# Patient Record
Sex: MALE | Race: WHITE | NOT HISPANIC OR LATINO | Employment: OTHER | URBAN - METROPOLITAN AREA
[De-identification: names, ages, dates, MRNs, and addresses within clinical notes are randomized per-mention and may not be internally consistent; named-entity substitution may affect disease eponyms.]

---

## 2018-10-19 ENCOUNTER — HOSPITAL ENCOUNTER (OUTPATIENT)
Facility: MEDICAL CENTER | Age: 73
DRG: 254 | End: 2018-10-19
Payer: MEDICARE

## 2018-10-19 ENCOUNTER — HOSPITAL ENCOUNTER (OUTPATIENT)
Dept: RADIOLOGY | Facility: MEDICAL CENTER | Age: 73
End: 2018-10-19

## 2018-10-19 ENCOUNTER — HOSPITAL ENCOUNTER (INPATIENT)
Facility: MEDICAL CENTER | Age: 73
LOS: 7 days | DRG: 254 | End: 2018-10-26
Admitting: INTERNAL MEDICINE
Payer: MEDICARE

## 2018-10-19 DIAGNOSIS — I99.8 ISCHEMIA OF TOE: ICD-10-CM

## 2018-10-19 PROBLEM — L98.8 MORGELLONS SYNDROME: Status: ACTIVE | Noted: 2018-10-19

## 2018-10-19 PROBLEM — I73.9 PAD (PERIPHERAL ARTERY DISEASE) (HCC): Status: ACTIVE | Noted: 2018-10-19

## 2018-10-19 PROBLEM — E87.6 HYPOKALEMIA: Status: ACTIVE | Noted: 2018-10-19

## 2018-10-19 PROBLEM — L03.116 CELLULITIS OF LEFT LOWER EXTREMITY: Status: ACTIVE | Noted: 2018-10-19

## 2018-10-19 LAB
APTT PPP: 35.9 SEC (ref 24.7–36)
INR PPP: 1.08 (ref 0.87–1.13)
PLATELET # BLD AUTO: 291 K/UL (ref 164–446)
PROTHROMBIN TIME: 14.1 SEC (ref 12–14.6)

## 2018-10-19 PROCEDURE — 700101 HCHG RX REV CODE 250: Performed by: HOSPITALIST

## 2018-10-19 PROCEDURE — 85610 PROTHROMBIN TIME: CPT

## 2018-10-19 PROCEDURE — 36415 COLL VENOUS BLD VENIPUNCTURE: CPT

## 2018-10-19 PROCEDURE — 85730 THROMBOPLASTIN TIME PARTIAL: CPT

## 2018-10-19 PROCEDURE — 700102 HCHG RX REV CODE 250 W/ 637 OVERRIDE(OP): Performed by: HOSPITALIST

## 2018-10-19 PROCEDURE — 700111 HCHG RX REV CODE 636 W/ 250 OVERRIDE (IP): Performed by: HOSPITALIST

## 2018-10-19 PROCEDURE — A9270 NON-COVERED ITEM OR SERVICE: HCPCS | Performed by: HOSPITALIST

## 2018-10-19 PROCEDURE — 99223 1ST HOSP IP/OBS HIGH 75: CPT | Mod: AI | Performed by: HOSPITALIST

## 2018-10-19 PROCEDURE — 85049 AUTOMATED PLATELET COUNT: CPT

## 2018-10-19 PROCEDURE — 770006 HCHG ROOM/CARE - MED/SURG/GYN SEMI*

## 2018-10-19 RX ORDER — BRIMONIDINE TARTRATE 2 MG/ML
1 SOLUTION/ DROPS OPHTHALMIC 2 TIMES DAILY
Status: ON HOLD | COMMUNITY
End: 2019-07-08

## 2018-10-19 RX ORDER — MULTIVIT WITH MINERALS/LUTEIN
1 TABLET ORAL DAILY
COMMUNITY

## 2018-10-19 RX ORDER — ACETAMINOPHEN 325 MG/1
650 TABLET ORAL EVERY 6 HOURS PRN
Status: DISCONTINUED | OUTPATIENT
Start: 2018-10-19 | End: 2018-10-26 | Stop reason: HOSPADM

## 2018-10-19 RX ORDER — ONDANSETRON 2 MG/ML
4 INJECTION INTRAMUSCULAR; INTRAVENOUS EVERY 4 HOURS PRN
Status: DISCONTINUED | OUTPATIENT
Start: 2018-10-19 | End: 2018-10-26 | Stop reason: HOSPADM

## 2018-10-19 RX ORDER — BISACODYL 10 MG
10 SUPPOSITORY, RECTAL RECTAL
Status: DISCONTINUED | OUTPATIENT
Start: 2018-10-19 | End: 2018-10-26 | Stop reason: HOSPADM

## 2018-10-19 RX ORDER — OXYCODONE HYDROCHLORIDE 5 MG/1
5 TABLET ORAL EVERY 4 HOURS PRN
Status: DISCONTINUED | OUTPATIENT
Start: 2018-10-19 | End: 2018-10-20

## 2018-10-19 RX ORDER — BRIMONIDINE TARTRATE 2 MG/ML
1 SOLUTION/ DROPS OPHTHALMIC 2 TIMES DAILY
Status: DISCONTINUED | OUTPATIENT
Start: 2018-10-19 | End: 2018-10-26 | Stop reason: HOSPADM

## 2018-10-19 RX ORDER — HEPARIN SODIUM 1000 [USP'U]/ML
2600 INJECTION, SOLUTION INTRAVENOUS; SUBCUTANEOUS PRN
Status: DISCONTINUED | OUTPATIENT
Start: 2018-10-19 | End: 2018-10-20

## 2018-10-19 RX ORDER — HEPARIN SODIUM 1000 [USP'U]/ML
5000 INJECTION, SOLUTION INTRAVENOUS; SUBCUTANEOUS ONCE
Status: COMPLETED | OUTPATIENT
Start: 2018-10-19 | End: 2018-10-19

## 2018-10-19 RX ORDER — POLYETHYLENE GLYCOL 3350 17 G/17G
1 POWDER, FOR SOLUTION ORAL
Status: DISCONTINUED | OUTPATIENT
Start: 2018-10-19 | End: 2018-10-26 | Stop reason: HOSPADM

## 2018-10-19 RX ORDER — GUAIFENESIN/DEXTROMETHORPHAN 100-10MG/5
10 SYRUP ORAL EVERY 6 HOURS PRN
Status: DISCONTINUED | OUTPATIENT
Start: 2018-10-19 | End: 2018-10-26 | Stop reason: HOSPADM

## 2018-10-19 RX ORDER — ATORVASTATIN CALCIUM 20 MG/1
20 TABLET, FILM COATED ORAL NIGHTLY
Status: ON HOLD | COMMUNITY
End: 2019-07-08

## 2018-10-19 RX ORDER — LATANOPROST 50 UG/ML
1 SOLUTION/ DROPS OPHTHALMIC NIGHTLY
Status: ON HOLD | COMMUNITY
End: 2019-07-08

## 2018-10-19 RX ORDER — M-VIT,TX,IRON,MINS/CALC/FOLIC 27MG-0.4MG
1 TABLET ORAL DAILY
Status: DISCONTINUED | OUTPATIENT
Start: 2018-10-20 | End: 2018-10-26 | Stop reason: HOSPADM

## 2018-10-19 RX ORDER — AMOXICILLIN 250 MG
2 CAPSULE ORAL 2 TIMES DAILY
Status: DISCONTINUED | OUTPATIENT
Start: 2018-10-19 | End: 2018-10-26 | Stop reason: HOSPADM

## 2018-10-19 RX ORDER — ONDANSETRON 4 MG/1
4 TABLET, ORALLY DISINTEGRATING ORAL EVERY 4 HOURS PRN
Status: DISCONTINUED | OUTPATIENT
Start: 2018-10-19 | End: 2018-10-26 | Stop reason: HOSPADM

## 2018-10-19 RX ORDER — TIMOLOL MALEATE 5 MG/ML
1 SOLUTION/ DROPS OPHTHALMIC 2 TIMES DAILY
Status: ON HOLD | COMMUNITY
End: 2019-07-08

## 2018-10-19 RX ORDER — ATORVASTATIN CALCIUM 20 MG/1
20 TABLET, FILM COATED ORAL NIGHTLY
Status: DISCONTINUED | OUTPATIENT
Start: 2018-10-19 | End: 2018-10-26 | Stop reason: HOSPADM

## 2018-10-19 RX ORDER — TIMOLOL MALEATE 5 MG/ML
1 SOLUTION/ DROPS OPHTHALMIC 2 TIMES DAILY
Status: DISCONTINUED | OUTPATIENT
Start: 2018-10-19 | End: 2018-10-26 | Stop reason: HOSPADM

## 2018-10-19 RX ORDER — LATANOPROST 50 UG/ML
1 SOLUTION/ DROPS OPHTHALMIC NIGHTLY
Status: DISCONTINUED | OUTPATIENT
Start: 2018-10-19 | End: 2018-10-26 | Stop reason: HOSPADM

## 2018-10-19 RX ADMIN — HEPARIN SODIUM 1050 UNITS/HR: 5000 INJECTION, SOLUTION INTRAVENOUS at 20:42

## 2018-10-19 RX ADMIN — TIMOLOL MALEATE 1 DROP: 5 SOLUTION/ DROPS OPHTHALMIC at 20:26

## 2018-10-19 RX ADMIN — BRIMONIDINE TARTRATE 1 DROP: 2 SOLUTION OPHTHALMIC at 20:26

## 2018-10-19 RX ADMIN — ACETAMINOPHEN 650 MG: 325 TABLET, FILM COATED ORAL at 23:18

## 2018-10-19 RX ADMIN — LATANOPROST 1 DROP: 50 SOLUTION OPHTHALMIC at 20:41

## 2018-10-19 RX ADMIN — ATORVASTATIN CALCIUM 20 MG: 20 TABLET, FILM COATED ORAL at 20:27

## 2018-10-19 RX ADMIN — OXYCODONE HYDROCHLORIDE 5 MG: 5 TABLET ORAL at 20:27

## 2018-10-19 RX ADMIN — HEPARIN SODIUM 5000 UNITS: 1000 INJECTION, SOLUTION INTRAVENOUS; SUBCUTANEOUS at 20:41

## 2018-10-19 ASSESSMENT — COGNITIVE AND FUNCTIONAL STATUS - GENERAL
SUGGESTED CMS G CODE MODIFIER DAILY ACTIVITY: CH
DAILY ACTIVITIY SCORE: 24
SUGGESTED CMS G CODE MODIFIER MOBILITY: CH
MOBILITY SCORE: 24

## 2018-10-19 ASSESSMENT — PAIN SCALES - GENERAL
PAINLEVEL_OUTOF10: 3
PAINLEVEL_OUTOF10: 6

## 2018-10-19 ASSESSMENT — ENCOUNTER SYMPTOMS
PALPITATIONS: 0
NAUSEA: 0
DIZZINESS: 0
VOMITING: 0
COUGH: 0
CHILLS: 0
ORTHOPNEA: 0
HEMOPTYSIS: 0
SPUTUM PRODUCTION: 0

## 2018-10-19 ASSESSMENT — COPD QUESTIONNAIRES
DURING THE PAST 4 WEEKS HOW MUCH DID YOU FEEL SHORT OF BREATH: NONE/LITTLE OF THE TIME
DO YOU EVER COUGH UP ANY MUCUS OR PHLEGM?: NO/ONLY WITH OCCASIONAL COLDS OR INFECTIONS
IN THE PAST 12 MONTHS DO YOU DO LESS THAN YOU USED TO BECAUSE OF YOUR BREATHING PROBLEMS: DISAGREE/UNSURE
HAVE YOU SMOKED AT LEAST 100 CIGARETTES IN YOUR ENTIRE LIFE: NO/DON'T KNOW
COPD SCREENING SCORE: 2

## 2018-10-19 ASSESSMENT — LIFESTYLE VARIABLES
ALCOHOL_USE: NO
EVER_SMOKED: YES

## 2018-10-19 ASSESSMENT — PATIENT HEALTH QUESTIONNAIRE - PHQ9
2. FEELING DOWN, DEPRESSED, IRRITABLE, OR HOPELESS: NOT AT ALL
SUM OF ALL RESPONSES TO PHQ9 QUESTIONS 1 AND 2: 0
1. LITTLE INTEREST OR PLEASURE IN DOING THINGS: NOT AT ALL

## 2018-10-19 NOTE — PROGRESS NOTES
Direct admit from [facility]. Accepted by Dr. Arteaga for ischemic lt toe.  ADT signed & held, needs to be released upon pt arrival.  No written orders received.  Pt coming by ground.

## 2018-10-20 ENCOUNTER — APPOINTMENT (OUTPATIENT)
Dept: RADIOLOGY | Facility: MEDICAL CENTER | Age: 73
DRG: 254 | End: 2018-10-20
Attending: SURGERY
Payer: MEDICARE

## 2018-10-20 LAB
ANION GAP SERPL CALC-SCNC: 9 MMOL/L (ref 0–11.9)
APTT PPP: 46.4 SEC (ref 24.7–36)
APTT PPP: 81.2 SEC (ref 24.7–36)
BASOPHILS # BLD AUTO: 0.2 % (ref 0–1.8)
BASOPHILS # BLD: 0.02 K/UL (ref 0–0.12)
BUN SERPL-MCNC: 23 MG/DL (ref 8–22)
CALCIUM SERPL-MCNC: 9.2 MG/DL (ref 8.5–10.5)
CHLORIDE SERPL-SCNC: 100 MMOL/L (ref 96–112)
CO2 SERPL-SCNC: 28 MMOL/L (ref 20–33)
CREAT SERPL-MCNC: 1.47 MG/DL (ref 0.5–1.4)
EOSINOPHIL # BLD AUTO: 0.29 K/UL (ref 0–0.51)
EOSINOPHIL NFR BLD: 3.4 % (ref 0–6.9)
ERYTHROCYTE [DISTWIDTH] IN BLOOD BY AUTOMATED COUNT: 49.6 FL (ref 35.9–50)
GLUCOSE SERPL-MCNC: 89 MG/DL (ref 65–99)
HCT VFR BLD AUTO: 34.3 % (ref 42–52)
HGB BLD-MCNC: 10.9 G/DL (ref 14–18)
IMM GRANULOCYTES # BLD AUTO: 0.04 K/UL (ref 0–0.11)
IMM GRANULOCYTES NFR BLD AUTO: 0.5 % (ref 0–0.9)
LYMPHOCYTES # BLD AUTO: 2.19 K/UL (ref 1–4.8)
LYMPHOCYTES NFR BLD: 25.9 % (ref 22–41)
MCH RBC QN AUTO: 30.4 PG (ref 27–33)
MCHC RBC AUTO-ENTMCNC: 31.8 G/DL (ref 33.7–35.3)
MCV RBC AUTO: 95.5 FL (ref 81.4–97.8)
MONOCYTES # BLD AUTO: 0.93 K/UL (ref 0–0.85)
MONOCYTES NFR BLD AUTO: 11 % (ref 0–13.4)
NEUTROPHILS # BLD AUTO: 4.97 K/UL (ref 1.82–7.42)
NEUTROPHILS NFR BLD: 59 % (ref 44–72)
NRBC # BLD AUTO: 0 K/UL
NRBC BLD-RTO: 0 /100 WBC
PLATELET # BLD AUTO: 264 K/UL (ref 164–446)
PMV BLD AUTO: 8.6 FL (ref 9–12.9)
POTASSIUM SERPL-SCNC: 4.5 MMOL/L (ref 3.6–5.5)
RBC # BLD AUTO: 3.59 M/UL (ref 4.7–6.1)
SODIUM SERPL-SCNC: 137 MMOL/L (ref 135–145)
WBC # BLD AUTO: 8.4 K/UL (ref 4.8–10.8)

## 2018-10-20 PROCEDURE — 93880 EXTRACRANIAL BILAT STUDY: CPT | Mod: 26 | Performed by: SURGERY

## 2018-10-20 PROCEDURE — 93880 EXTRACRANIAL BILAT STUDY: CPT

## 2018-10-20 PROCEDURE — 700111 HCHG RX REV CODE 636 W/ 250 OVERRIDE (IP): Performed by: SURGERY

## 2018-10-20 PROCEDURE — A9270 NON-COVERED ITEM OR SERVICE: HCPCS | Performed by: SURGERY

## 2018-10-20 PROCEDURE — 700102 HCHG RX REV CODE 250 W/ 637 OVERRIDE(OP): Performed by: INTERNAL MEDICINE

## 2018-10-20 PROCEDURE — 770006 HCHG ROOM/CARE - MED/SURG/GYN SEMI*

## 2018-10-20 PROCEDURE — A9270 NON-COVERED ITEM OR SERVICE: HCPCS | Performed by: INTERNAL MEDICINE

## 2018-10-20 PROCEDURE — 36415 COLL VENOUS BLD VENIPUNCTURE: CPT

## 2018-10-20 PROCEDURE — 700111 HCHG RX REV CODE 636 W/ 250 OVERRIDE (IP): Performed by: INTERNAL MEDICINE

## 2018-10-20 PROCEDURE — 700102 HCHG RX REV CODE 250 W/ 637 OVERRIDE(OP): Performed by: HOSPITALIST

## 2018-10-20 PROCEDURE — 85025 COMPLETE CBC W/AUTO DIFF WBC: CPT

## 2018-10-20 PROCEDURE — 80048 BASIC METABOLIC PNL TOTAL CA: CPT

## 2018-10-20 PROCEDURE — 85730 THROMBOPLASTIN TIME PARTIAL: CPT

## 2018-10-20 PROCEDURE — 700105 HCHG RX REV CODE 258: Performed by: INTERNAL MEDICINE

## 2018-10-20 PROCEDURE — 700111 HCHG RX REV CODE 636 W/ 250 OVERRIDE (IP): Performed by: HOSPITALIST

## 2018-10-20 PROCEDURE — 99233 SBSQ HOSP IP/OBS HIGH 50: CPT | Performed by: INTERNAL MEDICINE

## 2018-10-20 PROCEDURE — 700102 HCHG RX REV CODE 250 W/ 637 OVERRIDE(OP): Performed by: SURGERY

## 2018-10-20 PROCEDURE — A9270 NON-COVERED ITEM OR SERVICE: HCPCS | Performed by: HOSPITALIST

## 2018-10-20 RX ORDER — ASPIRIN 81 MG/1
81 TABLET, CHEWABLE ORAL DAILY
Status: DISCONTINUED | OUTPATIENT
Start: 2018-10-20 | End: 2018-10-26 | Stop reason: HOSPADM

## 2018-10-20 RX ORDER — OXYCODONE HYDROCHLORIDE 5 MG/1
5-10 TABLET ORAL EVERY 4 HOURS PRN
Status: DISCONTINUED | OUTPATIENT
Start: 2018-10-20 | End: 2018-10-26 | Stop reason: HOSPADM

## 2018-10-20 RX ORDER — HEPARIN SODIUM 5000 [USP'U]/ML
5000 INJECTION, SOLUTION INTRAVENOUS; SUBCUTANEOUS EVERY 8 HOURS
Status: DISCONTINUED | OUTPATIENT
Start: 2018-10-20 | End: 2018-10-26 | Stop reason: HOSPADM

## 2018-10-20 RX ORDER — SODIUM CHLORIDE 9 MG/ML
INJECTION, SOLUTION INTRAVENOUS CONTINUOUS
Status: DISCONTINUED | OUTPATIENT
Start: 2018-10-20 | End: 2018-10-24

## 2018-10-20 RX ORDER — MORPHINE SULFATE 4 MG/ML
2 INJECTION, SOLUTION INTRAMUSCULAR; INTRAVENOUS
Status: DISCONTINUED | OUTPATIENT
Start: 2018-10-20 | End: 2018-10-26 | Stop reason: HOSPADM

## 2018-10-20 RX ORDER — CLOPIDOGREL BISULFATE 75 MG/1
75 TABLET ORAL DAILY
Status: DISCONTINUED | OUTPATIENT
Start: 2018-10-20 | End: 2018-10-26 | Stop reason: HOSPADM

## 2018-10-20 RX ADMIN — BRIMONIDINE TARTRATE 1 DROP: 2 SOLUTION OPHTHALMIC at 17:01

## 2018-10-20 RX ADMIN — LATANOPROST 1 DROP: 50 SOLUTION OPHTHALMIC at 21:20

## 2018-10-20 RX ADMIN — MORPHINE SULFATE 2 MG: 4 INJECTION INTRAVENOUS at 19:48

## 2018-10-20 RX ADMIN — HEPARIN SODIUM 5000 UNITS: 5000 INJECTION, SOLUTION INTRAVENOUS; SUBCUTANEOUS at 14:07

## 2018-10-20 RX ADMIN — BRIMONIDINE TARTRATE 1 DROP: 2 SOLUTION OPHTHALMIC at 06:31

## 2018-10-20 RX ADMIN — ATORVASTATIN CALCIUM 20 MG: 20 TABLET, FILM COATED ORAL at 21:14

## 2018-10-20 RX ADMIN — OXYCODONE HYDROCHLORIDE 10 MG: 5 TABLET ORAL at 16:59

## 2018-10-20 RX ADMIN — HEPARIN SODIUM 5000 UNITS: 5000 INJECTION, SOLUTION INTRAVENOUS; SUBCUTANEOUS at 21:14

## 2018-10-20 RX ADMIN — SODIUM CHLORIDE: 9 INJECTION, SOLUTION INTRAVENOUS at 19:48

## 2018-10-20 RX ADMIN — OXYCODONE HYDROCHLORIDE 5 MG: 5 TABLET ORAL at 00:39

## 2018-10-20 RX ADMIN — SENNOSIDES AND DOCUSATE SODIUM 2 TABLET: 8.6; 5 TABLET ORAL at 21:14

## 2018-10-20 RX ADMIN — TIMOLOL MALEATE 1 DROP: 5 SOLUTION/ DROPS OPHTHALMIC at 17:01

## 2018-10-20 RX ADMIN — MORPHINE SULFATE 2 MG: 4 INJECTION INTRAVENOUS at 09:13

## 2018-10-20 RX ADMIN — TIMOLOL MALEATE 1 DROP: 5 SOLUTION/ DROPS OPHTHALMIC at 06:31

## 2018-10-20 RX ADMIN — Medication 81 MG: at 10:20

## 2018-10-20 RX ADMIN — CLOPIDOGREL 75 MG: 75 TABLET, FILM COATED ORAL at 10:20

## 2018-10-20 RX ADMIN — MORPHINE SULFATE 2 MG: 4 INJECTION INTRAVENOUS at 14:07

## 2018-10-20 RX ADMIN — SODIUM CHLORIDE: 9 INJECTION, SOLUTION INTRAVENOUS at 09:13

## 2018-10-20 RX ADMIN — OXYCODONE HYDROCHLORIDE 10 MG: 5 TABLET ORAL at 21:14

## 2018-10-20 ASSESSMENT — ENCOUNTER SYMPTOMS
SENSORY CHANGE: 0
VOMITING: 0
FEVER: 0
FLANK PAIN: 0
DIAPHORESIS: 0
COUGH: 0
ABDOMINAL PAIN: 0
NAUSEA: 0
MEMORY LOSS: 0
DIZZINESS: 0
SHORTNESS OF BREATH: 0
MYALGIAS: 1
HEADACHES: 0
FOCAL WEAKNESS: 0
NERVOUS/ANXIOUS: 0
WEAKNESS: 1
BACK PAIN: 0
CHILLS: 0
PALPITATIONS: 0
SPEECH CHANGE: 0
DEPRESSION: 0

## 2018-10-20 ASSESSMENT — PAIN SCALES - GENERAL
PAINLEVEL_OUTOF10: 4
PAINLEVEL_OUTOF10: 2
PAINLEVEL_OUTOF10: 7
PAINLEVEL_OUTOF10: 6
PAINLEVEL_OUTOF10: 10
PAINLEVEL_OUTOF10: 10
PAINLEVEL_OUTOF10: 7

## 2018-10-20 NOTE — PROGRESS NOTES
Med rec complete per pt at bedside with RX bottles  Bottles reviewed and returned to pt's belongings bag  Pt does not have latanoprost or centrum multivitamin with his possessions  Allergies reviewed - NKDA  No ABX in last month

## 2018-10-20 NOTE — PROGRESS NOTES
Report received from RN, assumed care at 1705  Pt is A0X4, and responds appropriately   Pt declines any SOB, chest pain, new onset of numbness/ tingiling  Pt rates pain at 3/10, on a scale of 1-10, pt resting at this time  Pt is voiding adequatly and without hesitancy  Pt has + flatus, + bowel sounds,  BM on 10/18/2018 PTA   Pt ambulates with a stand by assist and a steady gait  Pt is currently NPO, pt denies any nausea/vomiting  Pt has left foot digit three gang green toe, and 2+ pitting edema in L foot  Pt has scattered scabs and abrasions on entiertity of skin  Pt has cut on sacrum, picture taken and uploaded to [facility], wound consult placed   Admit done   Skin check complete   Plan of care discussed, all questions answered. Explained importance of calling before getting OOB and pt verbalizes understanding. Explained importance of oral care. Call light is within reach, treaded slipper socks on, bed in lowest/ locked position, hourly rounding in place, all needs met at this time

## 2018-10-20 NOTE — PROGRESS NOTES
[facility] Hospitalist Progress Note    Date of Service: 10/20/2018    Chief Complaint  73 y.o. male admitted 10/19/2018 with history of cellulitis and now with resultant ischemic left toes on anticoagulation.    Interval Problem Update  + left toe pain, ttp  Denies sob/nausea    Consultants/Specialty  surgery    Disposition   tbd        Review of Systems   Constitutional: Negative for chills, diaphoresis, fever and malaise/fatigue.   HENT: Negative for congestion and hearing loss.    Respiratory: Negative for cough and shortness of breath.    Cardiovascular: Negative for chest pain, palpitations and leg swelling.   Gastrointestinal: Negative for abdominal pain, nausea and vomiting.   Genitourinary: Negative for dysuria and flank pain.   Musculoskeletal: Positive for joint pain and myalgias. Negative for back pain.   Neurological: Positive for weakness. Negative for dizziness, sensory change, speech change, focal weakness and headaches.   Psychiatric/Behavioral: Negative for depression and memory loss. The patient is not nervous/anxious.       Physical Exam  Laboratory/Imaging   Hemodynamics  Temp (24hrs), Av.8 °C (98.3 °F), Min:36.4 °C (97.5 °F), Max:37.3 °C (99.2 °F)   Temperature: 37.3 °C (99.2 °F)  Pulse  Av.6  Min: 61  Max: 77    Blood Pressure : 140/84      Respiratory      Respiration: 18, Pulse Oximetry: 93 %        RUL Breath Sounds: Clear, RML Breath Sounds: Diminished, RLL Breath Sounds: Diminished, BRIAN Breath Sounds: Clear, LLL Breath Sounds: Diminished    Fluids    Intake/Output Summary (Last 24 hours) at 10/20/18 1251  Last data filed at 10/20/18 0800   Gross per 24 hour   Intake                0 ml   Output                0 ml   Net                0 ml       Nutrition  Orders Placed This Encounter   Procedures   • Diet Order Regular     Standing Status:   Standing     Number of Occurrences:   1     Order Specific Question:   Diet:     Answer:   Regular [1]   • Diet NPO     Standing Status:    Standing     Number of Occurrences:   8     Order Specific Question:   Restrict to:     Answer:   Sips with Medications [3]     Physical Exam   Constitutional: He is oriented to person, place, and time. He appears well-nourished. No distress.   HENT:   Head: Normocephalic and atraumatic.   Nose: Nose normal.   Eyes: Pupils are equal, round, and reactive to light. EOM are normal. Right eye exhibits no discharge. Left eye exhibits no discharge.   Neck: Neck supple. No thyromegaly present.   Cardiovascular: Normal rate and intact distal pulses.    Pulmonary/Chest: Breath sounds normal. No respiratory distress. He has no wheezes.   Abdominal: Soft. Bowel sounds are normal. He exhibits no distension. There is no tenderness.   Musculoskeletal: He exhibits no edema or tenderness.   Neurological: He is alert and oriented to person, place, and time. No cranial nerve deficit. Coordination normal.   Skin: Skin is warm and dry. No rash noted. He is not diaphoretic. No erythema.   Ischemic toes, ttp    Psychiatric: He has a normal mood and affect. His behavior is normal. Thought content normal.   Nursing note and vitals reviewed.      Recent Labs      10/19/18   2029  10/20/18   0330   WBC   --   8.4   RBC   --   3.59*   HEMOGLOBIN   --   10.9*   HEMATOCRIT   --   34.3*   MCV   --   95.5   MCH   --   30.4   MCHC   --   31.8*   RDW   --   49.6   PLATELETCT  291  264   MPV   --   8.6*     Recent Labs      10/20/18   0330   SODIUM  137   POTASSIUM  4.5   CHLORIDE  100   CO2  28   GLUCOSE  89   BUN  23*   CREATININE  1.47*   CALCIUM  9.2     Recent Labs      10/19/18   2029  10/20/18   0331  10/20/18   0957   APTT  35.9  81.2*  46.4*   INR  1.08   --    --                   Assessment/Plan     Ischemia of toe- (present on admission)   Assessment & Plan    Ischemia of multiple toes on the left foot  Improved with anticoagulation  Heparin drip for now in anticipation of probable invasive procedure  Rate of heparin drip will be  adjusted based on serial pTT measurements to ensure adequate anticoagulation and to avoid potential complications of bleeding  Dr. Grande of vascular surgery , npo for ? 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

## 2018-10-20 NOTE — ASSESSMENT & PLAN NOTE
CTA at outside hospital demonstrates extensive PAD  Continue statin and heparin for now  Vascular surgery consult

## 2018-10-20 NOTE — PROGRESS NOTES
2 RN skin check complete     Pt has left foot digit three gang green toe     Pt has scattered scabs and abrasions on entiertity of skin    Pt has cut on sacrum, picture taken and uploaded to [facility], wound consult placed     Pt has callous on bilateral heels     All bony prominces are intact, no areas of skin breakdown noted

## 2018-10-20 NOTE — PROGRESS NOTES
Bedside report completed.  A&O x4.  In no acute distress.   VSS.  SpO2 100% on RA.  Tolerating regular diet, denies N/V.  Complaints of L foot pain, will medicate per MAR.  Last BM 10/18/18 per pt report    Call light and personal belongings within reach. POC discussed and all questions answered.  Hourly rounding in place.  No additional needs at this time.

## 2018-10-20 NOTE — CARE PLAN
Problem: Safety  Goal: Will remain free from falls  Patient is not deemed a fall risk. Patient educated to call for assistance when necessary. Call light left within reach of patient.     Problem: Infection  Goal: Will remain free from infection    Intervention: Implement standard precautions and perform hand washing before and after patient contact  Hand hygiene performed prior to and after entering pt room. Gloves worn during patient interactions.

## 2018-10-20 NOTE — WOUND TEAM
Came to check wound to coccyx. Patient currently having carotid US being done. Wound team will return in am. Nursing to continue pressure ulcer preventative measures.

## 2018-10-20 NOTE — H&P
Hospital Medicine History & Physical Note    Date of Service  10/19/2018    Primary Care Physician  No primary care provider on file.    Consultants  Dr. Grande, vascular surgery    Code Status  Full Code    Chief Complaint  Discolored toes on left foot    History of Presenting Illness  73 y.o. male who presented 10/19/2018 with discoloration of his left toes.    Mr Lara has a history of tobacco dependence and hyperlipidemia.  He was admitted to [facility] and is transferred to [facility] for vascular surgery coverage.  His medical chart was reviewed and is summarized as follows: He was admitted on 10/12/2018 with acute onset of pain at the left 1st through 4th toes, it was also felt that he had cellulitis in this area. He was treated with antibiotics and anti-coagulated with lovenox.  This resulted in improvement in area of erythema and some regression of the blue area on his toes.  CT angiography demonstrated extensive peripheral arterial disease and plaque.     Review of Systems  Review of Systems   Constitutional: Negative for chills and malaise/fatigue.   Respiratory: Negative for cough, hemoptysis and sputum production.    Cardiovascular: Negative for chest pain, palpitations and orthopnea.   Gastrointestinal: Negative for nausea and vomiting.   Skin: Negative for itching and rash.   Neurological: Negative for dizziness.   All other systems reviewed and are negative.      Past Medical History  Hyperlipidemia  Glaucoma  Macular degeneration    Surgical History   has no past surgical history on file.     Family History  Father with alcoholism, mother with macular degeneration     Social History  Quit smoking 10 years ago  Rare alcohol  Smokes Marijuana    Allergies  No Known Allergies    Medications  Prior to Admission Medications   Prescriptions Last Dose Informant Patient Reported? Taking?   Multiple Vitamins-Minerals (CENTRUM SILVER) Tab 10/18/2018 at am Patient Yes Yes   Sig: Take 1 Tab by mouth  every day.   atorvastatin (LIPITOR) 20 MG Tab 10/18/2018 at pm Rx Bottle (For Med Information) Yes Yes   Sig: Take 20 mg by mouth every evening.   brimonidine (ALPHAGAN) 0.2 % Solution 10/18/2018 at pm Rx Bottle (For Med Information) Yes Yes   Sig: Place 1 Drop in right eye 2 Times a Day.   latanoprost (XALATAN) 0.005 % Solution 10/18/2018 at pm Patient Yes Yes   Sig: Place 1 Drop in right eye every evening.   timolol (TIMOPTIC) 0.5 % Solution 10/18/2018 at pm Rx Bottle (For Med Information) Yes Yes   Sig: Place 1 Drop in right eye 2 times a day.      Facility-Administered Medications: None       Physical Exam  Temp:  [36.9 °C (98.5 °F)] 36.9 °C (98.5 °F)  Pulse:  [71] 71  Resp:  [16] 16  BP: (143)/(81) 143/81    Physical Exam   Constitutional: He is oriented to person, place, and time. He appears well-developed and well-nourished.   HENT:   Head: Normocephalic and atraumatic.   Eyes: Conjunctivae and EOM are normal. Right eye exhibits no discharge. Left eye exhibits no discharge.   Cardiovascular: Normal rate, regular rhythm and intact distal pulses.    No murmur heard.  2+ Radial Pulses  Brisk Capillary Refill   Pulmonary/Chest: Effort normal and breath sounds normal. No respiratory distress. He has no wheezes.   Abdominal: Soft. Bowel sounds are normal. He exhibits no distension. There is no tenderness. There is no rebound.   Musculoskeletal: Normal range of motion. He exhibits no edema.   Left 1st-4th foot is dusky  2nd toe is purple  Minimal surrounding erythema   Neurological: He is alert and oriented to person, place, and time. No cranial nerve deficit.   Skin: Skin is warm and dry. He is not diaphoretic. No erythema.   Skin is warm and well perfused   Psychiatric: He has a normal mood and affect.       Laboratory:  WBC: 9.1, HGB 12.2, Plt: 369  Sodium 139, Potassium 3.3, Bun 24, Creatinine 1.3    Imaging:  EKG, reviewed by myself: sinus bradycardia, no ST segment elevation    Assessment/Plan:  I anticipate  this patient will require at least two midnights for appropriate medical management, necessitating inpatient admission.    Ischemia of toe- (present on admission)   Assessment & Plan    Ischemia of multiple toes on the left foot  Improved with anticoagulation  Heparin drip for now in anticipation of probable invasive procedure  Rate of heparin drip will be adjusted based on serial pTT measurements to ensure adequate anticoagulation and to avoid potential complications of bleeding  Dr. Grande of vascular surgery has been consulted and will see the patient        Cellulitis of left lower extremity- (present on admission)   Assessment & Plan    At this point any cellulitis appears to be resolved, hold off on further antibiotics        PAD (peripheral artery disease) (HCC)- (present on admission)   Assessment & Plan    CTA at outside hospital demonstrates extensive PAD  Continue statin and heparin for now  Vascular surgery consult        Hypokalemia- (present on admission)   Assessment & Plan    Recheck labs now            VTE prophylaxis: heparin

## 2018-10-20 NOTE — PROGRESS NOTES
Report received from RN, assumed care at 0700  Pt is A0X4, and responds appropriately   Pt declines any SOB, chest pain, new onset of numbness/ tingiling  Pt rates pain at 10 /10, on a scale of 1-10, awaiting page back from hospitalist regarding pain medications   Pt is voiding adequatly and without hesitancy  Pt has + flatus, + bowel sounds, BM on 10/18/2018 PTA   Pt ambulates with a stand by assist   Pt is NPO awaiting surgical consult, pt denies any nausea/vomiting  Pt has left foot digit three gang green toe, and 2+ pitting edema in L foot, pulses noted in bilateral feet  Pt has scattered scabs and abrasions on entiertity of skin  Pt has cut on sacrum    Plan of care discussed, all questions answered. Explained importance of calling before getting OOB and pt verbalizes understanding. Explained importance of oral care. Call light is within reach, treaded slipper socks on, bed in lowest/ locked position, hourly rounding in place, all needs met at this time

## 2018-10-20 NOTE — CONSULTS
Seen and examined.  Please see dictated note, #[id number].    Plan:  1) Hydrate patient with NS for his chronic kidney disease in anticipation of angiogram with intervention on Monday.  Will also need amputation of left 3rd toe at some point post endovascular intervention.  2) Stop Heparin gtt.  Start Plavix.    Discussed with patient and RN.    Thank you!

## 2018-10-20 NOTE — PROGRESS NOTES
Admitting hospitalist paged for updates that pt is here, admit is done, height and weight obtained, PIV started, med rec being done by pharmacy.

## 2018-10-20 NOTE — CONSULTS
DATE OF SERVICE:  10/20/2018    HOSPITAL VISIT    REFERRING PHYSICIAN:  Kev Schmidt MD    CHIEF COMPLAINT:  Left toe pain.    HISTORY OF PRESENT ILLNESS:  The patient is a pleasant 73 years old male with   history of tobacco abuse and hyperlipidemia who was admitted to outside   hospital for cellulitis and gangrene of the left toes.  He was treated with IV   antibiotics and heparin anticoagulation.  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  The popliteal and runoff   vessels appeared to be patent, although is diffusively calcified.  Patient was   transferred to [facility].  I was asked to see patient.    PAST MEDICAL HISTORY:  Significant for hyperlipidemia, glaucoma, macular   degeneration.  The patient denied a history of coronary artery disease or   pulmonary disease.    ALLERGIES:  NKDA.    CURRENT MEDICATIONS:  Reviewed.  Patient is on heparin.    SOCIAL HISTORY:  Significant for tobacco use up to about 10 years ago.  He   denies alcohol abuse.  He smokes weed, but deny other illicit drug use.    FAMILY HISTORY:  Significant for alcoholism in his father and macular   degeneration in his mother.    REVIEW OF SYSTEMS:  Significant for left toe pain.  Patient denied chest pain.    He does have some mild dyspnea on exertion.  He denied any neurological   symptom.  The remaining of his review of system is negative as per AMA/CMS   criteria.    PHYSICAL EXAMINATION:  GENERAL:  Patient is a well-developed, well-nourished male in no apparent   distress.  HEENT:  Unremarkable.  LUNGS:  Clear to auscultation bilaterally.  CARDIOVASCULAR:  Showed regular rate and rhythm.  ABDOMEN:  Showed the abdomen to be soft, nondistended, nontender with   normoactive bowel sounds.  EXTREMITIES:    Upper extremities showed the radial pulses to be palpable   bilaterally.  Lower extremity exam showed the femoral and  posterior tibial   pulses to be actually palpable bilaterally although weak on the left side.  There is 1+ edema of the left   foot.  The [id number] left third toe as well as medial aspect of left 2nd toe are gangrenous.  There is mild erythema   of the left second toe.  NEUROLOGIC:  Nonfocal.    I reviewed the CTA images from outside hospital.    LABORATORY DATA:  Also reviewed.    ASSESSMENT:  1.  Peripheral arterial disease with left toe ischemia and gangrene.  2.  Chronic kidney disease.  3.  Hyperlipidemia.  4.  History of tobacco use.  5.  Glaucoma.    PLAN:  I had a long discussion with patient.  I recommended that he undergo   angiogram with possible endovascular intervention to improve his circulation   to help with healing of the second toe and to hopefully ensure healing of the   left toe amputation.  The risks of the procedure were discussed.  Risks   include but not limited to bleeding, infection, atheroembolization, contrast   reaction, contrast-induced kidney failure, and perioperative anesthetic   complication.  The alternative of not having the procedure done was also   discussed.  With this option, there is a risk of progression of gangrene and   infection.  Patient indicated understanding and would like to proceed.  Due to   him having chronic renal insufficiency and received contrast bolus yesterday   for CTA, I would not be able to perform the angiogram today.  I recommend that   the patient be hydrated with normal saline over the next couple of days and   in preparation for angiogram on Monday.  Patient indicated understanding.    There is no indication for continuation of heparin intravenously.  I will   therefore stop the heparin and place patient on Plavix 75 mg once a day.    Since patient has risk factor for carotid disease, I ordered a carotid duplex   as well.    Thank you for the consultation.       ____________________________________     MD ANDREA FRITZ / RICHA    DD:   10/20/2018 09:23:18  DT:  10/20/2018 13:03:21    D#:  [id number]  Job#:  [id number]    cc: MINGO HERNANDEZ MD

## 2018-10-20 NOTE — ASSESSMENT & PLAN NOTE
On Plavix/ASA indefinitely  Dr. Grande, post angiogram with angioplasty followed by amputation x 2 toes  Wound care to continue

## 2018-10-21 LAB
ANION GAP SERPL CALC-SCNC: 7 MMOL/L (ref 0–11.9)
BASOPHILS # BLD AUTO: 0 % (ref 0–1.8)
BASOPHILS # BLD: 0 K/UL (ref 0–0.12)
BUN SERPL-MCNC: 21 MG/DL (ref 8–22)
CALCIUM SERPL-MCNC: 9.3 MG/DL (ref 8.5–10.5)
CHLORIDE SERPL-SCNC: 104 MMOL/L (ref 96–112)
CO2 SERPL-SCNC: 28 MMOL/L (ref 20–33)
CREAT SERPL-MCNC: 1.37 MG/DL (ref 0.5–1.4)
EOSINOPHIL # BLD AUTO: 0.07 K/UL (ref 0–0.51)
EOSINOPHIL NFR BLD: 0.9 % (ref 0–6.9)
ERYTHROCYTE [DISTWIDTH] IN BLOOD BY AUTOMATED COUNT: 51.2 FL (ref 35.9–50)
GLUCOSE SERPL-MCNC: 93 MG/DL (ref 65–99)
HCT VFR BLD AUTO: 34.7 % (ref 42–52)
HGB BLD-MCNC: 11.3 G/DL (ref 14–18)
LYMPHOCYTES # BLD AUTO: 2.56 K/UL (ref 1–4.8)
LYMPHOCYTES NFR BLD: 35.1 % (ref 22–41)
MANUAL DIFF BLD: NORMAL
MCH RBC QN AUTO: 31.4 PG (ref 27–33)
MCHC RBC AUTO-ENTMCNC: 32.6 G/DL (ref 33.7–35.3)
MCV RBC AUTO: 96.4 FL (ref 81.4–97.8)
METAMYELOCYTES NFR BLD MANUAL: 1.7 %
MONOCYTES # BLD AUTO: 0.58 K/UL (ref 0–0.85)
MONOCYTES NFR BLD AUTO: 7.9 % (ref 0–13.4)
MORPHOLOGY BLD-IMP: NORMAL
NEUTROPHILS # BLD AUTO: 3.97 K/UL (ref 1.82–7.42)
NEUTROPHILS NFR BLD: 53.5 % (ref 44–72)
NEUTS BAND NFR BLD MANUAL: 0.9 % (ref 0–10)
NRBC # BLD AUTO: 0 K/UL
NRBC BLD-RTO: 0 /100 WBC
PLATELET # BLD AUTO: 269 K/UL (ref 164–446)
PLATELET BLD QL SMEAR: NORMAL
PMV BLD AUTO: 8.7 FL (ref 9–12.9)
POTASSIUM SERPL-SCNC: 4.1 MMOL/L (ref 3.6–5.5)
RBC # BLD AUTO: 3.6 M/UL (ref 4.7–6.1)
RBC BLD AUTO: NORMAL
SODIUM SERPL-SCNC: 139 MMOL/L (ref 135–145)
WBC # BLD AUTO: 7.3 K/UL (ref 4.8–10.8)

## 2018-10-21 PROCEDURE — 80048 BASIC METABOLIC PNL TOTAL CA: CPT

## 2018-10-21 PROCEDURE — 700102 HCHG RX REV CODE 250 W/ 637 OVERRIDE(OP): Performed by: INTERNAL MEDICINE

## 2018-10-21 PROCEDURE — 85007 BL SMEAR W/DIFF WBC COUNT: CPT

## 2018-10-21 PROCEDURE — 700102 HCHG RX REV CODE 250 W/ 637 OVERRIDE(OP): Performed by: HOSPITALIST

## 2018-10-21 PROCEDURE — 36415 COLL VENOUS BLD VENIPUNCTURE: CPT

## 2018-10-21 PROCEDURE — 770006 HCHG ROOM/CARE - MED/SURG/GYN SEMI*

## 2018-10-21 PROCEDURE — 85027 COMPLETE CBC AUTOMATED: CPT

## 2018-10-21 PROCEDURE — 700111 HCHG RX REV CODE 636 W/ 250 OVERRIDE (IP): Performed by: INTERNAL MEDICINE

## 2018-10-21 PROCEDURE — 700105 HCHG RX REV CODE 258: Performed by: INTERNAL MEDICINE

## 2018-10-21 PROCEDURE — 99233 SBSQ HOSP IP/OBS HIGH 50: CPT | Performed by: INTERNAL MEDICINE

## 2018-10-21 PROCEDURE — 700102 HCHG RX REV CODE 250 W/ 637 OVERRIDE(OP): Performed by: SURGERY

## 2018-10-21 PROCEDURE — A9270 NON-COVERED ITEM OR SERVICE: HCPCS | Performed by: HOSPITALIST

## 2018-10-21 PROCEDURE — A9270 NON-COVERED ITEM OR SERVICE: HCPCS | Performed by: SURGERY

## 2018-10-21 PROCEDURE — 700111 HCHG RX REV CODE 636 W/ 250 OVERRIDE (IP): Performed by: SURGERY

## 2018-10-21 PROCEDURE — A9270 NON-COVERED ITEM OR SERVICE: HCPCS | Performed by: INTERNAL MEDICINE

## 2018-10-21 RX ADMIN — MULTIPLE VITAMINS W/ MINERALS TAB 1 TABLET: TAB at 04:55

## 2018-10-21 RX ADMIN — LATANOPROST 1 DROP: 50 SOLUTION OPHTHALMIC at 21:57

## 2018-10-21 RX ADMIN — BRIMONIDINE TARTRATE 1 DROP: 2 SOLUTION OPHTHALMIC at 04:56

## 2018-10-21 RX ADMIN — OXYCODONE HYDROCHLORIDE 10 MG: 5 TABLET ORAL at 21:57

## 2018-10-21 RX ADMIN — CLOPIDOGREL 75 MG: 75 TABLET, FILM COATED ORAL at 04:56

## 2018-10-21 RX ADMIN — ATORVASTATIN CALCIUM 20 MG: 20 TABLET, FILM COATED ORAL at 21:57

## 2018-10-21 RX ADMIN — OXYCODONE HYDROCHLORIDE 10 MG: 5 TABLET ORAL at 04:55

## 2018-10-21 RX ADMIN — MORPHINE SULFATE 2 MG: 4 INJECTION INTRAVENOUS at 04:17

## 2018-10-21 RX ADMIN — TIMOLOL MALEATE 1 DROP: 5 SOLUTION/ DROPS OPHTHALMIC at 17:19

## 2018-10-21 RX ADMIN — HEPARIN SODIUM 5000 UNITS: 5000 INJECTION, SOLUTION INTRAVENOUS; SUBCUTANEOUS at 13:47

## 2018-10-21 RX ADMIN — SODIUM CHLORIDE: 9 INJECTION, SOLUTION INTRAVENOUS at 05:01

## 2018-10-21 RX ADMIN — OXYCODONE HYDROCHLORIDE 10 MG: 5 TABLET ORAL at 17:17

## 2018-10-21 RX ADMIN — Medication 81 MG: at 04:56

## 2018-10-21 RX ADMIN — SENNOSIDES AND DOCUSATE SODIUM 2 TABLET: 8.6; 5 TABLET ORAL at 04:56

## 2018-10-21 RX ADMIN — OXYCODONE HYDROCHLORIDE 10 MG: 5 TABLET ORAL at 01:00

## 2018-10-21 RX ADMIN — HEPARIN SODIUM 5000 UNITS: 5000 INJECTION, SOLUTION INTRAVENOUS; SUBCUTANEOUS at 04:55

## 2018-10-21 RX ADMIN — HEPARIN SODIUM 5000 UNITS: 5000 INJECTION, SOLUTION INTRAVENOUS; SUBCUTANEOUS at 21:57

## 2018-10-21 RX ADMIN — OXYCODONE HYDROCHLORIDE 10 MG: 5 TABLET ORAL at 11:47

## 2018-10-21 RX ADMIN — SODIUM CHLORIDE: 9 INJECTION, SOLUTION INTRAVENOUS at 14:55

## 2018-10-21 RX ADMIN — TIMOLOL MALEATE 1 DROP: 5 SOLUTION/ DROPS OPHTHALMIC at 05:46

## 2018-10-21 RX ADMIN — BRIMONIDINE TARTRATE 1 DROP: 2 SOLUTION OPHTHALMIC at 17:18

## 2018-10-21 ASSESSMENT — ENCOUNTER SYMPTOMS
CHILLS: 0
MYALGIAS: 1
FOCAL WEAKNESS: 0
ABDOMINAL PAIN: 0
WEAKNESS: 1
NAUSEA: 0
SENSORY CHANGE: 0
SPEECH CHANGE: 0
FEVER: 0
HEADACHES: 0
PALPITATIONS: 0
NERVOUS/ANXIOUS: 0
SHORTNESS OF BREATH: 0

## 2018-10-21 ASSESSMENT — PAIN SCALES - GENERAL
PAINLEVEL_OUTOF10: 4
PAINLEVEL_OUTOF10: 6
PAINLEVEL_OUTOF10: 0
PAINLEVEL_OUTOF10: 5
PAINLEVEL_OUTOF10: ASSUMED PAIN PRESENT
PAINLEVEL_OUTOF10: 5
PAINLEVEL_OUTOF10: 4
PAINLEVEL_OUTOF10: 5
PAINLEVEL_OUTOF10: ASSUMED PAIN PRESENT
PAINLEVEL_OUTOF10: 7
PAINLEVEL_OUTOF10: 3

## 2018-10-21 NOTE — CARE PLAN
Problem: Safety  Goal: Will remain free from injury  Outcome: PROGRESSING AS EXPECTED  Patient ambulates frequently during NOC shift unassisted with steady gait. Patient advised to stop when tires.     Problem: Venous Thromboembolism (VTW)/Deep Vein Thrombosis (DVT) Prevention:  Goal: Patient will participate in Venous Thrombosis (VTE)/Deep Vein Thrombosis (DVT)Prevention Measures  Outcome: PROGRESSING AS EXPECTED  Patient on aspirin, Plavix, and subcutaneous heparin for impending vascular surgery. Patient ambulates frequently during NOC shift.

## 2018-10-21 NOTE — PROGRESS NOTES
"Received report from AM RN; assumed care. VSS. A&O x 4. 93% on RA. Patient initially reporting pain in left foot of 7/10, medicated with IV MS at beginning of shift. PO oxycodone provided once patient finished ambulating x 6 laps around unit unassisted with steady wait. Patient stated it assists with pain. Mild abdominal distention noted, non-tender with palpation. BS  x 4 normoactive. Provided stool softener initially refused since patient stated he's feeling \"a little constipated.\" Fluids encouraged. BM reported 10/19/18 per pt. Abrasions/scattered scabs noted. Small wound noted sacrum, which patient stated was fiberglass trying to work itself out of his body. 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 BLE edema noted. Left 3rd toe black, with 1-2 toes of same foot dusky, red/swollen toes surround 3rd toe. Tender to touch. Patient guards from touching/sock application. + void. + flatus. Bed locked/lowest position. Call light/belongings within reach. All needs met at present.   "

## 2018-10-21 NOTE — WOUND TEAM
Pt seen by wound team for sacral discoloration.  Pt presents with 2 small lesions from scratching that are covered with small amount dry purple drainage.  Yadi skin is intact and color is WNL.  Pt states he is putting lotion on the area and refraining from scratching.  Requesting nursing to continue to watch area.  Wound team signing off for now.

## 2018-10-21 NOTE — PROGRESS NOTES
"Assumed care of patient from night shift RN  73 year old male  Full code  Admitted 10/19 d/t gangrene of the Left foot third toe  Patient A&O x 4  RA  Cardiac WNL  Last BM 10/19  Voiding appropriately  Regular diet  20 g R and L  forearm with NS at 100  Skin: Left foot, third toe gangrenous, sacral wound where patient states \"fiberglass is moving\"   Up self  Low fall risk per [facility]  Plan: surgery tomorrow  All questions answered and all needs met at this time. Bed in low locked position. Call light within reach and patient verbalized understanding of proper use. RN will implement hourly rounding and continue to answer call lights appropriately.      "

## 2018-10-21 NOTE — PROGRESS NOTES
[facility]ist Progress Note    Date of Service: 10/21/2018    Chief Complaint  73 y.o. male admitted 10/19/2018 with history of cellulitis and now with resultant ischemic left toes on anticoagulation.    Interval Problem Update  + left toe pain, controlled  Ambulating without assistance  N.p.o. after midnight for OR    Consultants/Specialty  surgery    Disposition   tbd        Review of Systems   Constitutional: Negative for chills and fever.   HENT: Negative for congestion and hearing loss.    Respiratory: Negative for shortness of breath.    Cardiovascular: Negative for palpitations and leg swelling.   Gastrointestinal: Negative for abdominal pain and nausea.   Genitourinary: Negative for dysuria and urgency.   Musculoskeletal: Positive for joint pain and myalgias.   Neurological: Positive for weakness. Negative for sensory change, speech change, focal weakness and headaches.   Psychiatric/Behavioral: The patient is not nervous/anxious.       Physical Exam  Laboratory/Imaging   Hemodynamics  Temp (24hrs), Av.7 °C (98.1 °F), Min:36.3 °C (97.3 °F), Max:37.4 °C (99.4 °F)   Temperature: 36.4 °C (97.5 °F)  Pulse  Av  Min: 61  Max: 77    Blood Pressure : 132/78      Respiratory      Respiration: 18, Pulse Oximetry: 96 %        RML Breath Sounds: Diminished, RLL Breath Sounds: Diminished, LLL Breath Sounds: Diminished    Fluids    Intake/Output Summary (Last 24 hours) at 10/21/18 1403  Last data filed at 10/21/18 0415   Gross per 24 hour   Intake             1200 ml   Output                0 ml   Net             1200 ml       Nutrition  Orders Placed This Encounter   Procedures   • Diet Order Regular     Standing Status:   Standing     Number of Occurrences:   1     Order Specific Question:   Diet:     Answer:   Regular [1]   • Diet NPO     Standing Status:   Standing     Number of Occurrences:   8     Order Specific Question:   Restrict to:     Answer:   Sips with Medications [3]     Physical Exam    Constitutional: He is oriented to person, place, and time. He appears well-nourished. No distress.   HENT:   Head: Normocephalic and atraumatic.   Eyes: Pupils are equal, round, and reactive to light. EOM are normal.   Neck: Neck supple.   Cardiovascular: Normal rate and intact distal pulses.    Pulmonary/Chest: Effort normal and breath sounds normal. No respiratory distress.   Abdominal: Soft. Bowel sounds are normal. He exhibits no distension. There is no tenderness.   Musculoskeletal: He exhibits no edema or tenderness.   Neurological: He is alert and oriented to person, place, and time. No cranial nerve deficit.   Skin: Skin is warm and dry.   Ischemic toes, ttp    Psychiatric: He has a normal mood and affect. His behavior is normal. Thought content normal.   Nursing note and vitals reviewed.      Recent Labs      10/19/18   2029  10/20/18   0330  10/21/18   0359   WBC   --   8.4  7.3   RBC   --   3.59*  3.60*   HEMOGLOBIN   --   10.9*  11.3*   HEMATOCRIT   --   34.3*  34.7*   MCV   --   95.5  96.4   MCH   --   30.4  31.4   MCHC   --   31.8*  32.6*   RDW   --   49.6  51.2*   PLATELETCT  291  264  269   MPV   --   8.6*  8.7*     Recent Labs      10/20/18   0330  10/21/18   0359   SODIUM  137  139   POTASSIUM  4.5  4.1   CHLORIDE  100  104   CO2  28  28   GLUCOSE  89  93   BUN  23*  21   CREATININE  1.47*  1.37   CALCIUM  9.2  9.3     Recent Labs      10/19/18   2029  10/20/18   0331  10/20/18   0957   APTT  35.9  81.2*  46.4*   INR  1.08   --    --                   Assessment/Plan     Ischemia of toe- (present on admission)   Assessment & Plan    Ischemia of multiple toes on the left foot  Improved with anticoagulation  Heparin drip discontinued per surgery  On Plavix  Rate of heparin drip will be adjusted based on serial pTT measurements to ensure adequate anticoagulation and to avoid potential complications of bleeding  Dr. 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

## 2018-10-22 ENCOUNTER — APPOINTMENT (OUTPATIENT)
Dept: RADIOLOGY | Facility: MEDICAL CENTER | Age: 73
DRG: 254 | End: 2018-10-22
Attending: SURGERY
Payer: MEDICARE

## 2018-10-22 LAB
ALBUMIN SERPL BCP-MCNC: 3.5 G/DL (ref 3.2–4.9)
ALBUMIN/GLOB SERPL: 1.2 G/DL
ALP SERPL-CCNC: 64 U/L (ref 30–99)
ALT SERPL-CCNC: 52 U/L (ref 2–50)
ANION GAP SERPL CALC-SCNC: 6 MMOL/L (ref 0–11.9)
ANISOCYTOSIS BLD QL SMEAR: ABNORMAL
AST SERPL-CCNC: 43 U/L (ref 12–45)
BASOPHILS # BLD AUTO: 0.9 % (ref 0–1.8)
BASOPHILS # BLD: 0.07 K/UL (ref 0–0.12)
BILIRUB SERPL-MCNC: 0.3 MG/DL (ref 0.1–1.5)
BUN SERPL-MCNC: 28 MG/DL (ref 8–22)
CALCIUM SERPL-MCNC: 9.1 MG/DL (ref 8.5–10.5)
CHLORIDE SERPL-SCNC: 103 MMOL/L (ref 96–112)
CO2 SERPL-SCNC: 25 MMOL/L (ref 20–33)
CREAT SERPL-MCNC: 1.35 MG/DL (ref 0.5–1.4)
EOSINOPHIL # BLD AUTO: 0.22 K/UL (ref 0–0.51)
EOSINOPHIL NFR BLD: 2.6 % (ref 0–6.9)
ERYTHROCYTE [DISTWIDTH] IN BLOOD BY AUTOMATED COUNT: 49.7 FL (ref 35.9–50)
GLOBULIN SER CALC-MCNC: 2.9 G/DL (ref 1.9–3.5)
GLUCOSE SERPL-MCNC: 93 MG/DL (ref 65–99)
HCT VFR BLD AUTO: 33.7 % (ref 42–52)
HGB BLD-MCNC: 11 G/DL (ref 14–18)
LYMPHOCYTES # BLD AUTO: 1.89 K/UL (ref 1–4.8)
LYMPHOCYTES NFR BLD: 22.8 % (ref 22–41)
MACROCYTES BLD QL SMEAR: ABNORMAL
MANUAL DIFF BLD: NORMAL
MCH RBC QN AUTO: 31.3 PG (ref 27–33)
MCHC RBC AUTO-ENTMCNC: 32.6 G/DL (ref 33.7–35.3)
MCV RBC AUTO: 95.7 FL (ref 81.4–97.8)
METAMYELOCYTES NFR BLD MANUAL: 0.9 %
MONOCYTES # BLD AUTO: 0.51 K/UL (ref 0–0.85)
MONOCYTES NFR BLD AUTO: 6.2 % (ref 0–13.4)
MORPHOLOGY BLD-IMP: NORMAL
NEUTROPHILS # BLD AUTO: 5.53 K/UL (ref 1.82–7.42)
NEUTROPHILS NFR BLD: 64 % (ref 44–72)
NEUTS BAND NFR BLD MANUAL: 2.6 % (ref 0–10)
NRBC # BLD AUTO: 0 K/UL
NRBC BLD-RTO: 0 /100 WBC
OVALOCYTES BLD QL SMEAR: NORMAL
PLATELET # BLD AUTO: 263 K/UL (ref 164–446)
PLATELET BLD QL SMEAR: NORMAL
PMV BLD AUTO: 9.1 FL (ref 9–12.9)
POIKILOCYTOSIS BLD QL SMEAR: NORMAL
POTASSIUM SERPL-SCNC: 4.2 MMOL/L (ref 3.6–5.5)
PROT SERPL-MCNC: 6.4 G/DL (ref 6–8.2)
RBC # BLD AUTO: 3.52 M/UL (ref 4.7–6.1)
RBC BLD AUTO: PRESENT
SODIUM SERPL-SCNC: 134 MMOL/L (ref 135–145)
VARIANT LYMPHS BLD QL SMEAR: NORMAL
WBC # BLD AUTO: 8.3 K/UL (ref 4.8–10.8)

## 2018-10-22 PROCEDURE — A9270 NON-COVERED ITEM OR SERVICE: HCPCS | Performed by: INTERNAL MEDICINE

## 2018-10-22 PROCEDURE — 047L3Z1 DILATION OF LEFT FEMORAL ARTERY USING DRUG-COATED BALLOON, PERCUTANEOUS APPROACH: ICD-10-PCS | Performed by: SURGERY

## 2018-10-22 PROCEDURE — 700102 HCHG RX REV CODE 250 W/ 637 OVERRIDE(OP): Performed by: HOSPITALIST

## 2018-10-22 PROCEDURE — 700111 HCHG RX REV CODE 636 W/ 250 OVERRIDE (IP)

## 2018-10-22 PROCEDURE — 700102 HCHG RX REV CODE 250 W/ 637 OVERRIDE(OP): Performed by: SURGERY

## 2018-10-22 PROCEDURE — 700117 HCHG RX CONTRAST REV CODE 255: Performed by: SURGERY

## 2018-10-22 PROCEDURE — 80053 COMPREHEN METABOLIC PANEL: CPT

## 2018-10-22 PROCEDURE — 700102 HCHG RX REV CODE 250 W/ 637 OVERRIDE(OP): Performed by: INTERNAL MEDICINE

## 2018-10-22 PROCEDURE — 99153 MOD SED SAME PHYS/QHP EA: CPT

## 2018-10-22 PROCEDURE — A9270 NON-COVERED ITEM OR SERVICE: HCPCS | Performed by: HOSPITALIST

## 2018-10-22 PROCEDURE — 99232 SBSQ HOSP IP/OBS MODERATE 35: CPT | Performed by: INTERNAL MEDICINE

## 2018-10-22 PROCEDURE — 047N3Z1 DILATION OF LEFT POPLITEAL ARTERY USING DRUG-COATED BALLOON, PERCUTANEOUS APPROACH: ICD-10-PCS | Performed by: SURGERY

## 2018-10-22 PROCEDURE — 85027 COMPLETE CBC AUTOMATED: CPT

## 2018-10-22 PROCEDURE — A9270 NON-COVERED ITEM OR SERVICE: HCPCS | Performed by: SURGERY

## 2018-10-22 PROCEDURE — 36415 COLL VENOUS BLD VENIPUNCTURE: CPT

## 2018-10-22 PROCEDURE — 85007 BL SMEAR W/DIFF WBC COUNT: CPT

## 2018-10-22 PROCEDURE — 700105 HCHG RX REV CODE 258: Performed by: INTERNAL MEDICINE

## 2018-10-22 PROCEDURE — 770006 HCHG ROOM/CARE - MED/SURG/GYN SEMI*

## 2018-10-22 PROCEDURE — B41G1ZZ FLUOROSCOPY OF LEFT LOWER EXTREMITY ARTERIES USING LOW OSMOLAR CONTRAST: ICD-10-PCS | Performed by: SURGERY

## 2018-10-22 PROCEDURE — 047J3ZZ DILATION OF LEFT EXTERNAL ILIAC ARTERY, PERCUTANEOUS APPROACH: ICD-10-PCS | Performed by: SURGERY

## 2018-10-22 PROCEDURE — 700111 HCHG RX REV CODE 636 W/ 250 OVERRIDE (IP): Performed by: SURGERY

## 2018-10-22 PROCEDURE — 700111 HCHG RX REV CODE 636 W/ 250 OVERRIDE (IP): Performed by: INTERNAL MEDICINE

## 2018-10-22 RX ORDER — SODIUM CHLORIDE 9 MG/ML
500 INJECTION, SOLUTION INTRAVENOUS
Status: DISCONTINUED | OUTPATIENT
Start: 2018-10-22 | End: 2018-10-22

## 2018-10-22 RX ORDER — ONDANSETRON 2 MG/ML
4 INJECTION INTRAMUSCULAR; INTRAVENOUS PRN
Status: ACTIVE | OUTPATIENT
Start: 2018-10-22 | End: 2018-10-22

## 2018-10-22 RX ORDER — CEFAZOLIN SODIUM 2 G/100ML
2 INJECTION, SOLUTION INTRAVENOUS ONCE
Status: COMPLETED | OUTPATIENT
Start: 2018-10-22 | End: 2018-10-22

## 2018-10-22 RX ORDER — MIDAZOLAM HYDROCHLORIDE 1 MG/ML
.5-2 INJECTION INTRAMUSCULAR; INTRAVENOUS PRN
Status: DISCONTINUED | OUTPATIENT
Start: 2018-10-22 | End: 2018-10-22

## 2018-10-22 RX ORDER — IODIXANOL 270 MG/ML
100 INJECTION, SOLUTION INTRAVASCULAR ONCE
Status: COMPLETED | OUTPATIENT
Start: 2018-10-22 | End: 2018-10-22

## 2018-10-22 RX ORDER — MIDAZOLAM HYDROCHLORIDE 1 MG/ML
INJECTION INTRAMUSCULAR; INTRAVENOUS
Status: COMPLETED
Start: 2018-10-22 | End: 2018-10-22

## 2018-10-22 RX ORDER — HEPARIN SODIUM,PORCINE 1000/ML
VIAL (ML) INJECTION
Status: COMPLETED
Start: 2018-10-22 | End: 2018-10-22

## 2018-10-22 RX ADMIN — OXYCODONE HYDROCHLORIDE 10 MG: 5 TABLET ORAL at 01:56

## 2018-10-22 RX ADMIN — OXYCODONE HYDROCHLORIDE 10 MG: 5 TABLET ORAL at 14:37

## 2018-10-22 RX ADMIN — IODIXANOL 12 ML: 270 INJECTION, SOLUTION INTRAVASCULAR at 16:52

## 2018-10-22 RX ADMIN — ATORVASTATIN CALCIUM 20 MG: 20 TABLET, FILM COATED ORAL at 17:49

## 2018-10-22 RX ADMIN — SODIUM CHLORIDE: 9 INJECTION, SOLUTION INTRAVENOUS at 10:11

## 2018-10-22 RX ADMIN — CEFAZOLIN SODIUM 2 G: 2 INJECTION, SOLUTION INTRAVENOUS at 15:46

## 2018-10-22 RX ADMIN — HEPARIN SODIUM 5000 UNITS: 5000 INJECTION, SOLUTION INTRAVENOUS; SUBCUTANEOUS at 21:15

## 2018-10-22 RX ADMIN — FENTANYL CITRATE 50 MCG: 50 INJECTION, SOLUTION INTRAMUSCULAR; INTRAVENOUS at 16:35

## 2018-10-22 RX ADMIN — MIDAZOLAM HYDROCHLORIDE 1 MG: 1 INJECTION INTRAMUSCULAR; INTRAVENOUS at 16:11

## 2018-10-22 RX ADMIN — FENTANYL CITRATE 50 MCG: 50 INJECTION, SOLUTION INTRAMUSCULAR; INTRAVENOUS at 16:09

## 2018-10-22 RX ADMIN — MULTIPLE VITAMINS W/ MINERALS TAB 1 TABLET: TAB at 05:53

## 2018-10-22 RX ADMIN — SODIUM CHLORIDE: 9 INJECTION, SOLUTION INTRAVENOUS at 02:33

## 2018-10-22 RX ADMIN — BRIMONIDINE TARTRATE 1 DROP: 2 SOLUTION OPHTHALMIC at 17:45

## 2018-10-22 RX ADMIN — CLOPIDOGREL 75 MG: 75 TABLET, FILM COATED ORAL at 05:53

## 2018-10-22 RX ADMIN — BRIMONIDINE TARTRATE 1 DROP: 2 SOLUTION OPHTHALMIC at 05:52

## 2018-10-22 RX ADMIN — OXYCODONE HYDROCHLORIDE 10 MG: 5 TABLET ORAL at 10:09

## 2018-10-22 RX ADMIN — TIMOLOL MALEATE 1 DROP: 5 SOLUTION/ DROPS OPHTHALMIC at 05:54

## 2018-10-22 RX ADMIN — MIDAZOLAM HYDROCHLORIDE 1 MG: 1 INJECTION, SOLUTION INTRAMUSCULAR; INTRAVENOUS at 16:11

## 2018-10-22 RX ADMIN — HEPARIN SODIUM 5000 UNITS: 5000 INJECTION, SOLUTION INTRAVENOUS; SUBCUTANEOUS at 05:54

## 2018-10-22 RX ADMIN — OXYCODONE HYDROCHLORIDE 5 MG: 5 TABLET ORAL at 22:58

## 2018-10-22 RX ADMIN — Medication 81 MG: at 05:53

## 2018-10-22 RX ADMIN — HEPARIN SODIUM 5000 UNITS: 1000 INJECTION, SOLUTION INTRAVENOUS; SUBCUTANEOUS at 16:25

## 2018-10-22 RX ADMIN — OXYCODONE HYDROCHLORIDE 10 MG: 5 TABLET ORAL at 18:47

## 2018-10-22 RX ADMIN — MORPHINE SULFATE 2 MG: 4 INJECTION INTRAVENOUS at 04:27

## 2018-10-22 RX ADMIN — MORPHINE SULFATE 2 MG: 4 INJECTION INTRAVENOUS at 17:49

## 2018-10-22 RX ADMIN — OXYCODONE HYDROCHLORIDE 10 MG: 5 TABLET ORAL at 05:53

## 2018-10-22 RX ADMIN — LATANOPROST 1 DROP: 50 SOLUTION OPHTHALMIC at 17:45

## 2018-10-22 RX ADMIN — SODIUM CHLORIDE: 9 INJECTION, SOLUTION INTRAVENOUS at 22:58

## 2018-10-22 RX ADMIN — TIMOLOL MALEATE 1 DROP: 5 SOLUTION/ DROPS OPHTHALMIC at 17:45

## 2018-10-22 ASSESSMENT — ENCOUNTER SYMPTOMS
FOCAL WEAKNESS: 0
DEPRESSION: 0
DIAPHORESIS: 0
MYALGIAS: 1
NERVOUS/ANXIOUS: 0
SHORTNESS OF BREATH: 0
NAUSEA: 0
CHILLS: 0
FEVER: 0
HEADACHES: 0
ABDOMINAL PAIN: 0
WEAKNESS: 0
HEARTBURN: 0

## 2018-10-22 ASSESSMENT — PAIN SCALES - GENERAL
PAINLEVEL_OUTOF10: 6
PAINLEVEL_OUTOF10: 4
PAINLEVEL_OUTOF10: 4
PAINLEVEL_OUTOF10: 2
PAINLEVEL_OUTOF10: 3
PAINLEVEL_OUTOF10: 6
PAINLEVEL_OUTOF10: 6
PAINLEVEL_OUTOF10: 5
PAINLEVEL_OUTOF10: 6
PAINLEVEL_OUTOF10: 6
PAINLEVEL_OUTOF10: 4
PAINLEVEL_OUTOF10: 6
PAINLEVEL_OUTOF10: 4

## 2018-10-22 NOTE — OR SURGEON
Immediate Post OP Note    PreOp Diagnosis: PAD, left toe gangrene.    PostOp Diagnosis: Same.    Procedure(s):  Left leg angiogram, L SFA and EIA angioplasty.    Surgeon:  Ana Grande M.D.    Type of Anesthesia:  IV sedation for 1 hour and local with 10ml 1% Lidocaine.    IR Staff:    Specimens removed if any:  None.    Estimated Blood Loss: 10ml.    IVF: 12ml Visipaque.    Findings: Severe L proximal EIA and distal SFA stenosis, treated with angioplasty.    Complications: None.    Dictated, #[id number].        10/22/2018 4:52 PM Ana Grande M.D.

## 2018-10-22 NOTE — CARE PLAN
Problem: Pain Management  Goal: Pain level will decrease to patient’s comfort goal  Outcome: PROGRESSING AS EXPECTED  Pt has pain in L 3rd toe, receiving PRN pain meds per MAR, repositioned for comfort. Non-pharmacologic options offered.    Problem: Skin Integrity  Goal: Risk for impaired skin integrity will decrease  Outcome: PROGRESSING AS EXPECTED  L third toe is black, surrounding toes are red and swollen. Numbness and pain. Pt keeping area clean and dry. Dry gauze placed btw toes per order.

## 2018-10-22 NOTE — CARE PLAN
Problem: Knowledge Deficit  Goal: Knowledge of disease process/condition, treatment plan, diagnostic tests, and medications will improve  Outcome: PROGRESSING AS EXPECTED  Patient's questions answered about procedure and times for 10/22/18. NPO status reiterated. Patient verbalized understanding.     Problem: Fluid Volume:  Goal: Will maintain balanced intake and output  Outcome: PROGRESSING AS EXPECTED  Patient tolerating MIVF. Fluids encouraged. I&O's being measured.

## 2018-10-22 NOTE — PROGRESS NOTES
"Received report from AM RN; assumed care. VSS. A&O x 4. 97% SpO2 on RA. Patient stated was a \"good day\".  Medicated for pain; see MAR. BS  x 4 normoactive. Abdomen soft, non-tender  . BLE edema noted. Left 3rd toe black, with surrounding toes red/edematous. Tender to touch. + void. + flatus. Patient showered near beginning of shift; it \"felt great\" expressed upon inquiry.  Patient tolerating MIVF. 2 PIV bilateral FA patent, dressings CDI; RFA saline locked. LFA infusing 100 mL/hour of 0.9% NS. Patient instructed about NPO status at midnight, verbalized understanding. Ingested chocolate pudding. Bed locked/lowest position. Call light/belongings within reach. All needs met at present.   "

## 2018-10-22 NOTE — DOCUMENTATION QUERY
DOCUMENTATION QUERY    PROVIDERS: Please select “Cosign w/ note”to reply to query.    To better represent the severity of illness of your patient, please review the following information and exercise your independent professional judgment in responding to this query.     Chronic Kidney Disease is documented in the Consult Note. Based upon the clinical findings, risk factors, and treatment, can this diagnosis be further specified?    • Chronic Kidney Disease Stage I      • Chronic Kidney Disease Stage II     • Chronic Kidney Disease Stage III    • Chronic Kidney Disease Stage IV    • Chronic Kidney Disease Stage V     • End Stage Renal Disease  • Other explanation of clinical findings  • Unable to determine        The medical record reflects the following:   Clinical Findings · Per Surgery Consult Note: hydrate patient with NS for his chronic kidney disease  · 10/20 Bun 23, Creatinine 1.47, GFR 47  · 10/21 Bun 21, 1.37, GFR 51  · 10/22 Bun 28, Creatinine 1.35, GFR 51    Treatment  IV NS   Risk Factors  age, tobacco use, hyperlipidemia    Location within medical record  History & Physical, Consult Notes, Lab Results  and MAR       Definitions of CKD stages & ESRD are documented in chart below.     CKD Stage    Description    GFR (mL/min/1.73 m2)      1    Kidney damage with normal or elevated GFR    Greater than 90      2    Kidney damage with mild decrease in GFR    60-89      3    Moderate decrease in GFR                       30-59      4    Severe decrease in GFR    15-29      5    Kidney failure  Less than 15  (or dialysis)      ESRD    Renal replacement therapy    Dialysis or transplantation   Source: [employer]    Thank you,   Burak Colvin RN, BSN  Clinical   [phone]

## 2018-10-22 NOTE — PROGRESS NOTES
Left lower extremity arteriogram with left femoral and iliac angioplasty performed by Dr Grande via right femoral access. Procedure BARs explained to patient and consent obtained. Patient was continuously assessed and monitored throughout procedure; baseline ETCO2 38-40 with consistent waveform. Procedure completed without incidence. Right groin CDI without swelling; angioseal closure used and sterile guaze/tegaderm dressing with elastoderm compress applied. Patient tolerated procedure quite well and was returned to his room in good condition, alert and conversant.

## 2018-10-22 NOTE — DISCHARGE PLANNING
Anticipated Discharge Disposition: Home with services to be determined    Action: This RN CM met with pt at bedside briefly, unable to complete assessment as pt was leaving for a procedure.  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  Pt does not have a PCP, and requested assistance to be set up with a PCP in [place].  RN CM will follow up.    Barriers to Discharge: Unknown    Plan: RN CM will follow up and complete assessment.

## 2018-10-22 NOTE — PROGRESS NOTES
[facility]    Date of Service: 10/22/2018    Chief Complaint  73 y.o. male admitted 10/19/2018 with history of cellulitis and now with resultant ischemic left toes on anticoagulation.    Interval Problem Update  No new events  N.p.o. for angiogram today    Consultants/Specialty  surgery    Disposition   tbd        Review of Systems   Constitutional: Negative for chills, diaphoresis, fever and malaise/fatigue.   HENT: Negative for congestion.    Respiratory: Negative for shortness of breath.    Cardiovascular: Negative for chest pain and leg swelling.   Gastrointestinal: Negative for abdominal pain, heartburn and nausea.   Genitourinary: Negative for hematuria and urgency.   Musculoskeletal: Positive for joint pain and myalgias.   Neurological: Negative for focal weakness, weakness and headaches.   Psychiatric/Behavioral: Negative for depression. The patient is not nervous/anxious.       Physical Exam  Laboratory/Imaging   Hemodynamics  Temp (24hrs), Av.8 °C (98.2 °F), Min:36.6 °C (97.8 °F), Max:36.9 °C (98.5 °F)   Temperature: 36.6 °C (97.8 °F)  Pulse  Av.1  Min: 61  Max: 77    Blood Pressure : 113/71      Respiratory      Respiration: 18, Pulse Oximetry: 93 %        RML Breath Sounds: Diminished, RLL Breath Sounds: Diminished, LLL Breath Sounds: Diminished    Fluids    Intake/Output Summary (Last 24 hours) at 10/22/18 1557  Last data filed at 10/22/18 0800   Gross per 24 hour   Intake             1500 ml   Output                0 ml   Net             1500 ml       Nutrition  Orders Placed This Encounter   Procedures   • Diet NPO     Standing Status:   Standing     Number of Occurrences:   8     Order Specific Question:   Restrict to:     Answer:   Sips with Medications [3]     Physical Exam   Constitutional: He is oriented to person, place, and time. No distress.   HENT:   Head: Normocephalic and atraumatic.   Mouth/Throat: No oropharyngeal exudate.   Eyes: Pupils are equal, round,  and reactive to light. EOM are normal. No scleral icterus.   Neck: Neck supple.   Cardiovascular: Normal rate and intact distal pulses.    Pulmonary/Chest: Effort normal and breath sounds normal. No respiratory distress. He has no wheezes.   Abdominal: Soft. Bowel sounds are normal. He exhibits no distension and no mass. There is no tenderness.   Musculoskeletal: He exhibits no edema.   Neurological: He is alert and oriented to person, place, and time. No cranial nerve deficit. Coordination normal.   Skin: Skin is warm and dry. No rash noted. He is not diaphoretic. No erythema. No pallor.   Ischemic toes, ttp    Psychiatric: He has a normal mood and affect. His behavior is normal. Judgment and thought content normal.   Nursing note and vitals reviewed.      Recent Labs      10/20/18   0330  10/21/18   0359  10/22/18   0346   WBC  8.4  7.3  8.3   RBC  3.59*  3.60*  3.52*   HEMOGLOBIN  10.9*  11.3*  11.0*   HEMATOCRIT  34.3*  34.7*  33.7*   MCV  95.5  96.4  95.7   MCH  30.4  31.4  31.3   MCHC  31.8*  32.6*  32.6*   RDW  49.6  51.2*  49.7   PLATELETCT  264  269  263   MPV  8.6*  8.7*  9.1     Recent Labs      10/20/18   0330  10/21/18   0359  10/22/18   0346   SODIUM  137  139  134*   POTASSIUM  4.5  4.1  4.2   CHLORIDE  100  104  103   CO2  28  28  25   GLUCOSE  89  93  93   BUN  23*  21  28*   CREATININE  1.47*  1.37  1.35   CALCIUM  9.2  9.3  9.1     Recent Labs      10/19/18   2029  10/20/18   0331  10/20/18   0957   APTT  35.9  81.2*  46.4*   INR  1.08   --    --                   Assessment/Plan     Ischemia of toe- (present on admission)   Assessment & Plan    Ischemia of multiple toes on the left foot  Improved with anticoagulation  Heparin drip discontinued per surgery  On Plavix  Rate of heparin drip will be adjusted based on serial pTT measurements to ensure adequate anticoagulation and to avoid potential complications of bleeding  Dr. Grande of vascular surgery , plan for angiogram Monday  Continue Ivf,  pain control  Morphine prn  oxy 5-10 q4    10/22 N.p.o. for angiogram today  Follow-up a.m. 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

## 2018-10-23 LAB
ANION GAP SERPL CALC-SCNC: 9 MMOL/L (ref 0–11.9)
BASOPHILS # BLD AUTO: 0.2 % (ref 0–1.8)
BASOPHILS # BLD: 0.02 K/UL (ref 0–0.12)
BUN SERPL-MCNC: 20 MG/DL (ref 8–22)
CALCIUM SERPL-MCNC: 9.3 MG/DL (ref 8.5–10.5)
CHLORIDE SERPL-SCNC: 101 MMOL/L (ref 96–112)
CO2 SERPL-SCNC: 26 MMOL/L (ref 20–33)
CREAT SERPL-MCNC: 1.31 MG/DL (ref 0.5–1.4)
EOSINOPHIL # BLD AUTO: 0.13 K/UL (ref 0–0.51)
EOSINOPHIL NFR BLD: 1.2 % (ref 0–6.9)
ERYTHROCYTE [DISTWIDTH] IN BLOOD BY AUTOMATED COUNT: 49.6 FL (ref 35.9–50)
GLUCOSE SERPL-MCNC: 100 MG/DL (ref 65–99)
HCT VFR BLD AUTO: 33.7 % (ref 42–52)
HGB BLD-MCNC: 11.3 G/DL (ref 14–18)
IMM GRANULOCYTES # BLD AUTO: 0.04 K/UL (ref 0–0.11)
IMM GRANULOCYTES NFR BLD AUTO: 0.4 % (ref 0–0.9)
LYMPHOCYTES # BLD AUTO: 1.76 K/UL (ref 1–4.8)
LYMPHOCYTES NFR BLD: 16.7 % (ref 22–41)
MCH RBC QN AUTO: 31.8 PG (ref 27–33)
MCHC RBC AUTO-ENTMCNC: 33.5 G/DL (ref 33.7–35.3)
MCV RBC AUTO: 94.9 FL (ref 81.4–97.8)
MONOCYTES # BLD AUTO: 0.83 K/UL (ref 0–0.85)
MONOCYTES NFR BLD AUTO: 7.9 % (ref 0–13.4)
NEUTROPHILS # BLD AUTO: 7.73 K/UL (ref 1.82–7.42)
NEUTROPHILS NFR BLD: 73.6 % (ref 44–72)
NRBC # BLD AUTO: 0 K/UL
NRBC BLD-RTO: 0 /100 WBC
PLATELET # BLD AUTO: 264 K/UL (ref 164–446)
PMV BLD AUTO: 9.1 FL (ref 9–12.9)
POTASSIUM SERPL-SCNC: 4.2 MMOL/L (ref 3.6–5.5)
RBC # BLD AUTO: 3.55 M/UL (ref 4.7–6.1)
SODIUM SERPL-SCNC: 136 MMOL/L (ref 135–145)
WBC # BLD AUTO: 10.5 K/UL (ref 4.8–10.8)

## 2018-10-23 PROCEDURE — 700111 HCHG RX REV CODE 636 W/ 250 OVERRIDE (IP)

## 2018-10-23 PROCEDURE — 88305 TISSUE EXAM BY PATHOLOGIST: CPT

## 2018-10-23 PROCEDURE — 700102 HCHG RX REV CODE 250 W/ 637 OVERRIDE(OP): Performed by: INTERNAL MEDICINE

## 2018-10-23 PROCEDURE — 160002 HCHG RECOVERY MINUTES (STAT): Performed by: SURGERY

## 2018-10-23 PROCEDURE — 700101 HCHG RX REV CODE 250

## 2018-10-23 PROCEDURE — 700102 HCHG RX REV CODE 250 W/ 637 OVERRIDE(OP): Performed by: ANESTHESIOLOGY

## 2018-10-23 PROCEDURE — 0Y6S0Z0 DETACHMENT AT LEFT 2ND TOE, COMPLETE, OPEN APPROACH: ICD-10-PCS | Performed by: SURGERY

## 2018-10-23 PROCEDURE — 160009 HCHG ANES TIME/MIN: Performed by: SURGERY

## 2018-10-23 PROCEDURE — 0Y6U0Z0 DETACHMENT AT LEFT 3RD TOE, COMPLETE, OPEN APPROACH: ICD-10-PCS | Performed by: SURGERY

## 2018-10-23 PROCEDURE — 500881 HCHG PACK, EXTREMITY: Performed by: SURGERY

## 2018-10-23 PROCEDURE — 700111 HCHG RX REV CODE 636 W/ 250 OVERRIDE (IP): Performed by: INTERNAL MEDICINE

## 2018-10-23 PROCEDURE — A9270 NON-COVERED ITEM OR SERVICE: HCPCS | Performed by: INTERNAL MEDICINE

## 2018-10-23 PROCEDURE — 160036 HCHG PACU - EA ADDL 30 MINS PHASE I: Performed by: SURGERY

## 2018-10-23 PROCEDURE — 500440 HCHG DRESSING, STERILE ROLL (KERLIX): Performed by: SURGERY

## 2018-10-23 PROCEDURE — 160028 HCHG SURGERY MINUTES - 1ST 30 MINS LEVEL 3: Performed by: SURGERY

## 2018-10-23 PROCEDURE — 501838 HCHG SUTURE GENERAL: Performed by: SURGERY

## 2018-10-23 PROCEDURE — 36415 COLL VENOUS BLD VENIPUNCTURE: CPT

## 2018-10-23 PROCEDURE — 160039 HCHG SURGERY MINUTES - EA ADDL 1 MIN LEVEL 3: Performed by: SURGERY

## 2018-10-23 PROCEDURE — 160035 HCHG PACU - 1ST 60 MINS PHASE I: Performed by: SURGERY

## 2018-10-23 PROCEDURE — 88311 DECALCIFY TISSUE: CPT

## 2018-10-23 PROCEDURE — 700105 HCHG RX REV CODE 258: Performed by: INTERNAL MEDICINE

## 2018-10-23 PROCEDURE — A9270 NON-COVERED ITEM OR SERVICE: HCPCS | Performed by: ANESTHESIOLOGY

## 2018-10-23 PROCEDURE — 160048 HCHG OR STATISTICAL LEVEL 1-5: Performed by: SURGERY

## 2018-10-23 PROCEDURE — A9270 NON-COVERED ITEM OR SERVICE: HCPCS | Performed by: HOSPITALIST

## 2018-10-23 PROCEDURE — 99233 SBSQ HOSP IP/OBS HIGH 50: CPT | Performed by: INTERNAL MEDICINE

## 2018-10-23 PROCEDURE — 700102 HCHG RX REV CODE 250 W/ 637 OVERRIDE(OP): Performed by: HOSPITALIST

## 2018-10-23 PROCEDURE — 80048 BASIC METABOLIC PNL TOTAL CA: CPT

## 2018-10-23 PROCEDURE — 770006 HCHG ROOM/CARE - MED/SURG/GYN SEMI*

## 2018-10-23 PROCEDURE — 700111 HCHG RX REV CODE 636 W/ 250 OVERRIDE (IP): Performed by: SURGERY

## 2018-10-23 PROCEDURE — 85025 COMPLETE CBC W/AUTO DIFF WBC: CPT

## 2018-10-23 RX ORDER — OXYCODONE HCL 5 MG/5 ML
10 SOLUTION, ORAL ORAL
Status: DISCONTINUED | OUTPATIENT
Start: 2018-10-23 | End: 2018-10-23 | Stop reason: HOSPADM

## 2018-10-23 RX ORDER — OXYCODONE HCL 5 MG/5 ML
5 SOLUTION, ORAL ORAL
Status: DISCONTINUED | OUTPATIENT
Start: 2018-10-23 | End: 2018-10-23 | Stop reason: HOSPADM

## 2018-10-23 RX ORDER — HALOPERIDOL 5 MG/ML
1 INJECTION INTRAMUSCULAR
Status: DISCONTINUED | OUTPATIENT
Start: 2018-10-23 | End: 2018-10-23 | Stop reason: HOSPADM

## 2018-10-23 RX ORDER — SODIUM CHLORIDE, SODIUM LACTATE, POTASSIUM CHLORIDE, CALCIUM CHLORIDE 600; 310; 30; 20 MG/100ML; MG/100ML; MG/100ML; MG/100ML
INJECTION, SOLUTION INTRAVENOUS CONTINUOUS
Status: DISCONTINUED | OUTPATIENT
Start: 2018-10-23 | End: 2018-10-23 | Stop reason: HOSPADM

## 2018-10-23 RX ORDER — MEPERIDINE HYDROCHLORIDE 25 MG/ML
6.25 INJECTION INTRAMUSCULAR; INTRAVENOUS; SUBCUTANEOUS
Status: DISCONTINUED | OUTPATIENT
Start: 2018-10-23 | End: 2018-10-23 | Stop reason: HOSPADM

## 2018-10-23 RX ORDER — OXYCODONE HCL 5 MG/5 ML
5 SOLUTION, ORAL ORAL
Status: COMPLETED | OUTPATIENT
Start: 2018-10-23 | End: 2018-10-23

## 2018-10-23 RX ORDER — HYDRALAZINE HYDROCHLORIDE 20 MG/ML
5 INJECTION INTRAMUSCULAR; INTRAVENOUS
Status: DISCONTINUED | OUTPATIENT
Start: 2018-10-23 | End: 2018-10-23 | Stop reason: HOSPADM

## 2018-10-23 RX ORDER — ONDANSETRON 2 MG/ML
4 INJECTION INTRAMUSCULAR; INTRAVENOUS
Status: DISCONTINUED | OUTPATIENT
Start: 2018-10-23 | End: 2018-10-23 | Stop reason: HOSPADM

## 2018-10-23 RX ORDER — OXYCODONE HYDROCHLORIDE 10 MG/1
10 TABLET ORAL
Status: DISCONTINUED | OUTPATIENT
Start: 2018-10-23 | End: 2018-10-23 | Stop reason: HOSPADM

## 2018-10-23 RX ORDER — HYDROMORPHONE HYDROCHLORIDE 2 MG/ML
0.2 INJECTION, SOLUTION INTRAMUSCULAR; INTRAVENOUS; SUBCUTANEOUS
Status: DISCONTINUED | OUTPATIENT
Start: 2018-10-23 | End: 2018-10-23 | Stop reason: HOSPADM

## 2018-10-23 RX ORDER — LABETALOL HYDROCHLORIDE 5 MG/ML
5 INJECTION, SOLUTION INTRAVENOUS
Status: DISCONTINUED | OUTPATIENT
Start: 2018-10-23 | End: 2018-10-23 | Stop reason: HOSPADM

## 2018-10-23 RX ORDER — HYDROMORPHONE HYDROCHLORIDE 2 MG/ML
0.4 INJECTION, SOLUTION INTRAMUSCULAR; INTRAVENOUS; SUBCUTANEOUS
Status: DISCONTINUED | OUTPATIENT
Start: 2018-10-23 | End: 2018-10-23 | Stop reason: HOSPADM

## 2018-10-23 RX ORDER — HYDROMORPHONE HYDROCHLORIDE 2 MG/ML
0.1 INJECTION, SOLUTION INTRAMUSCULAR; INTRAVENOUS; SUBCUTANEOUS
Status: DISCONTINUED | OUTPATIENT
Start: 2018-10-23 | End: 2018-10-23 | Stop reason: HOSPADM

## 2018-10-23 RX ORDER — CEFAZOLIN SODIUM 2 G/100ML
2 INJECTION, SOLUTION INTRAVENOUS EVERY 8 HOURS
Status: COMPLETED | OUTPATIENT
Start: 2018-10-23 | End: 2018-10-24

## 2018-10-23 RX ORDER — OXYCODONE HCL 5 MG/5 ML
10 SOLUTION, ORAL ORAL
Status: COMPLETED | OUTPATIENT
Start: 2018-10-23 | End: 2018-10-23

## 2018-10-23 RX ORDER — OXYCODONE HYDROCHLORIDE 5 MG/1
5 TABLET ORAL
Status: DISCONTINUED | OUTPATIENT
Start: 2018-10-23 | End: 2018-10-23 | Stop reason: HOSPADM

## 2018-10-23 RX ADMIN — HEPARIN SODIUM 5000 UNITS: 5000 INJECTION, SOLUTION INTRAVENOUS; SUBCUTANEOUS at 21:35

## 2018-10-23 RX ADMIN — OXYCODONE HYDROCHLORIDE 10 MG: 5 TABLET ORAL at 11:25

## 2018-10-23 RX ADMIN — LATANOPROST 1 DROP: 50 SOLUTION OPHTHALMIC at 19:29

## 2018-10-23 RX ADMIN — HEPARIN SODIUM 5000 UNITS: 5000 INJECTION, SOLUTION INTRAVENOUS; SUBCUTANEOUS at 06:36

## 2018-10-23 RX ADMIN — MULTIPLE VITAMINS W/ MINERALS TAB 1 TABLET: TAB at 06:36

## 2018-10-23 RX ADMIN — SENNOSIDES AND DOCUSATE SODIUM 2 TABLET: 8.6; 5 TABLET ORAL at 06:36

## 2018-10-23 RX ADMIN — SENNOSIDES AND DOCUSATE SODIUM 2 TABLET: 8.6; 5 TABLET ORAL at 19:24

## 2018-10-23 RX ADMIN — OXYCODONE HYDROCHLORIDE 10 MG: 5 TABLET ORAL at 06:44

## 2018-10-23 RX ADMIN — OXYCODONE HYDROCHLORIDE 10 MG: 5 TABLET ORAL at 21:36

## 2018-10-23 RX ADMIN — ATORVASTATIN CALCIUM 20 MG: 20 TABLET, FILM COATED ORAL at 21:36

## 2018-10-23 RX ADMIN — TIMOLOL MALEATE 1 DROP: 5 SOLUTION/ DROPS OPHTHALMIC at 06:36

## 2018-10-23 RX ADMIN — BRIMONIDINE TARTRATE 1 DROP: 2 SOLUTION OPHTHALMIC at 19:24

## 2018-10-23 RX ADMIN — BRIMONIDINE TARTRATE 1 DROP: 2 SOLUTION OPHTHALMIC at 06:39

## 2018-10-23 RX ADMIN — MORPHINE SULFATE 2 MG: 4 INJECTION INTRAVENOUS at 02:33

## 2018-10-23 RX ADMIN — MORPHINE SULFATE 2 MG: 4 INJECTION INTRAVENOUS at 23:40

## 2018-10-23 RX ADMIN — OXYCODONE HYDROCHLORIDE 10 MG: 5 SOLUTION ORAL at 17:04

## 2018-10-23 RX ADMIN — TIMOLOL MALEATE 1 DROP: 5 SOLUTION/ DROPS OPHTHALMIC at 19:28

## 2018-10-23 RX ADMIN — SODIUM CHLORIDE: 9 INJECTION, SOLUTION INTRAVENOUS at 21:35

## 2018-10-23 RX ADMIN — CEFAZOLIN SODIUM 2 G: 2 INJECTION, SOLUTION INTRAVENOUS at 21:35

## 2018-10-23 RX ADMIN — SODIUM CHLORIDE: 9 INJECTION, SOLUTION INTRAVENOUS at 08:34

## 2018-10-23 ASSESSMENT — PAIN SCALES - GENERAL
PAINLEVEL_OUTOF10: 7
PAINLEVEL_OUTOF10: 2
PAINLEVEL_OUTOF10: 3
PAINLEVEL_OUTOF10: 6
PAINLEVEL_OUTOF10: 5
PAINLEVEL_OUTOF10: 9
PAINLEVEL_OUTOF10: 5
PAINLEVEL_OUTOF10: 0
PAINLEVEL_OUTOF10: 8
PAINLEVEL_OUTOF10: 7

## 2018-10-23 ASSESSMENT — ENCOUNTER SYMPTOMS
DIZZINESS: 0
COUGH: 0
FEVER: 0
NAUSEA: 0
SHORTNESS OF BREATH: 0
HEADACHES: 0
PALPITATIONS: 0
CHILLS: 0
WEAKNESS: 0
NERVOUS/ANXIOUS: 0
MYALGIAS: 1
ABDOMINAL PAIN: 0

## 2018-10-23 NOTE — CARE PLAN
Problem: Venous Thromboembolism (VTW)/Deep Vein Thrombosis (DVT) Prevention:  Goal: Patient will participate in Venous Thrombosis (VTE)/Deep Vein Thrombosis (DVT)Prevention Measures  Outcome: PROGRESSING AS EXPECTED  Pt ambulates very frequently in the hallway, and rarely in bed.  Pt takes plavix and ASA for pharmacologic prophylaxis.    Problem: Pain Management  Goal: Pain level will decrease to patient's comfort goal  Outcome: PROGRESSING AS EXPECTED  Current pain regimen adequately controlling pain.

## 2018-10-23 NOTE — PROGRESS NOTES
Successful angioplasty of left external iliac artery and distal left superficial femoral artery.    Plan for amputation of left 3rd toe, possible 2nd, toe(s) tomorrow at 3PM.    Discussed with patient who agreed to proceed with plan.  All questions were answered.

## 2018-10-23 NOTE — PROGRESS NOTES
Pt is A&O 4  Pain controlled on current regimen  Denies nausea  Tolerating Diet Regular  Surgical incision to R femoral covered with pressure dressing, no drainage noted on or around dressing  + Urine Void   + Flatus  - BM Void  Up independent   SCD's off, pt ambulating  Reviewed plan of care with patient, bed in lowest position and locked, pt resting comfortably now, call light within reach, all needs met at this time

## 2018-10-23 NOTE — OR NURSING
1619: received to PACU with LMA. Respirations spontaneous and unlabored. Dressing to left foot CDI. Brisk capillary refill. Elevated to 2 pillows.  1625: patient awaken. LMA dc'd without problem or difficulty. Denies any pain. Able to move left lower extremity.  1704: c/o pain. Pain scale 6/10. Medicated. See MAR  1733: report called to Deanna ADAMSON  1735: transported via bed by RN to T 431-2 with O2 at 2L / nc. Stable.

## 2018-10-23 NOTE — OR SURGEON
Immediate Post OP Note    PreOp Diagnosis: Gangrenous left 2nd and 3rd toes.    PostOp Diagnosis: Same.    Procedure(s):  TOE AMPUTATION LEFT 3RD  AND 2ND  TOES - Wound Class: Dirty or Infected    Surgeon:  Ana Grande M.D.    Anesthesiologist/Type of Anesthesia:  Anesthesiologist: Erica Ruth M.D./General    Surgical Staff:  Circulator: Pamela Trevino R.N.  Scrub Person: Hilario West    Specimens removed if any:  ID Type Source Tests Collected by Time Destination   A : Left 2nd/3rd toes Tissue Other Tissue PATHOLOGY SPECIMEN Ana Grande M.D. 10/23/2018  4:01 PM        Estimated Blood Loss: 5ml.    IVF:  400ml.    Findings: Tissue at amputation sites appears to be healthy.    Complications: None.    Dictated, #[id number].      10/23/2018 4:20 PM Ana Grande M.D.

## 2018-10-23 NOTE — PROGRESS NOTES
Pt back from IR.   VSS, on room air.  Medicated for pain.  R groin with pressure dressing, CDI.  Pt to lie flat until 1900.  Diet restarted.  NPO tomorrow after 0800.  Early breakfast tray ordered.

## 2018-10-23 NOTE — OP REPORT
DATE OF SERVICE:  10/22/2018    SURGEON:  Ana Grande MD    ANESTHESIA:  Intravenous sedation with fentanyl and Versed for 1 hour and   local anesthesia with 10 mL of 1% lidocaine solution.    PREOPERATIVE DIAGNOSIS:  Peripheral arterial disease with left toe gangrenes.    POSTOPERATIVE DIAGNOSIS:  Peripheral arterial disease with left toe gangrenes.    PROCEDURES:  1.  Percutaneous cannulation of right common femoral artery under ultrasound   guidance.  2.  Selective catheterization of left popliteal artery from contralateral   approach.  3.  Left leg angiogram.  4.  Percutaneous, transluminal angioplasty of severe left distal superficial   femoral artery/proximal popliteal artery stenosis using a 6x40 mm drug-coated   IN.PACT angioplasty balloon.  5.  Percutaneous, transluminal angioplasty of severe proximal left external   iliac artery stenosis using 8x40 mm angioplasty balloon.  6.  Application of StarClose, right common femoral artery.    INDICATION FOR PROCEDURE:  The patient is a pleasant 73 years old male with   multiple medical problems who presented with gangrenous left toes.  CT   angiogram was obtained and showed significant stenosis in the proximal left   external iliac artery as well as distal left superficial femoral artery.    Discussion was made with patient.  He would like to undergo above procedure to   improve circulation prior to amputation of the gangrenous toes, fully   understanding all risks.    DESCRIPTION OF PROCEDURE:  Informed consent was obtained.  Patient was taken   to interventional radiology suite and was placed in the supine position.  He   was given Ancef intravenously.  He has been on normal saline hydration.  A   time-out procedure was done.  Intravenous sedation was performed with fentanyl   and Versed.  The patient was closely monitored.    Next, both of his groins were sterilely prepped and draped in the normal   fashion.  Duplex evaluation of the right common femoral  artery was performed.    It was found to be ectatic and patent.  Under ultrasound guidance, the right   common femoral artery was percutaneously cannulated using an access needle,   after the skin and subcutaneous tissue were anesthetized with 10 mL of 1%   lidocaine solution.  A guidewire was passed through the needle.  The needle   was removed and replaced with a 5-Belgian sheath.  Over the guidewire, an   Omniflush catheter was advanced into the abdominal aorta.  Next, selective   catheterization of the left superficial femoral artery was performed from the   contralateral approach.  The 5-Belgian sheath was removed and replaced with a   6-Belgian Jonas sheath.  Patient was given heparin 5000 units intravenously.    Selective catheterization of the left popliteal artery was performed.  After   the heparin was allowed to circulate systemically for 3 minutes, over the   guidewire, percutaneous, transluminal angioplasty of the severe left distal   superficial femoral artery/proximal popliteal artery stenosis was performed   using 6x40 mm drug-coated IN.PACT angioplasty balloon.  A followup angiogram   was obtained and showed restoration of luminal patency with no significant   recoiledl stenosis seen.    Next, the Jonas sheath retracted into the left common iliac artery.  A left   iliac angiogram was obtained.  Over the guidewire, percutaneous, transluminal   angioplasty of the severe proximal left external iliac artery stenosis was   performed using an 8x40 mm angioplasty balloon.  A followup angiogram was   obtained and showed restoration of luminal patency with no significant recoiled   stenosis and preservation of distal flow.    The Jonas sheath was removed and StarClose was deployed with excellent   hemostasis achieved.  Sterile dressing was applied.    IMPRESSION:  1.  Aneurysmal left common iliac artery.  The left external iliac artery had   severe stenosis proximally.  This was successfully treated with  percutaneous,   transluminal angioplasty using 8x40 mm angioplasty balloon.  The left internal   iliac artery appears to be chronically occluded.  2.  Patent left common femoral and profunda femoral arteries.  The left   superficial femoral artery is patent except for distally where there was   severe stenosis seen.  This was treated with percutaneous, transluminal   angioplasty using 6x40 mm drug-coated IN.PACT angioplasty balloon.  The   below-knee popliteal artery is patent and appears to have 3-vessel runoff   seen.  The distal runoff vessels were not evaluated in order to minimize the   amount of contrast used.    ESTIMATED BLOOD LOSS:  10 mL    CONTRAST USED:  12 mL of Visipaque.    Sponge and instrument count was correct x2.    The patient was then awakened and taken to recovery area in stable condition   with strongly palpable left posterior tibial pulses as well as the left   dorsalis pedis pulses with multiphasic Doppler flow signals.       ____________________________________     MD ANDREA FRITZ / RICHA    DD:  10/22/2018 17:02:49  DT:  10/22/2018 17:29:39    D#:  [id number]  Job#:  [id number]    cc: BROOKE GOOD DO

## 2018-10-23 NOTE — PROGRESS NOTES
Bedside report received.  Assessment complete.  A&O x 4. Patient calls appropriately.  Patient up with no assist.   Patient has 2/10 pain. Declines intervention at this time  Denies N&V. Tolerating regular diet for early breakfast tray- pt is NPO since 0800 for surgery at 1500.  positive void, positive flatus  Patient denies SOB.  Patient resting in bed.  Review plan with of care with patient. Call light and personal belongings with in reach. Hourly rounding in place. All needs met at this time.

## 2018-10-23 NOTE — PROGRESS NOTES
[facility] Hospitalist Progress Note    Date of Service: 10/23/2018    Chief Complaint  73 y.o. male admitted 10/19/2018 with history of cellulitis and now with resultant ischemic left toes on anticoagulation.    Interval Problem Update  Ambulating, denies pain    Consultants/Specialty  surgery    Disposition   tbd        Review of Systems   Constitutional: Negative for chills and fever.   HENT: Negative for congestion.    Respiratory: Negative for cough and shortness of breath.    Cardiovascular: Negative for palpitations and leg swelling.   Gastrointestinal: Negative for abdominal pain and nausea.   Genitourinary: Negative for dysuria and hematuria.   Musculoskeletal: Positive for joint pain and myalgias.   Neurological: Negative for dizziness, weakness and headaches.   Psychiatric/Behavioral: The patient is not nervous/anxious.       Physical Exam  Laboratory/Imaging   Hemodynamics  Temp (24hrs), Av.8 °C (98.3 °F), Min:36.6 °C (97.8 °F), Max:37.4 °C (99.3 °F)   Temperature: 36.6 °C (97.9 °F)  Pulse  Av.8  Min: 55  Max: 85 Heart Rate (Monitored): 80  Blood Pressure : 118/71, NIBP: 124/74      Respiratory      Respiration: 15, Pulse Oximetry: 98 %        RUL Breath Sounds: Clear, RML Breath Sounds: Clear, RLL Breath Sounds: Diminished, BRIAN Breath Sounds: Clear, LLL Breath Sounds: Diminished    Fluids    Intake/Output Summary (Last 24 hours) at 10/23/18 1658  Last data filed at 10/23/18 1610   Gross per 24 hour   Intake             2960 ml   Output              305 ml   Net             2655 ml       Nutrition  Orders Placed This Encounter   Procedures   • Diet NPO     Standing Status:   Standing     Number of Occurrences:   1     Order Specific Question:   Restrict to:     Answer:   Sips with Medications [3]     Physical Exam   Constitutional: He is oriented to person, place, and time. No distress.   HENT:   Head: Normocephalic and atraumatic.   Eyes: Pupils are equal, round, and reactive to light. EOM are  normal.   Neck: Neck supple.   Cardiovascular: Normal rate and intact distal pulses.    Pulmonary/Chest: Effort normal and breath sounds normal. No respiratory distress.   Abdominal: Soft. Bowel sounds are normal. He exhibits no distension. There is no tenderness.   Musculoskeletal: He exhibits no edema.   Neurological: He is alert and oriented to person, place, and time. No cranial nerve deficit.   Skin: Skin is warm and dry. No erythema.   Ischemic toes, ttp     Dressing c/d/i   Psychiatric: He has a normal mood and affect. His behavior is normal. Judgment and thought content normal.   Nursing note and vitals reviewed.      Recent Labs      10/21/18   0359  10/22/18   0346  10/23/18   0412   WBC  7.3  8.3  10.5   RBC  3.60*  3.52*  3.55*   HEMOGLOBIN  11.3*  11.0*  11.3*   HEMATOCRIT  34.7*  33.7*  33.7*   MCV  96.4  95.7  94.9   MCH  31.4  31.3  31.8   MCHC  32.6*  32.6*  33.5*   RDW  51.2*  49.7  49.6   PLATELETCT  269  263  264   MPV  8.7*  9.1  9.1     Recent Labs      10/21/18   0359  10/22/18   0346  10/23/18   0412   SODIUM  139  134*  136   POTASSIUM  4.1  4.2  4.2   CHLORIDE  104  103  101   CO2  28  25  26   GLUCOSE  93  93  100*   BUN  21  28*  20   CREATININE  1.37  1.35  1.31   CALCIUM  9.3  9.1  9.3                      Assessment/Plan     Ischemia of toe- (present on admission)   Assessment & Plan    Ischemia of multiple toes on the left foot  Improved with anticoagulation  Heparin drip discontinued per surgery  On Plavix  Rate of heparin drip will be adjusted based on serial pTT measurements to ensure adequate anticoagulation and to avoid potential complications of bleeding  Dr. Grande of vascular surgery , plan for angiogram Monday  Continue Ivf, pain control  Morphine prn  oxy 5-10 q4    10/23 s/p angioplasty, npo for toe amputation today  Follow-up a.m. 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

## 2018-10-24 LAB
ANION GAP SERPL CALC-SCNC: 9 MMOL/L (ref 0–11.9)
BASOPHILS # BLD AUTO: 0.2 % (ref 0–1.8)
BASOPHILS # BLD: 0.02 K/UL (ref 0–0.12)
BUN SERPL-MCNC: 21 MG/DL (ref 8–22)
CALCIUM SERPL-MCNC: 8.7 MG/DL (ref 8.5–10.5)
CHLORIDE SERPL-SCNC: 103 MMOL/L (ref 96–112)
CO2 SERPL-SCNC: 25 MMOL/L (ref 20–33)
CREAT SERPL-MCNC: 1.37 MG/DL (ref 0.5–1.4)
EOSINOPHIL # BLD AUTO: 0.25 K/UL (ref 0–0.51)
EOSINOPHIL NFR BLD: 2.7 % (ref 0–6.9)
ERYTHROCYTE [DISTWIDTH] IN BLOOD BY AUTOMATED COUNT: 51.4 FL (ref 35.9–50)
GLUCOSE SERPL-MCNC: 96 MG/DL (ref 65–99)
HCT VFR BLD AUTO: 32.1 % (ref 42–52)
HGB BLD-MCNC: 10.3 G/DL (ref 14–18)
IMM GRANULOCYTES # BLD AUTO: 0.03 K/UL (ref 0–0.11)
IMM GRANULOCYTES NFR BLD AUTO: 0.3 % (ref 0–0.9)
LYMPHOCYTES # BLD AUTO: 1.83 K/UL (ref 1–4.8)
LYMPHOCYTES NFR BLD: 19.6 % (ref 22–41)
MCH RBC QN AUTO: 31.2 PG (ref 27–33)
MCHC RBC AUTO-ENTMCNC: 32.1 G/DL (ref 33.7–35.3)
MCV RBC AUTO: 97.3 FL (ref 81.4–97.8)
MONOCYTES # BLD AUTO: 0.94 K/UL (ref 0–0.85)
MONOCYTES NFR BLD AUTO: 10.1 % (ref 0–13.4)
NEUTROPHILS # BLD AUTO: 6.28 K/UL (ref 1.82–7.42)
NEUTROPHILS NFR BLD: 67.1 % (ref 44–72)
NRBC # BLD AUTO: 0 K/UL
NRBC BLD-RTO: 0 /100 WBC
PATHOLOGY CONSULT NOTE: NORMAL
PLATELET # BLD AUTO: 263 K/UL (ref 164–446)
PMV BLD AUTO: 8.9 FL (ref 9–12.9)
POTASSIUM SERPL-SCNC: 4.2 MMOL/L (ref 3.6–5.5)
RBC # BLD AUTO: 3.3 M/UL (ref 4.7–6.1)
SODIUM SERPL-SCNC: 137 MMOL/L (ref 135–145)
WBC # BLD AUTO: 9.4 K/UL (ref 4.8–10.8)

## 2018-10-24 PROCEDURE — 700102 HCHG RX REV CODE 250 W/ 637 OVERRIDE(OP): Performed by: SURGERY

## 2018-10-24 PROCEDURE — 700111 HCHG RX REV CODE 636 W/ 250 OVERRIDE (IP): Performed by: SPECIALIST

## 2018-10-24 PROCEDURE — 700111 HCHG RX REV CODE 636 W/ 250 OVERRIDE (IP): Performed by: SURGERY

## 2018-10-24 PROCEDURE — 80048 BASIC METABOLIC PNL TOTAL CA: CPT

## 2018-10-24 PROCEDURE — 770006 HCHG ROOM/CARE - MED/SURG/GYN SEMI*

## 2018-10-24 PROCEDURE — A9270 NON-COVERED ITEM OR SERVICE: HCPCS | Performed by: INTERNAL MEDICINE

## 2018-10-24 PROCEDURE — 85025 COMPLETE CBC W/AUTO DIFF WBC: CPT

## 2018-10-24 PROCEDURE — 36415 COLL VENOUS BLD VENIPUNCTURE: CPT

## 2018-10-24 PROCEDURE — 700105 HCHG RX REV CODE 258: Performed by: INTERNAL MEDICINE

## 2018-10-24 PROCEDURE — A9270 NON-COVERED ITEM OR SERVICE: HCPCS | Performed by: HOSPITALIST

## 2018-10-24 PROCEDURE — A9270 NON-COVERED ITEM OR SERVICE: HCPCS | Performed by: SURGERY

## 2018-10-24 PROCEDURE — 700102 HCHG RX REV CODE 250 W/ 637 OVERRIDE(OP): Performed by: INTERNAL MEDICINE

## 2018-10-24 PROCEDURE — 700102 HCHG RX REV CODE 250 W/ 637 OVERRIDE(OP): Performed by: HOSPITALIST

## 2018-10-24 PROCEDURE — 99233 SBSQ HOSP IP/OBS HIGH 50: CPT | Performed by: INTERNAL MEDICINE

## 2018-10-24 RX ORDER — KETOROLAC TROMETHAMINE 30 MG/ML
15 INJECTION, SOLUTION INTRAMUSCULAR; INTRAVENOUS ONCE
Status: COMPLETED | OUTPATIENT
Start: 2018-10-24 | End: 2018-10-24

## 2018-10-24 RX ADMIN — SENNOSIDES AND DOCUSATE SODIUM 2 TABLET: 8.6; 5 TABLET ORAL at 05:29

## 2018-10-24 RX ADMIN — OXYCODONE HYDROCHLORIDE 10 MG: 5 TABLET ORAL at 12:25

## 2018-10-24 RX ADMIN — CEFAZOLIN SODIUM 2 G: 2 INJECTION, SOLUTION INTRAVENOUS at 05:30

## 2018-10-24 RX ADMIN — KETOROLAC TROMETHAMINE 15 MG: 30 INJECTION, SOLUTION INTRAMUSCULAR at 01:01

## 2018-10-24 RX ADMIN — OXYCODONE HYDROCHLORIDE 10 MG: 5 TABLET ORAL at 22:50

## 2018-10-24 RX ADMIN — OXYCODONE HYDROCHLORIDE 10 MG: 5 TABLET ORAL at 03:00

## 2018-10-24 RX ADMIN — HEPARIN SODIUM 5000 UNITS: 5000 INJECTION, SOLUTION INTRAVENOUS; SUBCUTANEOUS at 05:30

## 2018-10-24 RX ADMIN — MULTIPLE VITAMINS W/ MINERALS TAB 1 TABLET: TAB at 05:30

## 2018-10-24 RX ADMIN — CEFAZOLIN SODIUM 2 G: 2 INJECTION, SOLUTION INTRAVENOUS at 13:32

## 2018-10-24 RX ADMIN — HEPARIN SODIUM 5000 UNITS: 5000 INJECTION, SOLUTION INTRAVENOUS; SUBCUTANEOUS at 21:15

## 2018-10-24 RX ADMIN — TIMOLOL MALEATE 1 DROP: 5 SOLUTION/ DROPS OPHTHALMIC at 17:58

## 2018-10-24 RX ADMIN — BRIMONIDINE TARTRATE 1 DROP: 2 SOLUTION OPHTHALMIC at 17:58

## 2018-10-24 RX ADMIN — Medication 81 MG: at 05:29

## 2018-10-24 RX ADMIN — LATANOPROST 1 DROP: 50 SOLUTION OPHTHALMIC at 21:16

## 2018-10-24 RX ADMIN — SODIUM CHLORIDE: 9 INJECTION, SOLUTION INTRAVENOUS at 06:42

## 2018-10-24 RX ADMIN — CLOPIDOGREL 75 MG: 75 TABLET, FILM COATED ORAL at 05:30

## 2018-10-24 RX ADMIN — OXYCODONE HYDROCHLORIDE 10 MG: 5 TABLET ORAL at 17:57

## 2018-10-24 RX ADMIN — HEPARIN SODIUM 5000 UNITS: 5000 INJECTION, SOLUTION INTRAVENOUS; SUBCUTANEOUS at 13:32

## 2018-10-24 RX ADMIN — TIMOLOL MALEATE 1 DROP: 5 SOLUTION/ DROPS OPHTHALMIC at 06:41

## 2018-10-24 RX ADMIN — BRIMONIDINE TARTRATE 1 DROP: 2 SOLUTION OPHTHALMIC at 05:34

## 2018-10-24 RX ADMIN — ATORVASTATIN CALCIUM 20 MG: 20 TABLET, FILM COATED ORAL at 21:16

## 2018-10-24 RX ADMIN — ACETAMINOPHEN 650 MG: 325 TABLET, FILM COATED ORAL at 01:01

## 2018-10-24 ASSESSMENT — ENCOUNTER SYMPTOMS
WEAKNESS: 1
BACK PAIN: 0
NAUSEA: 0
SPEECH CHANGE: 0
SENSORY CHANGE: 0
COUGH: 0
HEADACHES: 0
ABDOMINAL PAIN: 0
MYALGIAS: 1
CHILLS: 0
SHORTNESS OF BREATH: 0
NERVOUS/ANXIOUS: 0
DEPRESSION: 0
FEVER: 0
VOMITING: 0

## 2018-10-24 ASSESSMENT — PAIN SCALES - GENERAL
PAINLEVEL_OUTOF10: 6
PAINLEVEL_OUTOF10: 5
PAINLEVEL_OUTOF10: 2
PAINLEVEL_OUTOF10: 6
PAINLEVEL_OUTOF10: 5
PAINLEVEL_OUTOF10: 2

## 2018-10-24 NOTE — PROGRESS NOTES
Two RN skin check complete.     Surgical dressing noted to left leg- second and third toes are gone.     Blanching redness noted to sacrum- noted prior to patient transferring off unit.     Blanching redness noted behind bilateral ears.     Scattered abrasions noted.

## 2018-10-24 NOTE — OP REPORT
DATE OF SERVICE:  10/23/2018    SURGEON:  Ana Grande MD    ANESTHESIOLOGIST:  Erica Ruth MD    TYPE OF ANESTHESIA:  General anesthesia.    PREOPERATIVE DIAGNOSIS:  Gangrenous left second and third toes.    POSTOPERATIVE DIAGNOSIS:  Gangrenous left second and third toes.    PROCEDURE:  Amputation of left second and third toes, MP joint.    INDICATION FOR PROCEDURE:  The patient is a pleasant gentleman with multiple   medical problems who presented with gangrenous left toes.  He underwent   angiogram with angioplasty of the severely stenosed left proximal external   iliac artery and distal superficial femoral artery.  He is now taken to the   operating room for amputation of the gangrenous toes.  He would like to   proceed, fully understanding all risks.    DESCRIPTION OF PROCEDURE:  Informed consent was obtained.  The patient was   taken to the operating room and was placed in the supine position.  He was   given Ancef intravenously.  General anesthesia was induced.  Next, his left   lower extremity was sterilely prepped and draped in the normal fashion.    Inspection of the left second toe was done.  The overlying blister was sharply   excised using Metzenbaum scissor.  The tissue underneath the blister was   unhealthy with early gangrenous changes.  The left third toe was gangrenous.    Decision was made to do amputation of the left second and third toes.    Incision was made around the base of the left second and third toe, extended   to near the MP joints dorsally.  The incision was extended through   subcutaneous tissue and tendon using sharp dissection.  The left second and   third toe was disarticulated at the MP joint and passed off as specimen.    There was good bleeding from the digital vessel.  The digital vessel was   oversewn with 3-0 Vicryl suture.  The wound was copiously irrigated with warm   saline solution and was found to have good hemostasis.  The wound was closed   with 3-0 nylon  vertical mattresses.  The wounds were clean and sterile   dressing was applied.    ESTIMATED BLOOD LOSS:  5 mL    INTRAVENOUS FLUID:  400 mL    Sponge and instrument count was correct x2.    The patient was then awakened, extubated, taken to recovery area in stable   condition.       ____________________________________     MD ANDREA FRITZ / RICHA    DD:  10/23/2018 16:47:46  DT:  10/23/2018 17:43:22    D#:  [id number]  Job#:  [id number]    cc: Erica FRAGA DO, MD

## 2018-10-24 NOTE — DISCHARGE PLANNING
"Anticipated Discharge Disposition:  Home with services to be determined    Action: RN CM attempted to meet with pt to complete assessment, however he declined to speak with writer, requested writer to call nurse, saying \"I want my soup\" and asked to wait until tomorrow to speak with him.  RN CM informed bedside RN of pt request.      RN CM also called [facility] and spoke with Sheila, who reports pt has seen a PCP in [place] several times, Dr Britney Cordova at the [facility], [phone].      Barriers to Discharge: none known    Plan: Continue to attempt to complete assessment    "

## 2018-10-24 NOTE — PROGRESS NOTES
Pt is A&O 4  Pain tolerable on current regimen  Denies nausea  Tolerating Diet Regular  Surgical incision to R foot for 2-3rd toe amputation, covered with original surgical dressing, no drainage noted  + Urine Void   + Flatus  - BM Void  Non weightbearing on the R forefoot, ok for weight bearing on the R heel.  Pt aware and states understanding  Reviewed plan of care with patient, bed in lowest position and locked, pt resting comfortably now, call light within reach, all needs met at this time

## 2018-10-24 NOTE — PROGRESS NOTES
[facility]    Date of Service: 10/24/2018    Chief Complaint  73 y.o. male admitted 10/19/2018 with history of cellulitis and now with resultant ischemic left toes on anticoagulation.    Interval Problem Update  Status post toe amputation  Resting, easily arousable  Pain controlled    Consultants/Specialty  surgery    Disposition  PT/OT evaluation        Review of Systems   Constitutional: Negative for chills, fever and malaise/fatigue.   HENT: Negative for congestion.    Respiratory: Negative for cough and shortness of breath.    Cardiovascular: Negative for chest pain and leg swelling.   Gastrointestinal: Negative for abdominal pain, nausea and vomiting.   Genitourinary: Negative for dysuria and hematuria.   Musculoskeletal: Positive for joint pain and myalgias. Negative for back pain.   Neurological: Positive for weakness. Negative for sensory change, speech change and headaches.   Psychiatric/Behavioral: Negative for depression. The patient is not nervous/anxious.       Physical Exam  Laboratory/Imaging   Hemodynamics  Temp (24hrs), Av.7 °C (98 °F), Min:36.2 °C (97.2 °F), Max:36.9 °C (98.4 °F)   Temperature: 36.5 °C (97.7 °F)  Pulse  Av.2  Min: 55  Max: 85 Heart Rate (Monitored): 65  Blood Pressure : 122/69, NIBP: 127/78      Respiratory      Respiration: 16, Pulse Oximetry: 98 %        RUL Breath Sounds: Clear, RML Breath Sounds: Clear, RLL Breath Sounds: Diminished, BRIAN Breath Sounds: Clear, LLL Breath Sounds: Diminished    Fluids    Intake/Output Summary (Last 24 hours) at 10/24/18 1246  Last data filed at 10/24/18 0900   Gross per 24 hour   Intake             2440 ml   Output             1105 ml   Net             1335 ml       Nutrition  Orders Placed This Encounter   Procedures   • Diet Order Regular     Standing Status:   Standing     Number of Occurrences:   1     Order Specific Question:   Diet:     Answer:   Regular [1]     Physical Exam   Constitutional: He is oriented to  person, place, and time. No distress.   HENT:   Head: Normocephalic and atraumatic.   Mouth/Throat: No oropharyngeal exudate.   Eyes: Pupils are equal, round, and reactive to light. EOM are normal.   Neck: Normal range of motion. Neck supple.   Cardiovascular: Normal rate and intact distal pulses.    Pulmonary/Chest: Effort normal and breath sounds normal. No respiratory distress. He has no wheezes.   Abdominal: Soft. Bowel sounds are normal. He exhibits no distension. There is no tenderness.   Musculoskeletal: He exhibits no edema or tenderness.   Neurological: He is alert and oriented to person, place, and time. No cranial nerve deficit. Coordination normal.   Skin: Skin is warm and dry. He is not diaphoretic.   Ischemic toes, ttp     Dressing c/d/i   Psychiatric: He has a normal mood and affect. His behavior is normal. Judgment and thought content normal.   Nursing note and vitals reviewed.      Recent Labs      10/22/18   0346  10/23/18   0412  10/24/18   0439   WBC  8.3  10.5  9.4   RBC  3.52*  3.55*  3.30*   HEMOGLOBIN  11.0*  11.3*  10.3*   HEMATOCRIT  33.7*  33.7*  32.1*   MCV  95.7  94.9  97.3   MCH  31.3  31.8  31.2   MCHC  32.6*  33.5*  32.1*   RDW  49.7  49.6  51.4*   PLATELETCT  263  264  263   MPV  9.1  9.1  8.9*     Recent Labs      10/22/18   0346  10/23/18   0412  10/24/18   0438   SODIUM  134*  136  137   POTASSIUM  4.2  4.2  4.2   CHLORIDE  103  101  103   CO2  25  26  25   GLUCOSE  93  100*  96   BUN  28*  20  21   CREATININE  1.35  1.31  1.37   CALCIUM  9.1  9.3  8.7                      Assessment/Plan     Ischemia of toe- (present on admission)   Assessment & Plan    Ischemia of multiple toes on the left foot  Improved with anticoagulation  Heparin drip discontinued per surgery  On Plavix  Rate of heparin drip will be adjusted based on serial pTT measurements to ensure adequate anticoagulation and to avoid potential complications of bleeding  Dr. 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

## 2018-10-24 NOTE — PROGRESS NOTES
Awake, no complaints.  AF, VSS    L LE - Dressings c/d/i.  Toes are well perfused.    Assessment:  1) S/P angiogram and angioplasty of left lower extremity.  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    Plan:  Stable.  Continue Aspirin and Plavix.  Will take dressing down tomorrow to check wound.    Discussed with patient.

## 2018-10-24 NOTE — CARE PLAN
Problem: Safety  Goal: Will remain free from falls  Outcome: PROGRESSING AS EXPECTED  Pt educated not to get OOB without staff present.  Non weightbearing on the R forefoot.  Pt states understanding, call light within reach.    Problem: Pain Management  Goal: Pain level will decrease to patient's comfort goal  Outcome: PROGRESSING AS EXPECTED  Pain tolerable on current regimen

## 2018-10-25 LAB
ANION GAP SERPL CALC-SCNC: 5 MMOL/L (ref 0–11.9)
BASOPHILS # BLD AUTO: 0.2 % (ref 0–1.8)
BASOPHILS # BLD: 0.02 K/UL (ref 0–0.12)
BUN SERPL-MCNC: 21 MG/DL (ref 8–22)
CALCIUM SERPL-MCNC: 9.2 MG/DL (ref 8.5–10.5)
CHLORIDE SERPL-SCNC: 101 MMOL/L (ref 96–112)
CO2 SERPL-SCNC: 30 MMOL/L (ref 20–33)
CREAT SERPL-MCNC: 1.43 MG/DL (ref 0.5–1.4)
EOSINOPHIL # BLD AUTO: 0.29 K/UL (ref 0–0.51)
EOSINOPHIL NFR BLD: 3.1 % (ref 0–6.9)
ERYTHROCYTE [DISTWIDTH] IN BLOOD BY AUTOMATED COUNT: 50.2 FL (ref 35.9–50)
GLUCOSE SERPL-MCNC: 91 MG/DL (ref 65–99)
HCT VFR BLD AUTO: 32 % (ref 42–52)
HGB BLD-MCNC: 10.7 G/DL (ref 14–18)
IMM GRANULOCYTES # BLD AUTO: 0.06 K/UL (ref 0–0.11)
IMM GRANULOCYTES NFR BLD AUTO: 0.6 % (ref 0–0.9)
LYMPHOCYTES # BLD AUTO: 1.93 K/UL (ref 1–4.8)
LYMPHOCYTES NFR BLD: 20.8 % (ref 22–41)
MCH RBC QN AUTO: 32 PG (ref 27–33)
MCHC RBC AUTO-ENTMCNC: 33.4 G/DL (ref 33.7–35.3)
MCV RBC AUTO: 95.8 FL (ref 81.4–97.8)
MONOCYTES # BLD AUTO: 1.09 K/UL (ref 0–0.85)
MONOCYTES NFR BLD AUTO: 11.8 % (ref 0–13.4)
NEUTROPHILS # BLD AUTO: 5.88 K/UL (ref 1.82–7.42)
NEUTROPHILS NFR BLD: 63.5 % (ref 44–72)
NRBC # BLD AUTO: 0 K/UL
NRBC BLD-RTO: 0 /100 WBC
PLATELET # BLD AUTO: 254 K/UL (ref 164–446)
PMV BLD AUTO: 9.2 FL (ref 9–12.9)
POTASSIUM SERPL-SCNC: 4.5 MMOL/L (ref 3.6–5.5)
RBC # BLD AUTO: 3.34 M/UL (ref 4.7–6.1)
SODIUM SERPL-SCNC: 136 MMOL/L (ref 135–145)
WBC # BLD AUTO: 9.3 K/UL (ref 4.8–10.8)

## 2018-10-25 PROCEDURE — 85025 COMPLETE CBC W/AUTO DIFF WBC: CPT

## 2018-10-25 PROCEDURE — 97161 PT EVAL LOW COMPLEX 20 MIN: CPT

## 2018-10-25 PROCEDURE — A9270 NON-COVERED ITEM OR SERVICE: HCPCS | Performed by: INTERNAL MEDICINE

## 2018-10-25 PROCEDURE — 700102 HCHG RX REV CODE 250 W/ 637 OVERRIDE(OP): Performed by: SURGERY

## 2018-10-25 PROCEDURE — G8980 MOBILITY D/C STATUS: HCPCS | Mod: CI

## 2018-10-25 PROCEDURE — 700102 HCHG RX REV CODE 250 W/ 637 OVERRIDE(OP): Performed by: INTERNAL MEDICINE

## 2018-10-25 PROCEDURE — G8978 MOBILITY CURRENT STATUS: HCPCS | Mod: CI

## 2018-10-25 PROCEDURE — G8979 MOBILITY GOAL STATUS: HCPCS | Mod: CI

## 2018-10-25 PROCEDURE — A9270 NON-COVERED ITEM OR SERVICE: HCPCS | Performed by: HOSPITALIST

## 2018-10-25 PROCEDURE — 700102 HCHG RX REV CODE 250 W/ 637 OVERRIDE(OP): Performed by: HOSPITALIST

## 2018-10-25 PROCEDURE — 770006 HCHG ROOM/CARE - MED/SURG/GYN SEMI*

## 2018-10-25 PROCEDURE — 99233 SBSQ HOSP IP/OBS HIGH 50: CPT | Performed by: INTERNAL MEDICINE

## 2018-10-25 PROCEDURE — 700111 HCHG RX REV CODE 636 W/ 250 OVERRIDE (IP): Performed by: SURGERY

## 2018-10-25 PROCEDURE — A9270 NON-COVERED ITEM OR SERVICE: HCPCS | Performed by: SURGERY

## 2018-10-25 PROCEDURE — 80048 BASIC METABOLIC PNL TOTAL CA: CPT

## 2018-10-25 PROCEDURE — 36415 COLL VENOUS BLD VENIPUNCTURE: CPT

## 2018-10-25 RX ADMIN — ATORVASTATIN CALCIUM 20 MG: 20 TABLET, FILM COATED ORAL at 21:44

## 2018-10-25 RX ADMIN — LATANOPROST 1 DROP: 50 SOLUTION OPHTHALMIC at 21:45

## 2018-10-25 RX ADMIN — BRIMONIDINE TARTRATE 1 DROP: 2 SOLUTION OPHTHALMIC at 17:24

## 2018-10-25 RX ADMIN — MULTIPLE VITAMINS W/ MINERALS TAB 1 TABLET: TAB at 06:40

## 2018-10-25 RX ADMIN — TIMOLOL MALEATE 1 DROP: 5 SOLUTION/ DROPS OPHTHALMIC at 06:41

## 2018-10-25 RX ADMIN — OXYCODONE HYDROCHLORIDE 10 MG: 5 TABLET ORAL at 17:23

## 2018-10-25 RX ADMIN — OXYCODONE HYDROCHLORIDE 5 MG: 5 TABLET ORAL at 08:52

## 2018-10-25 RX ADMIN — BRIMONIDINE TARTRATE 1 DROP: 2 SOLUTION OPHTHALMIC at 06:40

## 2018-10-25 RX ADMIN — Medication 81 MG: at 06:40

## 2018-10-25 RX ADMIN — HEPARIN SODIUM 5000 UNITS: 5000 INJECTION, SOLUTION INTRAVENOUS; SUBCUTANEOUS at 13:15

## 2018-10-25 RX ADMIN — HEPARIN SODIUM 5000 UNITS: 5000 INJECTION, SOLUTION INTRAVENOUS; SUBCUTANEOUS at 21:43

## 2018-10-25 RX ADMIN — SENNOSIDES AND DOCUSATE SODIUM 2 TABLET: 8.6; 5 TABLET ORAL at 17:22

## 2018-10-25 RX ADMIN — TIMOLOL MALEATE 1 DROP: 5 SOLUTION/ DROPS OPHTHALMIC at 17:23

## 2018-10-25 RX ADMIN — HEPARIN SODIUM 5000 UNITS: 5000 INJECTION, SOLUTION INTRAVENOUS; SUBCUTANEOUS at 06:40

## 2018-10-25 RX ADMIN — OXYCODONE HYDROCHLORIDE 10 MG: 5 TABLET ORAL at 03:00

## 2018-10-25 RX ADMIN — OXYCODONE HYDROCHLORIDE 10 MG: 5 TABLET ORAL at 13:15

## 2018-10-25 RX ADMIN — OXYCODONE HYDROCHLORIDE 10 MG: 5 TABLET ORAL at 21:43

## 2018-10-25 RX ADMIN — SENNOSIDES AND DOCUSATE SODIUM 2 TABLET: 8.6; 5 TABLET ORAL at 06:47

## 2018-10-25 RX ADMIN — CLOPIDOGREL 75 MG: 75 TABLET, FILM COATED ORAL at 06:40

## 2018-10-25 ASSESSMENT — ENCOUNTER SYMPTOMS
SORE THROAT: 0
SPEECH CHANGE: 0
FEVER: 0
INSOMNIA: 0
HEADACHES: 0
HEARTBURN: 0
CLAUDICATION: 0
NERVOUS/ANXIOUS: 0
PHOTOPHOBIA: 0
ABDOMINAL PAIN: 0
DEPRESSION: 0
CONSTIPATION: 0
DIARRHEA: 0
BLURRED VISION: 0
SENSORY CHANGE: 0
DIZZINESS: 0
SHORTNESS OF BREATH: 0
WEAKNESS: 0
VOMITING: 0
NAUSEA: 0
COUGH: 0
CHILLS: 0
MYALGIAS: 0

## 2018-10-25 ASSESSMENT — GAIT ASSESSMENTS
ASSISTIVE DEVICE: FRONT WHEEL WALKER
DISTANCE (FEET): 200
GAIT LEVEL OF ASSIST: SUPERVISED
DEVIATION: STEP TO

## 2018-10-25 ASSESSMENT — PAIN SCALES - GENERAL
PAINLEVEL_OUTOF10: 4
PAINLEVEL_OUTOF10: 6
PAINLEVEL_OUTOF10: 3
PAINLEVEL_OUTOF10: 2
PAINLEVEL_OUTOF10: 3
PAINLEVEL_OUTOF10: 5
PAINLEVEL_OUTOF10: 5

## 2018-10-25 ASSESSMENT — COGNITIVE AND FUNCTIONAL STATUS - GENERAL
SUGGESTED CMS G CODE MODIFIER MOBILITY: CH
MOBILITY SCORE: 24

## 2018-10-25 NOTE — CARE PLAN
Problem: Discharge Barriers/Planning  Goal: Patient's continuum of care needs will be met  Outcome: PROGRESSING AS EXPECTED  Pt able to ambulate with offloading boot, states tolerable pain with ambulation.    Problem: Pain Management  Goal: Pain level will decrease to patient's comfort goal  Outcome: PROGRESSING AS EXPECTED  Pt's pain adequately managed at this time on current regimen

## 2018-10-25 NOTE — THERAPY
"Physical Therapy Evaluation completed.   Bed Mobility:  Supine to Sit: Modified Independent  Transfers: Sit to Stand: Supervised  Gait: Level Of Assist: Supervised with Front-Wheel Walker       Plan of Care: Patient with no further skilled PT needs in the acute care setting at this time  Discharge Recommendations: Equipment: No Equipment Needed. Post-acute therapy Currently anticipate no further skilled therapy needs once patient is discharged from the inpatient setting.    See \"Rehab Therapy-Acute\" Patient Summary Report for complete documentation.     "

## 2018-10-25 NOTE — PROGRESS NOTES
Pt is A&O 4  Pain controlled at this time on current regimen  Denies nausea  Tolerating Diet Regular  Original surgical dressing to R foot in place, no drainage noted  + Urine Void   + Flatus  + BM Void  Up independent with FWW and loading boot, gait steady, while slightly uneven d/t boot  Reviewed plan of care with patient, bed in lowest position and locked, pt resting comfortably now, call light within reach, all needs met at this time

## 2018-10-25 NOTE — PROGRESS NOTES
Assumed care of patient from night shift RN   73 year old female   Full code   Admitted 10/19 d/t gangrene of left foot 2 and 3 toe   Pain4/10 will medicate per MAR   A&O x 4   RA   Cardiac: WNL  Bowel: last BM 10/24/2018  Bladder: voiding appropriately in the urinal  20 g L forearm, 20 g R forearm, SL  Left foot dressing, CDI, 2 scabs with redness on sacrum  standby assist with walker, pt calls appropriatly  Low fall risk per [facility], appropriate interventions in place   All questions answered and all needs met at this time. Bed in low locked position. Call light within reach and patient verbalized understanding of proper use. RN will implement hourly rounding and answer call lights appropriatly

## 2018-10-26 ENCOUNTER — PATIENT OUTREACH (OUTPATIENT)
Dept: HEALTH INFORMATION MANAGEMENT | Facility: OTHER | Age: 73
End: 2018-10-26

## 2018-10-26 VITALS
SYSTOLIC BLOOD PRESSURE: 129 MMHG | RESPIRATION RATE: 17 BRPM | TEMPERATURE: 98.4 F | BODY MASS INDEX: 25.53 KG/M2 | DIASTOLIC BLOOD PRESSURE: 108 MMHG | WEIGHT: 153.22 LBS | OXYGEN SATURATION: 94 % | HEIGHT: 65 IN | HEART RATE: 57 BPM

## 2018-10-26 PROCEDURE — A9270 NON-COVERED ITEM OR SERVICE: HCPCS | Performed by: HOSPITALIST

## 2018-10-26 PROCEDURE — A9270 NON-COVERED ITEM OR SERVICE: HCPCS | Performed by: SURGERY

## 2018-10-26 PROCEDURE — 700111 HCHG RX REV CODE 636 W/ 250 OVERRIDE (IP): Performed by: SURGERY

## 2018-10-26 PROCEDURE — 99239 HOSP IP/OBS DSCHRG MGMT >30: CPT | Performed by: INTERNAL MEDICINE

## 2018-10-26 PROCEDURE — 700102 HCHG RX REV CODE 250 W/ 637 OVERRIDE(OP): Performed by: HOSPITALIST

## 2018-10-26 PROCEDURE — 700102 HCHG RX REV CODE 250 W/ 637 OVERRIDE(OP): Performed by: SURGERY

## 2018-10-26 RX ORDER — CLOPIDOGREL BISULFATE 75 MG/1
75 TABLET ORAL DAILY
Qty: 30 TAB | Refills: 3 | Status: ON HOLD | OUTPATIENT
Start: 2018-10-27 | End: 2019-07-08

## 2018-10-26 RX ORDER — ASPIRIN 81 MG/1
81 TABLET, CHEWABLE ORAL DAILY
Qty: 100 TAB | Status: ON HOLD | COMMUNITY
Start: 2018-10-27 | End: 2019-07-08

## 2018-10-26 RX ADMIN — SENNOSIDES AND DOCUSATE SODIUM 2 TABLET: 8.6; 5 TABLET ORAL at 05:09

## 2018-10-26 RX ADMIN — Medication 81 MG: at 05:09

## 2018-10-26 RX ADMIN — MULTIPLE VITAMINS W/ MINERALS TAB 1 TABLET: TAB at 05:10

## 2018-10-26 RX ADMIN — TIMOLOL MALEATE 1 DROP: 5 SOLUTION/ DROPS OPHTHALMIC at 08:06

## 2018-10-26 RX ADMIN — CLOPIDOGREL 75 MG: 75 TABLET, FILM COATED ORAL at 05:10

## 2018-10-26 RX ADMIN — HEPARIN SODIUM 5000 UNITS: 5000 INJECTION, SOLUTION INTRAVENOUS; SUBCUTANEOUS at 05:11

## 2018-10-26 RX ADMIN — BRIMONIDINE TARTRATE 1 DROP: 2 SOLUTION OPHTHALMIC at 05:21

## 2018-10-26 ASSESSMENT — PAIN SCALES - GENERAL: PAINLEVEL_OUTOF10: 0

## 2018-10-26 NOTE — PROGRESS NOTES
Pt is A&O 4  Pain controlled on current regimen  Denies nausea  Tolerating Diet Regular  Surgical incision covered with gauze, tape per MD  + Urine Void   + Flatus  - BM Void  Up independent, pt very frequently walks in hallway  Family updated on POC  Reviewed plan of care with patient, bed in lowest position and locked, pt resting comfortably now, call light within reach, all needs met at this time

## 2018-10-26 NOTE — DISCHARGE SUMMARY
Discharge Summary    CHIEF COMPLAINT ON ADMISSION  Discolored toes left foot.    Reason for Admission  lt toe ischemia     Admission Date  10/19/2018    CODE STATUS  Full Code    HPI & HOSPITAL COURSE  This is a 73 y.o. male here with cold and discolored left toes. He was transferred from [facility] to [facility] for vascular surgery consultation, he had been there since 10/12/18 and treated for cellulitis of the left toes.  As there was worsening discoloration he was sent here.  Patient admitted and vascular surgery consulted, Dr Grande, patient had angiogram and angioplasty followed by 2 toe amputation.  He tolerated this well and will follow up with Dr Grande in 3 weeks with the follow recommendations from him:      Continue Aspirin and Plavix indefinitely.  OK to shower.  No bath for 2 weeks.  Dry dressing change to amputation site once a day.  Keep left leg elevated when not on feet.  Again, STAY ON PLAVIX AND ASPIRIN INDEFINITELY.  Please have patient follow up with me 3 weeks after discharge for suture removal.    Therefore, he is discharged in good and stable condition to home with organized home healthcare and close outpatient follow-up.    The patient met 2-midnight criteria for an inpatient stay at the time of discharge.    Discharge Date  10/26/18    FOLLOW UP ITEMS POST DISCHARGE  PCP today  Dr Grande in 3 weeks    DISCHARGE DIAGNOSES  Active Problems:    Ischemia of toe POA: Yes    PAD (peripheral artery disease) (HCC) POA: Yes    Cellulitis of left lower extremity POA: Yes    Hypokalemia POA: Yes    Morgellons syndrome POA: Yes  Resolved Problems:    * No resolved hospital problems. *      FOLLOW UP  No future appointments.  Ana Grande M.D.  [address]  [address]  [facility] [address]  [phone]    In 3 weeks  For suture removal    PCP    Schedule an appointment as soon as possible for a visit        MEDICATIONS ON DISCHARGE     Medication List      START taking these medications       Instructions   aspirin 81 MG Chew chewable tablet  Commonly known as:  ASA   Take 1 Tab by mouth every day.  Dose:  81 mg     clopidogrel 75 MG Tabs  Commonly known as:  PLAVIX   Take 1 Tab by mouth every day.  Dose:  75 mg        CONTINUE taking these medications      Instructions   atorvastatin 20 MG Tabs  Commonly known as:  LIPITOR   Take 20 mg by mouth every evening.  Dose:  20 mg     brimonidine 0.2 % Soln  Commonly known as:  ALPHAGAN   Place 1 Drop in right eye 2 Times a Day.  Dose:  1 Drop     CENTRUM SILVER Tabs   Take 1 Tab by mouth every day.  Dose:  1 Tab     latanoprost 0.005 % Soln  Commonly known as:  XALATAN   Place 1 Drop in right eye every evening.  Dose:  1 Drop     timolol 0.5 % Soln  Commonly known as:  TIMOPTIC   Place 1 Drop in right eye 2 times a day.  Dose:  1 Drop            Allergies  No Known Allergies    DIET  Orders Placed This Encounter   Procedures   • Diet Order Regular     Standing Status:   Standing     Number of Occurrences:   1     Order Specific Question:   Diet:     Answer:   Regular [1]       ACTIVITY  As tolerated.  Weight bearing as tolerated    CONSULTATIONS  Dr Grande - vascular surgery    PROCEDURES  Dr Grande, 10/22/18:  Left leg angiogram, L SFA and EIA angioplasty  Dr Grande, 10/23/18:  TOE AMPUTATION LEFT 3RD  AND 2ND  TOES    LABORATORY  Lab Results   Component Value Date    SODIUM 136 10/25/2018    POTASSIUM 4.5 10/25/2018    CHLORIDE 101 10/25/2018    CO2 30 10/25/2018    GLUCOSE 91 10/25/2018    BUN 21 10/25/2018    CREATININE 1.43 (H) 10/25/2018        Lab Results   Component Value Date    WBC 9.3 10/25/2018    HEMOGLOBIN 10.7 (L) 10/25/2018    HEMATOCRIT 32.0 (L) 10/25/2018    PLATELETCT 254 10/25/2018        Total time of the discharge process exceeds 35 minutes.

## 2018-10-26 NOTE — CARE PLAN
Problem: Safety  Goal: Will remain free from injury  Outcome: PROGRESSING AS EXPECTED  Bed low and locked, call light and belongings in reach, hourly rounding in place, pt calls appropriately for assistance    Problem: Discharge Barriers/Planning  Goal: Patient's continuum of care needs will be met  Outcome: PROGRESSING AS EXPECTED  PCP appointment for today  HH set up  Likely D/C today, cleared by surgery

## 2018-10-26 NOTE — DISCHARGE PLANNING
Per [facility], [facility] has accepted pt but will not be able to see him until Wednesday 10/31.  Informed bedside RN, she will check with hospitalist to determine if this is acceptable.

## 2018-10-26 NOTE — DISCHARGE PLANNING
Received Choice form at 0840  Agency/Facility Name: [facility]  Referral sent per Choice form @ 0843     Agency/Facility Name: [facility]  Spoke To: Manohar  Outcome: Informed manohar of referral that was sent and its urgent nature, she will call back in 30 minutes.    Agency/Facility Name: [facility]  Spoke To: Manohar  Outcome: Patient accepted as long as he gets his PCP from the appointment, and will not be able to get out to him until Wednesday.    RN LORI James informed.

## 2018-10-26 NOTE — DISCHARGE PLANNING
Anticipated Discharge Disposition: Home with Home Health    Action: This RN CM provided choice form to pt for home health follow up, he chose [facility].  RN CM faxed completed choice form to [facility] with confirmation received.  Pt's friend is here ready to transport pt if he is discharged today.  RN CM also calling clinic in [place] for PCP appointment; the only appointment available is today 10/26 at 1430, otherwise there are no appointments until Tuesday 10/30.      Pt is agreeable, would need to leave the premises by noon to make it to his appointment at the [facility], [phone], fax [phone].      Informed bedside RN that DC order and summary will need to be completed in time for this plan.    Barriers to Discharge: Home Health acceptance and PCP appointment.    Plan: Await Home Health acceptance and discharge orders

## 2018-10-26 NOTE — PROGRESS NOTES
[facility] Medicine Daily Progress Note    Date of Service  10/25/2018    Chief Complaint  73 y.o. male admitted 10/19/2018 with cold toes LLE, severe vascular insufficiency and gangrene.    Hospital Course    Patient admitted and vascular surgery consulted, Dr Grande, patient had angiogram and angioplasty followed by 2 toe amputation.  He tolerated this well and awaiting after surgery evaluation by Dr Grande for next step of recommendations.      Interval Problem Update  Patient feeling well and hoping to dc home soon.  No acute complaints.    Consultants/Specialty  Christiane - surgery    Code Status  full    Disposition  Home when surgically cleared.    Review of Systems  Review of Systems   Constitutional: Negative for chills and fever.   HENT: Negative for congestion and sore throat.    Eyes: Negative for blurred vision and photophobia.   Respiratory: Negative for cough and shortness of breath.    Cardiovascular: Negative for chest pain, claudication and leg swelling.   Gastrointestinal: Negative for abdominal pain, constipation, diarrhea, heartburn, nausea and vomiting.   Genitourinary: Negative for dysuria and hematuria.   Musculoskeletal: Negative for joint pain and myalgias.   Skin: Negative for itching and rash.   Neurological: Negative for dizziness, sensory change, speech change, weakness and headaches.   Psychiatric/Behavioral: Negative for depression. The patient is not nervous/anxious and does not have insomnia.         Physical Exam  Temp:  [36.7 °C (98.1 °F)-37.4 °C (99.4 °F)] 36.7 °C (98.1 °F)  Pulse:  [60-63] 62  Resp:  [16-18] 17  BP: (120-141)/(70-95) 129/95    Physical Exam   Constitutional: He is oriented to person, place, and time. He appears well-developed and well-nourished. No distress.   HENT:   Head: Normocephalic and atraumatic.   Eyes: Conjunctivae are normal. No scleral icterus.   Neck: Neck supple. No JVD present.   Cardiovascular: Normal rate and regular rhythm.  Exam reveals no gallop  and no friction rub.    No murmur heard.  Pulmonary/Chest: Effort normal and breath sounds normal. No respiratory distress. He has no wheezes. He exhibits no tenderness.   Abdominal: Soft. Bowel sounds are normal. He exhibits no distension and no mass. There is no tenderness.   Musculoskeletal: He exhibits no edema or tenderness.   LLE wrapped in surgical bandages     Lymphadenopathy:     He has no cervical adenopathy.   Neurological: He is alert and oriented to person, place, and time. No cranial nerve deficit.   Skin: Skin is warm and dry. He is not diaphoretic. No erythema. No pallor.   Psychiatric: He has a normal mood and affect. His behavior is normal.   Nursing note and vitals reviewed.      Fluids    Intake/Output Summary (Last 24 hours) at 10/25/18 1749  Last data filed at 10/25/18 1310   Gross per 24 hour   Intake              960 ml   Output             2250 ml   Net            -1290 ml       Laboratory  Recent Labs      10/23/18   0412  10/24/18   0439  10/25/18   0403   WBC  10.5  9.4  9.3   RBC  3.55*  3.30*  3.34*   HEMOGLOBIN  11.3*  10.3*  10.7*   HEMATOCRIT  33.7*  32.1*  32.0*   MCV  94.9  97.3  95.8   MCH  31.8  31.2  32.0   MCHC  33.5*  32.1*  33.4*   RDW  49.6  51.4*  50.2*   PLATELETCT  264  263  254   MPV  9.1  8.9*  9.2     Recent Labs      10/23/18   0412  10/24/18   0438  10/25/18   0403   SODIUM  136  137  136   POTASSIUM  4.2  4.2  4.5   CHLORIDE  101  103  101   CO2  26  25  30   GLUCOSE  100*  96  91   BUN  20  21  21   CREATININE  1.31  1.37  1.43*   CALCIUM  9.3  8.7  9.2                   Imaging  US-CAROTID DOPPLER BILAT   Final Result      CT-FOREIGN FILM CAT SCAN   Final Result      OUTSIDE IMAGES-DX CHEST   Final Result      OUTSIDE IMAGES-DX LOWER EXTREMITY, LEFT   Final Result      OUTSIDE IMAGES-US ABDOMEN   Final Result      US-FOREIGN FILM ULTRASOUND   Final Result      IR-EXTREMITY ANGIOGRAM-UNILATERAL LEFT    (Results Pending)        Assessment/Plan  Ischemia of toe-  (present on admission)   Assessment & Plan    On Plavix/ASA indefinitely  Dr. 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

## 2018-10-26 NOTE — PROGRESS NOTES
Awake, no complaints.  AF, VSS     L LE - Amputation site healthy, c/d/i.  Toes are well perfused.     Assessment:  1) S/P angiogram and angioplasty of left lower extremity.  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     Plan:  Doing well.  Continue Aspirin and Plavix indefinitely.  OK to shower.  No bath for 2 weeks.  Dry dressing change to amputation site once a day.  Keep left leg elevated when not on feet.  OK to be discharged home from my standpoint.  Again, STAY ON PLAVIX AND ASPIRIN INDEFINITELY.  Please have patient follow up with me 3 weeks after discharge for suture removal.     Discussed with patient.

## 2018-10-26 NOTE — PROGRESS NOTES
Per YURI pt needs to see PCP prior to HH set up. Pt has appointment with PCP scheduled for TODAY 10/26 at 1430 in [place]. Pt will need to D/C by noon to make it to appointment. Soonest available if unable to make it today is Tuesday. Will discuss with MD

## 2018-10-26 NOTE — CARE PLAN
Problem: Communication  Goal: The ability to communicate needs accurately and effectively will improve  Outcome: PROGRESSING AS EXPECTED  Discussed POC with pt and pt family.  All questions asked and answered at this time.    Problem: Psychosocial Needs:  Goal: Level of anxiety will decrease  Outcome: PROGRESSING AS EXPECTED  Allowing pt to roam hallways as pt becomes anxious when staying in room for long periods of time.

## 2018-10-26 NOTE — FACE TO FACE
Face to Face Supporting Documentation - Home Health    The encounter with this patient was in whole or in part the primary reason for home health admission.    Date of encounter:   Patient:                    MRN:                       YOB: 2020  Dc aPtel  [id number]  1945     Home health to see patient for:  Skilled Nursing care for assessment, interventions & education and Wound Care    Skilled need for:  Surgical Aftercare amputation toes left foot    Skilled nursing interventions to include:  Wound Care    Homebound status evidenced by:  Needs the assistance of another person in order to leave the home. Leaving home requires a considerable and taxing effort. There is a normal inability to leave the home.    Community Physician to provide follow up care: No primary care provider on file.     Optional Interventions? No      I certify the face to face encounter for this home health care referral meets the CMS requirements and the encounter/clinical assessment with the patient was, in whole, or in part, for the medical condition(s) listed above, which is the primary reason for home health care. Based on my clinical findings: the service(s) are medically necessary, support the need for home health care, and the homebound criteria are met.  I certify that this patient has had a face to face encounter by myself.  Maureen Odell D.O. - NPI: [id number]

## 2018-10-26 NOTE — DISCHARGE INSTRUCTIONS
Discharge Instructions    Discharged to home by car with relative. Discharged via wheelchair, hospital escort: Yes.  Special equipment needed: Not Applicable    Be sure to schedule a follow-up appointment with your primary care doctor or any specialists as instructed.     Discharge Plan:   Diet Plan: Discussed  Activity Level: Discussed  Confirmed Follow up Appointment: Patient to Call and Schedule Appointment  Confirmed Symptoms Management: Discussed  Medication Reconciliation Updated: Yes  Influenza Vaccine Indication: Not indicated: Previously immunized this influenza season and > 8 years of age    I understand that a diet low in cholesterol, fat, and sodium is recommended for good health. Unless I have been given specific instructions below for another diet, I accept this instruction as my diet prescription.   Other diet: Regular    Special Instructions:   REMAIN ON PLAVIX AND ASPIRIN INDEFINITELY  OK to shower.  No bath for 2 weeks.  Dry dressing change to amputation site once a day.  Keep left leg elevated when not on feet.  OK to be discharged home from my standpoint.  Again, STAY ON PLAVIX AND ASPIRIN INDEFINITELY.  Please have patient follow up with me 3 weeks after discharge for suture removal.       · Is patient discharged on Warfarin / Coumadin?   No     Toe Amputation    Toe amputation is a surgical procedure to remove all or part of a toe. You may have this procedure if:  · Tissue in your toe is dying because of poor blood supply.  · You have a severe infection in your toe.  Removing your toe keeps nearby tissue healthy. If the toe is infected, removing it helps to keep the infection from spreading.    Tell a health care provider about:  · Any allergies you have.  · All medicines you are taking, including vitamins, herbs, eye drops, creams, and over-the-counter medicines.  · Any problems you or family members have had with anesthetic medicines.  · Any blood disorders you have.  · Any surgeries you have  had.  · Any medical conditions you have.  · Whether you are pregnant or may be pregnant.  ·   What are the risks?  Generally, this is a safe procedure. However, problems may occur, including:  · Bleeding.  · Buildup of blood and fluid (hematoma).  · Infection.  · Allergic reactions to medicines.  · Tissue death in the flap of skin (flap necrosis).  · Trouble with healing.  · Minor changes in the way that you walk (your gait).  · Feeling pain in the area that was removed (phantom pain). This is rare.  ·   What happens before the procedure?  · Follow instructions from your health care provider about eating or drinking restrictions.  · Ask your health care provider about:  ¨ Changing or stopping your regular medicines. This is especially important if you are taking diabetes medicines or blood thinners.  ¨ Taking medicines such as aspirin and ibuprofen. These medicines can thin your blood. Do not take these medicines before your procedure if your health care provider instructs you not to.  · You may be given antibiotic medicine to help prevent infection.  · Plan to have someone take you home after the procedure.  · If you will be going home right after the procedure, plan to have someone with you for 24 hours.  ·   What happens during the procedure?  · You will be given one or more of the following:  ¨ A medicine to help you relax (sedative).  ¨ A medicine to numb the area (local anesthetic).  ¨ A medicine to make you fall asleep (general anesthetic).  ¨ A medicine that is injected into your spine to numb the area below and slightly above the injection site (spinal anesthetic).  ¨ A medicine that is injected into an area of your body to numb everything below the injection site (regional anesthetic).  · To reduce your risk of infection:  ¨ Your health care team will wash or sanitize their hands.  ¨ Your skin will be washed with soap.  · Your surgeon will helena the area of your toe for removal.  · Your surgeon will make a  surgical cut (incision) in your toe.  · The dead tissue and bone will be removed.  · Nerves and vessels will be tied or heated with a special tool to stop bleeding.  · The area will be drained and cleaned.  · If only part of a toe is removed, the remaining part will be covered with a flap of skin.  · The incision will be treated in one of these ways:  ¨ It will be closed with stitches (sutures).  ¨ It will be left open to heal if there is an infection.  · The incision area may be packed with gauze and covered with bandages (dressings).  · Tissue samples may be sent to a lab to be examined under a microscope.  The procedure may vary among health care providers and hospitals.    What happens after the procedure?  · Your blood pressure, heart rate, breathing rate, and blood oxygen level will be monitored often until the medicines you were given have worn off.  · Your foot will be raised up high (elevated) to relieve swelling.  · You will be monitored for pain.  · You will be given pain medicines and antibiotics.  · Your health care provider or physical therapist will help you to move around as soon as possible.  · Do not drive for 24 hours if you received a sedative.  ·   This information is not intended to replace advice given to you by your health care provider. Make sure you discuss any questions you have with your health care provider.  Document Released: 11/28/2016 Document Revised: 08/21/2017 Document Reviewed: 09/10/2016  [employer] Patient Education © 2017 [employer]      Depression / Suicide Risk    As you are discharged from this [facility] facility, it is important to learn how to keep safe from harming yourself.    Recognize the warning signs:  · Abrupt changes in personality, positive or negative- including increase in energy   · Giving away possessions  · Change in eating patterns- significant weight changes-  positive or negative  · Change in sleeping patterns- unable to sleep or sleeping  all the time   · Unwillingness or inability to communicate  · Depression  · Unusual sadness, discouragement and loneliness  · Talk of wanting to die  · Neglect of personal appearance   · Rebelliousness- reckless behavior  · Withdrawal from people/activities they love  · Confusion- inability to concentrate     If you or a loved one observes any of these behaviors or has concerns about self-harm, here's what you can do:  · Talk about it- your feelings and reasons for harming yourself  · Remove any means that you might use to hurt yourself (examples: pills, rope, extension cords, firearm)  · Get professional help from the community (Mental Health, Substance Abuse, psychological counseling)  · Do not be alone:Call your Safe Contact- someone whom you trust who will be there for you.  · Call your local CRISIS HOTLINE 783-0537 or 443-338-2130  · Call your local Children's Mobile Crisis Response Team [place] (743) 470-8596 or www.Banjo  · Call the toll free National Suicide Prevention Hotlines   · National Suicide Prevention Lifeline 065-554-SMXB (8307)  · National Hope Line Network 800-SUICIDE (692-2647)

## 2018-10-26 NOTE — PROGRESS NOTES
Discharge teaching done.  Rx x1 sent to pharmacy  PIV x2 removed    Discharging Patient home per physician order.      Discharged with significant other.      Demonstrated understanding of discharge instructions, follow up appointments, home medications, prescriptions, home care for surgical wound, and nursing care instructions dressing changes.      Ambulating without assistance, voiding without difficulty, pain well controlled, tolerating oral medications, oxygen saturation greater than 90% , tolerating diet.      Educational handouts given and discussed.      Verbalized understanding of discharge instructions and educational handouts.     All questions answered.      Belongings with patient at time of discharge.

## 2019-01-01 ENCOUNTER — TELEPHONE (OUTPATIENT)
Dept: INFECTIOUS DISEASES | Facility: MEDICAL CENTER | Age: 74
End: 2019-01-01

## 2019-01-07 ENCOUNTER — HOSPITAL ENCOUNTER (INPATIENT)
Dept: HOSPITAL 8 - ORIP | Age: 74
LOS: 4 days | Discharge: HOME | DRG: 268 | End: 2019-01-11
Attending: SURGERY | Admitting: SURGERY
Payer: MEDICARE

## 2019-01-07 VITALS — WEIGHT: 159.39 LBS | HEIGHT: 67.5 IN | BODY MASS INDEX: 24.73 KG/M2

## 2019-01-07 DIAGNOSIS — I12.9: ICD-10-CM

## 2019-01-07 DIAGNOSIS — J96.00: ICD-10-CM

## 2019-01-07 DIAGNOSIS — I73.9: ICD-10-CM

## 2019-01-07 DIAGNOSIS — I72.4: ICD-10-CM

## 2019-01-07 DIAGNOSIS — H40.9: ICD-10-CM

## 2019-01-07 DIAGNOSIS — I74.3: ICD-10-CM

## 2019-01-07 DIAGNOSIS — N18.9: ICD-10-CM

## 2019-01-07 DIAGNOSIS — I74.5: ICD-10-CM

## 2019-01-07 DIAGNOSIS — H35.30: ICD-10-CM

## 2019-01-07 DIAGNOSIS — I72.3: Primary | ICD-10-CM

## 2019-01-07 DIAGNOSIS — E78.5: ICD-10-CM

## 2019-01-07 DIAGNOSIS — Z87.891: ICD-10-CM

## 2019-01-07 PROCEDURE — 34705 EVAC RPR A-BIILIAC NDGFT: CPT

## 2019-01-07 PROCEDURE — C1751 CATH, INF, PER/CENT/MIDLINE: HCPCS

## 2019-01-07 PROCEDURE — C1769 GUIDE WIRE: HCPCS

## 2019-01-07 PROCEDURE — 85025 COMPLETE CBC W/AUTO DIFF WBC: CPT

## 2019-01-07 PROCEDURE — 93005 ELECTROCARDIOGRAM TRACING: CPT

## 2019-01-07 PROCEDURE — C1894 INTRO/SHEATH, NON-LASER: HCPCS

## 2019-01-07 PROCEDURE — 80048 BASIC METABOLIC PNL TOTAL CA: CPT

## 2019-01-07 PROCEDURE — C2628 CATHETER, OCCLUSION: HCPCS

## 2019-01-07 PROCEDURE — C1725 CATH, TRANSLUMIN NON-LASER: HCPCS

## 2019-01-07 PROCEDURE — 36415 COLL VENOUS BLD VENIPUNCTURE: CPT

## 2019-01-07 PROCEDURE — 74021 RADEX ABDOMEN 3+ VIEWS: CPT

## 2019-01-07 PROCEDURE — C1874 STENT, COATED/COV W/DEL SYS: HCPCS

## 2019-01-07 PROCEDURE — C1768 GRAFT, VASCULAR: HCPCS

## 2019-01-09 VITALS — DIASTOLIC BLOOD PRESSURE: 77 MMHG | SYSTOLIC BLOOD PRESSURE: 126 MMHG

## 2019-01-09 LAB
ANION GAP SERPL CALC-SCNC: 7 MMOL/L (ref 5–15)
BASOPHILS # BLD AUTO: 0.04 X10^3/UL (ref 0–0.1)
BASOPHILS NFR BLD AUTO: 0 % (ref 0–1)
CALCIUM SERPL-MCNC: 8.8 MG/DL (ref 8.5–10.1)
CHLORIDE SERPL-SCNC: 106 MMOL/L (ref 98–107)
CREAT SERPL-MCNC: 1.37 MG/DL (ref 0.7–1.3)
EOSINOPHIL # BLD AUTO: 0.33 X10^3/UL (ref 0–0.4)
EOSINOPHIL NFR BLD AUTO: 3 % (ref 1–7)
ERYTHROCYTE [DISTWIDTH] IN BLOOD BY AUTOMATED COUNT: 15.5 % (ref 9.4–14.8)
LYMPHOCYTES # BLD AUTO: 1.75 X10^3/UL (ref 1–3.4)
LYMPHOCYTES NFR BLD AUTO: 17 % (ref 22–44)
MCH RBC QN AUTO: 30.7 PG (ref 27.5–34.5)
MCHC RBC AUTO-ENTMCNC: 32.9 G/DL (ref 33.2–36.2)
MCV RBC AUTO: 93.4 FL (ref 81–97)
MD: NO
MONOCYTES # BLD AUTO: 1 X10^3/UL (ref 0.2–0.8)
MONOCYTES NFR BLD AUTO: 9 % (ref 2–9)
NEUTROPHILS # BLD AUTO: 7.45 X10^3/UL (ref 1.8–6.8)
NEUTROPHILS NFR BLD AUTO: 71 % (ref 42–75)
PLATELET # BLD AUTO: 353 X10^3/UL (ref 130–400)
PMV BLD AUTO: 7.3 FL (ref 7.4–10.4)
RBC # BLD AUTO: 4.8 X10^6/UL (ref 4.38–5.82)

## 2019-01-09 PROCEDURE — B41G1ZZ FLUOROSCOPY OF LEFT LOWER EXTREMITY ARTERIES USING LOW OSMOLAR CONTRAST: ICD-10-PCS | Performed by: SURGERY

## 2019-01-09 PROCEDURE — 04VD3DZ RESTRICTION OF LEFT COMMON ILIAC ARTERY WITH INTRALUMINAL DEVICE, PERCUTANEOUS APPROACH: ICD-10-PCS | Performed by: SURGERY

## 2019-01-09 PROCEDURE — 04V03EZ RESTRICTION OF ABDOMINAL AORTA WITH BRANCHED OR FENESTRATED INTRALUMINAL DEVICE, ONE OR TWO ARTERIES, PERCUTANEOUS APPROACH: ICD-10-PCS | Performed by: SURGERY

## 2019-01-09 PROCEDURE — 047L34Z DILATION OF LEFT FEMORAL ARTERY WITH DRUG-ELUTING INTRALUMINAL DEVICE, PERCUTANEOUS APPROACH: ICD-10-PCS | Performed by: SURGERY

## 2019-01-09 PROCEDURE — B4101ZZ FLUOROSCOPY OF ABDOMINAL AORTA USING LOW OSMOLAR CONTRAST: ICD-10-PCS | Performed by: SURGERY

## 2019-01-09 PROCEDURE — 047J34Z DILATION OF LEFT EXTERNAL ILIAC ARTERY WITH DRUG-ELUTING INTRALUMINAL DEVICE, PERCUTANEOUS APPROACH: ICD-10-PCS | Performed by: SURGERY

## 2019-01-09 PROCEDURE — 04VC3DZ RESTRICTION OF RIGHT COMMON ILIAC ARTERY WITH INTRALUMINAL DEVICE, PERCUTANEOUS APPROACH: ICD-10-PCS | Performed by: SURGERY

## 2019-01-09 RX ADMIN — INSULIN LISPRO SCH NOTE: 100 INJECTION, SOLUTION INTRAVENOUS; SUBCUTANEOUS at 20:50

## 2019-01-09 RX ADMIN — MORPHINE SULFATE PRN MG: 4 INJECTION INTRAVENOUS at 21:01

## 2019-01-09 RX ADMIN — SODIUM CHLORIDE SCH MLS/HR: 0.9 INJECTION, SOLUTION INTRAVENOUS at 20:32

## 2019-01-09 RX ADMIN — ONDANSETRON PRN MG: 2 INJECTION, SOLUTION INTRAMUSCULAR; INTRAVENOUS at 20:32

## 2019-01-09 RX ADMIN — CEFAZOLIN SODIUM SCH MLS/HR: 2 SOLUTION INTRAVENOUS at 23:17

## 2019-01-09 RX ADMIN — ATORVASTATIN CALCIUM SCH MG: 20 TABLET, FILM COATED ORAL at 23:17

## 2019-01-09 RX ADMIN — HYDROCODONE BITARTRATE AND ACETAMINOPHEN PRN TAB: 5; 325 TABLET ORAL at 22:30

## 2019-01-09 RX ADMIN — MORPHINE SULFATE PRN MG: 4 INJECTION INTRAVENOUS at 20:32

## 2019-01-10 VITALS — SYSTOLIC BLOOD PRESSURE: 120 MMHG | DIASTOLIC BLOOD PRESSURE: 70 MMHG

## 2019-01-10 VITALS — DIASTOLIC BLOOD PRESSURE: 78 MMHG | SYSTOLIC BLOOD PRESSURE: 120 MMHG

## 2019-01-10 VITALS — DIASTOLIC BLOOD PRESSURE: 77 MMHG | SYSTOLIC BLOOD PRESSURE: 123 MMHG

## 2019-01-10 VITALS — SYSTOLIC BLOOD PRESSURE: 135 MMHG | DIASTOLIC BLOOD PRESSURE: 76 MMHG

## 2019-01-10 VITALS — SYSTOLIC BLOOD PRESSURE: 120 MMHG | DIASTOLIC BLOOD PRESSURE: 67 MMHG

## 2019-01-10 VITALS — SYSTOLIC BLOOD PRESSURE: 113 MMHG | DIASTOLIC BLOOD PRESSURE: 70 MMHG

## 2019-01-10 LAB
ANION GAP SERPL CALC-SCNC: 9 MMOL/L (ref 5–15)
BASOPHILS # BLD AUTO: 0.03 X10^3/UL (ref 0–0.1)
BASOPHILS NFR BLD AUTO: 0 % (ref 0–1)
CALCIUM SERPL-MCNC: 7.7 MG/DL (ref 8.5–10.1)
CHLORIDE SERPL-SCNC: 106 MMOL/L (ref 98–107)
CREAT SERPL-MCNC: 1.32 MG/DL (ref 0.7–1.3)
EOSINOPHIL # BLD AUTO: 0 X10^3/UL (ref 0–0.4)
EOSINOPHIL NFR BLD AUTO: 0 % (ref 1–7)
ERYTHROCYTE [DISTWIDTH] IN BLOOD BY AUTOMATED COUNT: 15 % (ref 9.4–14.8)
LYMPHOCYTES # BLD AUTO: 1.15 X10^3/UL (ref 1–3.4)
LYMPHOCYTES NFR BLD AUTO: 8 % (ref 22–44)
MCH RBC QN AUTO: 31.6 PG (ref 27.5–34.5)
MCHC RBC AUTO-ENTMCNC: 33.5 G/DL (ref 33.2–36.2)
MCV RBC AUTO: 94.3 FL (ref 81–97)
MD: NO
MONOCYTES # BLD AUTO: 1.09 X10^3/UL (ref 0.2–0.8)
MONOCYTES NFR BLD AUTO: 8 % (ref 2–9)
NEUTROPHILS # BLD AUTO: 11.59 X10^3/UL (ref 1.8–6.8)
NEUTROPHILS NFR BLD AUTO: 84 % (ref 42–75)
PLATELET # BLD AUTO: 278 X10^3/UL (ref 130–400)
PMV BLD AUTO: 7.4 FL (ref 7.4–10.4)
RBC # BLD AUTO: 3.73 X10^6/UL (ref 4.38–5.82)

## 2019-01-10 RX ADMIN — ENOXAPARIN SODIUM SCH MG: 40 INJECTION SUBCUTANEOUS at 08:14

## 2019-01-10 RX ADMIN — ASPIRIN SCH MG: 81 TABLET, COATED ORAL at 06:34

## 2019-01-10 RX ADMIN — HYDROCODONE BITARTRATE AND ACETAMINOPHEN PRN TAB: 5; 325 TABLET ORAL at 06:35

## 2019-01-10 RX ADMIN — HYDROCODONE BITARTRATE AND ACETAMINOPHEN PRN TAB: 5; 325 TABLET ORAL at 20:44

## 2019-01-10 RX ADMIN — CEFAZOLIN SODIUM SCH MLS/HR: 2 SOLUTION INTRAVENOUS at 08:13

## 2019-01-10 RX ADMIN — ATORVASTATIN CALCIUM SCH MG: 20 TABLET, FILM COATED ORAL at 20:43

## 2019-01-10 RX ADMIN — HYDROCODONE BITARTRATE AND ACETAMINOPHEN PRN TAB: 5; 325 TABLET ORAL at 16:21

## 2019-01-10 RX ADMIN — INSULIN LISPRO SCH NOTE: 100 INJECTION, SOLUTION INTRAVENOUS; SUBCUTANEOUS at 04:30

## 2019-01-10 RX ADMIN — CLOPIDOGREL SCH MG: 75 TABLET, FILM COATED ORAL at 08:13

## 2019-01-10 RX ADMIN — SODIUM CHLORIDE SCH MLS/HR: 0.9 INJECTION, SOLUTION INTRAVENOUS at 16:21

## 2019-01-10 RX ADMIN — CEFAZOLIN SODIUM SCH MLS/HR: 2 SOLUTION INTRAVENOUS at 16:21

## 2019-01-10 RX ADMIN — SODIUM CHLORIDE SCH MLS/HR: 0.9 INJECTION, SOLUTION INTRAVENOUS at 06:35

## 2019-01-10 RX ADMIN — HYDROCODONE BITARTRATE AND ACETAMINOPHEN PRN TAB: 5; 325 TABLET ORAL at 02:31

## 2019-01-11 VITALS — DIASTOLIC BLOOD PRESSURE: 66 MMHG | SYSTOLIC BLOOD PRESSURE: 118 MMHG

## 2019-01-11 VITALS — SYSTOLIC BLOOD PRESSURE: 139 MMHG | DIASTOLIC BLOOD PRESSURE: 7 MMHG

## 2019-01-11 RX ADMIN — SODIUM CHLORIDE SCH MLS/HR: 0.9 INJECTION, SOLUTION INTRAVENOUS at 02:00

## 2019-01-11 RX ADMIN — CLOPIDOGREL SCH MG: 75 TABLET, FILM COATED ORAL at 08:15

## 2019-01-11 RX ADMIN — ONDANSETRON PRN MG: 2 INJECTION, SOLUTION INTRAMUSCULAR; INTRAVENOUS at 02:25

## 2019-01-11 RX ADMIN — ENOXAPARIN SODIUM SCH MG: 40 INJECTION SUBCUTANEOUS at 08:16

## 2019-01-11 RX ADMIN — HYDROCODONE BITARTRATE AND ACETAMINOPHEN PRN TAB: 5; 325 TABLET ORAL at 06:22

## 2019-01-11 RX ADMIN — ASPIRIN SCH MG: 81 TABLET, COATED ORAL at 06:16

## 2019-01-11 RX ADMIN — HYDROCODONE BITARTRATE AND ACETAMINOPHEN PRN TAB: 5; 325 TABLET ORAL at 02:25

## 2019-07-03 ENCOUNTER — HOSPITAL ENCOUNTER (INPATIENT)
Facility: MEDICAL CENTER | Age: 74
LOS: 4 days | DRG: 254 | End: 2019-07-08
Attending: EMERGENCY MEDICINE | Admitting: INTERNAL MEDICINE
Payer: MEDICARE

## 2019-07-03 ENCOUNTER — HOSPITAL ENCOUNTER (OUTPATIENT)
Dept: RADIOLOGY | Facility: MEDICAL CENTER | Age: 74
End: 2019-07-03

## 2019-07-03 ENCOUNTER — ANESTHESIA EVENT (OUTPATIENT)
Dept: SURGERY | Facility: MEDICAL CENTER | Age: 74
DRG: 254 | End: 2019-07-03
Payer: MEDICARE

## 2019-07-03 ENCOUNTER — ANESTHESIA (OUTPATIENT)
Dept: SURGERY | Facility: MEDICAL CENTER | Age: 74
DRG: 254 | End: 2019-07-03
Payer: MEDICARE

## 2019-07-03 ENCOUNTER — APPOINTMENT (OUTPATIENT)
Dept: RADIOLOGY | Facility: MEDICAL CENTER | Age: 74
DRG: 254 | End: 2019-07-03
Attending: SURGERY
Payer: MEDICARE

## 2019-07-03 DIAGNOSIS — I99.8 ISCHEMIA OF TOE: ICD-10-CM

## 2019-07-03 DIAGNOSIS — I77.1 ARTERIAL INSUFFICIENCY (HCC): ICD-10-CM

## 2019-07-03 DIAGNOSIS — I73.9 CLAUDICATION (HCC): ICD-10-CM

## 2019-07-03 PROBLEM — N18.30 CKD (CHRONIC KIDNEY DISEASE) STAGE 3, GFR 30-59 ML/MIN: Status: ACTIVE | Noted: 2019-07-03

## 2019-07-03 PROBLEM — R73.9 HYPERGLYCEMIA: Status: ACTIVE | Noted: 2019-07-03

## 2019-07-03 PROBLEM — I70.202 FEMORAL ARTERY OCCLUSION, LEFT (HCC): Status: ACTIVE | Noted: 2019-07-03

## 2019-07-03 LAB
ABO + RH BLD: NORMAL
ABO GROUP BLD: NORMAL
ALBUMIN SERPL BCP-MCNC: 4.1 G/DL (ref 3.2–4.9)
ALBUMIN/GLOB SERPL: 1.4 G/DL
ALP SERPL-CCNC: 82 U/L (ref 30–99)
ALT SERPL-CCNC: 14 U/L (ref 2–50)
ANION GAP SERPL CALC-SCNC: 10 MMOL/L (ref 0–11.9)
APTT PPP: 29.7 SEC (ref 24.7–36)
AST SERPL-CCNC: 16 U/L (ref 12–45)
BASOPHILS # BLD AUTO: 0.2 % (ref 0–1.8)
BASOPHILS # BLD: 0.02 K/UL (ref 0–0.12)
BILIRUB SERPL-MCNC: 0.3 MG/DL (ref 0.1–1.5)
BLD GP AB SCN SERPL QL: NORMAL
BUN SERPL-MCNC: 34 MG/DL (ref 8–22)
CALCIUM SERPL-MCNC: 8.9 MG/DL (ref 8.5–10.5)
CHLORIDE SERPL-SCNC: 106 MMOL/L (ref 96–112)
CO2 SERPL-SCNC: 25 MMOL/L (ref 20–33)
CREAT SERPL-MCNC: 1.39 MG/DL (ref 0.5–1.4)
EOSINOPHIL # BLD AUTO: 0.29 K/UL (ref 0–0.51)
EOSINOPHIL NFR BLD: 2.9 % (ref 0–6.9)
ERYTHROCYTE [DISTWIDTH] IN BLOOD BY AUTOMATED COUNT: 51.9 FL (ref 35.9–50)
GLOBULIN SER CALC-MCNC: 3 G/DL (ref 1.9–3.5)
GLUCOSE SERPL-MCNC: 148 MG/DL (ref 65–99)
HCT VFR BLD AUTO: 41.8 % (ref 42–52)
HGB BLD-MCNC: 13.1 G/DL (ref 14–18)
IMM GRANULOCYTES # BLD AUTO: 0.04 K/UL (ref 0–0.11)
IMM GRANULOCYTES NFR BLD AUTO: 0.4 % (ref 0–0.9)
INR PPP: 1 (ref 0.87–1.13)
LYMPHOCYTES # BLD AUTO: 2.08 K/UL (ref 1–4.8)
LYMPHOCYTES NFR BLD: 20.5 % (ref 22–41)
MCH RBC QN AUTO: 29.8 PG (ref 27–33)
MCHC RBC AUTO-ENTMCNC: 31.3 G/DL (ref 33.7–35.3)
MCV RBC AUTO: 95 FL (ref 81.4–97.8)
MONOCYTES # BLD AUTO: 0.91 K/UL (ref 0–0.85)
MONOCYTES NFR BLD AUTO: 9 % (ref 0–13.4)
NEUTROPHILS # BLD AUTO: 6.79 K/UL (ref 1.82–7.42)
NEUTROPHILS NFR BLD: 67 % (ref 44–72)
NRBC # BLD AUTO: 0 K/UL
NRBC BLD-RTO: 0 /100 WBC
PLATELET # BLD AUTO: 335 K/UL (ref 164–446)
PMV BLD AUTO: 9.1 FL (ref 9–12.9)
POTASSIUM SERPL-SCNC: 3.7 MMOL/L (ref 3.6–5.5)
PROT SERPL-MCNC: 7.1 G/DL (ref 6–8.2)
PROTHROMBIN TIME: 13.4 SEC (ref 12–14.6)
RBC # BLD AUTO: 4.4 M/UL (ref 4.7–6.1)
RH BLD: NORMAL
SODIUM SERPL-SCNC: 141 MMOL/L (ref 135–145)
WBC # BLD AUTO: 10.1 K/UL (ref 4.8–10.8)

## 2019-07-03 PROCEDURE — A6402 STERILE GAUZE <= 16 SQ IN: HCPCS | Performed by: SURGERY

## 2019-07-03 PROCEDURE — 500002 HCHG ADHESIVE, DERMABOND: Performed by: SURGERY

## 2019-07-03 PROCEDURE — 85025 COMPLETE CBC W/AUTO DIFF WBC: CPT

## 2019-07-03 PROCEDURE — 99291 CRITICAL CARE FIRST HOUR: CPT

## 2019-07-03 PROCEDURE — 041K0JJ BYPASS RIGHT FEMORAL ARTERY TO LEFT FEMORAL ARTERY WITH SYNTHETIC SUBSTITUTE, OPEN APPROACH: ICD-10-PCS | Performed by: SURGERY

## 2019-07-03 PROCEDURE — 700101 HCHG RX REV CODE 250: Performed by: SURGERY

## 2019-07-03 PROCEDURE — 502594 HCHG SCISSOR HANDLE, HARMONIC ACE: Performed by: SURGERY

## 2019-07-03 PROCEDURE — 501837 HCHG SUTURE CV: Performed by: SURGERY

## 2019-07-03 PROCEDURE — 041L0JL BYPASS LEFT FEMORAL ARTERY TO POPLITEAL ARTERY WITH SYNTHETIC SUBSTITUTE, OPEN APPROACH: ICD-10-PCS | Performed by: SURGERY

## 2019-07-03 PROCEDURE — 502240 HCHG MISC OR SUPPLY RC 0272: Performed by: SURGERY

## 2019-07-03 PROCEDURE — C1757 CATH, THROMBECTOMY/EMBOLECT: HCPCS | Performed by: SURGERY

## 2019-07-03 PROCEDURE — 700111 HCHG RX REV CODE 636 W/ 250 OVERRIDE (IP)

## 2019-07-03 PROCEDURE — 501838 HCHG SUTURE GENERAL: Performed by: SURGERY

## 2019-07-03 PROCEDURE — 700105 HCHG RX REV CODE 258: Performed by: ANESTHESIOLOGY

## 2019-07-03 PROCEDURE — 85730 THROMBOPLASTIN TIME PARTIAL: CPT

## 2019-07-03 PROCEDURE — 160041 HCHG SURGERY MINUTES - EA ADDL 1 MIN LEVEL 4: Performed by: SURGERY

## 2019-07-03 PROCEDURE — 160048 HCHG OR STATISTICAL LEVEL 1-5: Performed by: SURGERY

## 2019-07-03 PROCEDURE — 160036 HCHG PACU - EA ADDL 30 MINS PHASE I: Performed by: SURGERY

## 2019-07-03 PROCEDURE — 86900 BLOOD TYPING SEROLOGIC ABO: CPT

## 2019-07-03 PROCEDURE — 700117 HCHG RX CONTRAST REV CODE 255: Performed by: SURGERY

## 2019-07-03 PROCEDURE — 700102 HCHG RX REV CODE 250 W/ 637 OVERRIDE(OP): Performed by: HOSPITALIST

## 2019-07-03 PROCEDURE — 99219 PR INITIAL OBSERVATION CARE,LEVL II: CPT | Performed by: HOSPITALIST

## 2019-07-03 PROCEDURE — 96375 TX/PRO/DX INJ NEW DRUG ADDON: CPT

## 2019-07-03 PROCEDURE — 85610 PROTHROMBIN TIME: CPT

## 2019-07-03 PROCEDURE — 700111 HCHG RX REV CODE 636 W/ 250 OVERRIDE (IP): Performed by: SURGERY

## 2019-07-03 PROCEDURE — 86901 BLOOD TYPING SEROLOGIC RH(D): CPT

## 2019-07-03 PROCEDURE — 80053 COMPREHEN METABOLIC PANEL: CPT

## 2019-07-03 PROCEDURE — 700111 HCHG RX REV CODE 636 W/ 250 OVERRIDE (IP): Performed by: ANESTHESIOLOGY

## 2019-07-03 PROCEDURE — 700101 HCHG RX REV CODE 250: Performed by: ANESTHESIOLOGY

## 2019-07-03 PROCEDURE — 501445 HCHG STAPLER, SKIN DISP: Performed by: SURGERY

## 2019-07-03 PROCEDURE — C1725 CATH, TRANSLUMIN NON-LASER: HCPCS | Performed by: SURGERY

## 2019-07-03 PROCEDURE — B41G1ZZ FLUOROSCOPY OF LEFT LOWER EXTREMITY ARTERIES USING LOW OSMOLAR CONTRAST: ICD-10-PCS | Performed by: SURGERY

## 2019-07-03 PROCEDURE — A4314 CATH W/DRAINAGE 2-WAY LATEX: HCPCS | Performed by: SURGERY

## 2019-07-03 PROCEDURE — 160002 HCHG RECOVERY MINUTES (STAT): Performed by: SURGERY

## 2019-07-03 PROCEDURE — 04CL0ZZ EXTIRPATION OF MATTER FROM LEFT FEMORAL ARTERY, OPEN APPROACH: ICD-10-PCS | Performed by: SURGERY

## 2019-07-03 PROCEDURE — G0378 HOSPITAL OBSERVATION PER HR: HCPCS

## 2019-07-03 PROCEDURE — 700105 HCHG RX REV CODE 258: Performed by: SURGERY

## 2019-07-03 PROCEDURE — 160029 HCHG SURGERY MINUTES - 1ST 30 MINS LEVEL 4: Performed by: SURGERY

## 2019-07-03 PROCEDURE — 160009 HCHG ANES TIME/MIN: Performed by: SURGERY

## 2019-07-03 PROCEDURE — 160035 HCHG PACU - 1ST 60 MINS PHASE I: Performed by: SURGERY

## 2019-07-03 PROCEDURE — 86850 RBC ANTIBODY SCREEN: CPT

## 2019-07-03 PROCEDURE — A9270 NON-COVERED ITEM OR SERVICE: HCPCS | Performed by: HOSPITALIST

## 2019-07-03 PROCEDURE — C1768 GRAFT, VASCULAR: HCPCS | Performed by: SURGERY

## 2019-07-03 DEVICE — GRAFT GORE PROPATEN 8MMX80CM: Type: IMPLANTABLE DEVICE | Status: FUNCTIONAL

## 2019-07-03 RX ORDER — OXYCODONE HCL 5 MG/5 ML
10 SOLUTION, ORAL ORAL
Status: DISCONTINUED | OUTPATIENT
Start: 2019-07-03 | End: 2019-07-03 | Stop reason: HOSPADM

## 2019-07-03 RX ORDER — SODIUM CHLORIDE, SODIUM LACTATE, POTASSIUM CHLORIDE, CALCIUM CHLORIDE 600; 310; 30; 20 MG/100ML; MG/100ML; MG/100ML; MG/100ML
INJECTION, SOLUTION INTRAVENOUS
Status: DISCONTINUED | OUTPATIENT
Start: 2019-07-03 | End: 2019-07-03 | Stop reason: SURG

## 2019-07-03 RX ORDER — CLOPIDOGREL BISULFATE 75 MG/1
75 TABLET ORAL DAILY
Status: DISCONTINUED | OUTPATIENT
Start: 2019-07-03 | End: 2019-07-08 | Stop reason: HOSPADM

## 2019-07-03 RX ORDER — BISACODYL 10 MG
10 SUPPOSITORY, RECTAL RECTAL
Status: DISCONTINUED | OUTPATIENT
Start: 2019-07-03 | End: 2019-07-08 | Stop reason: HOSPADM

## 2019-07-03 RX ORDER — MORPHINE SULFATE 10 MG/ML
2 INJECTION, SOLUTION INTRAMUSCULAR; INTRAVENOUS
Status: DISCONTINUED | OUTPATIENT
Start: 2019-07-03 | End: 2019-07-08 | Stop reason: HOSPADM

## 2019-07-03 RX ORDER — DEXAMETHASONE SODIUM PHOSPHATE 4 MG/ML
INJECTION, SOLUTION INTRA-ARTICULAR; INTRALESIONAL; INTRAMUSCULAR; INTRAVENOUS; SOFT TISSUE PRN
Status: DISCONTINUED | OUTPATIENT
Start: 2019-07-03 | End: 2019-07-03 | Stop reason: SURG

## 2019-07-03 RX ORDER — HYDROMORPHONE HYDROCHLORIDE 1 MG/ML
0.4 INJECTION, SOLUTION INTRAMUSCULAR; INTRAVENOUS; SUBCUTANEOUS
Status: DISCONTINUED | OUTPATIENT
Start: 2019-07-03 | End: 2019-07-03 | Stop reason: HOSPADM

## 2019-07-03 RX ORDER — ATORVASTATIN CALCIUM 20 MG/1
20 TABLET, FILM COATED ORAL NIGHTLY
Status: DISCONTINUED | OUTPATIENT
Start: 2019-07-03 | End: 2019-07-08 | Stop reason: HOSPADM

## 2019-07-03 RX ORDER — AMOXICILLIN 250 MG
2 CAPSULE ORAL 2 TIMES DAILY
Status: DISCONTINUED | OUTPATIENT
Start: 2019-07-04 | End: 2019-07-08 | Stop reason: HOSPADM

## 2019-07-03 RX ORDER — TIMOLOL MALEATE 5 MG/ML
1 SOLUTION/ DROPS OPHTHALMIC 2 TIMES DAILY
Status: DISCONTINUED | OUTPATIENT
Start: 2019-07-04 | End: 2019-07-08 | Stop reason: HOSPADM

## 2019-07-03 RX ORDER — MEPERIDINE HYDROCHLORIDE 25 MG/ML
12.5 INJECTION INTRAMUSCULAR; INTRAVENOUS; SUBCUTANEOUS
Status: DISCONTINUED | OUTPATIENT
Start: 2019-07-03 | End: 2019-07-03 | Stop reason: HOSPADM

## 2019-07-03 RX ORDER — PROTAMINE SULFATE 10 MG/ML
INJECTION, SOLUTION INTRAVENOUS PRN
Status: DISCONTINUED | OUTPATIENT
Start: 2019-07-03 | End: 2019-07-03 | Stop reason: SURG

## 2019-07-03 RX ORDER — SODIUM CHLORIDE, SODIUM LACTATE, POTASSIUM CHLORIDE, CALCIUM CHLORIDE 600; 310; 30; 20 MG/100ML; MG/100ML; MG/100ML; MG/100ML
INJECTION, SOLUTION INTRAVENOUS CONTINUOUS
Status: DISCONTINUED | OUTPATIENT
Start: 2019-07-03 | End: 2019-07-03 | Stop reason: HOSPADM

## 2019-07-03 RX ORDER — OXYCODONE HYDROCHLORIDE 5 MG/1
2.5 TABLET ORAL
Status: DISCONTINUED | OUTPATIENT
Start: 2019-07-03 | End: 2019-07-08 | Stop reason: HOSPADM

## 2019-07-03 RX ORDER — HEPARIN SODIUM,PORCINE 1000/ML
VIAL (ML) INJECTION
Status: DISCONTINUED | OUTPATIENT
Start: 2019-07-03 | End: 2019-07-03

## 2019-07-03 RX ORDER — ACETAMINOPHEN 325 MG/1
650 TABLET ORAL EVERY 6 HOURS PRN
Status: DISCONTINUED | OUTPATIENT
Start: 2019-07-03 | End: 2019-07-08 | Stop reason: HOSPADM

## 2019-07-03 RX ORDER — ONDANSETRON 4 MG/1
4 TABLET, ORALLY DISINTEGRATING ORAL EVERY 4 HOURS PRN
Status: DISCONTINUED | OUTPATIENT
Start: 2019-07-03 | End: 2019-07-08 | Stop reason: HOSPADM

## 2019-07-03 RX ORDER — OXYCODONE HCL 5 MG/5 ML
5 SOLUTION, ORAL ORAL
Status: DISCONTINUED | OUTPATIENT
Start: 2019-07-03 | End: 2019-07-03 | Stop reason: HOSPADM

## 2019-07-03 RX ORDER — IODIXANOL 270 MG/ML
INJECTION, SOLUTION INTRAVASCULAR
Status: DISCONTINUED | OUTPATIENT
Start: 2019-07-03 | End: 2019-07-03 | Stop reason: HOSPADM

## 2019-07-03 RX ORDER — ONDANSETRON 2 MG/ML
4 INJECTION INTRAMUSCULAR; INTRAVENOUS
Status: DISCONTINUED | OUTPATIENT
Start: 2019-07-03 | End: 2019-07-03 | Stop reason: HOSPADM

## 2019-07-03 RX ORDER — ONDANSETRON 2 MG/ML
INJECTION INTRAMUSCULAR; INTRAVENOUS PRN
Status: DISCONTINUED | OUTPATIENT
Start: 2019-07-03 | End: 2019-07-03 | Stop reason: SURG

## 2019-07-03 RX ORDER — CEFAZOLIN SODIUM 2 G/100ML
2 INJECTION, SOLUTION INTRAVENOUS EVERY 8 HOURS
Status: COMPLETED | OUTPATIENT
Start: 2019-07-04 | End: 2019-07-04

## 2019-07-03 RX ORDER — POLYETHYLENE GLYCOL 3350 17 G/17G
1 POWDER, FOR SOLUTION ORAL
Status: DISCONTINUED | OUTPATIENT
Start: 2019-07-03 | End: 2019-07-08 | Stop reason: HOSPADM

## 2019-07-03 RX ORDER — HYDRALAZINE HYDROCHLORIDE 20 MG/ML
10 INJECTION INTRAMUSCULAR; INTRAVENOUS EVERY 4 HOURS PRN
Status: DISCONTINUED | OUTPATIENT
Start: 2019-07-03 | End: 2019-07-08 | Stop reason: HOSPADM

## 2019-07-03 RX ORDER — CEFAZOLIN SODIUM 1 G/3ML
INJECTION, POWDER, FOR SOLUTION INTRAMUSCULAR; INTRAVENOUS PRN
Status: DISCONTINUED | OUTPATIENT
Start: 2019-07-03 | End: 2019-07-03 | Stop reason: SURG

## 2019-07-03 RX ORDER — BRIMONIDINE TARTRATE 2 MG/ML
1 SOLUTION/ DROPS OPHTHALMIC 2 TIMES DAILY
Status: DISCONTINUED | OUTPATIENT
Start: 2019-07-03 | End: 2019-07-08 | Stop reason: HOSPADM

## 2019-07-03 RX ORDER — HYDROMORPHONE HYDROCHLORIDE 1 MG/ML
0.2 INJECTION, SOLUTION INTRAMUSCULAR; INTRAVENOUS; SUBCUTANEOUS
Status: DISCONTINUED | OUTPATIENT
Start: 2019-07-03 | End: 2019-07-03 | Stop reason: HOSPADM

## 2019-07-03 RX ORDER — HALOPERIDOL 5 MG/ML
1 INJECTION INTRAMUSCULAR
Status: DISCONTINUED | OUTPATIENT
Start: 2019-07-03 | End: 2019-07-03 | Stop reason: HOSPADM

## 2019-07-03 RX ORDER — LATANOPROST 50 UG/ML
1 SOLUTION/ DROPS OPHTHALMIC NIGHTLY
Status: DISCONTINUED | OUTPATIENT
Start: 2019-07-03 | End: 2019-07-08 | Stop reason: HOSPADM

## 2019-07-03 RX ORDER — SODIUM CHLORIDE 9 MG/ML
INJECTION, SOLUTION INTRAVENOUS CONTINUOUS
Status: DISCONTINUED | OUTPATIENT
Start: 2019-07-03 | End: 2019-07-07

## 2019-07-03 RX ORDER — HYDROMORPHONE HYDROCHLORIDE 2 MG/ML
INJECTION, SOLUTION INTRAMUSCULAR; INTRAVENOUS; SUBCUTANEOUS PRN
Status: DISCONTINUED | OUTPATIENT
Start: 2019-07-03 | End: 2019-07-03 | Stop reason: SURG

## 2019-07-03 RX ORDER — HYDROMORPHONE HYDROCHLORIDE 1 MG/ML
0.1 INJECTION, SOLUTION INTRAMUSCULAR; INTRAVENOUS; SUBCUTANEOUS
Status: DISCONTINUED | OUTPATIENT
Start: 2019-07-03 | End: 2019-07-03 | Stop reason: HOSPADM

## 2019-07-03 RX ORDER — IPRATROPIUM BROMIDE AND ALBUTEROL SULFATE 2.5; .5 MG/3ML; MG/3ML
3 SOLUTION RESPIRATORY (INHALATION)
Status: DISCONTINUED | OUTPATIENT
Start: 2019-07-03 | End: 2019-07-03 | Stop reason: HOSPADM

## 2019-07-03 RX ORDER — BACITRACIN 50000 [IU]/1
INJECTION, POWDER, FOR SOLUTION INTRAMUSCULAR
Status: DISCONTINUED | OUTPATIENT
Start: 2019-07-03 | End: 2019-07-03

## 2019-07-03 RX ORDER — MIDAZOLAM HYDROCHLORIDE 1 MG/ML
2 INJECTION INTRAMUSCULAR; INTRAVENOUS ONCE
Status: COMPLETED | OUTPATIENT
Start: 2019-07-03 | End: 2019-07-03

## 2019-07-03 RX ORDER — MIDAZOLAM HYDROCHLORIDE 1 MG/ML
INJECTION INTRAMUSCULAR; INTRAVENOUS
Status: COMPLETED
Start: 2019-07-03 | End: 2019-07-03

## 2019-07-03 RX ORDER — ONDANSETRON 2 MG/ML
4 INJECTION INTRAMUSCULAR; INTRAVENOUS EVERY 4 HOURS PRN
Status: DISCONTINUED | OUTPATIENT
Start: 2019-07-03 | End: 2019-07-08 | Stop reason: HOSPADM

## 2019-07-03 RX ORDER — ASPIRIN 81 MG/1
81 TABLET, CHEWABLE ORAL DAILY
Status: DISCONTINUED | OUTPATIENT
Start: 2019-07-03 | End: 2019-07-08 | Stop reason: HOSPADM

## 2019-07-03 RX ORDER — OXYCODONE HYDROCHLORIDE 5 MG/1
5 TABLET ORAL
Status: DISCONTINUED | OUTPATIENT
Start: 2019-07-03 | End: 2019-07-08 | Stop reason: HOSPADM

## 2019-07-03 RX ORDER — HEPARIN SODIUM,PORCINE 1000/ML
VIAL (ML) INJECTION PRN
Status: DISCONTINUED | OUTPATIENT
Start: 2019-07-03 | End: 2019-07-03 | Stop reason: SURG

## 2019-07-03 RX ORDER — DIPHENHYDRAMINE HYDROCHLORIDE 50 MG/ML
12.5 INJECTION INTRAMUSCULAR; INTRAVENOUS
Status: DISCONTINUED | OUTPATIENT
Start: 2019-07-03 | End: 2019-07-03 | Stop reason: HOSPADM

## 2019-07-03 RX ADMIN — HYDROMORPHONE HYDROCHLORIDE 2 MG: 2 INJECTION, SOLUTION INTRAMUSCULAR; INTRAVENOUS; SUBCUTANEOUS at 17:23

## 2019-07-03 RX ADMIN — LIDOCAINE HYDROCHLORIDE 100 MG: 20 INJECTION, SOLUTION INTRAVENOUS at 17:23

## 2019-07-03 RX ADMIN — SODIUM CHLORIDE, POTASSIUM CHLORIDE, SODIUM LACTATE AND CALCIUM CHLORIDE: 600; 310; 30; 20 INJECTION, SOLUTION INTRAVENOUS at 17:01

## 2019-07-03 RX ADMIN — CEFAZOLIN 2 G: 330 INJECTION, POWDER, FOR SOLUTION INTRAMUSCULAR; INTRAVENOUS at 17:13

## 2019-07-03 RX ADMIN — FENTANYL CITRATE 50 MCG: 50 INJECTION, SOLUTION INTRAMUSCULAR; INTRAVENOUS at 19:15

## 2019-07-03 RX ADMIN — MIDAZOLAM 2 MG: 1 INJECTION INTRAMUSCULAR; INTRAVENOUS at 21:15

## 2019-07-03 RX ADMIN — ONDANSETRON 4 MG: 2 INJECTION INTRAMUSCULAR; INTRAVENOUS at 17:13

## 2019-07-03 RX ADMIN — DEXAMETHASONE SODIUM PHOSPHATE 4 MG: 4 INJECTION, SOLUTION INTRA-ARTICULAR; INTRALESIONAL; INTRAMUSCULAR; INTRAVENOUS; SOFT TISSUE at 17:13

## 2019-07-03 RX ADMIN — MIDAZOLAM 2 MG: 1 INJECTION INTRAMUSCULAR; INTRAVENOUS at 21:00

## 2019-07-03 RX ADMIN — ATORVASTATIN CALCIUM 20 MG: 20 TABLET, FILM COATED ORAL at 23:28

## 2019-07-03 RX ADMIN — PROPOFOL 120 MG: 10 INJECTION, EMULSION INTRAVENOUS at 17:23

## 2019-07-03 RX ADMIN — HEPARIN SODIUM 5000 UNITS: 1000 INJECTION, SOLUTION INTRAVENOUS; SUBCUTANEOUS at 18:29

## 2019-07-03 RX ADMIN — OXYCODONE HYDROCHLORIDE 5 MG: 5 TABLET ORAL at 23:28

## 2019-07-03 RX ADMIN — SODIUM CHLORIDE: 9 INJECTION, SOLUTION INTRAVENOUS at 23:28

## 2019-07-03 RX ADMIN — SODIUM CHLORIDE, POTASSIUM CHLORIDE, SODIUM LACTATE AND CALCIUM CHLORIDE: 600; 310; 30; 20 INJECTION, SOLUTION INTRAVENOUS at 20:15

## 2019-07-03 RX ADMIN — FENTANYL CITRATE 50 MCG: 50 INJECTION, SOLUTION INTRAMUSCULAR; INTRAVENOUS at 18:05

## 2019-07-03 RX ADMIN — PROTAMINE SULFATE 30 MG: 10 INJECTION, SOLUTION INTRAVENOUS at 20:05

## 2019-07-03 ASSESSMENT — ENCOUNTER SYMPTOMS
VOMITING: 0
LOSS OF CONSCIOUSNESS: 0
FALLS: 0
TINGLING: 1
COUGH: 0
HEADACHES: 0
FEVER: 0
DIZZINESS: 0
BLOOD IN STOOL: 0
PSYCHIATRIC NEGATIVE: 1
PALPITATIONS: 0
SHORTNESS OF BREATH: 0
ABDOMINAL PAIN: 0
MYALGIAS: 1
NAUSEA: 0
CHILLS: 0

## 2019-07-03 ASSESSMENT — COGNITIVE AND FUNCTIONAL STATUS - GENERAL
MOBILITY SCORE: 24
SUGGESTED CMS G CODE MODIFIER DAILY ACTIVITY: CH
DAILY ACTIVITIY SCORE: 24
SUGGESTED CMS G CODE MODIFIER MOBILITY: CH

## 2019-07-03 ASSESSMENT — LIFESTYLE VARIABLES
TOTAL SCORE: 0
HAVE YOU EVER FELT YOU SHOULD CUT DOWN ON YOUR DRINKING: NO
ON A TYPICAL DAY WHEN YOU DRINK ALCOHOL HOW MANY DRINKS DO YOU HAVE: 1
EVER FELT BAD OR GUILTY ABOUT YOUR DRINKING: NO
EVER_SMOKED: YES
TOTAL SCORE: 0
HOW MANY TIMES IN THE PAST YEAR HAVE YOU HAD 5 OR MORE DRINKS IN A DAY: 0
HAVE PEOPLE ANNOYED YOU BY CRITICIZING YOUR DRINKING: NO
TOTAL SCORE: 0
CONSUMPTION TOTAL: NEGATIVE
EVER HAD A DRINK FIRST THING IN THE MORNING TO STEADY YOUR NERVES TO GET RID OF A HANGOVER: NO
AVERAGE NUMBER OF DAYS PER WEEK YOU HAVE A DRINK CONTAINING ALCOHOL: 1
ALCOHOL_USE: YES

## 2019-07-03 ASSESSMENT — PATIENT HEALTH QUESTIONNAIRE - PHQ9
SUM OF ALL RESPONSES TO PHQ9 QUESTIONS 1 AND 2: 0
2. FEELING DOWN, DEPRESSED, IRRITABLE, OR HOPELESS: NOT AT ALL
1. LITTLE INTEREST OR PLEASURE IN DOING THINGS: NOT AT ALL

## 2019-07-03 ASSESSMENT — PAIN SCALES - GENERAL: PAIN_LEVEL: 2

## 2019-07-03 ASSESSMENT — COPD QUESTIONNAIRES
DO YOU EVER COUGH UP ANY MUCUS OR PHLEGM?: NO/ONLY WITH OCCASIONAL COLDS OR INFECTIONS
COPD SCREENING SCORE: 4
IN THE PAST 12 MONTHS DO YOU DO LESS THAN YOU USED TO BECAUSE OF YOUR BREATHING PROBLEMS: DISAGREE/UNSURE
DURING THE PAST 4 WEEKS HOW MUCH DID YOU FEEL SHORT OF BREATH: NONE/LITTLE OF THE TIME
HAVE YOU SMOKED AT LEAST 100 CIGARETTES IN YOUR ENTIRE LIFE: YES

## 2019-07-03 NOTE — ED PROVIDER NOTES
ED Provider Note    Scribed for Leah Wynn M.D. by Wilfrido Guo. 7/3/2019, 3:59 PM.    Primary care provider: None  Means of arrival: Ambulance  History obtained from: Patient  History limited by: None    CHIEF COMPLAINT  Chief Complaint   Patient presents with   • Other       HPI  Dc Patel is a 74 y.o. male who presents to the Emergency Department as a transfer for evaluation of leg pain onset last week. The patient's leg pain onset suddenly while he was doing no unusual activity. The pain radiated down his entire leg. The patient had an associated symptom of left lower leg decreased sensation. The patient's pain is constant but exacerbated by ambulation. The patient's pain is rated as a 5/10 when he is at rest. His pain is not alleviated by taking acetaminophen. The patient also affirms mild shortness of breath with ambulation. The patient denies any shortness of breath at rest. The patient had an arterial ultrasound scheduled this morning was referred to present to the ED. The patient has no DVT's and his arterial scan showed an arterial clot. He has no history of similar symptoms. The patient had a bipass surgery in January 2019. The patient had two toes amputated from his left foot in October 2018. The patient denies any abdominal pain, chest pain, leg swelling, or right leg pain.     REVIEW OF SYSTEMS  Pertinent positives include left leg and and decreased sensation. Pertinent negatives include no abdominal pain, chest pain, leg swelling, or right leg pain.  All other systems reviewed and negative.    PAST MEDICAL HISTORY   Cardiac stent.    SURGICAL HISTORY   has a past surgical history that includes toe amputation (Left, 10/23/2018).    SOCIAL HISTORY  Social History   Substance Use Topics   • Smoking status: Former Smoker   • Smokeless tobacco: Never Used   • Alcohol use Not on file      History   Drug use: Unknown       FAMILY HISTORY  History reviewed. No pertinent family history.    CURRENT  "MEDICATIONS  Home Medications     Reviewed by Obey Lambert M.D. (Physician) on 07/03/19 at 1659  Med List Status: <None>   Medication Last Dose Status   aspirin (ASA) 81 MG Chew Tab chewable tablet  Active   atorvastatin (LIPITOR) 20 MG Tab  Active   brimonidine (ALPHAGAN) 0.2 % Solution  Active   clopidogrel (PLAVIX) 75 MG Tab  Active   latanoprost (XALATAN) 0.005 % Solution  Active   Multiple Vitamins-Minerals (CENTRUM SILVER) Tab  Active   timolol (TIMOPTIC) 0.5 % Solution  Active                ALLERGIES  No Known Allergies    PHYSICAL EXAM  VITAL SIGNS: /83   Pulse 76   Temp 36.8 °C (98.2 °F) (Oral)   Resp 18   Ht 1.702 m (5' 7\")   Wt 72.6 kg (160 lb)   SpO2 94%   BMI 25.06 kg/m²   Constitutional: Alert in no apparent distress.  HENT: No signs of trauma, Bilateral external ears normal, Nose normal.   Eyes: Pupils are equal and reactive, Conjunctiva normal, Non-icteric.   Neck: Normal range of motion, No tenderness, Supple, No stridor.   Cardiovascular: Regular rate and rhythm, no murmurs.   Thorax & Lungs: Normal breath sounds, No respiratory distress, No wheezing, No chest tenderness.   Abdomen: Bowel sounds normal, Soft, No tenderness, No masses, No peritoneal signs.  Skin: Warm, Dry, No erythema, No rash.   Musculoskeletal:  Unable to palpate left foot pulse. Slightly delayed (3 second) capillary refill on the left toes. Left 2nd and 3rd toe amputation.  No major deformities noted.  Neurologic: Alert, moving all extremities without difficulty, no focal deficits.    LABS  Labs Reviewed   CBC WITH DIFFERENTIAL - Abnormal; Notable for the following:        Result Value    RBC 4.40 (*)     Hemoglobin 13.1 (*)     Hematocrit 41.8 (*)     MCHC 31.3 (*)     RDW 51.9 (*)     Lymphocytes 20.50 (*)     Monos (Absolute) 0.91 (*)     All other components within normal limits    Narrative:     Indicate which anticoagulants the patient is on:->UNKNOWN   COMP METABOLIC PANEL - Abnormal; Notable for the " following:     Glucose 148 (*)     Bun 34 (*)     All other components within normal limits    Narrative:     Indicate which anticoagulants the patient is on:->UNKNOWN   ESTIMATED GFR - Abnormal; Notable for the following:     GFR If Non  50 (*)     All other components within normal limits    Narrative:     Indicate which anticoagulants the patient is on:->UNKNOWN   PROTHROMBIN TIME    Narrative:     Indicate which anticoagulants the patient is on:->UNKNOWN   APTT    Narrative:     Indicate which anticoagulants the patient is on:->UNKNOWN   COD (ADULT)   ABO RH CONFIRM   URINE DRUG SCREEN     All labs reviewed by me.    RADIOLOGY  US-FOREIGN FILM ULTRASOUND   Final Result      US-FOREIGN FILM ULTRASOUND   Final Result      DX-PORTABLE FLUORO > 1 HOUR    (Results Pending)     The radiologist's interpretation of all radiological studies have been reviewed by me.    COURSE & MEDICAL DECISION MAKING  Pertinent Labs & Imaging studies reviewed. (See chart for details)    On History Review, patient's ultrasound showed likely occlusion fem fem bipass graft. Occlusion of the left distal SFA through the proximal popliteal artery.     3:59 PM - Patient seen and examined at bedside. I informed the patient of my plan to evaluate their symptoms including blood work and consulting vascular. Patient verbalizes understanding and support with my plan of care. Ordered CBC, CMP, Prothombin Time, APTT, COD to evaluate his symptoms.     4:10 PM - Paged Vascular.    4:24 PM - I spoke to Dr Grande (vascular) who agrees to consult the patient and take him to the OR.    4:46 PM - Paged hospitalist    4:37 PM - I spoke to Dr Montano (Hospitalist) who agrees to admit the patient. Patient care is transferred at this time.      Decision Making:  This is a 74 y.o. year old male who presents with evidence of an arterial clot in his recent femorofemoral bypass.  Patient has a history of claudication he has slightly delayed  capillary refill on that foot and I am unable to palpate pulses.  Dr. Grande, vascular surgery new of the patient's arrival is planning on taking him to the operating room.  He will be admitted by the hospitalist service afterwards.    DISPOSITION:  Patient will be admitted to Dr Montano in guarded condition.    FINAL IMPRESSION  1. Claudication (HCC)    2. Arterial insufficiency (HCC)               This dictation has been created using voice recognition software and/or scribes. The accuracy of the dictation is limited by the abilities of the software and the expertise of the scribes. I expect there may be some errors of grammar and possibly content. I made every attempt to manually correct the errors within my dictation. However, errors related to voice recognition software and/or scribes may still exist and should be interpreted within the appropriate context.     IWilfrido (Scribe), am scribing for, and in the presence of, Leah Wynn M.D..    Electronically signed by: Wilfrido Guo (Scribe), 7/3/2019    Leah REGAN M.D. personally performed the services described in this documentation, as scribed by Wilfrido Guo in my presence, and it is both accurate and complete.    The note accurately reflects work and decisions made by me.  Leah Wynn  7/3/2019  7:10 PM

## 2019-07-03 NOTE — ANESTHESIA PREPROCEDURE EVALUATION
"Severe vascular disease. Left leg with poor blood flow. History of toe amputations. Denies CAD though \"might have heart attack once\".     No active angina or dypnea.     HTN.     Relevant Problems   No relevant active problems       Physical Exam    Airway   Mallampati: II  TM distance: >3 FB  Neck ROM: full       Cardiovascular - normal exam  Rhythm: regular  Rate: normal  (-) murmur     Dental - normal exam  (+) lower dentures, upper dentures         Pulmonary - normal exam  Breath sounds clear to auscultation     Abdominal    Neurological - normal exam                 Anesthesia Plan    ASA 3 - emergent   ASA physical status 3 criteria: CAD/stents (> 3 months), other (comment) and hypertension - poorly controlledASA physical status emergent criteria: compromised vital organ, limb or tissue    Plan - general       Airway plan will be LMA        Induction: intravenous    Postoperative Plan: Postoperative administration of opioids is intended.    Pertinent diagnostic labs and testing reviewed    Informed Consent:    Anesthetic plan and risks discussed with patient.    Use of blood products discussed with: patient whom consented to blood products.         "

## 2019-07-03 NOTE — ED TRIAGE NOTES
Transfer from other hospital: arterial occlusion left lower leg. Patient c/o calf pain for the past 10 days which suddenly got worse. Currently on blood thinners.

## 2019-07-04 LAB
ANION GAP SERPL CALC-SCNC: 8 MMOL/L (ref 0–11.9)
BUN SERPL-MCNC: 34 MG/DL (ref 8–22)
CALCIUM SERPL-MCNC: 8.3 MG/DL (ref 8.5–10.5)
CHLORIDE SERPL-SCNC: 104 MMOL/L (ref 96–112)
CO2 SERPL-SCNC: 28 MMOL/L (ref 20–33)
CREAT SERPL-MCNC: 1.56 MG/DL (ref 0.5–1.4)
ERYTHROCYTE [DISTWIDTH] IN BLOOD BY AUTOMATED COUNT: 51.8 FL (ref 35.9–50)
EST. AVERAGE GLUCOSE BLD GHB EST-MCNC: 146 MG/DL
GLUCOSE BLD-MCNC: 132 MG/DL (ref 65–99)
GLUCOSE BLD-MCNC: 140 MG/DL (ref 65–99)
GLUCOSE SERPL-MCNC: 184 MG/DL (ref 65–99)
HBA1C MFR BLD: 6.7 % (ref 0–5.6)
HCT VFR BLD AUTO: 35.2 % (ref 42–52)
HGB BLD-MCNC: 11.1 G/DL (ref 14–18)
MCH RBC QN AUTO: 30.3 PG (ref 27–33)
MCHC RBC AUTO-ENTMCNC: 31.5 G/DL (ref 33.7–35.3)
MCV RBC AUTO: 96.2 FL (ref 81.4–97.8)
PLATELET # BLD AUTO: 311 K/UL (ref 164–446)
PMV BLD AUTO: 9.1 FL (ref 9–12.9)
POTASSIUM SERPL-SCNC: 4.8 MMOL/L (ref 3.6–5.5)
RBC # BLD AUTO: 3.66 M/UL (ref 4.7–6.1)
SODIUM SERPL-SCNC: 140 MMOL/L (ref 135–145)
WBC # BLD AUTO: 15.1 K/UL (ref 4.8–10.8)

## 2019-07-04 PROCEDURE — 770006 HCHG ROOM/CARE - MED/SURG/GYN SEMI*

## 2019-07-04 PROCEDURE — 82962 GLUCOSE BLOOD TEST: CPT | Mod: 91

## 2019-07-04 PROCEDURE — 85027 COMPLETE CBC AUTOMATED: CPT

## 2019-07-04 PROCEDURE — 3E0234Z INTRODUCTION OF SERUM, TOXOID AND VACCINE INTO MUSCLE, PERCUTANEOUS APPROACH: ICD-10-PCS | Performed by: SURGERY

## 2019-07-04 PROCEDURE — 83036 HEMOGLOBIN GLYCOSYLATED A1C: CPT

## 2019-07-04 PROCEDURE — 90670 PCV13 VACCINE IM: CPT | Performed by: SURGERY

## 2019-07-04 PROCEDURE — 700111 HCHG RX REV CODE 636 W/ 250 OVERRIDE (IP): Performed by: HOSPITALIST

## 2019-07-04 PROCEDURE — 700105 HCHG RX REV CODE 258: Performed by: SURGERY

## 2019-07-04 PROCEDURE — A9270 NON-COVERED ITEM OR SERVICE: HCPCS | Performed by: HOSPITALIST

## 2019-07-04 PROCEDURE — 700102 HCHG RX REV CODE 250 W/ 637 OVERRIDE(OP): Performed by: INTERNAL MEDICINE

## 2019-07-04 PROCEDURE — 700102 HCHG RX REV CODE 250 W/ 637 OVERRIDE(OP): Performed by: HOSPITALIST

## 2019-07-04 PROCEDURE — 96375 TX/PRO/DX INJ NEW DRUG ADDON: CPT

## 2019-07-04 PROCEDURE — 97165 OT EVAL LOW COMPLEX 30 MIN: CPT

## 2019-07-04 PROCEDURE — 700101 HCHG RX REV CODE 250: Performed by: HOSPITALIST

## 2019-07-04 PROCEDURE — 96365 THER/PROPH/DIAG IV INF INIT: CPT

## 2019-07-04 PROCEDURE — 700102 HCHG RX REV CODE 250 W/ 637 OVERRIDE(OP): Performed by: SURGERY

## 2019-07-04 PROCEDURE — 36415 COLL VENOUS BLD VENIPUNCTURE: CPT

## 2019-07-04 PROCEDURE — 90471 IMMUNIZATION ADMIN: CPT

## 2019-07-04 PROCEDURE — 97162 PT EVAL MOD COMPLEX 30 MIN: CPT

## 2019-07-04 PROCEDURE — 99232 SBSQ HOSP IP/OBS MODERATE 35: CPT | Performed by: INTERNAL MEDICINE

## 2019-07-04 PROCEDURE — 700111 HCHG RX REV CODE 636 W/ 250 OVERRIDE (IP): Performed by: SURGERY

## 2019-07-04 PROCEDURE — 96372 THER/PROPH/DIAG INJ SC/IM: CPT

## 2019-07-04 PROCEDURE — 80048 BASIC METABOLIC PNL TOTAL CA: CPT

## 2019-07-04 PROCEDURE — A9270 NON-COVERED ITEM OR SERVICE: HCPCS | Performed by: SURGERY

## 2019-07-04 RX ORDER — HEPARIN SODIUM 5000 [USP'U]/ML
5000 INJECTION, SOLUTION INTRAVENOUS; SUBCUTANEOUS EVERY 12 HOURS
Status: DISCONTINUED | OUTPATIENT
Start: 2019-07-04 | End: 2019-07-08 | Stop reason: HOSPADM

## 2019-07-04 RX ORDER — GABAPENTIN 300 MG/1
300 CAPSULE ORAL EVERY EVENING
Status: DISCONTINUED | OUTPATIENT
Start: 2019-07-04 | End: 2019-07-08 | Stop reason: HOSPADM

## 2019-07-04 RX ADMIN — HEPARIN SODIUM 5000 UNITS: 5000 INJECTION, SOLUTION INTRAVENOUS; SUBCUTANEOUS at 17:58

## 2019-07-04 RX ADMIN — OXYCODONE HYDROCHLORIDE 5 MG: 5 TABLET ORAL at 22:29

## 2019-07-04 RX ADMIN — ASPIRIN 81 MG 81 MG: 81 TABLET ORAL at 04:55

## 2019-07-04 RX ADMIN — CEFAZOLIN SODIUM 2 G: 2 INJECTION, SOLUTION INTRAVENOUS at 18:06

## 2019-07-04 RX ADMIN — CEFAZOLIN SODIUM 2 G: 2 INJECTION, SOLUTION INTRAVENOUS at 00:50

## 2019-07-04 RX ADMIN — ATORVASTATIN CALCIUM 20 MG: 20 TABLET, FILM COATED ORAL at 20:54

## 2019-07-04 RX ADMIN — BRIMONIDINE TARTRATE 1 DROP: 2 SOLUTION/ DROPS OPHTHALMIC at 04:56

## 2019-07-04 RX ADMIN — OXYCODONE HYDROCHLORIDE 5 MG: 5 TABLET ORAL at 18:15

## 2019-07-04 RX ADMIN — SENNOSIDES, DOCUSATE SODIUM 2 TABLET: 50; 8.6 TABLET, FILM COATED ORAL at 04:55

## 2019-07-04 RX ADMIN — CLOPIDOGREL BISULFATE 75 MG: 75 TABLET ORAL at 04:55

## 2019-07-04 RX ADMIN — BRIMONIDINE TARTRATE 1 DROP: 2 SOLUTION/ DROPS OPHTHALMIC at 18:00

## 2019-07-04 RX ADMIN — SENNOSIDES, DOCUSATE SODIUM 2 TABLET: 50; 8.6 TABLET, FILM COATED ORAL at 17:58

## 2019-07-04 RX ADMIN — GABAPENTIN 300 MG: 300 CAPSULE ORAL at 17:58

## 2019-07-04 RX ADMIN — OXYCODONE HYDROCHLORIDE 5 MG: 5 TABLET ORAL at 02:31

## 2019-07-04 RX ADMIN — TIMOLOL MALEATE 1 DROP: 5 SOLUTION OPHTHALMIC at 18:08

## 2019-07-04 RX ADMIN — ONDANSETRON 4 MG: 2 INJECTION INTRAMUSCULAR; INTRAVENOUS at 12:10

## 2019-07-04 RX ADMIN — CEFAZOLIN SODIUM 2 G: 2 INJECTION, SOLUTION INTRAVENOUS at 11:26

## 2019-07-04 RX ADMIN — PNEUMOCOCCAL 13-VALENT CONJUGATE VACCINE 0.5 ML: 2.2; 2.2; 2.2; 2.2; 2.2; 4.4; 2.2; 2.2; 2.2; 2.2; 2.2; 2.2; 2.2 INJECTION, SUSPENSION INTRAMUSCULAR at 04:55

## 2019-07-04 RX ADMIN — SODIUM CHLORIDE: 9 INJECTION, SOLUTION INTRAVENOUS at 11:27

## 2019-07-04 RX ADMIN — TIMOLOL MALEATE 1 DROP: 5 SOLUTION OPHTHALMIC at 04:56

## 2019-07-04 RX ADMIN — OXYCODONE HYDROCHLORIDE 5 MG: 5 TABLET ORAL at 07:40

## 2019-07-04 RX ADMIN — OXYCODONE HYDROCHLORIDE 5 MG: 5 TABLET ORAL at 12:13

## 2019-07-04 ASSESSMENT — COGNITIVE AND FUNCTIONAL STATUS - GENERAL
TOILETING: A LOT
MOVING TO AND FROM BED TO CHAIR: UNABLE
MOBILITY SCORE: 12
MOVING FROM LYING ON BACK TO SITTING ON SIDE OF FLAT BED: UNABLE
DRESSING REGULAR LOWER BODY CLOTHING: A LOT
TURNING FROM BACK TO SIDE WHILE IN FLAT BAD: A LOT
SUGGESTED CMS G CODE MODIFIER DAILY ACTIVITY: CK
HELP NEEDED FOR BATHING: A LOT
PERSONAL GROOMING: A LITTLE
CLIMB 3 TO 5 STEPS WITH RAILING: A LOT
SUGGESTED CMS G CODE MODIFIER MOBILITY: CL
DAILY ACTIVITIY SCORE: 16
WALKING IN HOSPITAL ROOM: A LITTLE
STANDING UP FROM CHAIR USING ARMS: A LITTLE
DRESSING REGULAR UPPER BODY CLOTHING: A LITTLE

## 2019-07-04 ASSESSMENT — ENCOUNTER SYMPTOMS
NAUSEA: 0
WEAKNESS: 1
FEVER: 0
MYALGIAS: 1
ABDOMINAL PAIN: 1
CHILLS: 0
VOMITING: 0
SENSORY CHANGE: 1

## 2019-07-04 ASSESSMENT — GAIT ASSESSMENTS: GAIT LEVEL OF ASSIST: UNABLE TO PARTICIPATE

## 2019-07-04 ASSESSMENT — ACTIVITIES OF DAILY LIVING (ADL): TOILETING: INDEPENDENT

## 2019-07-04 NOTE — H&P
Hospital Medicine History & Physical Note    Date of Service  7/3/2019    Primary Care Physician  No primary care provider on file.    Consultants  Vascular Surgery- [facility]    Code Status  Full    Chief Complaint  Left leg rest pain    History of Presenting Illness  74 y.o. male who presented 7/3/2019 with progressive worsening of left leg pain.  He was previously hospitalized in October 2018 where he had left angiogram, angioplasty and two toe amputation with Dr. Grande.  He had been doing well since that time but started to develop worsening rest pain in the left leg.  US done at an outside facility showed occlusion of the superficial femoral artery. Given his symptoms, it was recommended that he come in for vascular intervention.  I saw and examined the patient in pre-op, discussed the case with Dr. Grande.    Review of Systems  Review of Systems   Constitutional: Negative for chills, fever and malaise/fatigue.   Respiratory: Negative for cough and shortness of breath.    Cardiovascular: Negative for chest pain, palpitations and leg swelling.   Gastrointestinal: Negative for abdominal pain, blood in stool, nausea and vomiting.   Genitourinary: Negative for dysuria and hematuria.   Musculoskeletal: Positive for myalgias (left leg). Negative for falls.   Neurological: Positive for tingling (neuropathy). Negative for dizziness, loss of consciousness and headaches.   Psychiatric/Behavioral: Negative.    All other systems reviewed and are negative.      Past Medical History   has no past medical history on file.    Surgical History   has a past surgical history that includes toe amputation (Left, 10/23/2018).     Family History  family history is not on file.   Brother with heart failure, father with PE    Social History   reports that he has quit smoking. He has never used smokeless tobacco.  Smokes marijuana.  Denies alcohol, tobacco or illicit drug use.    Allergies  No Known Allergies    Medications  Prior to  Admission Medications   Prescriptions Last Dose Informant Patient Reported? Taking?   Multiple Vitamins-Minerals (CENTRUM SILVER) Tab  Patient Yes No   Sig: Take 1 Tab by mouth every day.   aspirin (ASA) 81 MG Chew Tab chewable tablet   Yes No   Sig: Take 1 Tab by mouth every day.   atorvastatin (LIPITOR) 20 MG Tab  Rx Bottle (For Med Information) Yes No   Sig: Take 20 mg by mouth every evening.   brimonidine (ALPHAGAN) 0.2 % Solution  Rx Bottle (For Med Information) Yes No   Sig: Place 1 Drop in right eye 2 Times a Day.   clopidogrel (PLAVIX) 75 MG Tab   No No   Sig: Take 1 Tab by mouth every day.   latanoprost (XALATAN) 0.005 % Solution  Patient Yes No   Sig: Place 1 Drop in right eye every evening.   timolol (TIMOPTIC) 0.5 % Solution  Rx Bottle (For Med Information) Yes No   Sig: Place 1 Drop in right eye 2 times a day.      Facility-Administered Medications: None       Physical Exam  Temp:  [36.8 °C (98.2 °F)] 36.8 °C (98.2 °F)  Pulse:  [76-80] 80  Resp:  [17-18] 17  BP: (133-140)/(83-92) 140/92  SpO2:  [94 %-97 %] 97 %    Physical Exam   Constitutional: He is oriented to person, place, and time. He appears well-developed. No distress.   HENT:   Head: Normocephalic.   Mouth/Throat: Oropharynx is clear and moist.   Eyes: EOM are normal. No scleral icterus.   Neck: Normal range of motion. Neck supple. No tracheal deviation present.   Cardiovascular: Normal rate.    Pulmonary/Chest: Effort normal and breath sounds normal. No respiratory distress. He has no rales.   Abdominal: Soft. Bowel sounds are normal. He exhibits no distension. There is no tenderness.   Bilateral groin incisions, well healed   Musculoskeletal: He exhibits tenderness (LE bilaterally).   Left second and third toe amputated, well-healed incision   Neurological: He is alert and oriented to person, place, and time.   Skin: Skin is dry. He is not diaphoretic.   LE cool to the touch, L > R   Psychiatric: His speech is normal and behavior is normal.    Odd affect   Vitals reviewed.      Laboratory:  Recent Labs      07/03/19   1613   WBC  10.1   RBC  4.40*   HEMOGLOBIN  13.1*   HEMATOCRIT  41.8*   MCV  95.0   MCH  29.8   MCHC  31.3*   RDW  51.9*   PLATELETCT  335   MPV  9.1     Recent Labs      07/03/19   1613   SODIUM  141   POTASSIUM  3.7   CHLORIDE  106   CO2  25   GLUCOSE  148*   BUN  34*   CREATININE  1.39   CALCIUM  8.9     Recent Labs      07/03/19   1613   ALTSGPT  14   ASTSGOT  16   ALKPHOSPHAT  82   TBILIRUBIN  0.3   GLUCOSE  148*     Recent Labs      07/03/19   1613   APTT  29.7   INR  1.00             No results for input(s): TROPONINI in the last 72 hours.    Urinalysis:    No results found     Imaging:  US-FOREIGN FILM ULTRASOUND   Final Result      US-FOREIGN FILM ULTRASOUND   Final Result      DX-PORTABLE FLUORO > 1 HOUR    (Results Pending)         Assessment/Plan:  I anticipate this patient is appropriate for observation status at this time.    * Femoral artery occlusion, left (HCC)- (present on admission)   Assessment & Plan    Presented with left lower critical limb ischemia. Planned for OR this evening, had a fem-fem bypass, left SFA and popliteal thrombectomy with vascular surgery.  - vascular following, appreciate recs  - pain control  - continue asa, plavix and statin    Procedures:  1) Right common femoral to left profunda femoral bypass using 8mm Propatent graft.  2) Left SFA and popliteal thrombectomy.  3) Left leg angiogram.  4) Left femoral to below-knee popliteal bypass using 8mm Propatent graft.     PAD (peripheral artery disease) ([facility])- (present on admission)   Assessment & Plan    Continue with risk modification and education  - continue with ASA, plavix, and statin  - encouraged competed cessation from any smoking     CKD (chronic kidney disease) stage 3, GFR 30-59 ml/min ([facility])- (present on admission)   Assessment & Plan    At baseline.   - avoid nephrotoxic agents, renally dose medications     Hyperglycemia- (present on  admission)   Assessment & Plan    Noted to by hyperglycemic, not on any DM medications.   - A1C pending         VTE prophylaxis: Hold for surgery

## 2019-07-04 NOTE — CARE PLAN
Problem: Communication  Goal: The ability to communicate needs accurately and effectively will improve  Outcome: PROGRESSING AS EXPECTED    Intervention: [place] patient and significant other/support system to call light to alert staff of needs   07/04/19 0039   OTHER   Oriented to: All of the Following : Location of Bathroom, Visiting Policy, Unit Routine, Call Light and Bedside Controls, Bedside Rail Policy, Smoking Policy, Rights and Responsibilities, Bedside Report, and Patient Education Notebook         Problem: Knowledge Deficit  Goal: Knowledge of disease process/condition, treatment plan, diagnostic tests, and medications will improve  Outcome: PROGRESSING AS EXPECTED  Patient educated about disease process and plan of care for the shift. Patient expressed understanding. All questions answered at this time.

## 2019-07-04 NOTE — WOUND TEAM
Wound consult received for unknown reasons. Spoke w/ Mark bedside RN and per Mark, patient is on bedrest, has two prevena wound vacs to bilateral groin area that was placed over incisions. Pt has no other wounds at this time. Please re consult wound care if needed.

## 2019-07-04 NOTE — PROGRESS NOTES
Fem-fem bypass, left SFA/popliteal thrombectomy, left leg angiogram, and left fem-pop bypass were done with good blood flow restored post-op.

## 2019-07-04 NOTE — ANESTHESIA TIME REPORT
Anesthesia Start and Stop Event Times     Date Time Event    7/3/2019 1713 Anesthesia Start     2055 Anesthesia Stop        Responsible Staff  07/03/19    Name Role Begin End    Obey Lambert M.D. Anesth 1713 1820    Sourav Hook D.O. Anesth 1820 2055        Preop Diagnosis (Free Text):  Pre-op Diagnosis             Preop Diagnosis (Codes):  Diagnosis Information     Diagnosis Code(s):         Post op Diagnosis  Critical ischemia of lower extremity  vascular occlusion    Premium Reason  A. 3PM - 7AM    Comments:

## 2019-07-04 NOTE — CARE PLAN
Problem: Pain Management  Goal: Pain level will decrease to patient's comfort goal  Outcome: PROGRESSING AS EXPECTED  Pain medication given to control and maintain an acceptable comfort level for the patient    Problem: Safety  Goal: Will remain free from injury  Outcome: PROGRESSING AS EXPECTED  Pt educated on the importance of using call light before getting out of bed. Pt call appropriately and waits for hospital staff to assist with ambulation. Call light with in reach, non-skid socks on patient, room clear of clutter.

## 2019-07-04 NOTE — CONSULTS
Seen and examined.  Please see dictated note, #[id number].    To OR for fem-fem bypass, angiogram, and possible left leg bypass.  All questions were answered.

## 2019-07-04 NOTE — ANESTHESIA QCDR
2019 [facility] Clinical Data Registry (for Quality Improvement)     Postoperative nausea/vomiting risk protocol (Adult = 18 yrs and Pediatric 3-17 yrs)- (430 and 463)  General inhalation anesthetic (NOT TIVA) with PONV risk factors: Yes  Provision of anti-emetic therapy with at least 2 different classes of agents: Yes   Patient DID NOT receive anti-emetic therapy and reason is documented in Medical Record:  N/A    Multimodal Pain Management- (AQI59)  Patient undergoing Elective Surgery (i.e. Outpatient, or ASC, or Prescheduled Surgery prior to Hospital Admission): No  Use of Multimodal Pain Management, two or more drugs and/or interventions, NOT including systemic opioids: N/A  Exception: Documented allergy to multiple classes of analgesics: N/A    PACU assessment of acute postoperative pain prior to Anesthesia Care End- Applies to Patients Age = 18- (ABG7)  Initial PACU pain score is which of the following: < 7/10  Patient unable to report pain score: N/A    Post-anesthetic transfer of care checklist/protocol to PACU/ICU- (426 and 427)  Upon conclusion of case, patient transferred to which of the following locations: PACU/Non-ICU  Use of transfer checklist/protocol: Yes  Exclusion: Service Performed in Patient Hospital Room (and thus did not require transfer): N/A    PACU Reintubation- (AQI31)  General anesthesia requiring endotracheal intubation (ETT) along with subsequent extubation in OR or PACU: No  Required reintubation in the PACU: N/A  Extubation was a planned trial documented in the medical record prior to removal of the original airway device: N/A    Unplanned admission to ICU related to anesthesia service up through end of PACU care- (MD51)  Unplanned admission to ICU (not initially anticipated at anesthesia start time): No

## 2019-07-04 NOTE — ASSESSMENT & PLAN NOTE
Monitor renal functions and give fluid resuscitation.  Patient at this point does not need IV fluids anymore as he is drinking and eating orally adequately.

## 2019-07-04 NOTE — OR SURGEON
Immediate Post OP Note    PreOp Diagnosis: Critical left leg ischemia.    PostOp Diagnosis: Same.    Procedure(s):  1) Right common femoral to left profunda femoral bypass using 8mm Propatent graft.  2) Left SFA and popliteal thrombectomy.  3) Left leg angiogram.  4) Left femoral to below-knee popliteal bypass using 8mm Propatent graft.    Surgeon:  Ana Grande M.D.    Anesthesiologist/Type of Anesthesia:  Anesthesiologist: Obey Lambert M.D.; Sourav Hook D.O./* No anesthesia type entered *    Surgical Staff:  Circulator: Gabriel Medina R.N.  Scrub Person: Noreen Marcial; Ruben Cheek  Count Malcolm: Jonatan Sheikh R.N.  Radiology Technologist: Gianni Hairston    Specimens removed if any:  None.    Estimated Blood Loss: 400ml.    IVF: 1.25l.    Contrast: 20ml Visipaque.    Findings: Good flow post-op.    Complications: None.    Dictated, #[id number].        7/3/2019 9:00 PM Ana Grande M.D.

## 2019-07-04 NOTE — OR NURSING
Assumed care of PT,received report from Latha ADAMSON/Andrei ADAMSON.  PT A/O x 4, roommate updated on status.  Belongings at the bedside,  PT denies pain, tolerating water and apple juice.  ALLI Chavez d/c by Andrei ADAMSON, pressure dressing applied. Awaiting bed placement.

## 2019-07-04 NOTE — PROGRESS NOTES
Awake, complains of sensitivity in left foot.  AF, VSS    Gen - Pleasant male in NAD.  Lungs - CTA, bilaterally.  CV - RRR.  Abd - +BS, soft, ND, NT.  LE - Palpable R PT pulses.  Multiphasic Doppler flow signals over bilateral pedal arteries.  1+ edema left lower leg / foot.  Feet are well perfused, warm.  Reperfusion hyperemic left foot.  Neurol - Non focal.    Labs: Reviewed.    Assessment: S/P fem-fem bypass, left leg thrombectomy, angiogram, and left fem-pop bypass.    Plan:  Doing well.  Reassured patient that hypersensitivity and swelling of left foot is normal following reperfusion.  Start Neurontin.  Keep left leg elevated when not on feet.  OK to be out of bed.  Avoid sitting upright to prevent kinking of graft.  Ambulate with assistance.  Start Heparin SQ for DVT prophylaxis.  AVOID injecting below umbilicus where graft is.  Advance diet as tolerated.    Discussed with patient and RN.

## 2019-07-04 NOTE — ASSESSMENT & PLAN NOTE
-accus with sliding scale coverage  -diabetic diet refused by patient  -diabetic education provided  -follow glycohemoglobin levels long term, most recent hemoglobin A1c 6.7  -monitor for hypoglycemic episodes and adjust control if he should get low

## 2019-07-04 NOTE — ANESTHESIA PROCEDURE NOTES
Airway  Date/Time: 7/3/2019 5:25 PM  Performed by: IRWIN CHRISTIAN  Authorized by: IRWIN CHRISTIAN     Location:  OR  Urgency:  Elective  Indications for Airway Management:  Anesthesia  Spontaneous Ventilation: absent    Sedation Level:  Deep  Preoxygenated: Yes    Mask Difficulty Assessment:  0 - not attempted  Final Airway Type:  Supraglottic airway  Final Supraglottic Airway:  Standard LMA  SGA Size:  4  Number of Attempts at Approach:  1

## 2019-07-04 NOTE — ANESTHESIA PROCEDURE NOTES
Arterial Line  Performed by: IRWIN CHRISTIAN  Authorized by: IRWIN CHRISTIAN     Start Time:  7/3/2019 5:33 PM  End Time:  7/3/2019 5:35 PM  Localization: ultrasound guidance  Image captured, interpreted and electronically stored.    Patient Location:  OR  Indication: continuous blood pressure monitoring    Catheter Size:  20 G  Seldinger Technique?: Yes    Laterality:  Left  Site:  Radial artery  Line Secured:  Antimicrobial disc, tape and transparent dressing  Events: patient tolerated procedure well with no complications

## 2019-07-04 NOTE — ASSESSMENT & PLAN NOTE
Postop day #4 after arterial bypass surgery.  Patient's pain at this point is improving the right lower extremity considerably.  Initially patient had critical ischemia in that leg.  Continue at this point with anticoagulation and the physical therapy.  Continue with pain management

## 2019-07-04 NOTE — PROGRESS NOTES
2 RN skin check completed with SNOW Zimmer.   Devices in place oxymask, and prevena wound vacs.  Skin assessed under devices yes.  Confirmed pressure ulcers found on N/A.  New potential pressure ulcers noted on N/A. Wound consult placed N/A.  The following interventions in place N/A.    Bilateral groin sites noted with prevena wound vacs in place.   Dressing noted to left lower calf.   Skin otherwise intact.

## 2019-07-04 NOTE — ANESTHESIA POSTPROCEDURE EVALUATION
Patient: Dc Patel    Procedure Summary     Date:  07/03/19 Room / Location:  [place] [address] / SURGERY [facility]    Anesthesia Start:  1713 Anesthesia Stop:  2055    Procedures:       CREATION, BYPASS, ARTERIAL, FEMORAL TO FEMORAL, USING GRAFT      CREATION, BYPASS, ARTERIAL, FEMORAL TO POPLITEAL, USING GRAFT (Left )      ANGIOGRAM Diagnosis:      Surgeon:  Ana Grande M.D. Responsible Provider:  Sourav Hook D.O.    Anesthesia Type:  general ASA Status:  3 - Emergent          Final Anesthesia Type: general  Last vitals  BP   Blood Pressure : 140/92    Temp   36.8 °C (98.2 °F)    Pulse   Pulse: 80   Resp   17    SpO2   97 %      Anesthesia Post Evaluation    Patient location during evaluation: bedside  Patient participation: complete - patient participated  Level of consciousness: awake and alert and combative (agitated from mckeon)  Pain score: 2    Airway patency: patent  Anesthetic complications: no  Cardiovascular status: adequate  Respiratory status: acceptable  Hydration status: acceptable    PONV: none           Nurse Pain Score: 1 (NPRS)

## 2019-07-04 NOTE — THERAPY
"Occupational Therapy Evaluation completed.   Functional Status:  Pt presents to skilled OT services following Lt femoral-popliteal bypass, pt cleared for mobility this afternoon. Pt performed bed mobility max a, tolerated ~5mins of sitting eob, STS eob with mod a for partial clearance, declined oob/eob ADLs today, BUE strength and ROM grossly intact, c/o some numbness of L foot and pain with mobility. Pt demonstrates decreased balance, pain, generalized weakness and endurance deficits limits pt's participation with ADLs at this time. Pt will benefit from acute skilled OT services while in house and post acute recommended prior to d/c home   Plan of Care: Will benefit from Occupational Therapy 3 times per week  Discharge Recommendations:  Equipment: Will Continue to Assess for Equipment Needs. Post-acute therapy Recommend post-acute placement for additional occupational therapy services prior to discharge home. Patient can tolerate post-acute therapies at a 5x/week frequency.      See \"Rehab Therapy-Acute\" Patient Summary Report for complete documentation.    "

## 2019-07-04 NOTE — CONSULTS
DATE OF SERVICE:  07/03/2019    REFERRING PHYSICIAN:  ER physician at [facility]    CHIEF COMPLAINT:  Left leg pain.    HISTORY OF PRESENT ILLNESS:  The patient is a 74-year-old male who back in   October of last year underwent a left iliac angioplasty and left superficial   femoral artery angioplasty followed by amputation of the gangrenous left   second and third toe.  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 Per patient, he was doing well until about a week   ago when he started to have claudication in the left leg.  The    claudication has progressed to the point that now he has some rest pain.  An   ultrasound was done at [place] and showed occlusion of the left iliac system.    There was also occlusion of the superficial femoral artery with reconstitution   of flow seen in the popliteal artery.  I was contacted and advised to have   the patient taken to a local emergency room where he was evaluated and   transferred to [facility].    PAST MEDICAL HISTORY:  As above.  Also, history of dyslipidemia and chronic kidney disease.    ALLERGIES:  NKDA.    HOME MEDICATIONS:  Aspirin, Plavix, and atorvastatin.    SOCIAL HISTORY:  Significant for past history of tobacco use.  Patient denies   alcohol abuse.  He smokes weed.    FAMILY HISTORY:  Significant for hypertension.    REVIEW OF SYSTEMS:  Significant for left leg pain.  The patient denies chest   pain or shortness of breath.  The remaining of his review of systems is   negative as per AMA/CMS criteria.    PHYSICAL EXAMINATION:  GENERAL:  Patient is a pleasant male.  HEENT:  Unremarkable.  LUNGS:  Clear to auscultation bilaterally.  CARDIOVASCULAR:  Heart sounds are regular rate and rhythm.  ABDOMEN:  Showed the abdomen to be soft, nondistended, nontender with   normoactive bowel sounds.  EXTREMITIES:  Lower extremity exam shows well-healed scars in both groins.     The right common femoral and posterior tibial pulses are strongly palpable with   multiphasic Doppler flow signals.  The left femoral pulses and pedal pulses   are not palpable.  There is a slight discoloration and mottling of the left leg.  The left   leg is cool to touch compared to the right.  NEUROLOGIC:  Grossly nonfocal.    LABORATORY DATA:  Reviewed.    ASSESSMENT:  1.  Critical left leg ischemia.  2.  Chronic renal insufficiency.  3.  Dyslipidemia.    PLAN:  I had a long discussion with the patient.  I recommended the patient   undergo right-to-left femorofemoral bypass, angiogram, and possible left leg   bypass for limb salvage.  Risks of the procedure were discussed, including,   but not limited to bleeding, infection, graft thrombosis, atheroembolization,   contrast reaction, contrast-induced kidney failure, and perioperative   anesthetic complications.  The alternative of not having the procedure was   also discussed.  With this option, there is a risk of limb loss.  Patient   indicated understanding and would like to proceed.  All questions were   answered.    Thank you for the consultation.       ____________________________________     MD ANDREA FRITZ / RICHA    DD:  07/03/2019 17:07:42  DT:  07/03/2019 19:20:34    D#:  [id number]  Job#:  [id number]

## 2019-07-04 NOTE — PROGRESS NOTES
Report received. Patient up to floor with belongings. Monitor room called, patient in sinus rhythm at a rate of 84. Patient encouraged to call for assistance, call light within reach. Bed locked and in lowest position, bed alarm on.

## 2019-07-04 NOTE — ASSESSMENT & PLAN NOTE
Postoperative day #4 after reperfusion.  Continue with pain management continue with Plavix aspirin and statin.

## 2019-07-04 NOTE — PROGRESS NOTES
[facility] Medicine Daily Progress Note    Date of Service  7/4/2019    Chief Complaint  74 y.o. male admitted 7/3/2019 with LLE critical limb ischemia s/p L femoral-popliteal bypass    Hospital Course    see below      Interval Problem Update  PVD-POD#1, doing somewhat well, increased swelling of the LLE but warm. Numbness is still there but less intense today.     Consultants/Specialty  Vascular surgery    Code Status  FCFC    Disposition  TELE, transfer to surgical    Review of Systems  Review of Systems   Constitutional: Negative for chills and fever.   Cardiovascular: Positive for leg swelling.   Gastrointestinal: Positive for abdominal pain (B/L groins). Negative for nausea and vomiting.   Musculoskeletal: Positive for joint pain and myalgias.   Neurological: Positive for sensory change (improving) and weakness.   All other systems reviewed and are negative.       Physical Exam  Temp:  [36.2 °C (97.1 °F)-37.1 °C (98.7 °F)] 36.9 °C (98.5 °F)  Pulse:  [] 77  Resp:  [11-20] 19  BP: (103-149)/(69-92) 110/77  SpO2:  [92 %-100 %] 98 %    Physical Exam   Constitutional: He is oriented to person, place, and time. He appears well-developed and well-nourished. No distress.   HENT:   Head: Normocephalic and atraumatic.   Mouth/Throat: No oropharyngeal exudate.   Eyes: Pupils are equal, round, and reactive to light. No scleral icterus.   Neck: Normal range of motion. Neck supple. No thyromegaly present.   Cardiovascular: Normal rate, regular rhythm, normal heart sounds and intact distal pulses.    No murmur heard.  Pulmonary/Chest: Effort normal and breath sounds normal. No respiratory distress. He has no wheezes.   Abdominal: Soft. Bowel sounds are normal. He exhibits no distension. There is no tenderness.   Musculoskeletal: Normal range of motion. He exhibits edema (2+ pitting edema of the LLE from the knee distally, warm on that leg). He exhibits no tenderness.   B/L inguinal regions with Reyna dressings in  place  -No e/o vac leak    Cold to the touch of the R foot from the ankle distal, dopplerable pulses, no duskiness noted   Neurological: He is alert and oriented to person, place, and time. No cranial nerve deficit.   Skin: Skin is warm and dry. No rash noted.   Psychiatric: He has a normal mood and affect.   Nursing note and vitals reviewed.      Fluids    Intake/Output Summary (Last 24 hours) at 07/04/19 1419  Last data filed at 07/04/19 0500   Gross per 24 hour   Intake             1662 ml   Output             1050 ml   Net              612 ml       Laboratory  Recent Labs      07/03/19   1613  07/04/19   0227   WBC  10.1  15.1*   RBC  4.40*  3.66*   HEMOGLOBIN  13.1*  11.1*   HEMATOCRIT  41.8*  35.2*   MCV  95.0  96.2   MCH  29.8  30.3   MCHC  31.3*  31.5*   RDW  51.9*  51.8*   PLATELETCT  335  311   MPV  9.1  9.1     Recent Labs      07/03/19   1613  07/04/19 0227   SODIUM  141  140   POTASSIUM  3.7  4.8   CHLORIDE  106  104   CO2  25  28   GLUCOSE  148*  184*   BUN  34*  34*   CREATININE  1.39  1.56*   CALCIUM  8.9  8.3*     Recent Labs      07/03/19   1613   APTT  29.7   INR  1.00               Imaging  US-FOREIGN FILM ULTRASOUND   Final Result      US-FOREIGN FILM ULTRASOUND   Final Result      DX-PORTABLE FLUORO > 1 HOUR    (Results Pending)        Assessment/Plan  * Femoral artery occlusion, 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  2) Left SFA and popliteal thrombectomy.  3) Left leg angiogram.  4) Left femoral to below-knee popliteal bypass using 8mm Propatent graft.     PAD  (peripheral artery disease) (HCC)- (present on admission)   Assessment & Plan    Continue with risk modification and education  - continue with ASA, plavix, and statin  - encouraged competed cessation from any smoking     CKD (chronic kidney disease) stage 3, GFR 30-59 ml/min (HCC)- (present on admission)   Assessment & Plan    -a bit above baseline, start NS at 50 ml/hour, low rate.   - avoid nephrotoxic agents, renally dose medications     Hyperglycemia- (present on admission)   Assessment & Plan    Noted to by hyperglycemic (persistent today), not on any DM medications.   - A1C is 6.7  -start ISS, adjust PRN to keep BS less than 150          VTE prophylaxis: heparin

## 2019-07-05 LAB
ANION GAP SERPL CALC-SCNC: 7 MMOL/L (ref 0–11.9)
BASOPHILS # BLD AUTO: 0.3 % (ref 0–1.8)
BASOPHILS # BLD: 0.04 K/UL (ref 0–0.12)
BUN SERPL-MCNC: 23 MG/DL (ref 8–22)
CALCIUM SERPL-MCNC: 8.1 MG/DL (ref 8.5–10.5)
CHLORIDE SERPL-SCNC: 104 MMOL/L (ref 96–112)
CHOLEST SERPL-MCNC: 99 MG/DL (ref 100–199)
CO2 SERPL-SCNC: 25 MMOL/L (ref 20–33)
CREAT SERPL-MCNC: 1.22 MG/DL (ref 0.5–1.4)
EOSINOPHIL # BLD AUTO: 0.05 K/UL (ref 0–0.51)
EOSINOPHIL NFR BLD: 0.4 % (ref 0–6.9)
ERYTHROCYTE [DISTWIDTH] IN BLOOD BY AUTOMATED COUNT: 50.6 FL (ref 35.9–50)
GLUCOSE BLD-MCNC: 105 MG/DL (ref 65–99)
GLUCOSE BLD-MCNC: 113 MG/DL (ref 65–99)
GLUCOSE BLD-MCNC: 136 MG/DL (ref 65–99)
GLUCOSE BLD-MCNC: 194 MG/DL (ref 65–99)
GLUCOSE SERPL-MCNC: 133 MG/DL (ref 65–99)
HCT VFR BLD AUTO: 30.7 % (ref 42–52)
HDLC SERPL-MCNC: 26 MG/DL
HGB BLD-MCNC: 9.6 G/DL (ref 14–18)
IMM GRANULOCYTES # BLD AUTO: 0.05 K/UL (ref 0–0.11)
IMM GRANULOCYTES NFR BLD AUTO: 0.4 % (ref 0–0.9)
LDLC SERPL CALC-MCNC: 42 MG/DL
LYMPHOCYTES # BLD AUTO: 1.76 K/UL (ref 1–4.8)
LYMPHOCYTES NFR BLD: 14.8 % (ref 22–41)
MCH RBC QN AUTO: 29.7 PG (ref 27–33)
MCHC RBC AUTO-ENTMCNC: 31.3 G/DL (ref 33.7–35.3)
MCV RBC AUTO: 95 FL (ref 81.4–97.8)
MONOCYTES # BLD AUTO: 1.2 K/UL (ref 0–0.85)
MONOCYTES NFR BLD AUTO: 10.1 % (ref 0–13.4)
NEUTROPHILS # BLD AUTO: 8.77 K/UL (ref 1.82–7.42)
NEUTROPHILS NFR BLD: 74 % (ref 44–72)
NRBC # BLD AUTO: 0 K/UL
NRBC BLD-RTO: 0 /100 WBC
PLATELET # BLD AUTO: 293 K/UL (ref 164–446)
PMV BLD AUTO: 9.5 FL (ref 9–12.9)
POTASSIUM SERPL-SCNC: 3.8 MMOL/L (ref 3.6–5.5)
RBC # BLD AUTO: 3.23 M/UL (ref 4.7–6.1)
SODIUM SERPL-SCNC: 136 MMOL/L (ref 135–145)
TRIGL SERPL-MCNC: 153 MG/DL (ref 0–149)
WBC # BLD AUTO: 11.9 K/UL (ref 4.8–10.8)

## 2019-07-05 PROCEDURE — 97530 THERAPEUTIC ACTIVITIES: CPT

## 2019-07-05 PROCEDURE — A9270 NON-COVERED ITEM OR SERVICE: HCPCS | Performed by: HOSPITALIST

## 2019-07-05 PROCEDURE — 85025 COMPLETE CBC W/AUTO DIFF WBC: CPT

## 2019-07-05 PROCEDURE — 80061 LIPID PANEL: CPT

## 2019-07-05 PROCEDURE — 700111 HCHG RX REV CODE 636 W/ 250 OVERRIDE (IP): Performed by: SURGERY

## 2019-07-05 PROCEDURE — 99232 SBSQ HOSP IP/OBS MODERATE 35: CPT | Performed by: HOSPITALIST

## 2019-07-05 PROCEDURE — A9270 NON-COVERED ITEM OR SERVICE: HCPCS | Performed by: SURGERY

## 2019-07-05 PROCEDURE — 80048 BASIC METABOLIC PNL TOTAL CA: CPT

## 2019-07-05 PROCEDURE — 36415 COLL VENOUS BLD VENIPUNCTURE: CPT

## 2019-07-05 PROCEDURE — 700102 HCHG RX REV CODE 250 W/ 637 OVERRIDE(OP): Performed by: SURGERY

## 2019-07-05 PROCEDURE — 700102 HCHG RX REV CODE 250 W/ 637 OVERRIDE(OP): Performed by: HOSPITALIST

## 2019-07-05 PROCEDURE — 700105 HCHG RX REV CODE 258: Performed by: INTERNAL MEDICINE

## 2019-07-05 PROCEDURE — 96372 THER/PROPH/DIAG INJ SC/IM: CPT

## 2019-07-05 PROCEDURE — 82962 GLUCOSE BLOOD TEST: CPT

## 2019-07-05 PROCEDURE — 700102 HCHG RX REV CODE 250 W/ 637 OVERRIDE(OP): Performed by: INTERNAL MEDICINE

## 2019-07-05 PROCEDURE — 770006 HCHG ROOM/CARE - MED/SURG/GYN SEMI*

## 2019-07-05 RX ADMIN — OXYCODONE HYDROCHLORIDE 5 MG: 5 TABLET ORAL at 22:04

## 2019-07-05 RX ADMIN — INSULIN HUMAN 1 UNITS: 100 INJECTION, SOLUTION PARENTERAL at 06:52

## 2019-07-05 RX ADMIN — TIMOLOL MALEATE 1 DROP: 5 SOLUTION OPHTHALMIC at 16:58

## 2019-07-05 RX ADMIN — SENNOSIDES, DOCUSATE SODIUM 2 TABLET: 50; 8.6 TABLET, FILM COATED ORAL at 05:07

## 2019-07-05 RX ADMIN — BRIMONIDINE TARTRATE 1 DROP: 2 SOLUTION/ DROPS OPHTHALMIC at 16:57

## 2019-07-05 RX ADMIN — OXYCODONE HYDROCHLORIDE 5 MG: 5 TABLET ORAL at 11:30

## 2019-07-05 RX ADMIN — ATORVASTATIN CALCIUM 20 MG: 20 TABLET, FILM COATED ORAL at 22:03

## 2019-07-05 RX ADMIN — HEPARIN SODIUM 5000 UNITS: 5000 INJECTION, SOLUTION INTRAVENOUS; SUBCUTANEOUS at 16:57

## 2019-07-05 RX ADMIN — GABAPENTIN 300 MG: 300 CAPSULE ORAL at 16:57

## 2019-07-05 RX ADMIN — SENNOSIDES, DOCUSATE SODIUM 2 TABLET: 50; 8.6 TABLET, FILM COATED ORAL at 16:57

## 2019-07-05 RX ADMIN — ASPIRIN 81 MG 81 MG: 81 TABLET ORAL at 05:07

## 2019-07-05 RX ADMIN — OXYCODONE HYDROCHLORIDE 5 MG: 5 TABLET ORAL at 05:07

## 2019-07-05 RX ADMIN — CLOPIDOGREL BISULFATE 75 MG: 75 TABLET ORAL at 05:07

## 2019-07-05 RX ADMIN — OXYCODONE HYDROCHLORIDE 5 MG: 5 TABLET ORAL at 16:57

## 2019-07-05 RX ADMIN — BRIMONIDINE TARTRATE 1 DROP: 2 SOLUTION/ DROPS OPHTHALMIC at 05:09

## 2019-07-05 RX ADMIN — TIMOLOL MALEATE 1 DROP: 5 SOLUTION OPHTHALMIC at 05:08

## 2019-07-05 RX ADMIN — HEPARIN SODIUM 5000 UNITS: 5000 INJECTION, SOLUTION INTRAVENOUS; SUBCUTANEOUS at 05:08

## 2019-07-05 RX ADMIN — SODIUM CHLORIDE: 9 INJECTION, SOLUTION INTRAVENOUS at 05:00

## 2019-07-05 ASSESSMENT — ENCOUNTER SYMPTOMS
FEVER: 0
CONSTIPATION: 0
DIAPHORESIS: 0
HEARTBURN: 0
CARDIOVASCULAR NEGATIVE: 1
DIZZINESS: 0
FOCAL WEAKNESS: 0
PSYCHIATRIC NEGATIVE: 1
LOSS OF CONSCIOUSNESS: 0
PALPITATIONS: 0
ABDOMINAL PAIN: 0
CHILLS: 0
NEUROLOGICAL NEGATIVE: 1
HEMOPTYSIS: 0
COUGH: 0
CONSTITUTIONAL NEGATIVE: 1
EYES NEGATIVE: 1
DIARRHEA: 0
BRUISES/BLEEDS EASILY: 0
MYALGIAS: 1
HEADACHES: 0
SEIZURES: 0
RESPIRATORY NEGATIVE: 1
GASTROINTESTINAL NEGATIVE: 1
BLOOD IN STOOL: 0
WHEEZING: 0
NAUSEA: 0
NERVOUS/ANXIOUS: 0
DOUBLE VISION: 0
VOMITING: 0

## 2019-07-05 ASSESSMENT — COGNITIVE AND FUNCTIONAL STATUS - GENERAL
MOBILITY SCORE: 17
WALKING IN HOSPITAL ROOM: TOTAL
SUGGESTED CMS G CODE MODIFIER MOBILITY: CK
STANDING UP FROM CHAIR USING ARMS: A LITTLE
CLIMB 3 TO 5 STEPS WITH RAILING: TOTAL

## 2019-07-05 NOTE — PROGRESS NOTES
Patient arrived on unit via bed, on 2L O2 via nasal cannula, is A&Ox4, has no complaints of pain at this moment. Will continue to monitor.

## 2019-07-05 NOTE — PROGRESS NOTES
[facility] Medicine Daily Progress Note    Date of Service  7/5/2019    Chief Complaint  74 y.o. male admitted 7/3/2019 with left lower extremity pain    Hospital Course    Patient was admitted due to left lower extremity pain which was found to be secondary to arterial occlusion.  The patient underwent femoropopliteal bypass is now postoperative day #2.  Continue at this point with Plavix aspirin and statin.      Interval Problem Update  Continue with antiplatelet management as well as statin.  Monitor for any pain and give pain management.  Follow surgical recommendations for mobilizing.  Patient most likely will need skilled placement    Consultants/Specialty  Vascular surgery    Code Status  Full code    Disposition  Determined    Review of Systems  Review of Systems   Constitutional: Negative.  Negative for chills, diaphoresis and fever.   HENT: Negative.    Eyes: Negative.  Negative for double vision.   Respiratory: Negative.  Negative for cough, hemoptysis and wheezing.    Cardiovascular: Negative.  Negative for chest pain, palpitations and leg swelling.   Gastrointestinal: Negative.  Negative for abdominal pain, blood in stool, constipation, diarrhea, heartburn, nausea and vomiting.   Genitourinary: Negative.  Negative for frequency, hematuria and urgency.   Musculoskeletal: Positive for myalgias. Negative for joint pain.   Skin: Negative.  Negative for itching and rash.   Neurological: Negative.  Negative for dizziness, focal weakness, seizures, loss of consciousness and headaches.   Endo/Heme/Allergies: Negative.  Does not bruise/bleed easily.   Psychiatric/Behavioral: Negative.  Negative for suicidal ideas. The patient is not nervous/anxious.    All other systems reviewed and are negative.       Physical Exam  Temp:  [36.7 °C (98 °F)-37.7 °C (99.8 °F)] 37 °C (98.6 °F)  Pulse:  [78-89] 78  Resp:  [16-19] 17  BP: (120-134)/(71-87) 134/75  SpO2:  [94 %-97 %] 94 %    Physical Exam   Constitutional: He is  oriented to person, place, and time. He appears well-developed and well-nourished.   HENT:   Head: Normocephalic and atraumatic.   Right Ear: External ear normal.   Left Ear: External ear normal.   Nose: Nose normal.   Mouth/Throat: Oropharynx is clear and moist.   Eyes: Pupils are equal, round, and reactive to light. Conjunctivae and EOM are normal.   Neck: Normal range of motion. Neck supple. No JVD present. No thyromegaly present.   Cardiovascular: Normal rate and regular rhythm.    No murmur heard.  Pulmonary/Chest: He has no wheezes. He has no rales. He exhibits no tenderness.   Abdominal: He exhibits no distension and no mass. There is no tenderness. There is no rebound and no guarding.   Musculoskeletal: Normal range of motion. He exhibits no edema or tenderness.   Lymphadenopathy:     He has no cervical adenopathy.   Neurological: He is alert and oriented to person, place, and time. He has normal reflexes. No cranial nerve deficit.   Skin: Skin is warm and dry. No rash noted. No erythema.   Psychiatric: He has a normal mood and affect.   Nursing note and vitals reviewed.      Fluids    Intake/Output Summary (Last 24 hours) at 07/05/19 1527  Last data filed at 07/05/19 1300   Gross per 24 hour   Intake              480 ml   Output              680 ml   Net             -200 ml       Laboratory  Recent Labs      07/03/19   1613  07/04/19 0227 07/05/19 0412   WBC  10.1  15.1*  11.9*   RBC  4.40*  3.66*  3.23*   HEMOGLOBIN  13.1*  11.1*  9.6*   HEMATOCRIT  41.8*  35.2*  30.7*   MCV  95.0  96.2  95.0   MCH  29.8  30.3  29.7   MCHC  31.3*  31.5*  31.3*   RDW  51.9*  51.8*  50.6*   PLATELETCT  335  311  293   MPV  9.1  9.1  9.5     Recent Labs      07/03/19   1613  07/04/19 0227 07/05/19 0412   SODIUM  141  140  136   POTASSIUM  3.7  4.8  3.8   CHLORIDE  106  104  104   CO2  25  28  25   GLUCOSE  148*  184*  133*   BUN  34*  34*  23*   CREATININE  1.39  1.56*  1.22   CALCIUM  8.9  8.3*  8.1*     Recent  Labs      07/03/19   1613   APTT  29.7   INR  1.00         Recent Labs      07/05/19   0412   TRIGLYCERIDE  153*   HDL  26*   LDL  42       Imaging  DX-PORTABLE FLUORO > 1 HOUR   Preliminary Result         Portable fluoroscopy utilized for 1 minute 24 seconds.      US-FOREIGN FILM ULTRASOUND   Final Result      US-FOREIGN FILM ULTRASOUND   Final Result           Assessment/Plan  * Femoral artery occlusion, left (HCC)- (present on admission)   Assessment & Plan    Postoperative day #2 after reperfusion and vascular surgical bypass.  Continue to monitor for pain at this point with aspirin Plavix and statin.         PAD (peripheral artery disease) ([facility])- (present on admission)   Assessment & Plan    Patient is status post bypass surgery in both lower extremities.  At this point patient will need to continue with aspirin Plavix statin.  And patient at this point needs aggressive smoking cessation.       CKD (chronic kidney disease) stage 3, GFR 30-59 ml/min ([facility])- (present on admission)   Assessment & Plan    Monitor renal functions and avoid nephrotoxic medications     Hyperglycemia- (present on admission)   Assessment & Plan    -accus with sliding scale coverage  -diabetic diet  -diabetic education  -follow glycohemoglobin levels long term, most recent hemoglobin A1c 6.7  -continue with oral antihyperglycemics  -monitor for hypoglycemic episodes and adjust control if he should get low          VTE prophylaxis: Heparin

## 2019-07-05 NOTE — PROGRESS NOTES
Report received by day shift RN. Pt awake alert and oriented x 4 with no c/o pain. Pt updated to POC and verbalized understanding. Bed locked in low position with fall precautions in place and call light in reach.

## 2019-07-05 NOTE — CARE PLAN
Problem: Communication  Goal: The ability to communicate needs accurately and effectively will improve  Outcome: PROGRESSING AS EXPECTED  Patient transferred to our unit, provided education about policies and plan of care, verbalizes understanding and does not have any questions.     Problem: Safety  Goal: Will remain free from falls  Outcome: PROGRESSING AS EXPECTED  Provided patient education about fall risk and importance of calling for assistance , patient aware of bed rest status, Patient verbalizes understanding. Fall precautions in place: bed locked and in lowest position, patient wearing threaded footwear, call light within reach.

## 2019-07-05 NOTE — THERAPY
"Physical Therapy Treatment completed.   Bed Mobility:  Supine to Sit: Modified Independent  Transfers: Sit to Stand: Supervised  Gait: Level Of Assist:  (pt declined gait attempt 2/2 difficulty moving L LE) with No Equipment Needed       Plan of Care: Will benefit from Physical Therapy 3 times per week  Discharge Recommendations: Equipment: Will Continue to Assess for Equipment Needs.     Pt has progressed his independence but appears to be most limited by discomfort to L LE. He did not require assist to exit bed or initiate sit to stand primarily on his R LE. Pt declined gait as he reported having too much pain to L LE. Pt also reported having gotten to standing several times today and appears quite motivated to progress. While here, PT will follow to facilitate progression. May require placement at current level, but pt may also progress such that he can return home. Will continue to assess.      See \"Rehab Therapy-Acute\" Patient Summary Report for complete documentation.       " [facility] ED  Emergency Department Encounter  Emergency Medicine Resident     Patient Name: Devang Torres  MRN: [id number]  [place]: 1997  Date of evaluation: 4/27/19  PCP:  Charles House MD    [address] [place] [address]       Chief Complaint   Patient presents with    Emesis     Pt to ED with c/o vomiting and diarrhea since this morning, pt states was drinking ETOH last night, has been attempting fluids today but cannot keep them down    Diarrhea       HISTORY OF PRESENT ILLNESS  (Location/Symptom, Timing/Onset, Context/Setting, Quality, Duration, Modifying Factors, Severity.)      Tracy Quintanilla is a 19 y.o. male who presents with a chief complaint of nausea and vomiting. Patient reports that he was drinking a lot last night, and when he woke up this morning, he has been throwing up since that time. Patient also reports occasional episodes of liquid stool. Patient has not had any blood in his emesis or stool. Patient denies abdominal pain, fevers, sore throat, shortness of breath, chest pain. PAST MEDICAL / SURGICAL / SOCIAL / FAMILY HISTORY      has a past medical history of Headache(784.0). has no past surgical history on file. Social History     Socioeconomic History    Marital status: Single     Spouse name: Not on file    Number of children: Not on file    Years of education: Not on file    Highest education level: Not on file   Occupational History    Not on file   Social Needs    Financial resource strain: Not on file    Food insecurity:     Worry: Not on file     Inability: Not on file    Transportation needs:     Medical: Not on file     Non-medical: Not on file   Tobacco Use    Smoking status: Never Smoker   Substance and Sexual Activity    Alcohol use:  Yes     Alcohol/week: 0.6 oz     Types: 1 Cans of beer per week    Drug use: Yes     Frequency: 10.0 times per week     Types: Marijuana    Sexual activity: Yes     Partners: Female   Lifestyle    Physical activity:     Days per week: Not on file     Minutes per session: Not on file    Stress: Not on file   Relationships    Social connections:     Talks on phone: Not on file     Gets together: Not on file     Attends [employer] service: Not on file     Active member of club or organization: Not on file     Attends meetings of clubs or organizations: Not on file     Relationship status: Not on file    Intimate partner violence:     Fear of current or ex partner: Not on file     Emotionally abused: Not on file     Physically abused: Not on file     Forced sexual activity: Not on file   Other Topics Concern    Not on file   Social History Narrative    Not on file       History reviewed. No pertinent family history. Allergies:  Patient has no known allergies. Home Medications:  Prior to Admission medications    Medication Sig Start Date End Date Taking? Authorizing Provider   ondansetron (ZOFRAN ODT) 4 MG disintegrating tablet Take 1 tablet by mouth every 8 hours as needed for Nausea 4/27/19  Yes Liana Merino, DO       REVIEW OF SYSTEMS    (2-9 systems for level 4, 10 or more for level 5)      Review of Systems   Constitutional: Negative for fever. HENT: Negative for sore throat. Respiratory: Negative for shortness of breath. Cardiovascular: Negative for chest pain. Gastrointestinal: Positive for diarrhea, nausea and vomiting. Negative for abdominal pain and blood in stool. PHYSICAL EXAM   (up to 7 for level 4, 8 or more for level 5)      INITIAL VITALS:   /65   Pulse 59   Temp 97.7 °F (36.5 °C) (Oral)   Resp 16   SpO2 99%     Physical Exam   Constitutional: He is oriented to person, place, and time. He appears well-developed and well-nourished. No distress. HENT:   Head: Normocephalic and atraumatic. Eyes: Right eye exhibits no discharge. Left eye exhibits no discharge. Neck: No tracheal deviation present. Cardiovascular: Normal rate, regular rhythm and normal heart sounds. Pulmonary/Chest: Effort normal and breath sounds normal. No stridor. No respiratory distress. He has no wheezes. He has no rales. Abdominal: Soft. Bowel sounds are normal. He exhibits no distension and no mass. There is no tenderness. There is no guarding. Musculoskeletal: Normal range of motion. He exhibits no deformity. Neurological: He is alert and oriented to person, place, and time. Skin: Skin is warm and dry. No rash noted. He is not diaphoretic. Psychiatric: He has a normal mood and affect. His speech is normal and behavior is normal.       WORK-UP     PLAN (LABS / IMAGING /EKG):  Orders Placed This Encounter   Procedures    Insert peripheral IV       MEDICATIONS ORDERED:  Orders Placed This Encounter   Medications    ondansetron (ZOFRAN) tablet 4 mg    0.9 % sodium chloride bolus    DISCONTD: promethazine (PHENERGAN) 12.5 mg in sodium chloride 0.9 % 100 mL IVPB    promethazine (PHENERGAN) injection 12.5 mg    ondansetron (ZOFRAN) injection 4 mg    ondansetron (ZOFRAN ODT) 4 MG disintegrating tablet     Sig: Take 1 tablet by mouth every 8 hours as needed for Nausea     Dispense:  5 tablet     Refill:  0       DIAGNOSTIC RESULTS / EMERGENCY DEPARTMENT COURSE /MDM / DIFFERENTIAL DIAGNOSIS     LABS:  No results found for this visit on 04/27/19. RADIOLOGY:  None    EKG  None    All EKG's are interpreted by the Emergency Department Physician who either signs or Co-signs this chart in the absence of a cardiologist.    DIFFERENTIAL DIAGNOSIS:  Dehydration, gastroenteritis, appendicitis, cholecystitis    EMERGENCY DEPARTMENT COURSE & MDM:  19 y.o. male presents with a chief complaint of vomiting and diarrhea. Vitals are stable. Patient is well-appearing and in no acute distress. Patient's abdomen is soft and nontender, therefore doubt appendicitis or cholecystitis. Patient was not appears very dehydrated as his mucous membranes are still moist, and he is not tachycardic.   We'll give patient Zofran for his nausea, and then trial by mouth challenge. Plan is for by mouth rehydration. ED Course as of Apr 28 1003   Sat Apr 27, 2019   1806 Patient still nauseated after zofran. Will start IV, give 1L ns, and give phenergan    [BJ]   1909 On reassessment, patient has had 1L of fluids. Mild nausea remains. Will attempt po challenge and reassess. [BJ]   1927 Patient reports he swallowed the ODT zofran. Will order 4mg of IV zofran at this time. Patient's pulse is now 59 - much improved after fluids. [BJ]   1952 Patient vomited shortly before getting zofran. Will wait a little while longer and then attempt po challenge again. Patient will be signed out to Dr. Kevin Taveras. Patient informed and has no questions about this transition of care at this time. [BJ]      ED Course User Index  [BJ] Leticia Meadows DO       PROCEDURES:  None    CONSULTS:  None    CRITICAL CARE:  Please see attending physician note. FINAL IMPRESSION      1. Non-intractable vomiting with nausea, unspecified vomiting type          DISPOSITION / PLAN     DISPOSITION Decision To Discharge 04/27/2019 08:46:44 PM    PATIENT REFERRED TO:  No follow-up provider specified.     DISCHARGE MEDICATIONS:  Discharge Medication List as of 4/27/2019  8:47 PM          Leticia Meadows DO  Emergency Medicine Resident    (Please note that portions ofthis note were completed with a voice recognition program.  Efforts were made to edit the dictations but occasionally words are mis-transcribed.)        Leticia Meadows DO  Resident  04/28/19 1009

## 2019-07-05 NOTE — CARE PLAN
Problem: Pain Management  Goal: Pain level will decrease to patient's comfort goal  Outcome: PROGRESSING AS EXPECTED   Follow pain managment plan developed in collaboration with patient and Interdisciplinary Team  Pain well managed with current regimen per MAR    Problem: Safety  Goal: Will remain free from injury  Outcome: PROGRESSING AS EXPECTED  Safety precautions in place. Call light within reach. Bed is low and in locked position. Hourly rounding in place.

## 2019-07-05 NOTE — PROGRESS NOTES
"Awake, no complaints.  Left foot feels \"better\".  AF, VSS     Gen - Pleasant male in NAD.  Lungs - CTA, bilaterally.  CV - RRR.  Abd - +BS, soft, ND, NT.  LE - Palpable R PT pulses.  Multiphasic Doppler flow signals over bilateral pedal arteries.  1+ edema left lower leg / foot.  Feet are well perfused, warm.  Reperfusion hyperemic left foot.  Compartments are soft.  Prevena's in place with no surrounding erythema, drainage, or fluctuation.  Neurol - Non focal.     Labs: Reviewed.    Assessment: S/P fem-fem bypass, left leg thrombectomy, angiogram, and left fem-pop bypass.     Plan:  Doing well.  Agree with current care.  Home once able to ambulate independently and cleared by hospitalist service.  If patient is discharged over the weekend, please have patient see me in my office next Tuesday for Prevena dressing removal.    Discussed with patient and RN.    Will follow peripherally.  Please call for questions / concerns.  "

## 2019-07-05 NOTE — PROGRESS NOTES
2 RN skin check completed with SNOW Graham  Devices in place silicone oxygen tubing and bilateral prevena wound vacs to bilateral groin areas.  Skin assessed under devices yes, skin intact with no evidence of skin breakdown noted.  Confirmed pressure ulcers found on none.  The following interventions in place patient education about skin break down provided, pillows in use for support and positioning, patient able to turn self in bed.

## 2019-07-05 NOTE — THERAPY
"Physical Therapy Evaluation completed.   Bed Mobility:  Supine to Sit: Moderate Assist  Transfers: Sit to Stand: Minimal Assist  Gait: Level Of Assist: Unable to Participate with Front-Wheel Walker       Plan of Care: Will benefit from Physical Therapy 3 times per week  Discharge Recommendations: Equipment: Will Continue to Assess for Equipment Needs. Post-acute therapy Recommend post-acute placement for continued physical therapy services prior to discharge home. Patient can tolerate post-acute therapies at a 5x/week frequency.         See \"Rehab Therapy-Acute\" Patient Summary Report for complete documentation.     "

## 2019-07-06 LAB
GLUCOSE BLD-MCNC: 110 MG/DL (ref 65–99)
GLUCOSE BLD-MCNC: 112 MG/DL (ref 65–99)
GLUCOSE BLD-MCNC: 119 MG/DL (ref 65–99)
GLUCOSE BLD-MCNC: 153 MG/DL (ref 65–99)

## 2019-07-06 PROCEDURE — 82962 GLUCOSE BLOOD TEST: CPT | Mod: 91

## 2019-07-06 PROCEDURE — 700105 HCHG RX REV CODE 258: Performed by: INTERNAL MEDICINE

## 2019-07-06 PROCEDURE — 99232 SBSQ HOSP IP/OBS MODERATE 35: CPT | Performed by: HOSPITALIST

## 2019-07-06 PROCEDURE — 96372 THER/PROPH/DIAG INJ SC/IM: CPT

## 2019-07-06 PROCEDURE — 700102 HCHG RX REV CODE 250 W/ 637 OVERRIDE(OP): Performed by: HOSPITALIST

## 2019-07-06 PROCEDURE — 770006 HCHG ROOM/CARE - MED/SURG/GYN SEMI*

## 2019-07-06 PROCEDURE — A9270 NON-COVERED ITEM OR SERVICE: HCPCS | Performed by: SURGERY

## 2019-07-06 PROCEDURE — 700111 HCHG RX REV CODE 636 W/ 250 OVERRIDE (IP): Performed by: SURGERY

## 2019-07-06 PROCEDURE — 700102 HCHG RX REV CODE 250 W/ 637 OVERRIDE(OP): Performed by: SURGERY

## 2019-07-06 PROCEDURE — A9270 NON-COVERED ITEM OR SERVICE: HCPCS | Performed by: HOSPITALIST

## 2019-07-06 RX ADMIN — HEPARIN SODIUM 5000 UNITS: 5000 INJECTION, SOLUTION INTRAVENOUS; SUBCUTANEOUS at 06:14

## 2019-07-06 RX ADMIN — TIMOLOL MALEATE 1 DROP: 5 SOLUTION OPHTHALMIC at 18:41

## 2019-07-06 RX ADMIN — OXYCODONE HYDROCHLORIDE 5 MG: 5 TABLET ORAL at 03:10

## 2019-07-06 RX ADMIN — INSULIN HUMAN 1 UNITS: 100 INJECTION, SOLUTION PARENTERAL at 21:17

## 2019-07-06 RX ADMIN — OXYCODONE HYDROCHLORIDE 5 MG: 5 TABLET ORAL at 09:37

## 2019-07-06 RX ADMIN — ASPIRIN 81 MG 81 MG: 81 TABLET ORAL at 06:13

## 2019-07-06 RX ADMIN — BRIMONIDINE TARTRATE 1 DROP: 2 SOLUTION/ DROPS OPHTHALMIC at 06:17

## 2019-07-06 RX ADMIN — SENNOSIDES, DOCUSATE SODIUM 2 TABLET: 50; 8.6 TABLET, FILM COATED ORAL at 06:13

## 2019-07-06 RX ADMIN — OXYCODONE HYDROCHLORIDE 5 MG: 5 TABLET ORAL at 15:27

## 2019-07-06 RX ADMIN — HEPARIN SODIUM 5000 UNITS: 5000 INJECTION, SOLUTION INTRAVENOUS; SUBCUTANEOUS at 18:37

## 2019-07-06 RX ADMIN — SENNOSIDES, DOCUSATE SODIUM 2 TABLET: 50; 8.6 TABLET, FILM COATED ORAL at 18:38

## 2019-07-06 RX ADMIN — ATORVASTATIN CALCIUM 20 MG: 20 TABLET, FILM COATED ORAL at 21:05

## 2019-07-06 RX ADMIN — TIMOLOL MALEATE 1 DROP: 5 SOLUTION OPHTHALMIC at 06:12

## 2019-07-06 RX ADMIN — OXYCODONE HYDROCHLORIDE 5 MG: 5 TABLET ORAL at 18:38

## 2019-07-06 RX ADMIN — GABAPENTIN 300 MG: 300 CAPSULE ORAL at 18:00

## 2019-07-06 RX ADMIN — OXYCODONE HYDROCHLORIDE 5 MG: 5 TABLET ORAL at 21:24

## 2019-07-06 RX ADMIN — OXYCODONE HYDROCHLORIDE 5 MG: 5 TABLET ORAL at 06:13

## 2019-07-06 RX ADMIN — BRIMONIDINE TARTRATE 1 DROP: 2 SOLUTION/ DROPS OPHTHALMIC at 18:37

## 2019-07-06 RX ADMIN — SODIUM CHLORIDE: 9 INJECTION, SOLUTION INTRAVENOUS at 15:26

## 2019-07-06 RX ADMIN — CLOPIDOGREL BISULFATE 75 MG: 75 TABLET ORAL at 06:13

## 2019-07-06 ASSESSMENT — ENCOUNTER SYMPTOMS
EYES NEGATIVE: 1
CARDIOVASCULAR NEGATIVE: 1
EYE PAIN: 0
VOMITING: 0
POLYDIPSIA: 0
ABDOMINAL PAIN: 0
NEUROLOGICAL NEGATIVE: 1
TREMORS: 0
BLURRED VISION: 0
ORTHOPNEA: 0
SPUTUM PRODUCTION: 0
SENSORY CHANGE: 0
WEIGHT LOSS: 0
FALLS: 0
TINGLING: 0
SHORTNESS OF BREATH: 0
PHOTOPHOBIA: 0
MYALGIAS: 1
BACK PAIN: 0
RESPIRATORY NEGATIVE: 1
BRUISES/BLEEDS EASILY: 0
DEPRESSION: 0
INSOMNIA: 1
CONSTITUTIONAL NEGATIVE: 1
FLANK PAIN: 0
CLAUDICATION: 0
HEADACHES: 0
HEARTBURN: 0
CONSTIPATION: 1
NAUSEA: 1

## 2019-07-06 ASSESSMENT — LIFESTYLE VARIABLES: SUBSTANCE_ABUSE: 0

## 2019-07-06 NOTE — CARE PLAN
Problem: Pain Management  Goal: Pain level will decrease to patient's comfort goal  Outcome: PROGRESSING AS EXPECTED  Pain controlled with oxy 5    Problem: Bowel/Gastric:  Goal: Normal bowel function is maintained or improved  Outcome: PROGRESSING SLOWER THAN EXPECTED

## 2019-07-06 NOTE — PROGRESS NOTES
Hospital Medicine Daily Progress Note    Date of Service  7/6/2019    Chief Complaint  74 y.o. male admitted 7/3/2019 with left lower extremity pain    Hospital Course    7/5/2019-patient was admitted due to left lower extremity pain which was found to be secondary to arterial occlusion.  The patient underwent femoropopliteal bypass is now postoperative day #2.  Continue at this point with Plavix aspirin and statin  7/6/2019- patient today is feeling less pain when he is still.  When he moves the leg he still feeling quite a bit of pain at this point we are optimizing pain management.  Continue at this point with antiplatelet management using Plavix aspirin and statin.  PT OT evaluation pending patient will benefit from going to skilled facility      Interval Problem Update  Continue with diabetic management currently blood sugars are stable continue with blood pressure management optimization.  Continue with antiplatelet management.  PT OT evaluation pending will need placement to skilled facility    Consultants/Specialty  Vascular surgery    Code Status  Full code    Disposition  Patient will need placement to skilled facility once vascular surgery has cleared him and PT OT evaluations are done.    Review of Systems  Review of Systems   Constitutional: Negative.  Negative for malaise/fatigue and weight loss.   HENT: Negative.  Negative for ear discharge and nosebleeds.    Eyes: Negative.  Negative for blurred vision, photophobia and pain.   Respiratory: Negative.  Negative for sputum production and shortness of breath.    Cardiovascular: Negative.  Negative for orthopnea and claudication.   Gastrointestinal: Positive for constipation and nausea. Negative for abdominal pain, heartburn, melena and vomiting.   Genitourinary: Negative.  Negative for dysuria and flank pain.   Musculoskeletal: Positive for myalgias (left calf and thigh and behind the knee). Negative for back pain and falls.   Skin: Negative.     Neurological: Negative.  Negative for tingling, tremors, sensory change and headaches.   Endo/Heme/Allergies: Negative.  Negative for polydipsia. Does not bruise/bleed easily.   Psychiatric/Behavioral: Negative for depression, substance abuse and suicidal ideas. The patient has insomnia.    All other systems reviewed and are negative.       Physical Exam  Temp:  [36.2 °C (97.1 °F)-37.3 °C (99.1 °F)] 36.3 °C (97.3 °F)  Pulse:  [74-92] 79  Resp:  [16-18] 17  BP: (104-121)/(67-78) 104/67  SpO2:  [95 %-97 %] 97 %    Physical Exam   Constitutional: He is oriented to person, place, and time. He appears well-developed and well-nourished. No distress.   HENT:   Head: Normocephalic and atraumatic.   Right Ear: External ear normal.   Left Ear: External ear normal.   Eyes: Pupils are equal, round, and reactive to light. Conjunctivae and EOM are normal. Right eye exhibits no discharge. Left eye exhibits no discharge.   Neck: Normal range of motion. Neck supple. No tracheal deviation present.   Cardiovascular: Normal rate and regular rhythm.    No murmur heard.  Pulmonary/Chest: Effort normal and breath sounds normal. No stridor. He has no wheezes. He has no rales. He exhibits no tenderness.   Abdominal: Soft. Bowel sounds are normal. He exhibits no distension and no mass. There is no tenderness. There is no rebound and no guarding.   Genitourinary: No penile tenderness.   Musculoskeletal: He exhibits edema and tenderness. He exhibits no deformity.        Left hip: He exhibits decreased range of motion, decreased strength and swelling.        Left knee: He exhibits decreased range of motion and swelling.   Neurological: He is alert and oriented to person, place, and time. He has normal reflexes. No cranial nerve deficit. Coordination normal.   Skin: Skin is warm and dry. No rash noted. He is not diaphoretic. No erythema. No pallor.   Psychiatric: He has a normal mood and affect.   Nursing note and vitals  reviewed.      Fluids    Intake/Output Summary (Last 24 hours) at 07/06/19 1404  Last data filed at 07/06/19 0600   Gross per 24 hour   Intake              120 ml   Output             1100 ml   Net             -980 ml       Laboratory  Recent Labs      07/03/19 1613 07/04/19 0227 07/05/19   0412   WBC  10.1  15.1*  11.9*   RBC  4.40*  3.66*  3.23*   HEMOGLOBIN  13.1*  11.1*  9.6*   HEMATOCRIT  41.8*  35.2*  30.7*   MCV  95.0  96.2  95.0   MCH  29.8  30.3  29.7   MCHC  31.3*  31.5*  31.3*   RDW  51.9*  51.8*  50.6*   PLATELETCT  335  311  293   MPV  9.1  9.1  9.5     Recent Labs      07/03/19 1613 07/04/19 0227 07/05/19 0412   SODIUM  141  140  136   POTASSIUM  3.7  4.8  3.8   CHLORIDE  106  104  104   CO2  25  28  25   GLUCOSE  148*  184*  133*   BUN  34*  34*  23*   CREATININE  1.39  1.56*  1.22   CALCIUM  8.9  8.3*  8.1*     Recent Labs      07/03/19 1613   APTT  29.7   INR  1.00         Recent Labs      07/05/19 0412   TRIGLYCERIDE  153*   HDL  26*   LDL  42       Imaging  DX-PORTABLE FLUORO > 1 HOUR   Preliminary Result         Portable fluoroscopy utilized for 1 minute 24 seconds.      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  Patient continues to have some pain continue with pain management.  Continue with aspirin Plavix and statin.         PAD (peripheral artery disease) ([facility])- (present on admission)   Assessment & Plan    Status post arterial bypass surgery.  Continue at this point with antiplatelet management.  Continue with pain management.  Mobilization orders per vascular surgery  Continue with statin       CKD (chronic kidney disease) stage 3, GFR 30-59 ml/min ([facility])- (present on admission)   Assessment & Plan    Renal functions at this point remain optimized.  His most recent creatinine  is 1.22.  BUN has come down.  Discontinue fluids and allow patient to drink and eat orally.  Monitor renal functions avoid nephrotoxic medications     Hyperglycemia- (present on admission)   Assessment & Plan    -accus with sliding scale coverage, blood sugars ranged from 105-136  -diabetic diet provided  -diabetic education  -follow glycohemoglobin levels long term, most recent hemoglobin A1c 6.7  -monitor for hypoglycemic episodes and adjust control if he should get low          VTE prophylaxis: Heparin

## 2019-07-07 LAB
GLUCOSE BLD-MCNC: 131 MG/DL (ref 65–99)
GLUCOSE BLD-MCNC: 94 MG/DL (ref 65–99)
GLUCOSE BLD-MCNC: 94 MG/DL (ref 65–99)
GLUCOSE BLD-MCNC: 98 MG/DL (ref 65–99)

## 2019-07-07 PROCEDURE — A9270 NON-COVERED ITEM OR SERVICE: HCPCS | Performed by: SURGERY

## 2019-07-07 PROCEDURE — 770006 HCHG ROOM/CARE - MED/SURG/GYN SEMI*

## 2019-07-07 PROCEDURE — 700102 HCHG RX REV CODE 250 W/ 637 OVERRIDE(OP): Performed by: HOSPITALIST

## 2019-07-07 PROCEDURE — 82962 GLUCOSE BLOOD TEST: CPT

## 2019-07-07 PROCEDURE — 700111 HCHG RX REV CODE 636 W/ 250 OVERRIDE (IP): Performed by: SURGERY

## 2019-07-07 PROCEDURE — 99232 SBSQ HOSP IP/OBS MODERATE 35: CPT | Performed by: HOSPITALIST

## 2019-07-07 PROCEDURE — 700102 HCHG RX REV CODE 250 W/ 637 OVERRIDE(OP): Performed by: SURGERY

## 2019-07-07 PROCEDURE — A9270 NON-COVERED ITEM OR SERVICE: HCPCS | Performed by: HOSPITALIST

## 2019-07-07 RX ADMIN — POLYETHYLENE GLYCOL 3350 1 PACKET: 17 POWDER, FOR SOLUTION ORAL at 09:50

## 2019-07-07 RX ADMIN — ATORVASTATIN CALCIUM 20 MG: 20 TABLET, FILM COATED ORAL at 20:16

## 2019-07-07 RX ADMIN — SENNOSIDES, DOCUSATE SODIUM 2 TABLET: 50; 8.6 TABLET, FILM COATED ORAL at 05:48

## 2019-07-07 RX ADMIN — BRIMONIDINE TARTRATE 1 DROP: 2 SOLUTION/ DROPS OPHTHALMIC at 17:14

## 2019-07-07 RX ADMIN — OXYCODONE HYDROCHLORIDE 5 MG: 5 TABLET ORAL at 11:30

## 2019-07-07 RX ADMIN — TIMOLOL MALEATE 1 DROP: 5 SOLUTION OPHTHALMIC at 17:15

## 2019-07-07 RX ADMIN — BRIMONIDINE TARTRATE 1 DROP: 2 SOLUTION/ DROPS OPHTHALMIC at 05:50

## 2019-07-07 RX ADMIN — SENNOSIDES, DOCUSATE SODIUM 2 TABLET: 50; 8.6 TABLET, FILM COATED ORAL at 17:14

## 2019-07-07 RX ADMIN — ASPIRIN 81 MG 81 MG: 81 TABLET ORAL at 05:48

## 2019-07-07 RX ADMIN — ACETAMINOPHEN 650 MG: 325 TABLET, FILM COATED ORAL at 09:50

## 2019-07-07 RX ADMIN — GABAPENTIN 300 MG: 300 CAPSULE ORAL at 17:14

## 2019-07-07 RX ADMIN — CLOPIDOGREL BISULFATE 75 MG: 75 TABLET ORAL at 05:48

## 2019-07-07 RX ADMIN — HEPARIN SODIUM 5000 UNITS: 5000 INJECTION, SOLUTION INTRAVENOUS; SUBCUTANEOUS at 05:49

## 2019-07-07 RX ADMIN — OXYCODONE HYDROCHLORIDE 5 MG: 5 TABLET ORAL at 03:03

## 2019-07-07 RX ADMIN — OXYCODONE HYDROCHLORIDE 5 MG: 5 TABLET ORAL at 20:16

## 2019-07-07 RX ADMIN — HEPARIN SODIUM 5000 UNITS: 5000 INJECTION, SOLUTION INTRAVENOUS; SUBCUTANEOUS at 17:14

## 2019-07-07 RX ADMIN — TIMOLOL MALEATE 1 DROP: 5 SOLUTION OPHTHALMIC at 05:47

## 2019-07-07 ASSESSMENT — ENCOUNTER SYMPTOMS
NERVOUS/ANXIOUS: 0
MYALGIAS: 1
CLAUDICATION: 0
NEUROLOGICAL NEGATIVE: 1
SPUTUM PRODUCTION: 0
DEPRESSION: 0
COUGH: 0
DIAPHORESIS: 0
CARDIOVASCULAR NEGATIVE: 1
DIZZINESS: 0
PND: 0
CONSTITUTIONAL NEGATIVE: 1
EYE REDNESS: 0
EYES NEGATIVE: 1
ABDOMINAL PAIN: 0
CONSTIPATION: 1
INSOMNIA: 1
CHILLS: 0
SEIZURES: 0
ORTHOPNEA: 0
FOCAL WEAKNESS: 0
EYE DISCHARGE: 0
NAUSEA: 0
SORE THROAT: 0
RESPIRATORY NEGATIVE: 1
HEARTBURN: 0
BLURRED VISION: 0
WEAKNESS: 0
SHORTNESS OF BREATH: 0

## 2019-07-07 ASSESSMENT — LIFESTYLE VARIABLES: SUBSTANCE_ABUSE: 0

## 2019-07-07 NOTE — CARE PLAN
Problem: Infection  Goal: Will remain free from infection  Outcome: PROGRESSING AS EXPECTED  Standard precautions in place, labs and VS monitored as available, promote nutrition. No s/sx infection at this time. Original surgical dressings CDI.    Problem: Venous Thromboembolism (VTW)/Deep Vein Thrombosis (DVT) Prevention:  Goal: Patient will participate in Venous Thrombosis (VTE)/Deep Vein Thrombosis (DVT)Prevention Measures  Outcome: PROGRESSING AS EXPECTED  SCDs not indicated per MD Mccain. Heparin, ASA, plavix. Encourage ambulation and ROM.    Problem: Bowel/Gastric:  Goal: Normal bowel function is maintained or improved  Outcome: PROGRESSING SLOWER THAN EXPECTED  No BM 4 days. Miralax given this am.Stool softeners as ordered. Promote mobility, PO intake, hydration.

## 2019-07-07 NOTE — CARE PLAN
Problem: Pain Management  Goal: Pain level will decrease to patient's comfort goal  Outcome: PROGRESSING AS EXPECTED  Pain controlled with oxy 5

## 2019-07-07 NOTE — PROGRESS NOTES
Hospital Medicine Daily Progress Note    Date of Service  7/7/2019    Chief Complaint  74 y.o. male admitted 7/3/2019 with left lower extremity pain    Hospital Course    7/5/2019-patient was admitted due to left lower extremity pain which was found to be secondary to arterial occlusion.  The patient underwent femoropopliteal bypass is now postoperative day #2.  Continue at this point with Plavix aspirin and statin  7/6/2019- patient today is feeling less pain when he is still.  When he moves the leg he still feeling quite a bit of pain at this point we are optimizing pain management.  Continue at this point with antiplatelet management using Plavix aspirin and statin.  PT OT evaluation pending patient will benefit from going to skilled facility  7/7/2019- he says he is feeling a lot better his pain is less in the right leg.  Today he is refusing a diabetic diet and he was just a regular diet which he needs at home.  We will adjust insulin to this.  Continue with pain management and therapies      Interval Problem Update  Continue at this point with antiplatelet management Plavix aspirin and statin.  Continue with pain management  Continue with physical therapy and Occupational Therapy.  Patient refuses diabetic diet, monitor diabetic situation including blood sugars and for hypoglycemia.    Consultants/Specialty  Vascular surgery    Code Status  Full code    Disposition  Patient will need placement to skilled facility once vascular surgery has cleared him and PT OT evaluations are done.    Review of Systems  Review of Systems   Constitutional: Negative.  Negative for chills and diaphoresis.   HENT: Negative.  Negative for ear discharge, hearing loss and sore throat.    Eyes: Negative.  Negative for blurred vision, discharge and redness.   Respiratory: Negative.  Negative for cough, sputum production and shortness of breath.    Cardiovascular: Negative.  Negative for chest pain, orthopnea, claudication and PND.    Gastrointestinal: Positive for constipation. Negative for abdominal pain, heartburn and nausea.   Genitourinary: Negative.  Negative for dysuria, frequency, hematuria and urgency.   Musculoskeletal: Positive for myalgias (left calf and thigh and behind the knee).   Skin: Negative.  Negative for itching and rash.   Neurological: Negative.  Negative for dizziness, focal weakness, seizures and weakness.   Endo/Heme/Allergies: Negative.  Negative for environmental allergies.   Psychiatric/Behavioral: Negative for depression, substance abuse and suicidal ideas. The patient has insomnia. The patient is not nervous/anxious.    All other systems reviewed and are negative.       Physical Exam  Temp:  [36.5 °C (97.7 °F)-37.4 °C (99.4 °F)] 36.5 °C (97.7 °F)  Pulse:  [68-89] 89  Resp:  [17-18] 18  BP: (104-121)/(58-72) 121/72  SpO2:  [93 %-99 %] 94 %    Physical Exam   Constitutional: He is oriented to person, place, and time. He appears well-developed and well-nourished. He is cooperative. No distress. Nasal cannula in place.   HENT:   Head: Normocephalic and atraumatic.   Right Ear: External ear normal.   Left Ear: External ear normal.   Nose: Nose normal.   Mouth/Throat: Oropharynx is clear and moist.   Eyes: Pupils are equal, round, and reactive to light. Conjunctivae and EOM are normal.   Neck: Normal range of motion. Neck supple. No JVD present. No thyromegaly present.   Cardiovascular: Normal rate and regular rhythm.  Exam reveals no gallop and no friction rub.    Pulmonary/Chest: Effort normal and breath sounds normal. No stridor. No respiratory distress. He has no wheezes.   Abdominal: Soft. Bowel sounds are normal. He exhibits no distension. There is no tenderness.   Genitourinary: No penile tenderness.   Musculoskeletal: He exhibits edema and tenderness. He exhibits no deformity.        Left hip: He exhibits decreased range of motion, decreased strength and swelling.        Left knee: He exhibits decreased range of  motion and swelling.   Lymphadenopathy:     He has no cervical adenopathy.   Neurological: He is alert and oriented to person, place, and time. He has normal reflexes. No cranial nerve deficit. Coordination normal.   Skin: Skin is warm and dry. No erythema.   Psychiatric: He has a normal mood and affect. His behavior is normal. Judgment and thought content normal.   Nursing note and vitals reviewed.      Fluids    Intake/Output Summary (Last 24 hours) at 07/07/19 1419  Last data filed at 07/07/19 0939   Gross per 24 hour   Intake                0 ml   Output             1430 ml   Net            -1430 ml       Laboratory  Recent Labs      07/05/19 0412   WBC  11.9*   RBC  3.23*   HEMOGLOBIN  9.6*   HEMATOCRIT  30.7*   MCV  95.0   MCH  29.7   MCHC  31.3*   RDW  50.6*   PLATELETCT  293   MPV  9.5     Recent Labs      07/05/19 0412   SODIUM  136   POTASSIUM  3.8   CHLORIDE  104   CO2  25   GLUCOSE  133*   BUN  23*   CREATININE  1.22   CALCIUM  8.1*             Recent Labs      07/05/19 0412   TRIGLYCERIDE  153*   HDL  26*   LDL  42       Imaging  DX-PORTABLE FLUORO > 1 HOUR   Preliminary Result         Portable fluoroscopy utilized for 1 minute 24 seconds.      US-FOREIGN FILM ULTRASOUND   Final Result      US-FOREIGN FILM ULTRASOUND   Final Result           Assessment/Plan  * Femoral artery occlusion, left ([facility])- (present on admission)   Assessment & Plan    Postoperative day #4 after reperfusion.  Continue with pain management continue with Plavix aspirin and statin.       PAD (peripheral artery disease) ([facility])- (present on admission)   Assessment & Plan    Postop day #4 after arterial bypass surgery.  Patient's pain at this point is improving the right lower extremity considerably.  Initially patient had critical ischemia in that leg.  Continue at this point with anticoagulation and the physical therapy.  Continue with pain management         CKD (chronic kidney disease) stage 3, GFR 30-59 ml/min ([facility])- (present  on admission)   Assessment & Plan    Monitor renal functions and give fluid resuscitation.  Patient at this point does not need IV fluids anymore as he is drinking and eating orally adequately.     Hyperglycemia- (present on admission)   Assessment & Plan    -accus with sliding scale coverage  -diabetic diet refused by patient  -diabetic education provided  -follow glycohemoglobin levels long term, most recent hemoglobin A1c 6.7  -monitor for hypoglycemic episodes and adjust control if he should get low          VTE prophylaxis: Heparin

## 2019-07-07 NOTE — PROGRESS NOTES
Clarified with MD Mccain that pt does not need a cardiac diet.  Per MD, SCDs not ordered because they are not indicated for pt.

## 2019-07-07 NOTE — PROGRESS NOTES
"Pt upset about diabetic diet. Pt states he is not diabetic, but that because he has PAD \"they filled in the gaps.\" (Pt A1C 6.7). Per MD Mccain, OK for pt to have regular diet.  "

## 2019-07-07 NOTE — PROGRESS NOTES
MD Mccain at bedside, discussed POC.     Notified of no BM for 4 days. Bowel protocol in place, pt to have miralax today.    Clarified need for continuous IVF. Per MD, no longer needed, order received to discontinue.    Notified MD that pt still requiring 1.5-2 L oxygen. Attempting to wean, IS in use.

## 2019-07-08 VITALS
HEART RATE: 94 BPM | DIASTOLIC BLOOD PRESSURE: 72 MMHG | SYSTOLIC BLOOD PRESSURE: 132 MMHG | WEIGHT: 164.46 LBS | HEIGHT: 67 IN | OXYGEN SATURATION: 91 % | TEMPERATURE: 97.8 F | RESPIRATION RATE: 18 BRPM | BODY MASS INDEX: 25.81 KG/M2

## 2019-07-08 LAB
GLUCOSE BLD-MCNC: 105 MG/DL (ref 65–99)
GLUCOSE BLD-MCNC: 91 MG/DL (ref 65–99)

## 2019-07-08 PROCEDURE — 700102 HCHG RX REV CODE 250 W/ 637 OVERRIDE(OP): Performed by: HOSPITALIST

## 2019-07-08 PROCEDURE — 82962 GLUCOSE BLOOD TEST: CPT | Mod: 91

## 2019-07-08 PROCEDURE — 99239 HOSP IP/OBS DSCHRG MGMT >30: CPT | Performed by: HOSPITALIST

## 2019-07-08 PROCEDURE — 700111 HCHG RX REV CODE 636 W/ 250 OVERRIDE (IP): Performed by: SURGERY

## 2019-07-08 PROCEDURE — A9270 NON-COVERED ITEM OR SERVICE: HCPCS | Performed by: HOSPITALIST

## 2019-07-08 RX ORDER — TIMOLOL MALEATE 5 MG/ML
1 SOLUTION/ DROPS OPHTHALMIC 2 TIMES DAILY
Qty: 1 BOTTLE | Refills: 3 | Status: SHIPPED | OUTPATIENT
Start: 2019-07-08

## 2019-07-08 RX ORDER — HEPARIN SODIUM 5000 [USP'U]/ML
5000 INJECTION, SOLUTION INTRAVENOUS; SUBCUTANEOUS EVERY 12 HOURS
Refills: 0
Start: 2019-07-08 | End: 2019-08-18

## 2019-07-08 RX ORDER — GABAPENTIN 300 MG/1
300 CAPSULE ORAL EVERY EVENING
Qty: 90 CAP
Start: 2019-07-08 | End: 2019-08-18

## 2019-07-08 RX ORDER — BISACODYL 10 MG
10 SUPPOSITORY, RECTAL RECTAL
Refills: 0
Start: 2019-07-08 | End: 2019-08-18

## 2019-07-08 RX ORDER — POLYETHYLENE GLYCOL 3350 17 G/17G
17 POWDER, FOR SOLUTION ORAL
Refills: 3
Start: 2019-07-08 | End: 2019-08-18

## 2019-07-08 RX ORDER — ATORVASTATIN CALCIUM 20 MG/1
20 TABLET, FILM COATED ORAL DAILY
Qty: 30 TAB | Status: ON HOLD
Start: 2019-07-08 | End: 2019-08-26

## 2019-07-08 RX ORDER — BRIMONIDINE TARTRATE 2 MG/ML
1 SOLUTION/ DROPS OPHTHALMIC 2 TIMES DAILY
Qty: 1 BOTTLE
Start: 2019-07-08

## 2019-07-08 RX ORDER — OXYCODONE HYDROCHLORIDE 5 MG/1
5 TABLET ORAL
Qty: 24 TAB | Refills: 0 | Status: SHIPPED | OUTPATIENT
Start: 2019-07-08 | End: 2019-07-11

## 2019-07-08 RX ORDER — CLOPIDOGREL BISULFATE 75 MG/1
75 TABLET ORAL DAILY
Qty: 30 TAB
Start: 2019-07-09 | End: 2019-08-18

## 2019-07-08 RX ORDER — LATANOPROST 50 UG/ML
1 SOLUTION/ DROPS OPHTHALMIC DAILY
Qty: 1 BOTTLE
Start: 2019-07-08 | End: 2019-08-18

## 2019-07-08 RX ORDER — ACETAMINOPHEN 325 MG/1
650 TABLET ORAL EVERY 6 HOURS PRN
Qty: 30 TAB | Refills: 0 | Status: SHIPPED | OUTPATIENT
Start: 2019-07-08

## 2019-07-08 RX ORDER — ASPIRIN 81 MG/1
81 TABLET, CHEWABLE ORAL DAILY
Qty: 100 TAB
Start: 2019-07-09

## 2019-07-08 RX ORDER — ONDANSETRON 4 MG/1
4 TABLET, ORALLY DISINTEGRATING ORAL EVERY 4 HOURS PRN
Qty: 10 TAB | Refills: 0 | Status: SHIPPED | OUTPATIENT
Start: 2019-07-08 | End: 2019-08-18

## 2019-07-08 RX ORDER — AMOXICILLIN 250 MG
2 CAPSULE ORAL 2 TIMES DAILY
Qty: 30 TAB | Refills: 0 | Status: SHIPPED | OUTPATIENT
Start: 2019-07-08 | End: 2019-08-18

## 2019-07-08 RX ADMIN — TIMOLOL MALEATE 1 DROP: 5 SOLUTION OPHTHALMIC at 05:49

## 2019-07-08 RX ADMIN — HEPARIN SODIUM 5000 UNITS: 5000 INJECTION, SOLUTION INTRAVENOUS; SUBCUTANEOUS at 05:49

## 2019-07-08 RX ADMIN — OXYCODONE HYDROCHLORIDE 5 MG: 5 TABLET ORAL at 05:56

## 2019-07-08 RX ADMIN — ASPIRIN 81 MG 81 MG: 81 TABLET ORAL at 05:49

## 2019-07-08 RX ADMIN — BRIMONIDINE TARTRATE 1 DROP: 2 SOLUTION/ DROPS OPHTHALMIC at 05:49

## 2019-07-08 RX ADMIN — CLOPIDOGREL BISULFATE 75 MG: 75 TABLET ORAL at 05:49

## 2019-07-08 NOTE — DISCHARGE PLANNING
Received Choice form at 0959  Agency/Facility Name: [facility] referral  Referral sent per Choice form @ 0916.  REFERRAL sent to call local SNF.

## 2019-07-08 NOTE — DISCHARGE SUMMARY
Discharge Summary    CHIEF COMPLAINT ON ADMISSION  Chief Complaint   Patient presents with   • Other       Reason for Admission  ems     CODE STATUS  Full Code    HPI & HOSPITAL COURSE  Mr. Patel was admitted due to left lower extremity pain which was found to be secondary to arterial occlusion.  The patient underwent femoropopliteal bypass and postoperatively treated with Plavix aspirin and statin.  The management at this point has improved.  Patient today had the drains removed.  He was eval by physical therapy and Occupational Therapy who found that the patient will benefit from extended care facility therapies.  This was documented in the physical therapy notes where the patient seems to be requiring quite a bit of assistance because of decline gait with the left lower extremity pain.  The patient at this point will need continued wound management where the surgical drains were removed with Betadine and dry dressing change to daily.  The patient will need to have continued monitoring of his renal functions long-term due to chronic renal insufficiency stage III.  Patient does fit diabetes mellitus type 2 with a hemoglobin A1c of 6.7.  He has refused a diabetic diet and has been on sliding scale with Accu-Cheks with sliding scale coverage.  At this point time patient will be transferred to an extended care facility for ongoing physical therapy occupational therapy and medical management optimization    Therefore, he is discharged in good and stable condition to skilled nursing facility.    The patient met 2-midnight criteria for an inpatient stay at the time of discharge.      FOLLOW UP ITEMS POST DISCHARGE  Follow-up with the primary care physician in 7 to 10 days after discharge from the extended care facility    DISCHARGE DIAGNOSES  Principal Problem:    Femoral artery occlusion, left (HCC) POA: Yes  Active Problems:    PAD (peripheral artery disease) (HCC) POA: Yes    CKD (chronic kidney disease) stage 3,  GFR 30-59 ml/min ([facility]) POA: Yes    Hyperglycemia POA: Yes  Resolved Problems:    * No resolved hospital problems. *      FOLLOW UP  No future appointments.  [facility]  [address]  [place] [address]  [phone]        Ana Grande M.D.  [address]  [place] [id number] [phone]    In 2 weeks  For wound re-check      MEDICATIONS ON DISCHARGE     Medication List      START taking these medications      Instructions   acetaminophen 325 MG Tabs  Commonly known as:  TYLENOL   Take 2 Tabs by mouth every 6 hours as needed (Mild Pain; (Pain scale 1-3); Temp greater than 100.5 F).  Dose:  650 mg     bisacodyl 10 MG Supp  Commonly known as:  DULCOLAX   Insert 1 Suppository in rectum 1 time daily as needed (if magnesium hydroxide ineffective after 24 hours).  Dose:  10 mg     gabapentin 300 MG Caps  Commonly known as:  NEURONTIN   Take 1 Cap by mouth every evening.  Dose:  300 mg     heparin 5000 UNIT/ML Soln   Inject 1 mL as instructed every 12 hours.  Dose:  5000 Units     insulin regular 100 Unit/mL Soln  Commonly known as:  HUMULIN R   Inject 1-6 Units as instructed 4 Times a Day,Before Meals and at Bedtime.  Dose:  1-6 Units     magnesium hydroxide 400 MG/5ML Susp  Commonly known as:  MILK OF MAGNESIA   Take 30 mL by mouth 1 time daily as needed (if polyethylene glycol ineffective after 24 hours).  Dose:  30 mL     ondansetron 4 MG Tbdp  Commonly known as:  ZOFRAN ODT   Take 1 Tab by mouth every four hours as needed for Nausea (give PO if IV route is unavailable.).  Dose:  4 mg     oxyCODONE immediate-release 5 MG Tabs  Commonly known as:  ROXICODONE   Take 1 Tab by mouth every 3 hours as needed for up to 3 days.  Dose:  5 mg     polyethylene glycol/lytes Pack  Commonly known as:  MIRALAX   Take 1 Packet by mouth 1 time daily as needed (if sennosides and docusate ineffective after 24 hours).  Dose:  17 g     senna-docusate 8.6-50 MG Tabs  Commonly known as:  PERICOLACE or  SENOKOT S   Take 2 Tabs by mouth 2 Times a Day.  Dose:  2 Tab        CHANGE how you take these medications      Instructions   atorvastatin 20 MG Tabs  What changed:  when to take this  Commonly known as:  LIPITOR   Take 1 Tab by mouth every day.  Dose:  20 mg     latanoprost 0.005 % Soln  What changed:  when to take this  Commonly known as:  XALATAN   Place 1 Drop in right eye every day.  Dose:  1 Drop     timolol 0.5 % Soln  What changed:  when to take this  Commonly known as:  TIMOPTIC   Place 1 Drop in right eye 2 Times a Day.  Dose:  1 Drop        CONTINUE taking these medications      Instructions   aspirin 81 MG Chew chewable tablet  Start taking on:  7/9/2019  Commonly known as:  ASA   Take 1 Tab by mouth every day.  Dose:  81 mg     brimonidine 0.2 % Soln  Commonly known as:  ALPHAGAN   Place 1 Drop in right eye 2 Times a Day.  Dose:  1 Drop     CENTRUM SILVER Tabs   Take 1 Tab by mouth every day.  Dose:  1 Tab     clopidogrel 75 MG Tabs  Start taking on:  7/9/2019  Commonly known as:  PLAVIX   Take 1 Tab by mouth every day.  Dose:  75 mg            Allergies  No Known Allergies    DIET  Orders Placed This Encounter   Procedures   • Diet Order Regular     Standing Status:   Standing     Number of Occurrences:   1     Order Specific Question:   Diet:     Answer:   Regular [1]       ACTIVITY  As tolerated.  Weight bearing as tolerated    LINES, DRAINS, AND WOUNDS  This is an automated list. Peripheral IVs will be removed prior to discharge.  Peripheral IV 07/06/19 20 G Left Forearm (Active)   Site Assessment Clean;Dry;Intact 7/8/2019  9:17 AM   Dressing Type Transparent 7/8/2019  9:17 AM   Line Status Scrubbed the hub prior to access;Flushed;Saline locked 7/8/2019  9:17 AM   Dressing Status Intact;Dry 7/8/2019  9:17 AM   Dressing Intervention N/A 7/8/2019  9:17 AM   Infiltration Grading ([facility] 0 7/8/2019  9:17 AM   Phlebitis Scale ([facility] Only) 0 7/8/2019  9:17 AM          Peripheral IV 07/06/19 20 G  Left Forearm (Active)   Site Assessment Clean;Dry;Intact 7/8/2019  9:17 AM   Dressing Type Transparent 7/8/2019  9:17 AM   Line Status Scrubbed the hub prior to access;Flushed;Saline locked 7/8/2019  9:17 AM   Dressing Status Intact;Dry 7/8/2019  9:17 AM   Dressing Intervention N/A 7/8/2019  9:17 AM   Infiltration Grading ([facility], [facility]) 0 7/8/2019  9:17 AM   Phlebitis Scale ([facility] Only) 0 7/8/2019  9:17 AM               MENTAL STATUS ON TRANSFER  Level of Consciousness: Alert  Orientation : Oriented x 4  Speech: Speech Clear    CONSULTATIONS  Vascular surgery    PROCEDURES  Femoropopliteal bypass following thrombectomy on the left side with angiogram.  Done on 7/3/2019    LABORATORY  Lab Results   Component Value Date    SODIUM 136 07/05/2019    POTASSIUM 3.8 07/05/2019    CHLORIDE 104 07/05/2019    CO2 25 07/05/2019    GLUCOSE 133 (H) 07/05/2019    BUN 23 (H) 07/05/2019    CREATININE 1.22 07/05/2019        Lab Results   Component Value Date    WBC 11.9 (H) 07/05/2019    HEMOGLOBIN 9.6 (L) 07/05/2019    HEMATOCRIT 30.7 (L) 07/05/2019    PLATELETCT 293 07/05/2019        Total time of the discharge process exceeds 38 minutes.

## 2019-07-08 NOTE — CARE PLAN
Problem: Discharge Barriers/Planning  Goal: Patient's continuum of care needs will be met  Outcome: PROGRESSING AS EXPECTED  Hospitalist and vascular on board. CM involved for SNF placement, accepted. Pt to discharge to SNF as soon as transport arranged.    Problem: Respiratory:  Goal: Respiratory status will improve  Outcome: PROGRESSING AS EXPECTED  Maintaining sats in 90s on RA. Promote IS use, mobility. Able to pull 3000 IS and is observed to use independently.    Problem: Mobility  Goal: Risk for activity intolerance will decrease  Outcome: PROGRESSING AS EXPECTED  Pt able to walk half lap around unit with standby assist. Alter rest and mobility throughout the day, pain control.

## 2019-07-08 NOTE — DISCHARGE INSTRUCTIONS
Follow up with Dr. Grande in 2 weeks.  Stay on Aspirin, Plavix, and Statin.  Paint incisions with Betadine and cover with dry gauzes once a day.    Discharge Instructions    Discharged to other by medical transportation with escort. Discharged via wheelchair, hospital escort: Yes.  Special equipment needed: Not Applicable    Be sure to schedule a follow-up appointment with your primary care doctor or any specialists as instructed.     Discharge Plan:   Diet Plan: Discussed  Activity Level: Discussed  Confirmed Follow up Appointment: Patient to Call and Schedule Appointment  Confirmed Symptoms Management: Discussed  Medication Reconciliation Updated: Yes  Pneumococcal Vaccine Administered/Refused: Given (See MAR)  Influenza Vaccine Indication: Indicated: Not available from distributor/    I understand that a diet low in cholesterol, fat, and sodium is recommended for good health. Unless I have been given specific instructions below for another diet, I accept this instruction as my diet prescription.   Other diet: regular    Special Instructions:   Deep Vein Thrombosis Discharge Instructions    A deep vein thrombosis (DVT) is a blood clot (thrombus) that develops in a deep vein. A DVT is a clot in the deep, larger veins of the leg, arm, or pelvis. These are more dangerous than clots that might form in veins on the surface of the body. Deep vein thrombosis can lead to complications if the clot breaks off and travels in the bloodstream to the lungs.     CAUSES  Blood clots form in a vein for different reasons. Usually several things cause blood clots. They include:   · The flow of blood slows down.   · The inside of the vein is damaged in some way.   · The person has a condition that makes blood clot more easily. These conditions may include:  · Older age (especially over 75 years old).  · Having a history of blood clots.  · Having major or lengthy surgery. Hip surgery is particularly high-risk.   · Breaking  a hip or leg.  · Sitting or lying still for a long time.  · Cancer or cancer treatment.  · Having a long, thin tube (catheter) placed inside a vein during a medical procedure.   · Being overweight (obese).  · Pregnancy and childbirth.  · Medicines with estrogen.  · Smoking.  · Other circulation or heart problems.     SYMPTOMS  When a clot forms, it can either partially or totally block the blood flow in that vein. Symptoms of a DVT can include:  · Swelling of the leg or arm, especially if one side is much worse.  · Warmth and redness of the leg or arm, especially if one side is much worse.   · Pain in an arm or leg. If the clot is in the leg, symptoms may be more noticeable or worse when standing or walking.  If the blood clot travels to the lung, it may cause:  · Shortness of breath.  · Chest pain. The pain may be worsened by deep breaths.   · Coughing up thick mucus (phlegm), possibly flecked with blood.   Anyone with these symptoms should get emergency medical treatment right away. Call your local emergency  Services (911 in [place] if you have these symptoms.     DIAGNOSIS  If a DVT is suspected, your caregiver will take a full medical history. He or she will also perform a physical exam. Tests that also may be required include:   · Studies of the clotting properties of the blood.   · An ultrasound scan.   · X-rays to show the flow of blood when special dye is injected into the veins (venography).   · Studies of your lungs if you have any chest symptoms.     PREVENTION  · Exercise the legs regularly. Take a brisk 30 minute walk every day.   · Maintain a weight that is appropriate for your height.  · Avoid sitting or lying in bed for long periods of time without moving your legs.   · Women, particularly those over the age of 35, should consider the risks and benefits of taking estrogen medicine, including birth control pills.   · Do not smoke, especially if you take estrogen medicines.   · Long-distance travel can  increase your risk. You should exercise your legs by walking or pumping the muscles every hour.   · In hospital prevention: Prevention may include medical and non medical measures.     TREATMENT  · The most common treatment for DVT is blood thinning (anticoagulant) medicine, which reduces the blood's tendency to clot. Anticoagulants can stop new blood clots from forming and old ones from growing. They cannot dissolve existing clots. Your body does this by itself over time. Anticoagulants can be given by mouth, by intravenous (IV) access, or by injection. Your caregiver will determine the best program for you.   · Less commonly, clot-dissolving drugs (thrombolytics) are used to dissolve a DVT. They carry a high risk of bleeding, so they are used mainly in severe cases.   · Very rarely, a blood clot in the leg needs to be removed surgically.   · If you are unable to take anticoagulants, your caregiver may arrange for you to have a filter placed in a main vein in your belly (abdomen). This filter prevents clots from traveling to your lungs.     HOME CARE INSTRUCTIONS  Take all medicines prescribed by your caregiver. Follow the directions carefully.   · You will most likely continue taking anticoagulants after you leave the hospital. Your caregiver will advise you on the length of treatment (usually 3 to 5 months, sometimes for life).   · Taking too much or too little of an anticoagulant is dangerous. While taking this type of medicine, you will need to have regular blood tests to be sure the dose is correct. The dose can change for many reasons. It is critically important that you take this medicine exactly as prescribed, and that you have blood tests exactly as directed.   · Many foods can interfere with anticoagulants. These include foods high in vitamin K, such as spinach, kale, broccoli, cabbage, hue and turnip greens, [place] sprouts, peas, cauliflower, seaweed, parsley, beef and pork liver, green tea, and  soybean oil. Your caregiver should discuss limits on these foods with you or you should arrange a visit with a dietician to answer your questions.   · Many medicines can interfere with anticoagulants. You must tell your caregiver about any and all medicines you take. This includes all vitamins and supplements. Be especially cautious with aspirin and anti-inflammatory medicines. Ask your caregiver before taking these.   · Anticoagulants can have side effects, mostly excessive bruising or bleeding. You will need to hold pressure over cuts for longer than usual. Avoid alcoholic drinks or consume only very small amounts while taking this medicine.    If you are taking an anticoagulant:  · Wear a medical alert bracelet.  · Notify your dentist or other caregivers before procedures.  · Avoid contact sports.    · Ask your caregiver how soon you can go back to normal activities. Not being active can lead to new clots. Ask for a list of what you should and should not do.   · Exercise your lower leg muscles. This is important while traveling.   · You may need to wear compression stockings. These are tight elastic stockings that apply pressure to the lower legs. This can help keep the blood in the legs from clotting.   · If you are a smoker, you should quit.   · Learn as much as you can about DVT.     SEEK MEDICAL CARE IF:  · You have unusual bruising or any bleeding problems.  · The swelling or pain in your affected arm or leg is not gradually improving.   · You anticipate surgery or long-distance travel. You should get specific advice on DVT prevention.   · You discover other family members with blood clots. This may require further testing for inherited diseases or conditions.     SEEK IMMEDIATE MEDICAL CARE IF:  · You develop chest pain.  · You develop severe shortness of breath.  · You begin to cough up bloody mucus or phlegm (sputum).  · You feel dizzy or faint.   · You develop swelling or pain in the leg.  · You have  breathing problems after traveling.    MAKE SURE YOU:  · Understand these instructions.  · Will watch your condition.  · Will get help right away if you are not doing well or get worse.       · Is patient discharged on Warfarin / Coumadin?   No       Femoral Popliteal Bypass, Care After  Refer to this sheet in the next few weeks. These instructions provide you with information on caring for yourself after your procedure. Your health care provider may also give you more specific instructions. Your treatment has been planned according to current medical practices, but problems sometimes occur. Call your health care provider if you have any problems or questions after your procedure.  HOME CARE INSTRUCTIONS   · Take medicines as told by your health care provider.  · Clean your incision site as told by your health care provider.  · Keep the area around your incision dry unless told by your health care provider. Ask your health care provider when it is okay to shower. Do not take a bath, swim, or soak in a hot tub until your incision has completely healed.  · Keep the area around the incision clean. This will help decrease your risk of infection. Check your incision site every day. Let your health care provider know if you see any swelling, redness, or anything leaking from the incision.  · Exercise as told by your health care provider.  · Avoid strenuous physical activity for the first few weeks. This is anything that requires great effort or energy.  · Raise (elevate) your legs when sitting.  · Ask your health care provider when you can return to work. The type of work you do will make a difference.  · Do not drive while taking pain medicines. Ask your health care provider when it is safe to start driving.  · Take all medicines as directed by your health care provider.  · Keep all your follow-up appointments with your health care provider.  · For long-term success:  ¨ Control your blood pressure.  ¨ Take prescription  medicines as told by your health care provider.  ¨ Manage diabetes, if you have it.  ¨ Do not smoke.  ¨ Eat healthy foods. Ask your health care provider for suggestions, or talk with a dietitian.  ¨ Get regular exercise. Talk with your health care provider before starting any new physical activity.  SEEK MEDICAL CARE IF:   · You have redness and swelling around your incision site.  · You continue to have pain in your leg, even after taking pain medicine.  · You have any questions about your medicines.  · You have nausea or vomiting.  · You have abnormal bowel habits such as having a difficult time having a bowel movement (constipation) or having loose stools (diarrhea).  · You feel weak and tired.  · You are too tired to walk every day.  SEEK IMMEDIATE MEDICAL CARE IF:   · Your leg becomes pale, cold, blue, tingly, or has no feeling.  · You have yellow or tan fluid coming from your incision site.  · You have bleeding from the incision site.  · You have severe pain in your leg that does not go away, even after you have taken pain medicine.  · You have trouble breathing.  · You have chest pain.  · You have a fever.  MAKE SURE YOU:  · Understand these instructions.  · Will watch your condition.  · Will get help right away if you are not doing well or get worse.     This information is not intended to replace advice given to you by your health care provider. Make sure you discuss any questions you have with your health care provider.     Document Released: 10/15/2010 Document Revised: 12/23/2014 Document Reviewed: 10/15/2010  [employer] Interactive Patient Education ©2016 [employer]        Incision Care, Adult  An incision is a surgical cut that is made through your skin. Most incisions are closed after surgery. Your incision may be closed with stitches (sutures), staples, skin glue, or adhesive strips. You may need to return to your health care provider to have sutures or staples removed. This may occur several days  to several weeks after your surgery. The incision needs to be cared for properly to prevent infection.  How to care for your incision  Incision care   · Follow instructions from your health care provider about how to take care of your incision. Make sure you:  ¨ Wash your hands with soap and water before you change the bandage (dressing). If soap and water are not available, use hand .  ¨ Change your dressing as told by your health care provider.  ¨ Leave sutures, skin glue, or adhesive strips in place. These skin closures may need to stay in place for 2 weeks or longer. If adhesive strip edges start to loosen and curl up, you may trim the loose edges. Do not remove adhesive strips completely unless your health care provider tells you to do that.  · Check your incision area every day for signs of infection. Check for:  ¨ More redness, swelling, or pain.  ¨ More fluid or blood.  ¨ Warmth.  ¨ Pus or a bad smell.  · Ask your health care provider how to clean the incision. This may include:  ¨ Using mild soap and water.  ¨ Using a clean towel to pat the incision dry after cleaning it.  ¨ Applying a cream or ointment. Do this only as told by your health care provider.  ¨ Covering the incision with a clean dressing.  · Ask your health care provider when you can leave the incision uncovered.  · Do not take baths, swim, or use a hot tub until your health care provider approves. Ask your health care provider if you can take showers. You may only be allowed to take sponge baths for bathing.  Medicines  · If you were prescribed an antibiotic medicine, cream, or ointment, take or apply the antibiotic as told by your health care provider. Do not stop taking or applying the antibiotic even if your condition improves.  · Take over-the-counter and prescription medicines only as told by your health care provider.  General instructions  · Limit movement around your incision to improve healing.  ¨ Avoid straining, lifting, or  exercise for the first month, or for as long as told by your health care provider.  ¨ Follow instructions from your health care provider about returning to your normal activities.  ¨ Ask your health care provider what activities are safe.  · Protect your incision from the sun when you are outside for the first 6 months, or for as long as told by your health care provider. Apply sunscreen around the scar or cover it up.  · Keep all follow-up visits as told by your health care provider. This is important.  Contact a health care provider if:  · Your have more redness, swelling, or pain around the incision.  · You have more fluid or blood coming from the incision.  · Your incision feels warm to the touch.  · You have pus or a bad smell coming from the incision.  · You have a fever or shaking chills.  · You are nauseous or you vomit.  · You are dizzy.  · Your sutures or staples come undone.  Get help right away if:  · You have a red streak coming from your incision.  · Your incision bleeds through the dressing and the bleeding does not stop with gentle pressure.  · The edges of your incision open up and separate.  · You have severe pain.  · You have a rash.  · You are confused.  · You faint.  · You have trouble breathing and a fast heartbeat.  This information is not intended to replace advice given to you by your health care provider. Make sure you discuss any questions you have with your health care provider.  Document Released: 07/07/2006 Document Revised: 08/25/2017 Document Reviewed: 07/05/2017  [employer] Interactive Patient Education © 2017 [employer]      Depression / Suicide Risk    As you are discharged from this [facility] facility, it is important to learn how to keep safe from harming yourself.    Recognize the warning signs:  · Abrupt changes in personality, positive or negative- including increase in energy   · Giving away possessions  · Change in eating patterns- significant weight changes-  positive  or negative  · Change in sleeping patterns- unable to sleep or sleeping all the time   · Unwillingness or inability to communicate  · Depression  · Unusual sadness, discouragement and loneliness  · Talk of wanting to die  · Neglect of personal appearance   · Rebelliousness- reckless behavior  · Withdrawal from people/activities they love  · Confusion- inability to concentrate     If you or a loved one observes any of these behaviors or has concerns about self-harm, here's what you can do:  · Talk about it- your feelings and reasons for harming yourself  · Remove any means that you might use to hurt yourself (examples: pills, rope, extension cords, firearm)  · Get professional help from the community (Mental Health, Substance Abuse, psychological counseling)  · Do not be alone:Call your Safe Contact- someone whom you trust who will be there for you.  · Call your local CRISIS HOTLINE 289-7747 or 938-738-0352  · Call your local Children's Mobile Crisis Response Team [place] (113) 373-5269 or www.enEvolv  · Call the toll free National Suicide Prevention Hotlines   · National Suicide Prevention Lifeline 136-453-ZZKW (0767)  · National Hope Line Network 800-SUICIDE (239-3970)

## 2019-07-08 NOTE — DISCHARGE PLANNING
Anticipated Discharge Disposition: SNF    Action: LSW met with the Pt and received blanket SNF choice.     Barriers to Discharge: SNF acceptance    Plan: follow up on SNF referrals

## 2019-07-08 NOTE — DISCHARGE PLANNING
LSW informed by [facility] liason that the Pt was accepted. LSW informed [facility] that the Pt would like to go to [facility].

## 2019-07-08 NOTE — PROGRESS NOTES
Awake, no complaints.  Has been ambulatory.  AF, VSS     Gen - Pleasant male in NAD.  Lungs - CTA, bilaterally.  CV - RRR.  Abd - +BS, soft, ND, NT.  LE - Palpable R PT pulses.  Multiphasic Doppler flow signals over bilateral pedal arteries.  Trace to 1+ edema left lower leg / foot.  Feet are well perfused, warm.  Incisions are healing well without surrounding erythema, drainage, or fluctuation.  Neurol - Non focal.     Labs: Reviewed.    Assessment: S/P fem-fem bypass, left leg thrombectomy, angiogram, and left fem-pop bypass.     Plan:  Doing well.  I removed Prevena dressings.  OK to shower.  Paint incisions with Betadine and cover with dry gauzes once a day.    Transfer plan noted.    Please have patient follow up with me in 2 weeks.  Stay on Aspirin, Plavix, and Statin.  Paint incisions with Betadine and cover with dry gauzes once a day while at  [facility].    Discussed with patient and RN.

## 2019-07-08 NOTE — DISCHARGE PLANNING
Anticipated Discharge Disposition: [facility]    Action: LSW informed Pt of the transport time to [facility] and Pt signed the COBRA.     Barriers to Discharge: none    Plan: DC to [facility] at 1500

## 2019-07-08 NOTE — DISCHARGE PLANNING
Care Transition Team Assessment    Information Source  Orientation : Oriented x 4  Information Given By: Patient  Informant's Name: Gene  Who is responsible for making decisions for patient? : Patient    Readmission Evaluation  Is this a readmission?: No    Elopement Risk  Legal Hold: No  Ambulatory or Self Mobile in Wheelchair: Yes  Disoriented: No  Psychiatric Symptoms: None  History of Wandering: No  Elopement this Admit: No  Vocalizing Wanting to Leave: No  Displays Behaviors, Body Language Wanting to Leave: No-Not at Risk for Elopement  Elopement Risk: Not at Risk for Elopement    Interdisciplinary Discharge Planning  Does Admitting Nurse Feel This Could be a Complex Discharge?: No  Primary Care Physician: Dr. Cordova  Lives with - Patient's Self Care Capacity: Significant Other  Patient or legal guardian wants to designate a caregiver (see row info): No  Support Systems: Family Member(s), Friends / Neighbors  Housing / Facility: Mobile Home  Do You Take your Prescribed Medications Regularly: Yes  Mobility Issues: Yes (Patient is bedbound after procedure )  Prior Services: None  Patient Expects to be Discharged to:: Home  Assistance Needed: No  Durable Medical Equipment: Not Applicable    Discharge Preparedness  What is your plan after discharge?: Home with help, Skilled nursing facility  What are your discharge supports?: Partner  Prior Functional Level: Ambulatory, Independent with Activities of Daily Living, Independent with Medication Management  Difficulity with ADLs: None  Difficulity with IADLs: Other    Functional Assesment  Prior Functional Level: Ambulatory, Independent with Activities of Daily Living, Independent with Medication Management    Finances  Prescription Coverage: Yes    Vision / Hearing Impairment  Vision Impairment : Yes  Right Eye Vision: Impaired, Wears Glasses  Left Eye Vision: Impaired, Wears Glasses  Hearing Impairment : Yes  Hearing Impairment: Both Ears, Hearing Device Not  Available  Does Pt Need Special Equipment for the Hearing Impaired?: No              Domestic Abuse  Have you ever been the victim of abuse or violence?: No  Physical Abuse or Sexual Abuse: No  Verbal Abuse or Emotional Abuse: No  Possible Abuse Reported to:: Not Applicable    Psychological Assessment  History of Substance Abuse: None  History of Psychiatric Problems: No  Non-compliant with Treatment: No  Newly Diagnosed Illness: No         Anticipated Discharge Information  Anticipated discharge disposition: Home, SNF

## 2019-07-08 NOTE — PROGRESS NOTES
Discharge prescriptions and instructions reviewed with patient.   Pt verbalized understanding of the orders. Will f/u with PCP and Dr. Grande. IV removed, tip intact. Bilateral groin incisions and LLE incision dressings CDI.     [facility] to [facility], paper prescription for oxy sent with completed COBRA packet.    Pt discharged escorted via wheelchair with all his personal belongings after all questions were answered.

## 2019-07-08 NOTE — DISCHARGE PLANNING
Agency/Facility Name: [facility]  Spoke To: Rosalina  Outcome: They have accepted patient pending bed availability and wound vac availability.  Per  Analia, they currently have a number of patient with wound vacs.    1048: received call from [facility], they have accepted patient on service.

## 2019-07-08 NOTE — DISCHARGE PLANNING
Spoke with Brandon at [facility] they have accepted  Patient. Transport has been arranged for 1500 via the [facility] van. JULIO Mendiola has been advised of transport time. Discharge Summary has been requested.

## 2019-07-16 NOTE — DOCUMENTATION QUERY
"                                                                         [facility]                                                                       Query Response Note      PATIENT:               KATHRYN DIAS  ACCT #:                  [id number]  MRN:                     [id number]  :                      1945  ADMIT DATE:       7/3/2019 3:40 PM  DISCH DATE:        2019 5:50 PM  RESPONDING  PROVIDER #:        [id number]           QUERY TEXT:    Please clarify in documentation the relationship, if any, between   Left femoral artery occlusion and thrombosis of stent graft and left superficial femoral stent.    The patient's Clinical Indicators include:  Left Femoral artery occlusion is Diagnosed by Attending, Dr. Mccain.    OP Report authenticated by Ana Grande M.D. at 2019   in Findings \"The left limb of the stent graft and the left superficial   femoral stent were thrombosed.\"  Options provided:   -- Left femoral artery occlusion is due to or associated with thrombosis of stent graft and left superficial femoral stent   -- Unrelated to each other   -- Unable to determine      Query created by: Henrietta Bowman on 2019 3:29 PM    RESPONSE TEXT:    Left femoral artery occlusion is due to or associated with thrombosis of stent graft and left superficial femoral stent       QUERY TEXT:    Pt has documented Diabetes Mellitus Type 2 noted as ?rule out? or similar terminology such as suspected, probable, etc.     Please clarify status of this condition:    NOTE:  If an appropriate response is not listed below, please respond with a new note.       The patient's Clinical Indicators include:  Progress Notes by Sylwia Mccain M.D. at 2019    Hyperglycemia- (present on admission)  Assessment & Plan    -accus with sliding scale coverage  -diabetic diet refused by patient  -diabetic education provided  -follow glycohemoglobin levels long term, most recent hemoglobin A1c 6.7  -monitor for " hypoglycemic episodes and adjust control if he should get low    Discharge Summaries by Jd Taylor M.D. at 7/8/2019  ?Patient does fit diabetes mellitus type 2 with a hemoglobin A1c of 6.7.  He has refused a diabetic diet and has been on sliding scale with Accu-Cheks with sliding scale coverage.?     Active Problems:   Hyperglycemia POA: Yes  Options provided:   -- Diabetes Mellitus Type 2 is ruled in   -- Diabetes Mellitus Type 2 is ruled out   -- Unable to determine      Query created by: Henrietta Bowman on 7/16/2019 3:29 PM    RESPONSE TEXT:    Diabetes Mellitus Type 2 is ruled in          Electronically signed by:  JD TAYLOR 7/16/2019 4:11 PM

## 2019-08-05 NOTE — DOCUMENTATION QUERY
"                                                                         [facility]                                                                       Query Response Note      PATIENT:               KATHRYN DIAS  ACCT #:                  [id number]  MRN:                     [id number]  :                      1945  ADMIT DATE:       7/3/2019 3:40 PM  DISCH DATE:        2019 5:50 PM  RESPONDING  PROVIDER #:        [id number]           QUERY TEXT:    There is conflicting documentation in the medical record.      Popliteal artery thrombectomy is listed in \"Procedure List', however, there is no description of that being done in Procedure Notes.     Based on treatment, clinical findings and risk factors, can this documentation be further clarified?    The patient's Clinical Indicators include:  OP Report authenticated by Ana Grande M.D. at 2019    PROCEDURES  2.  Left superficial femoral and popliteal artery thrombectomy using #3   López thromboembolectomy catheter      Next, a transverse arteriotomy was made on the superficial femoral artery.    Thrombectomy of the superficial femoral and profunda femoral artery was   performed using a #3 López thromboembolectomy catheter.  There was small   thrombus evacuated.  The arteriotomy was repaired with interrupted 5-0 Prolene   suture.  Options provided:   -- Popliteal thrombectomy was done - PLEASE ADD ADDENDUM TO REFLECT THIS   -- Popliteal thrombectomy was NOT done      Query created by: Henrietta Bowman on 2019 3:29 PM    RESPONSE TEXT:    I am very confused about your question! My Op note clearly said  \"Next, a transverse arteriotomy was made on the superficial femoral artery. Thrombectomy of the superficial femoral and profunda femoral artery was performed using a #3 López thromboembolectomy catheter. There was small thrombus evacuated. The arteriotomy was repaired with interrupted 5-0 Prolene suture.\"          Electronically signed " by:  MARIANN GOOD 8/5/2019 11:32 AM

## 2019-08-18 ENCOUNTER — HOSPITAL ENCOUNTER (INPATIENT)
Facility: MEDICAL CENTER | Age: 74
LOS: 8 days | DRG: 252 | End: 2019-08-26
Attending: EMERGENCY MEDICINE | Admitting: HOSPITALIST
Payer: MEDICARE

## 2019-08-18 ENCOUNTER — APPOINTMENT (OUTPATIENT)
Dept: RADIOLOGY | Facility: MEDICAL CENTER | Age: 74
DRG: 252 | End: 2019-08-18
Attending: EMERGENCY MEDICINE
Payer: MEDICARE

## 2019-08-18 DIAGNOSIS — T82.7XXA INFECTION OF PROSTHETIC VASCULAR GRAFT, INITIAL ENCOUNTER (HCC): ICD-10-CM

## 2019-08-18 DIAGNOSIS — A41.01 SEPSIS DUE TO METHICILLIN SUSCEPTIBLE STAPHYLOCOCCUS AUREUS (HCC): ICD-10-CM

## 2019-08-18 LAB
ALBUMIN SERPL BCP-MCNC: 3.2 G/DL (ref 3.2–4.9)
ALBUMIN/GLOB SERPL: 0.9 G/DL
ALP SERPL-CCNC: 110 U/L (ref 30–99)
ALT SERPL-CCNC: 24 U/L (ref 2–50)
ANION GAP SERPL CALC-SCNC: 7 MMOL/L (ref 0–11.9)
ANISOCYTOSIS BLD QL SMEAR: ABNORMAL
AST SERPL-CCNC: 24 U/L (ref 12–45)
BASOPHILS # BLD AUTO: 0 % (ref 0–1.8)
BASOPHILS # BLD: 0 K/UL (ref 0–0.12)
BILIRUB SERPL-MCNC: 0.3 MG/DL (ref 0.1–1.5)
BUN SERPL-MCNC: 27 MG/DL (ref 8–22)
CALCIUM SERPL-MCNC: 8.8 MG/DL (ref 8.5–10.5)
CHLORIDE SERPL-SCNC: 96 MMOL/L (ref 96–112)
CO2 SERPL-SCNC: 28 MMOL/L (ref 20–33)
CREAT SERPL-MCNC: 1.17 MG/DL (ref 0.5–1.4)
EOSINOPHIL # BLD AUTO: 0 K/UL (ref 0–0.51)
EOSINOPHIL NFR BLD: 0 % (ref 0–6.9)
ERYTHROCYTE [DISTWIDTH] IN BLOOD BY AUTOMATED COUNT: 47.6 FL (ref 35.9–50)
GLOBULIN SER CALC-MCNC: 3.5 G/DL (ref 1.9–3.5)
GLUCOSE SERPL-MCNC: 155 MG/DL (ref 65–99)
HCT VFR BLD AUTO: 28.7 % (ref 42–52)
HGB BLD-MCNC: 9.4 G/DL (ref 14–18)
HYPOCHROMIA BLD QL SMEAR: ABNORMAL
INR PPP: 3.5 (ref 0.87–1.13)
LACTATE BLD-SCNC: 1.1 MMOL/L (ref 0.5–2)
LYMPHOCYTES # BLD AUTO: 0.85 K/UL (ref 1–4.8)
LYMPHOCYTES NFR BLD: 4.4 % (ref 22–41)
MACROCYTES BLD QL SMEAR: ABNORMAL
MANUAL DIFF BLD: NORMAL
MCH RBC QN AUTO: 28.8 PG (ref 27–33)
MCHC RBC AUTO-ENTMCNC: 32.8 G/DL (ref 33.7–35.3)
MCV RBC AUTO: 88 FL (ref 81.4–97.8)
MONOCYTES # BLD AUTO: 1.18 K/UL (ref 0–0.85)
MONOCYTES NFR BLD AUTO: 6.1 % (ref 0–13.4)
MORPHOLOGY BLD-IMP: NORMAL
NEUTROPHILS # BLD AUTO: 17.36 K/UL (ref 1.82–7.42)
NEUTROPHILS NFR BLD: 85.1 % (ref 44–72)
NEUTS BAND NFR BLD MANUAL: 4.4 % (ref 0–10)
NRBC # BLD AUTO: 0 K/UL
NRBC BLD-RTO: 0 /100 WBC
PLATELET # BLD AUTO: 326 K/UL (ref 164–446)
PLATELET BLD QL SMEAR: NORMAL
PMV BLD AUTO: 9.4 FL (ref 9–12.9)
POTASSIUM SERPL-SCNC: 4.1 MMOL/L (ref 3.6–5.5)
PROT SERPL-MCNC: 6.7 G/DL (ref 6–8.2)
PROTHROMBIN TIME: 36.5 SEC (ref 12–14.6)
RBC # BLD AUTO: 3.26 M/UL (ref 4.7–6.1)
RBC BLD AUTO: PRESENT
SODIUM SERPL-SCNC: 131 MMOL/L (ref 135–145)
WBC # BLD AUTO: 19.4 K/UL (ref 4.8–10.8)

## 2019-08-18 PROCEDURE — 700105 HCHG RX REV CODE 258: Performed by: HOSPITALIST

## 2019-08-18 PROCEDURE — 96368 THER/DIAG CONCURRENT INF: CPT

## 2019-08-18 PROCEDURE — 96375 TX/PRO/DX INJ NEW DRUG ADDON: CPT

## 2019-08-18 PROCEDURE — 700102 HCHG RX REV CODE 250 W/ 637 OVERRIDE(OP): Performed by: HOSPITALIST

## 2019-08-18 PROCEDURE — 87040 BLOOD CULTURE FOR BACTERIA: CPT | Mod: 91

## 2019-08-18 PROCEDURE — A9270 NON-COVERED ITEM OR SERVICE: HCPCS | Performed by: HOSPITALIST

## 2019-08-18 PROCEDURE — 85027 COMPLETE CBC AUTOMATED: CPT | Mod: 91

## 2019-08-18 PROCEDURE — 80053 COMPREHEN METABOLIC PANEL: CPT

## 2019-08-18 PROCEDURE — 304561 HCHG STAT O2

## 2019-08-18 PROCEDURE — 85610 PROTHROMBIN TIME: CPT

## 2019-08-18 PROCEDURE — 96376 TX/PRO/DX INJ SAME DRUG ADON: CPT

## 2019-08-18 PROCEDURE — 700117 HCHG RX CONTRAST REV CODE 255: Performed by: EMERGENCY MEDICINE

## 2019-08-18 PROCEDURE — 99223 1ST HOSP IP/OBS HIGH 75: CPT | Mod: AI | Performed by: HOSPITALIST

## 2019-08-18 PROCEDURE — 87186 SC STD MICRODIL/AGAR DIL: CPT

## 2019-08-18 PROCEDURE — 93971 EXTREMITY STUDY: CPT | Mod: LT

## 2019-08-18 PROCEDURE — 36415 COLL VENOUS BLD VENIPUNCTURE: CPT

## 2019-08-18 PROCEDURE — 80048 BASIC METABOLIC PNL TOTAL CA: CPT

## 2019-08-18 PROCEDURE — 700105 HCHG RX REV CODE 258: Performed by: EMERGENCY MEDICINE

## 2019-08-18 PROCEDURE — 87150 DNA/RNA AMPLIFIED PROBE: CPT

## 2019-08-18 PROCEDURE — 87077 CULTURE AEROBIC IDENTIFY: CPT | Mod: 91

## 2019-08-18 PROCEDURE — 96365 THER/PROPH/DIAG IV INF INIT: CPT

## 2019-08-18 PROCEDURE — 99285 EMERGENCY DEPT VISIT HI MDM: CPT

## 2019-08-18 PROCEDURE — 93926 LOWER EXTREMITY STUDY: CPT | Mod: LT

## 2019-08-18 PROCEDURE — 770006 HCHG ROOM/CARE - MED/SURG/GYN SEMI*

## 2019-08-18 PROCEDURE — 700105 HCHG RX REV CODE 258

## 2019-08-18 PROCEDURE — 83605 ASSAY OF LACTIC ACID: CPT

## 2019-08-18 PROCEDURE — 73706 CT ANGIO LWR EXTR W/O&W/DYE: CPT | Mod: LT

## 2019-08-18 PROCEDURE — 96366 THER/PROPH/DIAG IV INF ADDON: CPT

## 2019-08-18 PROCEDURE — 700111 HCHG RX REV CODE 636 W/ 250 OVERRIDE (IP): Performed by: HOSPITALIST

## 2019-08-18 PROCEDURE — 700111 HCHG RX REV CODE 636 W/ 250 OVERRIDE (IP): Performed by: EMERGENCY MEDICINE

## 2019-08-18 PROCEDURE — 85007 BL SMEAR W/DIFF WBC COUNT: CPT | Mod: 91

## 2019-08-18 RX ORDER — METHOCARBAMOL 750 MG/1
750 TABLET, FILM COATED ORAL 2 TIMES DAILY PRN
Refills: 0 | COMMUNITY
Start: 2019-08-16 | End: 2019-08-18

## 2019-08-18 RX ORDER — SODIUM CHLORIDE 9 MG/ML
INJECTION, SOLUTION INTRAVENOUS
Status: ACTIVE
Start: 2019-08-18 | End: 2019-08-19

## 2019-08-18 RX ORDER — AMOXICILLIN 250 MG
2 CAPSULE ORAL 2 TIMES DAILY
Status: DISCONTINUED | OUTPATIENT
Start: 2019-08-18 | End: 2019-08-20

## 2019-08-18 RX ORDER — HYDROMORPHONE HYDROCHLORIDE 1 MG/ML
0.5 INJECTION, SOLUTION INTRAMUSCULAR; INTRAVENOUS; SUBCUTANEOUS EVERY 4 HOURS PRN
Status: DISCONTINUED | OUTPATIENT
Start: 2019-08-18 | End: 2019-08-21

## 2019-08-18 RX ORDER — TRAMADOL HYDROCHLORIDE 50 MG/1
50 TABLET ORAL EVERY 6 HOURS PRN
COMMUNITY
End: 2019-08-18

## 2019-08-18 RX ORDER — BISACODYL 10 MG
10 SUPPOSITORY, RECTAL RECTAL
Status: DISCONTINUED | OUTPATIENT
Start: 2019-08-18 | End: 2019-08-20

## 2019-08-18 RX ORDER — ONDANSETRON 4 MG/1
4 TABLET, ORALLY DISINTEGRATING ORAL EVERY 4 HOURS PRN
Status: DISCONTINUED | OUTPATIENT
Start: 2019-08-18 | End: 2019-08-26 | Stop reason: HOSPADM

## 2019-08-18 RX ORDER — SODIUM CHLORIDE 9 MG/ML
INJECTION, SOLUTION INTRAVENOUS CONTINUOUS
Status: DISCONTINUED | OUTPATIENT
Start: 2019-08-18 | End: 2019-08-19

## 2019-08-18 RX ORDER — ONDANSETRON 2 MG/ML
4 INJECTION INTRAMUSCULAR; INTRAVENOUS EVERY 4 HOURS PRN
Status: DISCONTINUED | OUTPATIENT
Start: 2019-08-18 | End: 2019-08-20

## 2019-08-18 RX ORDER — ACETAMINOPHEN 325 MG/1
650 TABLET ORAL EVERY 6 HOURS PRN
Status: DISCONTINUED | OUTPATIENT
Start: 2019-08-18 | End: 2019-08-20

## 2019-08-18 RX ORDER — WARFARIN SODIUM 5 MG/1
5 TABLET ORAL DAILY
Refills: 4 | Status: ON HOLD | COMMUNITY
Start: 2019-08-16 | End: 2019-08-26

## 2019-08-18 RX ORDER — HYDROMORPHONE HYDROCHLORIDE 1 MG/ML
0.5 INJECTION, SOLUTION INTRAMUSCULAR; INTRAVENOUS; SUBCUTANEOUS
Status: DISCONTINUED | OUTPATIENT
Start: 2019-08-18 | End: 2019-08-18

## 2019-08-18 RX ORDER — SODIUM CHLORIDE 9 MG/ML
INJECTION, SOLUTION INTRAVENOUS
Status: COMPLETED
Start: 2019-08-18 | End: 2019-08-18

## 2019-08-18 RX ORDER — POLYETHYLENE GLYCOL 3350 17 G/17G
1 POWDER, FOR SOLUTION ORAL
Status: DISCONTINUED | OUTPATIENT
Start: 2019-08-18 | End: 2019-08-20

## 2019-08-18 RX ORDER — OXYCODONE HYDROCHLORIDE 5 MG/1
5-10 TABLET ORAL EVERY 4 HOURS PRN
Status: DISCONTINUED | OUTPATIENT
Start: 2019-08-18 | End: 2019-08-21

## 2019-08-18 RX ADMIN — VANCOMYCIN HYDROCHLORIDE 1900 MG: 500 INJECTION, POWDER, LYOPHILIZED, FOR SOLUTION INTRAVENOUS at 17:02

## 2019-08-18 RX ADMIN — HYDROMORPHONE HYDROCHLORIDE 0.5 MG: 1 INJECTION, SOLUTION INTRAMUSCULAR; INTRAVENOUS; SUBCUTANEOUS at 12:43

## 2019-08-18 RX ADMIN — OXYCODONE HYDROCHLORIDE 10 MG: 5 TABLET ORAL at 23:31

## 2019-08-18 RX ADMIN — SODIUM CHLORIDE: 900 INJECTION INTRAVENOUS at 21:07

## 2019-08-18 RX ADMIN — ACETAMINOPHEN 650 MG: 325 TABLET ORAL at 23:31

## 2019-08-18 RX ADMIN — IOHEXOL 100 ML: 350 INJECTION, SOLUTION INTRAVENOUS at 15:40

## 2019-08-18 RX ADMIN — PIPERACILLIN AND TAZOBACTAM 3.38 G: 3; .375 INJECTION, POWDER, LYOPHILIZED, FOR SOLUTION INTRAVENOUS; PARENTERAL at 21:07

## 2019-08-18 RX ADMIN — PIPERACILLIN AND TAZOBACTAM 4.5 G: 4; .5 INJECTION, POWDER, LYOPHILIZED, FOR SOLUTION INTRAVENOUS; PARENTERAL at 16:26

## 2019-08-18 RX ADMIN — HYDROMORPHONE HYDROCHLORIDE 0.5 MG: 1 INJECTION, SOLUTION INTRAMUSCULAR; INTRAVENOUS; SUBCUTANEOUS at 16:17

## 2019-08-18 RX ADMIN — SENNOSIDES, DOCUSATE SODIUM 2 TABLET: 50; 8.6 TABLET, FILM COATED ORAL at 21:08

## 2019-08-18 RX ADMIN — SODIUM CHLORIDE 500 ML: 9 INJECTION, SOLUTION INTRAVENOUS at 21:08

## 2019-08-18 ASSESSMENT — LIFESTYLE VARIABLES
DOES PATIENT WANT TO STOP DRINKING: NO
EVER HAD A DRINK FIRST THING IN THE MORNING TO STEADY YOUR NERVES TO GET RID OF A HANGOVER: NO
TOTAL SCORE: 0
EVER_SMOKED: YES
TOTAL SCORE: 0
ON A TYPICAL DAY WHEN YOU DRINK ALCOHOL HOW MANY DRINKS DO YOU HAVE: 1
HOW MANY TIMES IN THE PAST YEAR HAVE YOU HAD 5 OR MORE DRINKS IN A DAY: 0
CONSUMPTION TOTAL: NEGATIVE
HAVE YOU EVER FELT YOU SHOULD CUT DOWN ON YOUR DRINKING: NO
EVER_SMOKED: YES
CONSUMPTION TOTAL: INCOMPLETE
HAVE PEOPLE ANNOYED YOU BY CRITICIZING YOUR DRINKING: NO
EVER FELT BAD OR GUILTY ABOUT YOUR DRINKING: NO
DO YOU DRINK ALCOHOL: NO
AVERAGE NUMBER OF DAYS PER WEEK YOU HAVE A DRINK CONTAINING ALCOHOL: 1
TOTAL SCORE: 0
EVER FELT BAD OR GUILTY ABOUT YOUR DRINKING: NO
TOTAL SCORE: 0
TOTAL SCORE: 0
EVER HAD A DRINK FIRST THING IN THE MORNING TO STEADY YOUR NERVES TO GET RID OF A HANGOVER: NO
HAVE PEOPLE ANNOYED YOU BY CRITICIZING YOUR DRINKING: NO
ALCOHOL_USE: YES
HAVE YOU EVER FELT YOU SHOULD CUT DOWN ON YOUR DRINKING: NO
DOES PATIENT WANT TO STOP DRINKING: NO
TOTAL SCORE: 0

## 2019-08-18 ASSESSMENT — PATIENT HEALTH QUESTIONNAIRE - PHQ9
5. POOR APPETITE OR OVEREATING: NOT AT ALL
4. FEELING TIRED OR HAVING LITTLE ENERGY: SEVERAL DAYS
6. FEELING BAD ABOUT YOURSELF - OR THAT YOU ARE A FAILURE OR HAVE LET YOURSELF OR YOUR FAMILY DOWN: SEVERAL DAYS
8. MOVING OR SPEAKING SO SLOWLY THAT OTHER PEOPLE COULD HAVE NOTICED. OR THE OPPOSITE, BEING SO FIGETY OR RESTLESS THAT YOU HAVE BEEN MOVING AROUND A LOT MORE THAN USUAL: NOT AT ALL
7. TROUBLE CONCENTRATING ON THINGS, SUCH AS READING THE NEWSPAPER OR WATCHING TELEVISION: NOT AT ALL
1. LITTLE INTEREST OR PLEASURE IN DOING THINGS: MORE THAN HALF THE DAYS
SUM OF ALL RESPONSES TO PHQ9 QUESTIONS 1 AND 2: 0
2. FEELING DOWN, DEPRESSED, IRRITABLE, OR HOPELESS: NOT AT ALL
2. FEELING DOWN, DEPRESSED, IRRITABLE, OR HOPELESS: MORE THAN HALF THE DAYS
1. LITTLE INTEREST OR PLEASURE IN DOING THINGS: NOT AT ALL
3. TROUBLE FALLING OR STAYING ASLEEP OR SLEEPING TOO MUCH: NOT AT ALL
SUM OF ALL RESPONSES TO PHQ QUESTIONS 1-9: 6
SUM OF ALL RESPONSES TO PHQ9 QUESTIONS 1 AND 2: 4
9. THOUGHTS THAT YOU WOULD BE BETTER OFF DEAD, OR OF HURTING YOURSELF: NOT AT ALL

## 2019-08-18 ASSESSMENT — COGNITIVE AND FUNCTIONAL STATUS - GENERAL
TOILETING: A LITTLE
MOVING TO AND FROM BED TO CHAIR: A LITTLE
MOBILITY SCORE: 14
TURNING FROM BACK TO SIDE WHILE IN FLAT BAD: A LITTLE
MOVING FROM LYING ON BACK TO SITTING ON SIDE OF FLAT BED: A LOT
DRESSING REGULAR UPPER BODY CLOTHING: A LITTLE
WALKING IN HOSPITAL ROOM: A LOT
HELP NEEDED FOR BATHING: A LITTLE
CLIMB 3 TO 5 STEPS WITH RAILING: TOTAL
SUGGESTED CMS G CODE MODIFIER DAILY ACTIVITY: CJ
DAILY ACTIVITIY SCORE: 20
PERSONAL GROOMING: A LITTLE
STANDING UP FROM CHAIR USING ARMS: A LITTLE
SUGGESTED CMS G CODE MODIFIER MOBILITY: CL

## 2019-08-18 ASSESSMENT — COPD QUESTIONNAIRES
DO YOU EVER COUGH UP ANY MUCUS OR PHLEGM?: NO/ONLY WITH OCCASIONAL COLDS OR INFECTIONS
DURING THE PAST 4 WEEKS HOW MUCH DID YOU FEEL SHORT OF BREATH: SOME OF THE TIME
COPD SCREENING SCORE: 6
HAVE YOU SMOKED AT LEAST 100 CIGARETTES IN YOUR ENTIRE LIFE: YES

## 2019-08-18 NOTE — PROGRESS NOTES
"Pharmacy Kinetics 74 y.o. male on vancomycin day # 1 2019    Currently on Vancomycin 1900 mg iv once  Provider specified end date: n/a    Indication for Treatment: Intra-Abdominal Infection    Pertinent history per medical record: Admitted on 2019 for possible infected prosthetic vascular graft. Patient had a right to left femoral to femoral bypass graft and left femoral to below-knee popliteal bypass graft both of which were performed on July 3, 2019. Patient reported pain in the suprapubic region overlying the course of his femoral bypass graft on admit. Vascular has been consulted and is recommending broad spectrum abxs for possible infection.    Other antibiotics: Zosyn 3.375 g IV q8h    Allergies: Patient has no known allergies.     List concerns for renal function: age, BUN:SCr > 20, recent contrast, concomitant use with Zosyn    Pertinent cultures to date:   Cultures have been collected    MRSA nares swab if pneumonia is a concern (ordered/positive/negative/n-a): n/a    Recent Labs     19  1340   WBC 19.4*   NEUTSPOLYS 85.10*   BANDSSTABS 4.40     Recent Labs     19  1340   BUN 27*   CREATININE 1.17   ALBUMIN 3.2     No results for input(s): VANCOTROUGH, VANCOPEAK, VANCORANDOM in the last 72 hours.No intake or output data in the 24 hours ending 19 1641   /70   Pulse (!) 47   Temp 37.8 °C (100 °F) (Oral)   Resp 18   Ht 1.702 m (5' 7\")   Wt 76 kg (167 lb 8.8 oz)   SpO2 98%  Temp (24hrs), Av.8 °C (100 °F), Min:37.8 °C (100 °F), Max:37.8 °C (100 °F)      A/P   1. Vancomycin dose change: initiate 1500 mg IV q24h starting tomorrow at 1700  2. Next vancomycin level: two days or sooner if clinically indicated (not ordered)  3. Goal trough: 12-16 mcg/mL  4. Comments: No history of vancomycin administration. Will initiate vancomycin 1500 mg IV q24h starting tomorrow at 1700 (~20 mg/kg) and recommend obtaining a trough prior to third total dose as patient does have risk " factors of accumulation. Patient's renal function stable from prior encounters. Pharmacy will continue to monitor and de-escalate if appropriate.    Lorna Reid, UbaldoD

## 2019-08-18 NOTE — CONSULTS
Vascular Surgery Consult Note  -------------------------------------------------------------------------------------------------  Date: 8/18/2019    Consulting Physician: Soham Ramirez M.D. [facility]  -------------------------------------------------------------------------------------------------    Reason for consultation:  Thrombosed bypass    HPI:  This is a 74 y.o. male who has a fairly complex history of lower extremity revascularization procedures.  He has at least one iliac stent on the left side which apparently was for an aneurysm, but I do not know when that was performed.  He also has a history of a right to left femoral to femoral bypass graft and left femoral to below-knee popliteal bypass graft both of which were performed on July 3, 2019 by Dr. Grande.  Patient also had amputation of the left second and third toes at some point.    Patient presented to the emergency room today with a chief complaint of pain in the suprapubic region overlying the course of his femoral to femoral bypass graft.  He was also found to have a fever and a white blood cell count of 19.4.  Blood cultures have been sent and antibiotics have been ordered.    Patient also has a complaint of severe left knee pain which she says is been going on for several months and getting worse.  The knee is particularly painful with palpation or manipulation and appears to be consistent with musculoskeletal pain.    Patient also reports left foot numbness in the foot appears to have a mild bluish discoloration on exam however the patient can move the foot normally and he reports that the symptoms of numbness have been going on for several months and are not new.  He says those symptoms have been going on even before the bypass operation was performed on [place] 3, 2019.      Past Medical History:   Diagnosis Date   • PVD (peripheral vascular disease) (HCC)        Past Surgical History:   Procedure Laterality Date   • FEMORAL  FEMORAL BYPASS Bilateral 7/3/2019    Procedure: CREATION, BYPASS, ARTERIAL, FEMORAL TO FEMORAL, USING GRAFT;  Surgeon: Ana Grande M.D.;  Location: SURGERY [facility];  Service: General   • FEMORAL POPLITEAL BYPASS Left 7/3/2019    Procedure: CREATION, BYPASS, ARTERIAL, FEMORAL TO POPLITEAL, USING GRAFT;  Surgeon: Ana Grande M.D.;  Location: SURGERY [facility];  Service: General   • ANGIOGRAM Left 7/3/2019    Procedure: ANGIOGRAM;  Surgeon: Ana Grande M.D.;  Location: SURGERY [facility];  Service: General   • TOE AMPUTATION Left 10/23/2018    Procedure: TOE AMPUTATION 3RD  POSS 2ND  ;  Surgeon: Ana Grande M.D.;  Location: SURGERY [facility];  Service: General       Current Facility-Administered Medications   Medication Dose Route Frequency Provider Last Rate Last Dose   • HYDROmorphone pf (DILAUDID) injection 0.5 mg  0.5 mg Intravenous Q HOUR PRN Raymond Ambrosio M.D.   0.5 mg at 08/18/19 1243   • piperacillin-tazobactam (ZOSYN) 4.5 g in  mL IVPB  4.5 g Intravenous Once Raymond Ambrosio M.D.       • vancomycin (VANCOCIN) 1,900 mg in  mL IVPB  25 mg/kg Intravenous Once Raymond Ambrosio M.D.         Current Outpatient Medications   Medication Sig Dispense Refill   • warfarin (COUMADIN) 5 MG Tab Take 5 mg by mouth every day.  4   • brimonidine (ALPHAGAN) 0.2 % Solution Place 1 Drop in right eye 2 Times a Day. 1 Bottle    • timolol (TIMOPTIC) 0.5 % Solution Place 1 Drop in right eye 2 Times a Day. 1 Bottle 3   • atorvastatin (LIPITOR) 20 MG Tab Take 1 Tab by mouth every day. 30 Tab    • aspirin (ASA) 81 MG Chew Tab chewable tablet Take 1 Tab by mouth every day. 100 Tab    • acetaminophen (TYLENOL) 325 MG Tab Take 2 Tabs by mouth every 6 hours as needed (Mild Pain; (Pain scale 1-3); Temp greater than 100.5 F). 30 Tab 0   • Multiple Vitamins-Minerals (CENTRUM SILVER) Tab Take 1 Tab by mouth every day.         Social History     Socioeconomic History   • Marital status:  "     Spouse name: Not on file   • Number of children: Not on file   • Years of education: Not on file   • Highest education level: Not on file   Occupational History   • Not on file   Social Needs   • Financial resource strain: Not on file   • Food insecurity:     Worry: Not on file     Inability: Not on file   • Transportation needs:     Medical: Not on file     Non-medical: Not on file   Tobacco Use   • Smoking status: Former Smoker   • Smokeless tobacco: Never Used   Substance and Sexual Activity   • Alcohol use: Not Currently   • Drug use: Not Currently     Types: Inhaled     Comment: THC   • Sexual activity: Not on file   Lifestyle   • Physical activity:     Days per week: Not on file     Minutes per session: Not on file   • Stress: Not on file   Relationships   • Social connections:     Talks on phone: Not on file     Gets together: Not on file     Attends [employer]oration service: Not on file     Active member of club or organization: Not on file     Attends meetings of clubs or organizations: Not on file     Relationship status: Not on file   • Intimate partner violence:     Fear of current or ex partner: Not on file     Emotionally abused: Not on file     Physically abused: Not on file     Forced sexual activity: Not on file   Other Topics Concern   • Not on file   Social History Narrative   • Not on file       History reviewed. No pertinent family history.    Allergies:  Patient has no known allergies.    Review of Systems:  Noncontributory except as per HPI    Physical Exam:  /70   Pulse 94   Temp 37.8 °C (100 °F) (Oral)   Resp 20   Ht 1.702 m (5' 7\")   Wt 76 kg (167 lb 8.8 oz)   SpO2 92%     Constitutional: Alert, oriented, no acute distress  HEENT:  Normocephalic and atraumatic, EOMI  Neck:   Supple, no JVD,   Cardiovascular: Regular rate and rhythm,   Pulmonary:  Good air entry bilaterally,    Abdominal:  Soft, non-tender, non-distended     Aortic impulse not widened  Musculoskeletal: No " edema, no tenderness except for the left knee which has tenderness and pain with passive or active movement.  Extremities:  There are bilateral longitudinal femoral incisions which are fully healed.  There is a palpable femoral to femoral bypass conduit and there is erythema and mild tenderness overlying the course of the conduit.  There is a healed left below-knee popliteal incision.  There is no erythema or tenderness along the medial thigh or at the site of the popliteal incision.  The skin is warm all the way down to the ankle on both sides.  The left foot has a mild blue or purplish discoloration.  There are paresthesias in the left foot but sensation appears to be intact and motor function is intact.  Neurological:  CN II-XII grossly intact, no focal deficits  Skin:   Skin is warm and dry. No rash noted.      Labs:  Recent Labs     08/18/19  1340   WBC 19.4*   RBC 3.26*   HEMOGLOBIN 9.4*   HEMATOCRIT 28.7*   MCV 88.0   MCH 28.8   MCHC 32.8*   RDW 47.6   PLATELETCT 326   MPV 9.4     Recent Labs     08/18/19  1340   SODIUM 131*   POTASSIUM 4.1   CHLORIDE 96   CO2 28   GLUCOSE 155*   BUN 27*   CREATININE 1.17   CALCIUM 8.8     Recent Labs     08/18/19  1340   INR 3.50*     Recent Labs     08/18/19  1340   ASTSGOT 24   ALTSGPT 24   TBILIRUBIN 0.3   ALKPHOSPHAT 110*   GLOBULIN 3.5   INR 3.50*       Radiology:  CT angiogram of the left lower extremity was performed.  This shows patency of the left side of the femoral to femoral bypass coming into the left common femoral artery and patency of the left profunda femoris artery and its branches.  The native SFA is patent down to the mid section and then there is a stent which is thrombosed and this thrombosis carries all the way through to the below-knee popliteal artery.  There is also a left femoral to below-knee popliteal artery bypass conduit which is thrombosed all the way down past the distal anastomosis with reconstitution of the individual 3 tibial arteries near  their origins.  There is scattered tibial disease.  There is fluid around the femoral to femoral bypass graft and also the femoral to popliteal bypass graft and there are air bubbles in the perigraft fluid and also air bubbles within the thrombosed femoral to popliteal bypass conduit.    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 male who currently has a right to left femoral to femoral bypass and a left femoral to popliteal bypass and evidence of graft infection and also ischemia of the left foot.  The current acute symptoms appear to be more related to the possibility of graft infection in the ischemia of the foot appears to be more subacute or even chronic and were not the chief symptoms that brought him to the emergency room today.    At this point the patient needs to be evaluated and treated for a possible bypass graft infection.    - Blood cultures have been sent  - Broad-spectrum empiric antibiotics have been ordered    - Patient can be on diet as tolerated at this point pending further results.  The patient will not get any emergent surgery but depending on blood culture results he may require extraction of the femorofemoral and femoral-popliteal bypass grafts most likely with simultaneous reconstruction using biologic graft.  I suspect the soonest will be able to perform this operation is Tuesday 8/20.     - Patient's INR was supratherapeutic, we will let that drift down naturally and then start a heparin drip once the INR is below 2.    Appreciate hospitalist services support managing Mr. Patel.    Soham Ramirez MD  [facility] (General and Vascular Surgery)  Cell: [phone] (text or call is fine, if you don't reach me please try my office)  Office: [phone]    8/18/2019    3:54  PM  ___________________________________________________________________  Patient:Gene Devisscher   MRN:[id number]   CSN:[id number]

## 2019-08-18 NOTE — ED TRIAGE NOTES
Pt to ED as transfer from outside hospital accepted by Dr. Langston for worsening pain and decreased pulse to left leg possible DVT vs arterial clot. He had a fem pop bypass by dr. Grande in last 6 weeks. Now has +leg swelling, redness to lower abdomen, pain in back of knee.

## 2019-08-18 NOTE — ED NOTES
2nd iv established, flushed well with blood return initially, but now not able to collect labs. Call to phleb to draw pt .

## 2019-08-18 NOTE — ED NOTES
Med rec updated and complete. Allergies reviewed.  Pt denies antibiotic use in last 14 days.  Pt takes coumadin dose in the AM.  Home pharmacy [facility].

## 2019-08-18 NOTE — ED PROVIDER NOTES
ED Provider Note    CHIEF COMPLAINT  Chief Complaint   Patient presents with   • Leg Pain   • Deep Vein Thrombosis       HPI  Dc Patel is a 74 y.o. male who presents for evaluation of left leg pain and aching.  The patient is a complex history including severe peripheral vascular disease.  He is around 6 weeks status post bilateral femoropopliteal grafting and bypass with Dr. Grande.  He is on Coumadin.  He presented to a [facility] hospital with left leg pain and claudication.  They do not have ultrasound capability but his INR level was apparently elevated at around 4 and he was transferred here for higher level of care.  He reports compliance with his Coumadin regimen.  He denies fevers or chills.  He reports dull aching but no numbness weakness or tingling.  He is relieved by letting his leg hanging off the end of the gurney.  No other symptoms reported    REVIEW OF SYSTEMS  See HPI for further details. All other systems are negative.     PAST MEDICAL HISTORY  Past Medical History:   Diagnosis Date   • PVD (peripheral vascular disease) (HCC)        FAMILY HISTORY  Noncontributory    SOCIAL HISTORY  Social History     Socioeconomic History   • Marital status:      Spouse name: Not on file   • Number of children: Not on file   • Years of education: Not on file   • Highest education level: Not on file   Occupational History   • Not on file   Social Needs   • Financial resource strain: Not on file   • Food insecurity:     Worry: Not on file     Inability: Not on file   • Transportation needs:     Medical: Not on file     Non-medical: Not on file   Tobacco Use   • Smoking status: Former Smoker   • Smokeless tobacco: Never Used   Substance and Sexual Activity   • Alcohol use: Not Currently   • Drug use: Not Currently     Types: Inhaled     Comment: THC   • Sexual activity: Not on file   Lifestyle   • Physical activity:     Days per week: Not on file     Minutes per session: Not on file   • Stress:  Not on file   Relationships   • Social connections:     Talks on phone: Not on file     Gets together: Not on file     Attends Taoism service: Not on file     Active member of club or organization: Not on file     Attends meetings of clubs or organizations: Not on file     Relationship status: Not on file   • Intimate partner violence:     Fear of current or ex partner: Not on file     Emotionally abused: Not on file     Physically abused: Not on file     Forced sexual activity: Not on file   Other Topics Concern   • Not on file   Social History Narrative   • Not on file     Extensive smoking history  SURGICAL HISTORY  Past Surgical History:   Procedure Laterality Date   • FEMORAL FEMORAL BYPASS Bilateral 7/3/2019    Procedure: CREATION, BYPASS, ARTERIAL, FEMORAL TO FEMORAL, USING GRAFT;  Surgeon: Ana Grande M.D.;  Location: SURGERY [facility];  Service: General   • FEMORAL POPLITEAL BYPASS Left 7/3/2019    Procedure: CREATION, BYPASS, ARTERIAL, FEMORAL TO POPLITEAL, USING GRAFT;  Surgeon: Ana Grande M.D.;  Location: SURGERY [facility];  Service: General   • ANGIOGRAM Left 7/3/2019    Procedure: ANGIOGRAM;  Surgeon: Ana Grande M.D.;  Location: SURGERY [facility];  Service: General   • TOE AMPUTATION Left 10/23/2018    Procedure: TOE AMPUTATION 3RD  POSS 2ND  ;  Surgeon: Ana Grande M.D.;  Location: SURGERY [facility];  Service: General       CURRENT MEDICATIONS  Home Medications     Reviewed by Ave Shipman (Pharmacy Tech) on 08/18/19 at 1544  Med List Status: Complete   Medication Last Dose Status   acetaminophen (TYLENOL) 325 MG Tab 8/16/2019 Active   aspirin (ASA) 81 MG Chew Tab chewable tablet 8/17/2019 Active   atorvastatin (LIPITOR) 20 MG Tab 8/17/2019 Active   brimonidine (ALPHAGAN) 0.2 % Solution 8/18/2019 Active   Multiple Vitamins-Minerals (CENTRUM SILVER) Tab 8/17/2019 Active   timolol (TIMOPTIC) 0.5 % Solution 8/18/2019 Active   warfarin (COUMADIN) 5 MG Tab  "8/18/2019 Active                ALLERGIES  No Known Allergies    PHYSICAL EXAM  VITAL SIGNS: /70   Pulse 94   Temp 37.8 °C (100 °F) (Oral)   Resp 20   Ht 1.702 m (5' 7\")   Wt 76 kg (167 lb 8.8 oz)   SpO2 92%   BMI 26.24 kg/m²  Room air O2: 88    Constitutional: Patient appears chronically ill  HENT: Normocephalic, Atraumatic, Bilateral external ears normal, Oropharynx moist, No oral exudates, Nose normal.   Eyes: PERRLA, EOMI, Conjunctiva normal, No discharge.   Neck: Normal range of motion, No tenderness, Supple, No stridor.   Lymphatic: No lymphadenopathy noted.   Cardiovascular: Normal heart rate, Normal rhythm, No murmurs, No rubs, No gallops.   Thorax & Lungs: Normal breath sounds, No respiratory distress, No wheezing, No chest tenderness.   Abdomen: Bowel sounds normal, Soft, No tenderness, No masses, No pulsatile masses.   Skin: Warm, Dry, No erythema, No rash.   Back: No tenderness, No CVA tenderness.   Extremities: Left lower extremity exam is notable for having pallor, it is cold to the touch.  I cannot palpate a dorsalis pedal pulse.  Neurologic: Alert & oriented x 3, Normal motor function, Normal sensory function, No focal deficits noted.   Psychiatric: Affect normal, Judgment normal, Mood normal.     RADIOLOGY/PROCEDURES  US-EXTREMITY VENOUS LOWER UNILAT LEFT   Final Result      US-EXTREMITY ARTERY LOWER UNILAT LEFT   Final Result      CT-CTA LOWER EXT WITH & W/O-POST PROCESS LEFT    (Results Pending)     Results for orders placed or performed during the hospital encounter of 08/18/19   CBC WITH DIFFERENTIAL   Result Value Ref Range    WBC 19.4 (H) 4.8 - 10.8 K/uL    RBC 3.26 (L) 4.70 - 6.10 M/uL    Hemoglobin 9.4 (L) 14.0 - 18.0 g/dL    Hematocrit 28.7 (L) 42.0 - 52.0 %    MCV 88.0 81.4 - 97.8 fL    MCH 28.8 27.0 - 33.0 pg    MCHC 32.8 (L) 33.7 - 35.3 g/dL    RDW 47.6 35.9 - 50.0 fL    Platelet Count 326 164 - 446 K/uL    MPV 9.4 9.0 - 12.9 fL    Neutrophils-Polys 85.10 (H) 44.00 - 72.00 % "    Lymphocytes 4.40 (L) 22.00 - 41.00 %    Monocytes 6.10 0.00 - 13.40 %    Eosinophils 0.00 0.00 - 6.90 %    Basophils 0.00 0.00 - 1.80 %    Nucleated RBC 0.00 /100 WBC    Neutrophils (Absolute) 17.36 (H) 1.82 - 7.42 K/uL    Lymphs (Absolute) 0.85 (L) 1.00 - 4.80 K/uL    Monos (Absolute) 1.18 (H) 0.00 - 0.85 K/uL    Eos (Absolute) 0.00 0.00 - 0.51 K/uL    Baso (Absolute) 0.00 0.00 - 0.12 K/uL    NRBC (Absolute) 0.00 K/uL    Hypochromia 1+     Anisocytosis 1+     Macrocytosis 1+    Comp Metabolic Panel   Result Value Ref Range    Sodium 131 (L) 135 - 145 mmol/L    Potassium 4.1 3.6 - 5.5 mmol/L    Chloride 96 96 - 112 mmol/L    Co2 28 20 - 33 mmol/L    Anion Gap 7.0 0.0 - 11.9    Glucose 155 (H) 65 - 99 mg/dL    Bun 27 (H) 8 - 22 mg/dL    Creatinine 1.17 0.50 - 1.40 mg/dL    Calcium 8.8 8.5 - 10.5 mg/dL    AST(SGOT) 24 12 - 45 U/L    ALT(SGPT) 24 2 - 50 U/L    Alkaline Phosphatase 110 (H) 30 - 99 U/L    Total Bilirubin 0.3 0.1 - 1.5 mg/dL    Albumin 3.2 3.2 - 4.9 g/dL    Total Protein 6.7 6.0 - 8.2 g/dL    Globulin 3.5 1.9 - 3.5 g/dL    A-G Ratio 0.9 g/dL   Prothrombin Time   Result Value Ref Range    PT 36.5 (H) 12.0 - 14.6 sec    INR 3.50 (H) 0.87 - 1.13   ESTIMATED GFR   Result Value Ref Range    GFR If African American >60 >60 mL/min/1.73 m 2    GFR If Non African American >60 >60 mL/min/1.73 m 2   DIFFERENTIAL MANUAL   Result Value Ref Range    Bands-Stabs 4.40 0.00 - 10.00 %    Manual Diff Status PERFORMED    PERIPHERAL SMEAR REVIEW   Result Value Ref Range    Peripheral Smear Review see below    PLATELET ESTIMATE   Result Value Ref Range    Plt Estimation Normal    MORPHOLOGY   Result Value Ref Range    RBC Morphology Present       Age:    74    Gender:     M     MRN:    [id number]     :    1945      BSA:     Indications:     Pain in Limb     CPT Codes:       76414     ICD Codes:       729.5     History:         Left lower extremity pain. History of right-to-left fem-fem                     bypass graft  and left fem-pop byppass graft     Limitations:                   RIGHT   Waveform        Peak Systolic Velocity (cm/s)                   Prox    Prox-Mid  Mid    Mid-Dist  Distal                                                              CFA                                                                  PFA                                                                  SFA                                                                  POP                                                                  AT                                                                  PT                                                                  AL                   LEFT   Waveform        Peak Systolic Velocity (cm/s)                   Prox    Prox-Mid  Mid    Mid-Dist  Distal   Bi, non-                          173                      CFA   reversed       Triphasic       108                                        PFA       Absent          59                36               0       SFA       Absent                            0                        POP       Monophasic      6                                  6       AT       Monophasic      7                                  7       PT       Monophasic      5                                  6       AL           FINDINGS   Left.    With the exception of the very proximal fem-pop bypass graft, no flow can    be demonstrated throughout the graft's length. Echolucent material    consistent with arterial thrombus is visualized at the proximal thigh.    Retrograde flow seen in the very proximal segment. Throughout the graft's    length, there appears to be fluid collecting around the graft.   No flow can be demonstrated from the mid-distal superficial femoral artery    through the distal popliteal artery. Echolucent material consistent with    arterial thrombus is visualized throughout this segment.   3-Vessel, severely diminished and monophasic runoff to the  foot.   The fem-fem bypass graft is widely patent throughout its length. There    appears to be a fluid collection surrounding this graft.  COURSE & MEDICAL DECISION MAKING  Pertinent Labs & Imaging studies reviewed. (See chart for details)  Patient has evidence of acute thrombosis and vascular emergency to the left lower extremity.  Emergency ultrasound was performed.  The patient is actually supratherapeutic on his INR.  Emergent consultation with Dr. Ramirez was obtained with vascular surgery.  He has recommended getting a CT angiogram of the left lower extremity and he will consult.  The patient did not require heparinization as he is already supratherapeutic on Coumadin.  The patient will be admitted in guarded condition to internal medicine and [facility] of vascular surgery consult.  He may ultimately require thrombectomy or revision of the graft later today or tomorrow.  Vascular surgery and internal medicine will follow up on the CT angiogram.  CT angiogram was read as a complete occlusion however there is also some gas adjacent to the graft concerning for infection.  Dr. Ramirez reviewed the imaging studies as well and recommends empiric IV antibiotics.  The patient be given vancomycin and Zosyn.  Dr. Henry will admit the patient    FINAL IMPRESSION  1.  Acute arterial occlusion of the left lower extremity causing critical vascular compromise  2.  Likely infection of femoral graft       CRITICAL CARE TIME:    The patient required approximately 38 minutes worth of critical care time. This excludes any procedures. This includes time spent directly at caring for the patient, making critical medical decisions, involving consultants and speaking with the family.  Electronically signed by: Raymond Ambrosio, 8/18/2019 1:57 PM

## 2019-08-18 NOTE — ED NOTES
RN unable to locate doppler pulses on left lower leg. Popliteal pulse present on assessment.   Foot is cool. Cap refil <3 seconds.     US at bedside.     [facility] 5[facility]6

## 2019-08-19 ENCOUNTER — APPOINTMENT (OUTPATIENT)
Dept: RADIOLOGY | Facility: MEDICAL CENTER | Age: 74
DRG: 252 | End: 2019-08-19
Attending: SURGERY
Payer: MEDICARE

## 2019-08-19 PROBLEM — M17.12 OSTEOARTHRITIS OF LEFT KNEE: Status: ACTIVE | Noted: 2019-08-19

## 2019-08-19 PROBLEM — Z79.01 CHRONIC ANTICOAGULATION: Status: ACTIVE | Noted: 2019-08-19

## 2019-08-19 PROBLEM — A49.01 MSSA (METHICILLIN SUSCEPTIBLE STAPHYLOCOCCUS AUREUS): Status: ACTIVE | Noted: 2019-08-19

## 2019-08-19 PROBLEM — N17.9 ACUTE RENAL FAILURE SUPERIMPOSED ON STAGE 3 CHRONIC KIDNEY DISEASE (HCC): Status: ACTIVE | Noted: 2019-07-03

## 2019-08-19 PROBLEM — T82.7XXA INFECTED PROSTHETIC VASCULAR GRAFT (HCC): Status: ACTIVE | Noted: 2019-08-19

## 2019-08-19 PROBLEM — A41.9 SEPSIS (HCC): Status: ACTIVE | Noted: 2019-08-19

## 2019-08-19 PROBLEM — E87.1 HYPONATREMIA: Status: ACTIVE | Noted: 2019-08-19

## 2019-08-19 PROBLEM — D50.9 IRON DEFICIENCY ANEMIA: Status: ACTIVE | Noted: 2019-08-19

## 2019-08-19 PROBLEM — E78.5 DYSLIPIDEMIA: Status: ACTIVE | Noted: 2019-08-19

## 2019-08-19 PROBLEM — D64.9 ANEMIA: Status: ACTIVE | Noted: 2019-08-19

## 2019-08-19 LAB
ANION GAP SERPL CALC-SCNC: 8 MMOL/L (ref 0–11.9)
ANISOCYTOSIS BLD QL SMEAR: ABNORMAL
APTT PPP: 117.5 SEC (ref 24.7–36)
BASOPHILS # BLD AUTO: 0 % (ref 0–1.8)
BASOPHILS # BLD: 0 K/UL (ref 0–0.12)
BUN SERPL-MCNC: 27 MG/DL (ref 8–22)
CALCIUM SERPL-MCNC: 8.3 MG/DL (ref 8.5–10.5)
CHLORIDE SERPL-SCNC: 95 MMOL/L (ref 96–112)
CO2 SERPL-SCNC: 24 MMOL/L (ref 20–33)
CREAT SERPL-MCNC: 1.25 MG/DL (ref 0.5–1.4)
EOSINOPHIL # BLD AUTO: 0 K/UL (ref 0–0.51)
EOSINOPHIL NFR BLD: 0 % (ref 0–6.9)
ERYTHROCYTE [DISTWIDTH] IN BLOOD BY AUTOMATED COUNT: 47.9 FL (ref 35.9–50)
FOLATE SERPL-MCNC: 9.4 NG/ML
GLUCOSE SERPL-MCNC: 136 MG/DL (ref 65–99)
HCT VFR BLD AUTO: 28.3 % (ref 42–52)
HGB BLD-MCNC: 9.1 G/DL (ref 14–18)
INR PPP: 2.67 (ref 0.87–1.13)
IRON SATN MFR SERPL: ABNORMAL % (ref 15–55)
IRON SERPL-MCNC: <10 UG/DL (ref 50–180)
LACTATE BLD-SCNC: 1.1 MMOL/L (ref 0.5–2)
LACTATE BLD-SCNC: 1.1 MMOL/L (ref 0.5–2)
LACTATE BLD-SCNC: 1.8 MMOL/L (ref 0.5–2)
LYMPHOCYTES # BLD AUTO: 0.66 K/UL (ref 1–4.8)
LYMPHOCYTES NFR BLD: 4.3 % (ref 22–41)
MANUAL DIFF BLD: NORMAL
MCH RBC QN AUTO: 27.8 PG (ref 27–33)
MCHC RBC AUTO-ENTMCNC: 32.2 G/DL (ref 33.7–35.3)
MCV RBC AUTO: 86.5 FL (ref 81.4–97.8)
MONOCYTES # BLD AUTO: 1.19 K/UL (ref 0–0.85)
MONOCYTES NFR BLD AUTO: 7.8 % (ref 0–13.4)
MORPHOLOGY BLD-IMP: NORMAL
NEUTROPHILS # BLD AUTO: 13.45 K/UL (ref 1.82–7.42)
NEUTROPHILS NFR BLD: 80.9 % (ref 44–72)
NEUTS BAND NFR BLD MANUAL: 7 % (ref 0–10)
NRBC # BLD AUTO: 0 K/UL
NRBC BLD-RTO: 0 /100 WBC
OSMOLALITY SERPL: 275 MOSM/KG H2O (ref 278–298)
OSMOLALITY UR: 534 MOSM/KG H2O (ref 300–900)
PLATELET # BLD AUTO: 311 K/UL (ref 164–446)
PLATELET BLD QL SMEAR: NORMAL
PMV BLD AUTO: 9.3 FL (ref 9–12.9)
POIKILOCYTOSIS BLD QL SMEAR: NORMAL
POLYCHROMASIA BLD QL SMEAR: NORMAL
POTASSIUM SERPL-SCNC: 4 MMOL/L (ref 3.6–5.5)
PROTHROMBIN TIME: 29.4 SEC (ref 12–14.6)
RBC # BLD AUTO: 3.27 M/UL (ref 4.7–6.1)
RBC BLD AUTO: PRESENT
SODIUM SERPL-SCNC: 127 MMOL/L (ref 135–145)
SODIUM UR-SCNC: 28 MMOL/L
TIBC SERPL-MCNC: 183 UG/DL (ref 250–450)
VIT B12 SERPL-MCNC: 1136 PG/ML (ref 211–911)
WBC # BLD AUTO: 15.3 K/UL (ref 4.8–10.8)

## 2019-08-19 PROCEDURE — 700102 HCHG RX REV CODE 250 W/ 637 OVERRIDE(OP): Performed by: HOSPITALIST

## 2019-08-19 PROCEDURE — 82607 VITAMIN B-12: CPT

## 2019-08-19 PROCEDURE — A9270 NON-COVERED ITEM OR SERVICE: HCPCS | Performed by: HOSPITALIST

## 2019-08-19 PROCEDURE — 83930 ASSAY OF BLOOD OSMOLALITY: CPT

## 2019-08-19 PROCEDURE — 700105 HCHG RX REV CODE 258: Performed by: SURGERY

## 2019-08-19 PROCEDURE — 700111 HCHG RX REV CODE 636 W/ 250 OVERRIDE (IP): Performed by: HOSPITALIST

## 2019-08-19 PROCEDURE — 85610 PROTHROMBIN TIME: CPT

## 2019-08-19 PROCEDURE — 83540 ASSAY OF IRON: CPT

## 2019-08-19 PROCEDURE — 93970 EXTREMITY STUDY: CPT

## 2019-08-19 PROCEDURE — 84300 ASSAY OF URINE SODIUM: CPT

## 2019-08-19 PROCEDURE — A9270 NON-COVERED ITEM OR SERVICE: HCPCS | Performed by: INTERNAL MEDICINE

## 2019-08-19 PROCEDURE — 99223 1ST HOSP IP/OBS HIGH 75: CPT | Performed by: INTERNAL MEDICINE

## 2019-08-19 PROCEDURE — 83935 ASSAY OF URINE OSMOLALITY: CPT

## 2019-08-19 PROCEDURE — 36415 COLL VENOUS BLD VENIPUNCTURE: CPT

## 2019-08-19 PROCEDURE — 80061 LIPID PANEL: CPT

## 2019-08-19 PROCEDURE — 770006 HCHG ROOM/CARE - MED/SURG/GYN SEMI*

## 2019-08-19 PROCEDURE — 99233 SBSQ HOSP IP/OBS HIGH 50: CPT | Performed by: HOSPITALIST

## 2019-08-19 PROCEDURE — 82746 ASSAY OF FOLIC ACID SERUM: CPT

## 2019-08-19 PROCEDURE — 85730 THROMBOPLASTIN TIME PARTIAL: CPT

## 2019-08-19 PROCEDURE — 700105 HCHG RX REV CODE 258: Performed by: HOSPITALIST

## 2019-08-19 PROCEDURE — 83605 ASSAY OF LACTIC ACID: CPT | Mod: 91

## 2019-08-19 PROCEDURE — 83550 IRON BINDING TEST: CPT

## 2019-08-19 PROCEDURE — 700102 HCHG RX REV CODE 250 W/ 637 OVERRIDE(OP): Performed by: INTERNAL MEDICINE

## 2019-08-19 RX ORDER — HEPARIN SODIUM 1000 [USP'U]/ML
5000 INJECTION, SOLUTION INTRAVENOUS; SUBCUTANEOUS ONCE
Status: COMPLETED | OUTPATIENT
Start: 2019-08-19 | End: 2019-08-19

## 2019-08-19 RX ORDER — SODIUM CHLORIDE, SODIUM LACTATE, POTASSIUM CHLORIDE, CALCIUM CHLORIDE 600; 310; 30; 20 MG/100ML; MG/100ML; MG/100ML; MG/100ML
INJECTION, SOLUTION INTRAVENOUS CONTINUOUS
Status: DISCONTINUED | OUTPATIENT
Start: 2019-08-19 | End: 2019-08-19

## 2019-08-19 RX ORDER — SODIUM CHLORIDE 9 MG/ML
INJECTION, SOLUTION INTRAVENOUS CONTINUOUS
Status: DISCONTINUED | OUTPATIENT
Start: 2019-08-19 | End: 2019-08-21

## 2019-08-19 RX ORDER — ATORVASTATIN CALCIUM 40 MG/1
40 TABLET, FILM COATED ORAL EVERY EVENING
Status: DISCONTINUED | OUTPATIENT
Start: 2019-08-19 | End: 2019-08-26 | Stop reason: HOSPADM

## 2019-08-19 RX ORDER — SODIUM CHLORIDE 9 MG/ML
30 INJECTION, SOLUTION INTRAVENOUS
Status: DISCONTINUED | OUTPATIENT
Start: 2019-08-19 | End: 2019-08-21

## 2019-08-19 RX ORDER — SODIUM CHLORIDE 9 MG/ML
500 INJECTION, SOLUTION INTRAVENOUS
Status: DISCONTINUED | OUTPATIENT
Start: 2019-08-19 | End: 2019-08-21

## 2019-08-19 RX ORDER — HEPARIN SODIUM 1000 [USP'U]/ML
2600 INJECTION, SOLUTION INTRAVENOUS; SUBCUTANEOUS PRN
Status: DISCONTINUED | OUTPATIENT
Start: 2019-08-19 | End: 2019-08-20

## 2019-08-19 RX ORDER — CEFAZOLIN SODIUM 2 G/100ML
2 INJECTION, SOLUTION INTRAVENOUS EVERY 8 HOURS
Status: DISCONTINUED | OUTPATIENT
Start: 2019-08-19 | End: 2019-08-19

## 2019-08-19 RX ORDER — RIFAMPIN 300 MG/1
300 CAPSULE ORAL 3 TIMES DAILY
Status: DISCONTINUED | OUTPATIENT
Start: 2019-08-19 | End: 2019-08-26 | Stop reason: HOSPADM

## 2019-08-19 RX ORDER — HEPARIN SODIUM 5000 [USP'U]/100ML
INJECTION, SOLUTION INTRAVENOUS CONTINUOUS
Status: DISCONTINUED | OUTPATIENT
Start: 2019-08-19 | End: 2019-08-20

## 2019-08-19 RX ORDER — CEFAZOLIN SODIUM 2 G/100ML
2 INJECTION, SOLUTION INTRAVENOUS EVERY 12 HOURS
Status: DISCONTINUED | OUTPATIENT
Start: 2019-08-20 | End: 2019-08-23

## 2019-08-19 RX ADMIN — OXYCODONE HYDROCHLORIDE 10 MG: 5 TABLET ORAL at 12:54

## 2019-08-19 RX ADMIN — OXYCODONE HYDROCHLORIDE 10 MG: 5 TABLET ORAL at 19:53

## 2019-08-19 RX ADMIN — HYDROMORPHONE HYDROCHLORIDE 0.5 MG: 1 INJECTION, SOLUTION INTRAMUSCULAR; INTRAVENOUS; SUBCUTANEOUS at 10:37

## 2019-08-19 RX ADMIN — HEPARIN SODIUM 1050 UNITS/HR: 5000 INJECTION, SOLUTION INTRAVENOUS at 12:36

## 2019-08-19 RX ADMIN — HYDROMORPHONE HYDROCHLORIDE 0.5 MG: 1 INJECTION, SOLUTION INTRAMUSCULAR; INTRAVENOUS; SUBCUTANEOUS at 22:34

## 2019-08-19 RX ADMIN — PIPERACILLIN AND TAZOBACTAM 3.38 G: 3; .375 INJECTION, POWDER, LYOPHILIZED, FOR SOLUTION INTRAVENOUS; PARENTERAL at 05:22

## 2019-08-19 RX ADMIN — SENNOSIDES, DOCUSATE SODIUM 2 TABLET: 50; 8.6 TABLET, FILM COATED ORAL at 18:13

## 2019-08-19 RX ADMIN — SENNOSIDES, DOCUSATE SODIUM 2 TABLET: 50; 8.6 TABLET, FILM COATED ORAL at 05:22

## 2019-08-19 RX ADMIN — SODIUM CHLORIDE: 9 INJECTION, SOLUTION INTRAVENOUS at 22:37

## 2019-08-19 RX ADMIN — RIFAMPIN 300 MG: 300 CAPSULE ORAL at 18:13

## 2019-08-19 RX ADMIN — SODIUM CHLORIDE, POTASSIUM CHLORIDE, SODIUM LACTATE AND CALCIUM CHLORIDE: 600; 310; 30; 20 INJECTION, SOLUTION INTRAVENOUS at 12:41

## 2019-08-19 RX ADMIN — HEPARIN SODIUM 5000 UNITS: 1000 INJECTION, SOLUTION INTRAVENOUS; SUBCUTANEOUS at 12:29

## 2019-08-19 RX ADMIN — CEFAZOLIN SODIUM 2 G: 2 INJECTION, SOLUTION INTRAVENOUS at 09:57

## 2019-08-19 RX ADMIN — SODIUM CHLORIDE: 900 INJECTION INTRAVENOUS at 05:22

## 2019-08-19 RX ADMIN — CEFAZOLIN SODIUM 2 G: 2 INJECTION, SOLUTION INTRAVENOUS at 15:46

## 2019-08-19 RX ADMIN — ATORVASTATIN CALCIUM 40 MG: 40 TABLET, FILM COATED ORAL at 22:36

## 2019-08-19 ASSESSMENT — ENCOUNTER SYMPTOMS
STRIDOR: 0
CONSTIPATION: 0
SENSORY CHANGE: 1
ABDOMINAL PAIN: 1
ORTHOPNEA: 0
POLYDIPSIA: 0
FEVER: 1
COUGH: 0
FALLS: 0
SORE THROAT: 0
MYALGIAS: 0
PHOTOPHOBIA: 0
HEMOPTYSIS: 0
SINUS PAIN: 0
WEAKNESS: 0
SHORTNESS OF BREATH: 0
MYALGIAS: 1
NECK PAIN: 0
CHILLS: 1
BLOOD IN STOOL: 0
VOMITING: 1
BLURRED VISION: 0
DIAPHORESIS: 0
VOMITING: 0
CLAUDICATION: 1
SPUTUM PRODUCTION: 0
PALPITATIONS: 0
FEVER: 0
FLANK PAIN: 0
TINGLING: 0
TREMORS: 0
HEADACHES: 0
DIZZINESS: 0
DOUBLE VISION: 0
CHILLS: 0
DIARRHEA: 0
EYE PAIN: 0
PSYCHIATRIC NEGATIVE: 1
PND: 0
BACK PAIN: 0
NAUSEA: 1
BRUISES/BLEEDS EASILY: 0
WHEEZING: 0
HEARTBURN: 0

## 2019-08-19 NOTE — ED NOTES
Pt called friend: Eileen - best friend of late significant other. [phone] to updated her on his condition and plan for admission. She is going to let family know .

## 2019-08-19 NOTE — ASSESSMENT & PLAN NOTE
This is sepsis (without associated acute organ dysfunction).   Source infected left groin vascular graft, bacteremia, left knee septic arthritis  Source control with I&D left groin, excision of infected graft, left knee washout  Antibiotic guidance per infectious disease team  Continue close clinical monitoring

## 2019-08-19 NOTE — CARE PLAN
Problem: Safety  Goal: Will remain free from falls  Outcome: PROGRESSING AS EXPECTED    Pt is a moderate fall risk, fall precautions in place     Problem: Knowledge Deficit  Goal: Knowledge of the prescribed therapeutic regimen will improve  Outcome: PROGRESSING AS EXPECTED    Discussed POC with patient, all questions answered

## 2019-08-19 NOTE — ASSESSMENT & PLAN NOTE
Currently with occluded grafts of the left lower extremity with poor circulation on presentation  Status post revascularization by vascular surgery  Vascular surgery following, defer anticoagulation needs to vascular surgery, medical management to vascular surgery

## 2019-08-19 NOTE — ASSESSMENT & PLAN NOTE
Status post washout per orthopedics team  Continue antibiotics per ID team    Pain management with oral oxycodone, as needed IV morphine, monitor for adverse effects due to the use of morphine

## 2019-08-19 NOTE — CONSULTS
[facility] INFECTIOUS DISEASES INPATIENT CONSULT NOTE     Date of Service: 8/19/2019    Consult Requested By: Anu Jaquez M.D.    Reason for Consultation: Infected grafts, MSSA bacteremia    Chief Complaint: Abdominal pain    History of Present Illness:     Dc Patel is a 74 y.o. male admitted 8/18/2019.  Patient has a past medical history of peripheral arterial disease on Coumadin, osteoarthritis, CKD stage III.  Patient underwent a fem-fem bypass graft placement approximately 6 weeks prior.  Patient presented to the ER with dull lower abdominal pain over the course of his femoral to femoral bypass graft, along with left knee pain and left foot numbness.  Patient has had multiple lower extremity revascularization procedures.  Per vascular surgery report, patient has at least one iliac stent on the left side apparently for an aneurysm.  Patient also reportedly has aortic stent grafts in place, which cannot be removed. He also has a history of right to left femoral to femoral bypass graft and left femoral to below-knee popliteal bypass graft both performed on 7/3/2019 by Dr. Grande.  Due to his arterial disease, patient also had amputation of the left second and third toes in the past.    In the ER, patient had a white count of 19.4 and was febrile to 101.9.  Patient was started on IV vancomycin and Zosyn, blood cultures returned positive for MSSA, antibiotics changed to IV Ancef monotherapy.    CTA on 8/18 showed complete occlusion of the left femoral-popliteal bypass grafts with gas within the lumen fluid surrounding the graft.    Dr. Grande plans to remove the infected femoral-femoral bypass and left femoral popliteal bypass graft, and perform redo femoral-femoral bypass and left leg bypass using CryoVeins.  This is scheduled for tomorrow.    Patient also notes severe left knee pain, swelling, inability to even slightly move the joint.    Review of Systems:  All other systems reviewed and are negative expect  as noted in HPI    Past Medical History:   Diagnosis Date   • PVD (peripheral vascular disease) (HCC)        Past Surgical History:   Procedure Laterality Date   • FEMORAL FEMORAL BYPASS Bilateral 7/3/2019    Procedure: CREATION, BYPASS, ARTERIAL, FEMORAL TO FEMORAL, USING GRAFT;  Surgeon: Ana Grande M.D.;  Location: SURGERY [facility];  Service: General   • FEMORAL POPLITEAL BYPASS Left 7/3/2019    Procedure: CREATION, BYPASS, ARTERIAL, FEMORAL TO POPLITEAL, USING GRAFT;  Surgeon: Ana Grande M.D.;  Location: SURGERY [facility];  Service: General   • ANGIOGRAM Left 7/3/2019    Procedure: ANGIOGRAM;  Surgeon: Ana Grande M.D.;  Location: SURGERY [facility];  Service: General   • TOE AMPUTATION Left 10/23/2018    Procedure: TOE AMPUTATION 3RD  POSS 2ND  ;  Surgeon: Ana Grande M.D.;  Location: SURGERY [facility];  Service: General       Family history  Reviewed and not pertinent.      Social History     Socioeconomic History   • Marital status:      Spouse name: Not on file   • Number of children: Not on file   • Years of education: Not on file   • Highest education level: Not on file   Occupational History   • Not on file   Social Needs   • Financial resource strain: Not on file   • Food insecurity:     Worry: Not on file     Inability: Not on file   • Transportation needs:     Medical: Not on file     Non-medical: Not on file   Tobacco Use   • Smoking status: Former Smoker   • Smokeless tobacco: Never Used   Substance and Sexual Activity   • Alcohol use: Not Currently   • Drug use: Not Currently     Types: Inhaled     Comment: THC   • Sexual activity: Not on file   Lifestyle   • Physical activity:     Days per week: Not on file     Minutes per session: Not on file   • Stress: Not on file   Relationships   • Social connections:     Talks on phone: Not on file     Gets together: Not on file     Attends Sikh service: Not on file     Active member of club or  organization: Not on file     Attends meetings of clubs or organizations: Not on file     Relationship status: Not on file   • Intimate partner violence:     Fear of current or ex partner: Not on file     Emotionally abused: Not on file     Physically abused: Not on file     Forced sexual activity: Not on file   Other Topics Concern   • Not on file   Social History Narrative   • Not on file       No Known Allergies    Medications:    Current Facility-Administered Medications:   •  NS infusion 2,280 mL, 30 mL/kg, Intravenous, Once PRN, Jaylon Henry M.D.  •  NS (BOLUS) infusion 500 mL, 500 mL, Intravenous, Once PRN, Jaylon Henry M.D.  •  ceFAZolin in dextrose (ANCEF) IVPB premix 2 g, 2 g, Intravenous, Q8HRS, Anu Jaquez M.D., Stopped at 08/19/19 1027  •  lactated ringers infusion, , Intravenous, Continuous, Ana Grande M.D., Last Rate: 100 mL/hr at 08/19/19 1241  •  [COMPLETED] heparin injection 5,000 Units, 5,000 Units, Intravenous, Once, 5,000 Units at 08/19/19 1229 **AND** heparin injection 2,600 Units, 2,600 Units, Intravenous, PRN **AND** heparin infusion 25,000 units in 500 mL 0.45% NACL, , Intravenous, Continuous, Last Rate: 21 mL/hr at 08/19/19 1236, 1,050 Units/hr at 08/19/19 1236 **AND** Protocol 440 Heparin Weight Based DO NOT GIVE ANY HEPARIN BOLUS TO STROKE PATIENT, , , CONTINUOUS **AND** Protocol 440 Heparin Weight Based Discontinue Enoxaparin (Lovenox), Dabigatran (Pradaxa), Rivaroxaban (Xarelto), Apixaban (Eliquis), Edoxaban (Savaysa, Lixiana), Fondaparinux (Arixtra) and Argatroban prior to heparin administration, , , CONTINUOUS **AND** Protocol 440 Heparin Weight Based Draw baseline aPTT, PT, and platelet count if not already done, , , CONTINUOUS **AND** Protocol 440 Heparin Weight Based Draw aPTT 6 hours after beginning infusion. , , , CONTINUOUS **AND** Protocol 440 Heparin Weight Based Draw Platelet count every three days. Contact MD if platelet is 50% lower than baseline count., , ,  "CONTINUOUS **AND** Protocol 440 Heparin Weight Based Record Patient Data, , , CONTINUOUS **AND** Protocol 440 Heparin Weight Based INSTRUCTIONS, , , CONTINUOUS **AND** Protocol 440 Heparin Weight Based Review aPTT results 6 hours after infusion is begun as detailed, , , CONTINUOUS **AND** Protocol 440 Heparin Weight Based Adjust heparin to maintain aPTT between 55-96 sec, , , CONTINUOUS **AND** Protocol 440 Heparin Weight Based Order aPTT 6 hours after any rate change or hold until aPTT is therapeutic (55-96 seconds), , , CONTINUOUS **AND** Protocol 440 Heparin Weight Based Documentation and verification, , , CONTINUOUS, Anu Jaquez M.D.  •  senna-docusate (PERICOLACE or SENOKOT S) 8.6-50 MG per tablet 2 Tab, 2 Tab, Oral, BID, 2 Tab at 19 0522 **AND** polyethylene glycol/lytes (MIRALAX) PACKET 1 Packet, 1 Packet, Oral, QDAY PRN **AND** magnesium hydroxide (MILK OF MAGNESIA) suspension 30 mL, 30 mL, Oral, QDAY PRN **AND** bisacodyl (DULCOLAX) suppository 10 mg, 10 mg, Rectal, QDAY PRN, Jaylon Henry M.D.  •  Respiratory Care per Protocol, , Nebulization, Continuous RT, Jaylon Henry M.D.  •  ondansetron (ZOFRAN) syringe/vial injection 4 mg, 4 mg, Intravenous, Q4HRS PRN, Jaylon Henry M.D.  •  ondansetron (ZOFRAN ODT) dispertab 4 mg, 4 mg, Oral, Q4HRS PRN, Jaylon Henry M.D.  •  oxyCODONE immediate-release (ROXICODONE) tablet 5-10 mg, 5-10 mg, Oral, Q4HRS PRN, Jaylon Henry M.D., 10 mg at 19 1254  •  HYDROmorphone pf (DILAUDID) injection 0.5 mg, 0.5 mg, Intravenous, Q4HRS PRN, Jaylon Henry M.D., 0.5 mg at 19 1037  •  acetaminophen (TYLENOL) tablet 650 mg, 650 mg, Oral, Q6HRS PRN, Anastasia Farmer M.D., 650 mg at 19 4991    Physical Exam:   Vital Signs: /80   Pulse 98   Temp 37.3 °C (99.1 °F) (Oral)   Resp 17   Ht 1.702 m (5' 7\")   Wt 68.6 kg (151 lb 3.8 oz)   SpO2 95%   BMI 23.69 kg/m²   Temp  Av.5 °C (99.5 °F)  Min: 36.6 °C (97.8 °F)  Max: 38.8 °C " (101.9 °F)  Vital signs reviewed  Constitutional: Patient is oriented to person, place, and time. Appears malnourished and slightly unkempt  Head: Atraumatic, normocephalic  Eyes: Conjunctivae normal, EOM intact. Pupils are equal, round, and reactive to light.   Mouth/Throat: Lips without lesions, oropharynx is clear and moist.  Neck: Neck supple. No masses/lymphadenopathy  Cardiovascular: Normal rate, regular rhythm, normal S1S2 and intact distal pulses. No murmur, gallop, or friction rub. No pedal edema.  Pulmonary/Chest: No respiratory distress. Unlabored respiratory effort, lungs clear to auscultation. No wheezes or rales.   Abdominal: Soft, non tender.  Bilateral groin incisions with swelling and redness, mild TTP left side.    Musculoskeletal: Left knee swollen and exquisitely tender to even slight movement.  Left proximal medial leg incision with no gross external evidence of infection  Neurological: Alert and oriented to person, place, and time. No gross cranial nerve deficit. No focal neural deficit noted  Skin: Skin is warm and dry.  Multiple excoriations noted bilateral upper extremities  Psychiatric: Slightly emotionally labile    LABS:  Recent Labs     08/18/19  1340 08/18/19  2344   WBC 19.4* 15.3*      Recent Labs     08/18/19  1340 08/18/19  2344   HEMOGLOBIN 9.4* 9.1*   HEMATOCRIT 28.7* 28.3*   MCV 88.0 86.5   MCH 28.8 27.8   MACROCYTOSIS 1+  --    ANISOCYTOSIS 1+ 1+   PLATELETCT 326 311       Recent Labs     08/18/19  1340 08/18/19  2344   SODIUM 131* 127*   POTASSIUM 4.1 4.0   CHLORIDE 96 95*   CO2 28 24   CREATININE 1.17 1.25        Recent Labs     08/18/19  1340   ALBUMIN 3.2        MICRO:  No results found for: BLOODCULTU, BLDCULT, BCHOLD     Latest pertinent labs were reviewed    IMAGING STUDIES:  CTA left lower extremity with complete occlusion of the left femoral-popliteal bypass grafts with gas within the lumen fluid surrounding the graft.    Hospital Course/Assessment:   cD Patel is  a 74 y.o. male with a history of peripheral artery disease on Coumadin, CKD stage III, history of multiple revascularization procedures, admitted with infected left femoral-femoral and femoral-popliteal bypass grafts, MSSA bacteremia    Pertinent Diagnoses:  Sepsis  MSSA bacteremia  Infected vascular grafts  Left knee swelling, suspicious for septic arthritis  History of blood clots  Chronic anticoagulant use  CKD stage III    Plan:   -Continue IV Ancef.  Will renally dose to 2 g every 12 hours  -Will add p.o. rifampin 300 mg 3 times daily.  Note interaction with Coumadin.  Will need close monitoring of INR  -Obtain TTE  -Recommend orthopedics consult for left knee tap given concerning exam findings for septic arthritis in setting of MSSA bacteremia  -Will follow repeat blood cultures from today  -Will follow operative report.  Given his multiple grafts in place including an aortic stent graft which cannot be removed per vascular surgery, patient will likely need chronic p.o. suppression following 6 weeks of IV antibiotics    Plan was discussed with the primary team, Dr. Jaquez    ID will follow. Please feel free to call with questions.    Harvinder Head M.D.    Please note that this dictation was created using voice recognition software. I have worked with technical experts from [facility] to optimize the interface.  I have made every reasonable attempt to correct obvious errors, but there may be errors of grammar and possibly content that I did not discover before finalizing the note.

## 2019-08-19 NOTE — ASSESSMENT & PLAN NOTE
Avoid IV iron given underlying infectious issues    In addition there is associated anemia of acute blood loss postoperative, operative losses    Monitor Hb / Restrictive transfusion strategy    Iron / MV supplementation ordered     PRBC today

## 2019-08-19 NOTE — H&P
Hospital Medicine History & Physical Note    Date of Service  8/18/2019    Primary Care Physician  No primary care provider on file.    Consultants  Dr dennis vascular surgery    Code Status  full    Chief Complaint  Abdominal pain    History of Presenting Illness  74 y.o. male who presented 8/18/2019 with past medical history of peripheral arterial disease, osteoarthritis, chronic anticoagulation, CKD stage 3 on Coumadin comes to the emergency department stating that the is having some abdominal pain.  It is in the lower abdomen, dull pain , intensity 5 out of 10.  Patient underwent a femoral to femoral bypass graft approximately 6 weeks ago.  There is also associated with left leg numbing pain       Patient is on Coumadin and his INR is 3.5      Review of Systems  Review of Systems   Constitutional: Negative for chills and fever.   HENT: Negative for hearing loss and tinnitus.    Eyes: Negative for blurred vision.   Respiratory: Negative for cough and hemoptysis.    Cardiovascular: Negative for chest pain, palpitations and orthopnea.   Gastrointestinal: Positive for abdominal pain and nausea. Negative for vomiting.   Genitourinary: Negative for dysuria, frequency and urgency.   Musculoskeletal: Positive for joint pain (Left knee) and myalgias.   Skin: Negative for rash.   Neurological: Positive for sensory change.   Psychiatric/Behavioral: Negative.        Past Medical History   has a past medical history of PVD (peripheral vascular disease) (HCC).    Surgical History   has a past surgical history that includes toe amputation (Left, 10/23/2018); femoral femoral bypass (Bilateral, 7/3/2019); femoral popliteal bypass (Left, 7/3/2019); and angiogram (Left, 7/3/2019).     Family History  The family history of cancer     Social History   reports that he has quit smoking. He has never used smokeless tobacco. He reports that he drank alcohol. He reports that he has current or past drug history. Drug:  Inhaled.    Allergies  No Known Allergies    Medications  Prior to Admission Medications   Prescriptions Last Dose Informant Patient Reported? Taking?   Multiple Vitamins-Minerals (CENTRUM SILVER) Tab 8/17/2019 at 0900 Patient Yes No   Sig: Take 1 Tab by mouth every day.   acetaminophen (TYLENOL) 325 MG Tab 8/16/2019 at 1930 Patient No No   Sig: Take 2 Tabs by mouth every 6 hours as needed (Mild Pain; (Pain scale 1-3); Temp greater than 100.5 F).   aspirin (ASA) 81 MG Chew Tab chewable tablet 8/17/2019 at 0900 Patient No No   Sig: Take 1 Tab by mouth every day.   atorvastatin (LIPITOR) 20 MG Tab 8/17/2019 at 0900 Patient No No   Sig: Take 1 Tab by mouth every day.   brimonidine (ALPHAGAN) 0.2 % Solution 8/18/2019 at 0900 Patient No No   Sig: Place 1 Drop in right eye 2 Times a Day.   timolol (TIMOPTIC) 0.5 % Solution 8/18/2019 at 0900 Patient No No   Sig: Place 1 Drop in right eye 2 Times a Day.   warfarin (COUMADIN) 5 MG Tab 8/18/2019 at 0900 Patient Yes No   Sig: Take 5 mg by mouth every day.      Facility-Administered Medications: None       Physical Exam  Temp:  [37.4 °C (99.4 °F)-37.8 °C (100 °F)] 37.4 °C (99.4 °F)  Pulse:  [] 107  Resp:  [16-20] 17  BP: (107-150)/(61-89) 137/86  SpO2:  [70 %-98 %] 94 %    Physical Exam   Constitutional: He is oriented to person, place, and time. He appears distressed.   Disheveled   HENT:   Head: Normocephalic and atraumatic.   Mouth/Throat: No oropharyngeal exudate.   Eyes: Pupils are equal, round, and reactive to light. EOM are normal. Left eye exhibits no discharge. No scleral icterus.   Neck: No JVD present. No thyromegaly present.   Cardiovascular: Normal rate, regular rhythm and intact distal pulses. Exam reveals no gallop and no friction rub.   No murmur heard.  Pulmonary/Chest: Effort normal and breath sounds normal. No respiratory distress. He has no wheezes. He has no rales.   Abdominal: Soft. Bowel sounds are normal. He exhibits distension. There is  tenderness.   Musculoskeletal: Normal range of motion. He exhibits edema (Trace edema both lower extremities.  Multiple excoriations in both lower extremity), tenderness (Left knee) and deformity (Left second and third toe amputation).   Neurological: He is alert and oriented to person, place, and time. No cranial nerve deficit. Coordination normal.   Psychiatric: He has a normal mood and affect. His behavior is normal.       Laboratory:  Recent Labs     08/18/19  1340   WBC 19.4*   RBC 3.26*   HEMOGLOBIN 9.4*   HEMATOCRIT 28.7*   MCV 88.0   MCH 28.8   MCHC 32.8*   RDW 47.6   PLATELETCT 326   MPV 9.4     Recent Labs     08/18/19  1340   SODIUM 131*   POTASSIUM 4.1   CHLORIDE 96   CO2 28   GLUCOSE 155*   BUN 27*   CREATININE 1.17   CALCIUM 8.8     Recent Labs     08/18/19  1340   ALTSGPT 24   ASTSGOT 24   ALKPHOSPHAT 110*   TBILIRUBIN 0.3   GLUCOSE 155*     Recent Labs     08/18/19  1340   INR 3.50*     No results for input(s): NTPROBNP in the last 72 hours.      No results for input(s): TROPONINT in the last 72 hours.    Urinalysis:    No results found     Imaging:  CT-CTA LOWER EXT WITH & W/O-POST PROCESS LEFT   Final Result         1. Completely occluded left femoral-popliteal bypass graft with gas within the lumen and fluid surrounding the graft. The finding is concerning for infected occluded graft.      2. Partially visualized femoral-femoral bypass that appears patent. There is small amount of clot in the junction between the femorofemoral bypass graft and the common femoral artery.      3. Complete occlusion of the native superficial femoral artery from the mid to distal thigh at the area of prior stenting.      Complete occlusion of the left popliteal artery.      Partial flow reconstitution at the trifurcation but flow is very sluggish. No distal flow flow appreciated in the anterior tibial artery, posterior tibial artery and peroneal artery at 10 cm from the ankle.         CRITICAL RESULT READ BACK:  Preliminary findings discussed with and critical read back performed by Dr. BERNA PARR in the Emergency Department via telephone on 8/18/2019 3:51 PM      US-EXTREMITY VENOUS LOWER UNILAT LEFT   Final Result      US-EXTREMITY ARTERY LOWER UNILAT LEFT   Final Result            Assessment/Plan:  I anticipate this patient will require at least two midnights for appropriate medical management, necessitating inpatient admission.    * Infected prosthetic vascular graft (HCC)- (present on admission)  Assessment & Plan  Patient is septic        IV antibiotics Zosyn and IV vancomycin    Follow CBC    Surgery on case for surgical intervention    PAD (peripheral artery disease) ([facility])- (present on admission)  Assessment & Plan  Status post bypass    Hold Coumadin for upcoming procedures    CKD (chronic kidney disease) stage 3, GFR 30-59 ml/min ([facility])- (present on admission)  Assessment & Plan  Renally dose all medication    Follow BMP    Osteoarthritis of left knee- (present on admission)  Assessment & Plan  Pain control with Tylenol and oxycodone    Chronic anticoagulation- (present on admission)  Assessment & Plan  Held for impending surgery    Sepsis (HCC)- (present on admission)  Assessment & Plan  This is sepsis (without associated acute organ dysfunction).     IV antibiotics as above    Follow lactic acid trend    Saline boluses as needed    Dyslipidemia- (present on admission)  Assessment & Plan  Continue statin      VTE prophylaxis: scd

## 2019-08-19 NOTE — ASSESSMENT & PLAN NOTE
Patient was on anticoagulation for reported DVT at [place] in [place], per Dr. Grande  INR supratherapeutic on presentation, improved now  DVT studies negative here  At this time anticoagulation is held    I have discussed with Dr. Grande from vascular surgery, aspirin and Plavix from his perspective, findings of DVT requiring anticoagulation than aspirin and anticoagulation should be continued while Plavix held.

## 2019-08-19 NOTE — PROGRESS NOTES
Seen and examined.  Radiologic study was reviewed.    There is erythema over fem-fem bypass graft.  Left knee is also tender to palpation.  The left foot is cool with monophasic Doppler flow signals.  Patient denies pain in left foot.    I had a long discussion with patient.  I recommended that he undergoes removal of infected fem-fem bypass and left fem-pop bypass grafts, redo fem-fem bypass and left leg bypass using Cryoveins, and intraoperative angiogram.  Risks and benefits were discussed.  Risks include, but not limited to, bleeding, recurrent / persistent infection, graft thrombosis, heart attack, and perioperative anesthetic complications.  I told patient that more than likely I would have to leave the wounds opened due to infection and let the wounds healed by secondary intention.  The alternative of not having the procedures done was also discussed.  With this option, there is a risk of sepsis, death, and limb loss.  Patient indicated understanding and would like to proceed.  All questions were answered.    I contacted the rep to have the veins ordered in.  Patient is on the OR schedule for tomorrow.  I ordered Heparin gtt weight-based protocol to be started in the meantime.    Discussed with RN.    I also contacted Dr. Jaquez and recommended getting ID consult as patient has aortic stent grafts which cannot be removed and therefore would need to be on life-long suppression antibiotics after this acute episode.

## 2019-08-19 NOTE — DISCHARGE PLANNING
Care Transition Team Assessment    Information Source  Orientation : Oriented x 4  Information Given By: Patient  Informant's Name: (Gene)  Who is responsible for making decisions for patient? : Patient    Readmission Evaluation  Is this a readmission?: No    Elopement Risk  Legal Hold: No  Ambulatory or Self Mobile in Wheelchair: Yes  Disoriented: No  Psychiatric Symptoms: None  History of Wandering: No  Elopement this Admit: No  Vocalizing Wanting to Leave: No  Displays Behaviors, Body Language Wanting to Leave: No-Not at Risk for Elopement  Elopement Risk: Not at Risk for Elopement    Interdisciplinary Discharge Planning  Patient or legal guardian wants to designate a caregiver (see row info): No    Discharge Preparedness  What is your plan after discharge?: Home with help  What are your discharge supports?: Spouse  Prior Functional Level: Independent with Activities of Daily Living    Functional Assesment  Prior Functional Level: Independent with Activities of Daily Living    Finances  Financial Barriers to Discharge: No  Prescription Coverage: Yes    Vision / Hearing Impairment  Vision Impairment : Yes  Right Eye Vision: Impaired  Left Eye Vision: Impaired  Hearing Impairment : Yes  Hearing Impairment: Both Ears  Does Pt Need Special Equipment for the Hearing Impaired?: No         Advance Directive  Advance Directive?: None  Advance Directive offered?: AD Booklet refused    Domestic Abuse  Have you ever been the victim of abuse or violence?: No  Physical Abuse or Sexual Abuse: No  Verbal Abuse or Emotional Abuse: No  Possible Abuse Reported to:: Not Applicable    Psychological Assessment  History of Substance Abuse: Alcohol  Substance Abuse Comments: (Patient Self Reports hx of Alcohol & Inhalers.)  History of Psychiatric Problems: No    Discharge Risks or Barriers  Discharge risks or barriers?: No    Anticipated Discharge Information  Anticipated discharge disposition: Home

## 2019-08-19 NOTE — ASSESSMENT & PLAN NOTE
Occluded and infected with MSSA  Status post surgical excision and revascularization by Dr. Grande from vascular surgery this stay    Patient on IV antibiotic therapy  Vascular interventions, postoperative follow-up/care per vascular surgery    Antibiotics per ID team  Continue close clinical monitoring

## 2019-08-19 NOTE — ED NOTES
Transport here to take patient to floor.     RN called kitchen to have pt's preferred dinner delivered to [facility] room.

## 2019-08-19 NOTE — PROGRESS NOTES
Patient arrived to [place] [address] via [facility] with transport at 1915. Patient transferred from [facility] to bed via slide board  AOx4  LLE pain rated at 3/10, aggravated with movement, repositioned.  4 L of O2 via nasal cannula   Left popliteal pulse heard via doppler.  Left foot cool to touch, cap refill less than 2 seconds  Patient states he cannot ambulate due to LLE pain  Patient is tachycardic in   No complaints of pain at this time, regular diet  IV fluids + zosyn infusing  Oriented to room call light and smoking policy.  Reviewed plan of care (equipment, incentive spirometer, sequential compression devices, medications, activity, diet, fall precautions, skin care, and pain) with patient. Welcome packet given and reviewed with, all questions answered.

## 2019-08-20 ENCOUNTER — ANESTHESIA (OUTPATIENT)
Dept: SURGERY | Facility: MEDICAL CENTER | Age: 74
DRG: 252 | End: 2019-08-20
Payer: MEDICARE

## 2019-08-20 ENCOUNTER — ANESTHESIA EVENT (OUTPATIENT)
Dept: SURGERY | Facility: MEDICAL CENTER | Age: 74
DRG: 252 | End: 2019-08-20
Payer: MEDICARE

## 2019-08-20 ENCOUNTER — APPOINTMENT (OUTPATIENT)
Dept: RADIOLOGY | Facility: MEDICAL CENTER | Age: 74
DRG: 252 | End: 2019-08-20
Attending: SURGERY
Payer: MEDICARE

## 2019-08-20 PROBLEM — R78.81 MSSA BACTEREMIA: Status: ACTIVE | Noted: 2019-08-19

## 2019-08-20 PROBLEM — M00.062 STAPHYLOCOCCAL ARTHRITIS OF LEFT KNEE (HCC): Status: ACTIVE | Noted: 2019-08-19

## 2019-08-20 PROBLEM — A41.01 SEPSIS DUE TO METHICILLIN SUSCEPTIBLE STAPHYLOCOCCUS AUREUS (HCC): Status: ACTIVE | Noted: 2019-08-19

## 2019-08-20 PROBLEM — B95.61 MSSA BACTEREMIA: Status: ACTIVE | Noted: 2019-08-19

## 2019-08-20 LAB
ABO GROUP BLD: NORMAL
APPEARANCE FLD: NORMAL
APTT PPP: 100.4 SEC (ref 24.7–36)
APTT PPP: 126.2 SEC (ref 24.7–36)
APTT PPP: 60 SEC (ref 24.7–36)
BARCODED ABORH UBTYP: 5100
BARCODED ABORH UBTYP: 5100
BARCODED ABORH UBTYP: 6200
BARCODED PRD CODE UBPRD: NORMAL
BARCODED UNIT NUM UBUNT: NORMAL
BLD GP AB SCN SERPL QL: NORMAL
BODY FLD TYPE: NORMAL
BODY FLD TYPE: NORMAL
CHOLEST SERPL-MCNC: 82 MG/DL (ref 100–199)
COLOR FLD: NORMAL
COMPONENT F 8504F: NORMAL
COMPONENT FT 8504FT: NORMAL
COMPONENT R 8504R: NORMAL
COMPONENT R 8504R: NORMAL
CRYSTALS FLD MICRO: NORMAL
EKG IMPRESSION: NORMAL
GRAM STN SPEC: NORMAL
HDLC SERPL-MCNC: 7 MG/DL
INR PPP: 2.26 (ref 0.87–1.13)
INR PPP: 2.49 (ref 0.87–1.13)
INR PPP: 2.76 (ref 0.87–1.13)
INR PPP: 3.04 (ref 0.87–1.13)
LDLC SERPL CALC-MCNC: 28 MG/DL
NEUTROPHILS NFR FLD: 100 %
PRODUCT TYPE UPROD: NORMAL
PROTHROMBIN TIME: 25.7 SEC (ref 12–14.6)
PROTHROMBIN TIME: 27.8 SEC (ref 12–14.6)
PROTHROMBIN TIME: 30.2 SEC (ref 12–14.6)
PROTHROMBIN TIME: 32.6 SEC (ref 12–14.6)
RBC # FLD: NORMAL CELLS/UL
RH BLD: NORMAL
SIGNIFICANT IND 70042: NORMAL
SITE SITE: NORMAL
SOURCE SOURCE: NORMAL
TRIGL SERPL-MCNC: 236 MG/DL (ref 0–149)
UNIT STATUS USTAT: NORMAL
WBC # FLD: NORMAL CELLS/UL

## 2019-08-20 PROCEDURE — A9270 NON-COVERED ITEM OR SERVICE: HCPCS | Performed by: HOSPITALIST

## 2019-08-20 PROCEDURE — 0YB80ZZ EXCISION OF LEFT FEMORAL REGION, OPEN APPROACH: ICD-10-PCS | Performed by: SURGERY

## 2019-08-20 PROCEDURE — A9270 NON-COVERED ITEM OR SERVICE: HCPCS | Performed by: SURGERY

## 2019-08-20 PROCEDURE — 93010 ELECTROCARDIOGRAM REPORT: CPT | Performed by: INTERNAL MEDICINE

## 2019-08-20 PROCEDURE — 501445 HCHG STAPLER, SKIN DISP: Performed by: SURGERY

## 2019-08-20 PROCEDURE — P9017 PLASMA 1 DONOR FRZ W/IN 8 HR: HCPCS | Mod: 91

## 2019-08-20 PROCEDURE — 700105 HCHG RX REV CODE 258: Performed by: ANESTHESIOLOGY

## 2019-08-20 PROCEDURE — 302129 PCA PLUS: Performed by: SURGERY

## 2019-08-20 PROCEDURE — 700101 HCHG RX REV CODE 250: Performed by: ANESTHESIOLOGY

## 2019-08-20 PROCEDURE — 20610 DRAIN/INJ JOINT/BURSA W/O US: CPT | Mod: LT

## 2019-08-20 PROCEDURE — 82803 BLOOD GASES ANY COMBINATION: CPT | Mod: 91

## 2019-08-20 PROCEDURE — 0S9D3ZZ DRAINAGE OF LEFT KNEE JOINT, PERCUTANEOUS APPROACH: ICD-10-PCS | Performed by: ORTHOPAEDIC SURGERY

## 2019-08-20 PROCEDURE — 770006 HCHG ROOM/CARE - MED/SURG/GYN SEMI*

## 2019-08-20 PROCEDURE — 99232 SBSQ HOSP IP/OBS MODERATE 35: CPT | Performed by: INTERNAL MEDICINE

## 2019-08-20 PROCEDURE — 160036 HCHG PACU - EA ADDL 30 MINS PHASE I: Performed by: SURGERY

## 2019-08-20 PROCEDURE — 500440 HCHG DRESSING, STERILE ROLL (KERLIX): Performed by: SURGERY

## 2019-08-20 PROCEDURE — 700105 HCHG RX REV CODE 258

## 2019-08-20 PROCEDURE — A9270 NON-COVERED ITEM OR SERVICE: HCPCS | Performed by: INTERNAL MEDICINE

## 2019-08-20 PROCEDURE — 36415 COLL VENOUS BLD VENIPUNCTURE: CPT

## 2019-08-20 PROCEDURE — 89060 EXAM SYNOVIAL FLUID CRYSTALS: CPT

## 2019-08-20 PROCEDURE — 160002 HCHG RECOVERY MINUTES (STAT): Performed by: SURGERY

## 2019-08-20 PROCEDURE — 700111 HCHG RX REV CODE 636 W/ 250 OVERRIDE (IP): Performed by: ANESTHESIOLOGY

## 2019-08-20 PROCEDURE — 700111 HCHG RX REV CODE 636 W/ 250 OVERRIDE (IP): Performed by: HOSPITALIST

## 2019-08-20 PROCEDURE — 700111 HCHG RX REV CODE 636 W/ 250 OVERRIDE (IP): Performed by: SURGERY

## 2019-08-20 PROCEDURE — 700117 HCHG RX CONTRAST REV CODE 255: Performed by: SURGERY

## 2019-08-20 PROCEDURE — C1757 CATH, THROMBECTOMY/EMBOLECT: HCPCS | Performed by: SURGERY

## 2019-08-20 PROCEDURE — 700105 HCHG RX REV CODE 258: Performed by: HOSPITALIST

## 2019-08-20 PROCEDURE — 160029 HCHG SURGERY MINUTES - 1ST 30 MINS LEVEL 4: Performed by: SURGERY

## 2019-08-20 PROCEDURE — 84295 ASSAY OF SERUM SODIUM: CPT | Mod: 91

## 2019-08-20 PROCEDURE — 82330 ASSAY OF CALCIUM: CPT

## 2019-08-20 PROCEDURE — C1725 CATH, TRANSLUMIN NON-LASER: HCPCS | Performed by: SURGERY

## 2019-08-20 PROCEDURE — 89051 BODY FLUID CELL COUNT: CPT

## 2019-08-20 PROCEDURE — 85014 HEMATOCRIT: CPT | Mod: 91

## 2019-08-20 PROCEDURE — 502594 HCHG SCISSOR HANDLE, HARMONIC ACE: Performed by: SURGERY

## 2019-08-20 PROCEDURE — 86900 BLOOD TYPING SEROLOGIC ABO: CPT

## 2019-08-20 PROCEDURE — 700111 HCHG RX REV CODE 636 W/ 250 OVERRIDE (IP): Performed by: INTERNAL MEDICINE

## 2019-08-20 PROCEDURE — 82947 ASSAY GLUCOSE BLOOD QUANT: CPT | Mod: 91

## 2019-08-20 PROCEDURE — P9016 RBC LEUKOCYTES REDUCED: HCPCS

## 2019-08-20 PROCEDURE — 87205 SMEAR GRAM STAIN: CPT

## 2019-08-20 PROCEDURE — A6402 STERILE GAUZE <= 16 SQ IN: HCPCS | Performed by: SURGERY

## 2019-08-20 PROCEDURE — 85730 THROMBOPLASTIN TIME PARTIAL: CPT

## 2019-08-20 PROCEDURE — 160041 HCHG SURGERY MINUTES - EA ADDL 1 MIN LEVEL 4: Performed by: SURGERY

## 2019-08-20 PROCEDURE — 87186 SC STD MICRODIL/AGAR DIL: CPT

## 2019-08-20 PROCEDURE — 30233N1 TRANSFUSION OF NONAUTOLOGOUS RED BLOOD CELLS INTO PERIPHERAL VEIN, PERCUTANEOUS APPROACH: ICD-10-PCS | Performed by: INTERNAL MEDICINE

## 2019-08-20 PROCEDURE — 86923 COMPATIBILITY TEST ELECTRIC: CPT

## 2019-08-20 PROCEDURE — A4314 CATH W/DRAINAGE 2-WAY LATEX: HCPCS | Performed by: SURGERY

## 2019-08-20 PROCEDURE — B41G1ZZ FLUOROSCOPY OF LEFT LOWER EXTREMITY ARTERIES USING LOW OSMOLAR CONTRAST: ICD-10-PCS | Performed by: SURGERY

## 2019-08-20 PROCEDURE — 93005 ELECTROCARDIOGRAM TRACING: CPT | Performed by: SURGERY

## 2019-08-20 PROCEDURE — 160009 HCHG ANES TIME/MIN: Performed by: SURGERY

## 2019-08-20 PROCEDURE — 84132 ASSAY OF SERUM POTASSIUM: CPT

## 2019-08-20 PROCEDURE — 700112 HCHG RX REV CODE 229: Performed by: SURGERY

## 2019-08-20 PROCEDURE — 36430 TRANSFUSION BLD/BLD COMPNT: CPT

## 2019-08-20 PROCEDURE — 160048 HCHG OR STATISTICAL LEVEL 1-5: Performed by: SURGERY

## 2019-08-20 PROCEDURE — 501837 HCHG SUTURE CV: Performed by: SURGERY

## 2019-08-20 PROCEDURE — 700102 HCHG RX REV CODE 250 W/ 637 OVERRIDE(OP): Performed by: SURGERY

## 2019-08-20 PROCEDURE — 700102 HCHG RX REV CODE 250 W/ 637 OVERRIDE(OP): Performed by: HOSPITALIST

## 2019-08-20 PROCEDURE — 04CN0ZZ EXTIRPATION OF MATTER FROM LEFT POPLITEAL ARTERY, OPEN APPROACH: ICD-10-PCS | Performed by: SURGERY

## 2019-08-20 PROCEDURE — 87075 CULTR BACTERIA EXCEPT BLOOD: CPT

## 2019-08-20 PROCEDURE — 86850 RBC ANTIBODY SCREEN: CPT

## 2019-08-20 PROCEDURE — 500367 HCHG DRAIN KIT, HEMOVAC: Performed by: SURGERY

## 2019-08-20 PROCEDURE — 502720 HCHG STAPLER,SKIN 35W PREMIUM: Performed by: SURGERY

## 2019-08-20 PROCEDURE — 160035 HCHG PACU - 1ST 60 MINS PHASE I: Performed by: SURGERY

## 2019-08-20 PROCEDURE — 87070 CULTURE OTHR SPECIMN AEROBIC: CPT

## 2019-08-20 PROCEDURE — 86901 BLOOD TYPING SEROLOGIC RH(D): CPT

## 2019-08-20 PROCEDURE — 502000 HCHG MISC OR IMPLANTS RC 0278: Performed by: SURGERY

## 2019-08-20 PROCEDURE — 700102 HCHG RX REV CODE 250 W/ 637 OVERRIDE(OP): Performed by: INTERNAL MEDICINE

## 2019-08-20 PROCEDURE — 30233K1 TRANSFUSION OF NONAUTOLOGOUS FROZEN PLASMA INTO PERIPHERAL VEIN, PERCUTANEOUS APPROACH: ICD-10-PCS | Performed by: INTERNAL MEDICINE

## 2019-08-20 PROCEDURE — 041L0KL BYPASS LEFT FEMORAL ARTERY TO POPLITEAL ARTERY WITH NONAUTOLOGOUS TISSUE SUBSTITUTE, OPEN APPROACH: ICD-10-PCS | Performed by: SURGERY

## 2019-08-20 PROCEDURE — 99233 SBSQ HOSP IP/OBS HIGH 50: CPT | Performed by: INTERNAL MEDICINE

## 2019-08-20 PROCEDURE — 85610 PROTHROMBIN TIME: CPT

## 2019-08-20 PROCEDURE — 501838 HCHG SUTURE GENERAL: Performed by: SURGERY

## 2019-08-20 PROCEDURE — 87077 CULTURE AEROBIC IDENTIFY: CPT

## 2019-08-20 DEVICE — IMPLANTABLE DEVICE: Type: IMPLANTABLE DEVICE | Status: FUNCTIONAL

## 2019-08-20 RX ORDER — LABETALOL HYDROCHLORIDE 5 MG/ML
5 INJECTION, SOLUTION INTRAVENOUS
Status: DISCONTINUED | OUTPATIENT
Start: 2019-08-20 | End: 2019-08-20 | Stop reason: HOSPADM

## 2019-08-20 RX ORDER — ACETAMINOPHEN 500 MG
500 TABLET ORAL EVERY 4 HOURS PRN
Status: DISCONTINUED | OUTPATIENT
Start: 2019-08-20 | End: 2019-08-23

## 2019-08-20 RX ORDER — METOPROLOL TARTRATE 1 MG/ML
1 INJECTION, SOLUTION INTRAVENOUS
Status: DISCONTINUED | OUTPATIENT
Start: 2019-08-20 | End: 2019-08-20 | Stop reason: HOSPADM

## 2019-08-20 RX ORDER — ONDANSETRON 2 MG/ML
4 INJECTION INTRAMUSCULAR; INTRAVENOUS
Status: DISCONTINUED | OUTPATIENT
Start: 2019-08-20 | End: 2019-08-20 | Stop reason: HOSPADM

## 2019-08-20 RX ORDER — HEPARIN SODIUM,PORCINE 1000/ML
VIAL (ML) INJECTION
Status: DISCONTINUED | OUTPATIENT
Start: 2019-08-20 | End: 2019-08-20 | Stop reason: HOSPADM

## 2019-08-20 RX ORDER — SODIUM CHLORIDE, SODIUM LACTATE, POTASSIUM CHLORIDE, CALCIUM CHLORIDE 600; 310; 30; 20 MG/100ML; MG/100ML; MG/100ML; MG/100ML
INJECTION, SOLUTION INTRAVENOUS
Status: DISCONTINUED | OUTPATIENT
Start: 2019-08-20 | End: 2019-08-20 | Stop reason: SURG

## 2019-08-20 RX ORDER — SODIUM CHLORIDE, SODIUM GLUCONATE, SODIUM ACETATE, POTASSIUM CHLORIDE AND MAGNESIUM CHLORIDE 526; 502; 368; 37; 30 MG/100ML; MG/100ML; MG/100ML; MG/100ML; MG/100ML
INJECTION, SOLUTION INTRAVENOUS
Status: DISCONTINUED | OUTPATIENT
Start: 2019-08-20 | End: 2019-08-20 | Stop reason: SURG

## 2019-08-20 RX ORDER — SODIUM CHLORIDE 9 MG/ML
INJECTION, SOLUTION INTRAVENOUS
Status: COMPLETED
Start: 2019-08-20 | End: 2019-08-20

## 2019-08-20 RX ORDER — SCOLOPAMINE TRANSDERMAL SYSTEM 1 MG/1
1 PATCH, EXTENDED RELEASE TRANSDERMAL
Status: DISCONTINUED | OUTPATIENT
Start: 2019-08-20 | End: 2019-08-21

## 2019-08-20 RX ORDER — ONDANSETRON 2 MG/ML
INJECTION INTRAMUSCULAR; INTRAVENOUS PRN
Status: DISCONTINUED | OUTPATIENT
Start: 2019-08-20 | End: 2019-08-20 | Stop reason: SURG

## 2019-08-20 RX ORDER — DEXAMETHASONE SODIUM PHOSPHATE 4 MG/ML
INJECTION, SOLUTION INTRA-ARTICULAR; INTRALESIONAL; INTRAMUSCULAR; INTRAVENOUS; SOFT TISSUE PRN
Status: DISCONTINUED | OUTPATIENT
Start: 2019-08-20 | End: 2019-08-21 | Stop reason: HOSPADM

## 2019-08-20 RX ORDER — ONDANSETRON 2 MG/ML
4 INJECTION INTRAMUSCULAR; INTRAVENOUS EVERY 4 HOURS PRN
Status: DISCONTINUED | OUTPATIENT
Start: 2019-08-20 | End: 2019-08-26 | Stop reason: HOSPADM

## 2019-08-20 RX ORDER — HYDRALAZINE HYDROCHLORIDE 20 MG/ML
5 INJECTION INTRAMUSCULAR; INTRAVENOUS
Status: DISCONTINUED | OUTPATIENT
Start: 2019-08-20 | End: 2019-08-20 | Stop reason: HOSPADM

## 2019-08-20 RX ORDER — ASPIRIN 325 MG
325 TABLET ORAL DAILY
Status: DISCONTINUED | OUTPATIENT
Start: 2019-08-20 | End: 2019-08-24

## 2019-08-20 RX ORDER — HYDROMORPHONE HYDROCHLORIDE 1 MG/ML
0.1 INJECTION, SOLUTION INTRAMUSCULAR; INTRAVENOUS; SUBCUTANEOUS
Status: DISCONTINUED | OUTPATIENT
Start: 2019-08-20 | End: 2019-08-20 | Stop reason: HOSPADM

## 2019-08-20 RX ORDER — HALOPERIDOL 5 MG/ML
1 INJECTION INTRAMUSCULAR EVERY 6 HOURS PRN
Status: DISCONTINUED | OUTPATIENT
Start: 2019-08-20 | End: 2019-08-21

## 2019-08-20 RX ORDER — CALCIUM CHLORIDE 100 MG/ML
INJECTION INTRAVENOUS; INTRAVENTRICULAR PRN
Status: DISCONTINUED | OUTPATIENT
Start: 2019-08-20 | End: 2019-08-20 | Stop reason: SURG

## 2019-08-20 RX ORDER — OXYCODONE HCL 5 MG/5 ML
10 SOLUTION, ORAL ORAL
Status: DISCONTINUED | OUTPATIENT
Start: 2019-08-20 | End: 2019-08-20 | Stop reason: HOSPADM

## 2019-08-20 RX ORDER — HEPARIN SODIUM,PORCINE 1000/ML
VIAL (ML) INJECTION PRN
Status: DISCONTINUED | OUTPATIENT
Start: 2019-08-20 | End: 2019-08-20 | Stop reason: SURG

## 2019-08-20 RX ORDER — HYDRALAZINE HYDROCHLORIDE 20 MG/ML
INJECTION INTRAMUSCULAR; INTRAVENOUS
Status: DISPENSED
Start: 2019-08-20 | End: 2019-08-21

## 2019-08-20 RX ORDER — SODIUM CHLORIDE, SODIUM LACTATE, POTASSIUM CHLORIDE, CALCIUM CHLORIDE 600; 310; 30; 20 MG/100ML; MG/100ML; MG/100ML; MG/100ML
INJECTION, SOLUTION INTRAVENOUS CONTINUOUS
Status: DISCONTINUED | OUTPATIENT
Start: 2019-08-20 | End: 2019-08-20 | Stop reason: HOSPADM

## 2019-08-20 RX ORDER — LABETALOL HYDROCHLORIDE 5 MG/ML
10 INJECTION, SOLUTION INTRAVENOUS EVERY 4 HOURS PRN
Status: DISCONTINUED | OUTPATIENT
Start: 2019-08-20 | End: 2019-08-26 | Stop reason: HOSPADM

## 2019-08-20 RX ORDER — PROTAMINE SULFATE 10 MG/ML
INJECTION, SOLUTION INTRAVENOUS PRN
Status: DISCONTINUED | OUTPATIENT
Start: 2019-08-20 | End: 2019-08-20 | Stop reason: SURG

## 2019-08-20 RX ORDER — METOCLOPRAMIDE HYDROCHLORIDE 5 MG/ML
INJECTION INTRAMUSCULAR; INTRAVENOUS PRN
Status: DISCONTINUED | OUTPATIENT
Start: 2019-08-20 | End: 2019-08-20 | Stop reason: SURG

## 2019-08-20 RX ORDER — HALOPERIDOL 5 MG/ML
1 INJECTION INTRAMUSCULAR
Status: DISCONTINUED | OUTPATIENT
Start: 2019-08-20 | End: 2019-08-20 | Stop reason: HOSPADM

## 2019-08-20 RX ORDER — PHENYLEPHRINE HYDROCHLORIDE 10 MG/ML
INJECTION, SOLUTION INTRAMUSCULAR; INTRAVENOUS; SUBCUTANEOUS PRN
Status: DISCONTINUED | OUTPATIENT
Start: 2019-08-20 | End: 2019-08-20 | Stop reason: SURG

## 2019-08-20 RX ORDER — OXYCODONE HCL 5 MG/5 ML
5 SOLUTION, ORAL ORAL
Status: DISCONTINUED | OUTPATIENT
Start: 2019-08-20 | End: 2019-08-20 | Stop reason: HOSPADM

## 2019-08-20 RX ORDER — HYDRALAZINE HYDROCHLORIDE 20 MG/ML
10 INJECTION INTRAMUSCULAR; INTRAVENOUS EVERY 4 HOURS PRN
Status: DISCONTINUED | OUTPATIENT
Start: 2019-08-20 | End: 2019-08-26 | Stop reason: HOSPADM

## 2019-08-20 RX ORDER — MAGNESIUM SULFATE HEPTAHYDRATE 40 MG/ML
INJECTION, SOLUTION INTRAVENOUS PRN
Status: DISCONTINUED | OUTPATIENT
Start: 2019-08-20 | End: 2019-08-20 | Stop reason: SURG

## 2019-08-20 RX ORDER — CEFAZOLIN SODIUM 1 G/3ML
INJECTION, POWDER, FOR SOLUTION INTRAMUSCULAR; INTRAVENOUS PRN
Status: DISCONTINUED | OUTPATIENT
Start: 2019-08-20 | End: 2019-08-20 | Stop reason: SURG

## 2019-08-20 RX ORDER — HYDROMORPHONE HYDROCHLORIDE 1 MG/ML
0.2 INJECTION, SOLUTION INTRAMUSCULAR; INTRAVENOUS; SUBCUTANEOUS
Status: DISCONTINUED | OUTPATIENT
Start: 2019-08-20 | End: 2019-08-20 | Stop reason: HOSPADM

## 2019-08-20 RX ORDER — DOCUSATE SODIUM 100 MG/1
100 CAPSULE, LIQUID FILLED ORAL 2 TIMES DAILY
Status: DISCONTINUED | OUTPATIENT
Start: 2019-08-20 | End: 2019-08-21

## 2019-08-20 RX ORDER — DEXAMETHASONE SODIUM PHOSPHATE 4 MG/ML
4 INJECTION, SOLUTION INTRA-ARTICULAR; INTRALESIONAL; INTRAMUSCULAR; INTRAVENOUS; SOFT TISSUE
Status: DISCONTINUED | OUTPATIENT
Start: 2019-08-20 | End: 2019-08-21

## 2019-08-20 RX ORDER — MEPERIDINE HYDROCHLORIDE 25 MG/ML
12.5 INJECTION INTRAMUSCULAR; INTRAVENOUS; SUBCUTANEOUS
Status: DISCONTINUED | OUTPATIENT
Start: 2019-08-20 | End: 2019-08-20 | Stop reason: HOSPADM

## 2019-08-20 RX ORDER — SODIUM CHLORIDE, SODIUM LACTATE, POTASSIUM CHLORIDE, CALCIUM CHLORIDE 600; 310; 30; 20 MG/100ML; MG/100ML; MG/100ML; MG/100ML
INJECTION, SOLUTION INTRAVENOUS CONTINUOUS
Status: DISCONTINUED | OUTPATIENT
Start: 2019-08-20 | End: 2019-08-21

## 2019-08-20 RX ORDER — HYDROMORPHONE HYDROCHLORIDE 1 MG/ML
0.4 INJECTION, SOLUTION INTRAMUSCULAR; INTRAVENOUS; SUBCUTANEOUS
Status: DISCONTINUED | OUTPATIENT
Start: 2019-08-20 | End: 2019-08-20 | Stop reason: HOSPADM

## 2019-08-20 RX ORDER — KETAMINE HYDROCHLORIDE 50 MG/ML
INJECTION, SOLUTION INTRAMUSCULAR; INTRAVENOUS PRN
Status: DISCONTINUED | OUTPATIENT
Start: 2019-08-20 | End: 2019-08-20 | Stop reason: SURG

## 2019-08-20 RX ADMIN — RIFAMPIN 300 MG: 300 CAPSULE ORAL at 22:41

## 2019-08-20 RX ADMIN — KETAMINE HYDROCHLORIDE 50 MG: 50 INJECTION, SOLUTION INTRAMUSCULAR; INTRAVENOUS at 16:04

## 2019-08-20 RX ADMIN — FENTANYL CITRATE 50 MCG: 50 INJECTION, SOLUTION INTRAMUSCULAR; INTRAVENOUS at 16:54

## 2019-08-20 RX ADMIN — CALCIUM CHLORIDE 1 G: 100 INJECTION INTRAVENOUS; INTRAVENTRICULAR at 18:03

## 2019-08-20 RX ADMIN — FENTANYL CITRATE 50 MCG: 0.05 INJECTION, SOLUTION INTRAMUSCULAR; INTRAVENOUS at 19:58

## 2019-08-20 RX ADMIN — PROTAMINE SULFATE 50 MG: 10 INJECTION, SOLUTION INTRAVENOUS at 18:52

## 2019-08-20 RX ADMIN — Medication: at 22:33

## 2019-08-20 RX ADMIN — SENNOSIDES, DOCUSATE SODIUM 2 TABLET: 50; 8.6 TABLET, FILM COATED ORAL at 05:14

## 2019-08-20 RX ADMIN — DOCUSATE SODIUM 100 MG: 100 CAPSULE, LIQUID FILLED ORAL at 22:25

## 2019-08-20 RX ADMIN — SODIUM CHLORIDE: 9 INJECTION, SOLUTION INTRAVENOUS at 10:33

## 2019-08-20 RX ADMIN — SODIUM CHLORIDE, SODIUM GLUCONATE, SODIUM ACETATE, POTASSIUM CHLORIDE AND MAGNESIUM CHLORIDE: 526; 502; 368; 37; 30 INJECTION, SOLUTION INTRAVENOUS at 14:55

## 2019-08-20 RX ADMIN — FENTANYL CITRATE 100 MCG: 50 INJECTION, SOLUTION INTRAMUSCULAR; INTRAVENOUS at 15:23

## 2019-08-20 RX ADMIN — HYDRALAZINE HYDROCHLORIDE 5 MG: 20 INJECTION INTRAMUSCULAR; INTRAVENOUS at 20:11

## 2019-08-20 RX ADMIN — KETAMINE HYDROCHLORIDE 50 MG: 50 INJECTION, SOLUTION INTRAMUSCULAR; INTRAVENOUS at 18:08

## 2019-08-20 RX ADMIN — CEFAZOLIN 2 G: 330 INJECTION, POWDER, FOR SOLUTION INTRAMUSCULAR; INTRAVENOUS at 14:47

## 2019-08-20 RX ADMIN — RIFAMPIN 300 MG: 300 CAPSULE ORAL at 12:19

## 2019-08-20 RX ADMIN — METOCLOPRAMIDE 10 MG: 5 INJECTION, SOLUTION INTRAMUSCULAR; INTRAVENOUS at 19:28

## 2019-08-20 RX ADMIN — FENTANYL CITRATE 50 MCG: 50 INJECTION, SOLUTION INTRAMUSCULAR; INTRAVENOUS at 15:45

## 2019-08-20 RX ADMIN — DEXAMETHASONE SODIUM PHOSPHATE 4 MG: 4 INJECTION, SOLUTION INTRA-ARTICULAR; INTRALESIONAL; INTRAMUSCULAR; INTRAVENOUS; SOFT TISSUE at 14:47

## 2019-08-20 RX ADMIN — CEFAZOLIN 2 G: 10 INJECTION, POWDER, FOR SOLUTION INTRAVENOUS at 22:26

## 2019-08-20 RX ADMIN — SODIUM CHLORIDE, POTASSIUM CHLORIDE, SODIUM LACTATE AND CALCIUM CHLORIDE: 600; 310; 30; 20 INJECTION, SOLUTION INTRAVENOUS at 14:43

## 2019-08-20 RX ADMIN — ONDANSETRON 4 MG: 2 INJECTION INTRAMUSCULAR; INTRAVENOUS at 19:28

## 2019-08-20 RX ADMIN — MIDAZOLAM HYDROCHLORIDE 2 MG: 1 INJECTION, SOLUTION INTRAMUSCULAR; INTRAVENOUS at 14:43

## 2019-08-20 RX ADMIN — ASPIRIN 325 MG: 325 TABLET, FILM COATED ORAL at 22:25

## 2019-08-20 RX ADMIN — RIFAMPIN 300 MG: 300 CAPSULE ORAL at 05:14

## 2019-08-20 RX ADMIN — SODIUM CHLORIDE 500 ML: 9 INJECTION, SOLUTION INTRAVENOUS at 12:19

## 2019-08-20 RX ADMIN — PROPOFOL 150 MG: 10 INJECTION, EMULSION INTRAVENOUS at 14:47

## 2019-08-20 RX ADMIN — HEPARIN SODIUM 7000 UNITS: 1000 INJECTION, SOLUTION INTRAVENOUS; SUBCUTANEOUS at 16:51

## 2019-08-20 RX ADMIN — CEFAZOLIN 2 G: 10 INJECTION, POWDER, FOR SOLUTION INTRAVENOUS at 05:11

## 2019-08-20 RX ADMIN — CEFAZOLIN 2 G: 330 INJECTION, POWDER, FOR SOLUTION INTRAMUSCULAR; INTRAVENOUS at 18:47

## 2019-08-20 RX ADMIN — PHENYLEPHRINE HYDROCHLORIDE 100 MCG: 10 INJECTION INTRAVENOUS at 14:56

## 2019-08-20 RX ADMIN — KETAMINE HYDROCHLORIDE 50 MG: 50 INJECTION, SOLUTION INTRAMUSCULAR; INTRAVENOUS at 16:56

## 2019-08-20 RX ADMIN — OXYCODONE HYDROCHLORIDE 10 MG: 5 TABLET ORAL at 09:52

## 2019-08-20 RX ADMIN — MAGNESIUM SULFATE IN WATER 2 G: 40 INJECTION, SOLUTION INTRAVENOUS at 14:47

## 2019-08-20 RX ADMIN — HYDRALAZINE HYDROCHLORIDE 5 MG: 20 INJECTION INTRAMUSCULAR; INTRAVENOUS at 19:51

## 2019-08-20 RX ADMIN — HEPARIN SODIUM 4000 UNITS: 1000 INJECTION, SOLUTION INTRAVENOUS; SUBCUTANEOUS at 17:48

## 2019-08-20 RX ADMIN — ROCURONIUM BROMIDE 100 MG: 10 INJECTION, SOLUTION INTRAVENOUS at 14:47

## 2019-08-20 RX ADMIN — ATORVASTATIN CALCIUM 40 MG: 40 TABLET, FILM COATED ORAL at 22:25

## 2019-08-20 RX ADMIN — HYDRALAZINE HYDROCHLORIDE 5 MG: 20 INJECTION INTRAMUSCULAR; INTRAVENOUS at 19:57

## 2019-08-20 RX ADMIN — KETAMINE HYDROCHLORIDE 50 MG: 50 INJECTION, SOLUTION INTRAMUSCULAR; INTRAVENOUS at 14:43

## 2019-08-20 RX ADMIN — FENTANYL CITRATE 50 MCG: 50 INJECTION, SOLUTION INTRAMUSCULAR; INTRAVENOUS at 19:11

## 2019-08-20 RX ADMIN — FENTANYL CITRATE 50 MCG: 0.05 INJECTION, SOLUTION INTRAMUSCULAR; INTRAVENOUS at 20:00

## 2019-08-20 RX ADMIN — IRON SUCROSE 200 MG: 20 INJECTION, SOLUTION INTRAVENOUS at 05:10

## 2019-08-20 RX ADMIN — HYDROMORPHONE HYDROCHLORIDE 0.5 MG: 1 INJECTION, SOLUTION INTRAMUSCULAR; INTRAVENOUS; SUBCUTANEOUS at 05:07

## 2019-08-20 ASSESSMENT — LIFESTYLE VARIABLES: SUBSTANCE_ABUSE: 0

## 2019-08-20 ASSESSMENT — ENCOUNTER SYMPTOMS
HEADACHES: 0
BLURRED VISION: 0
WEIGHT LOSS: 0
NAUSEA: 0
PND: 0
CONSTIPATION: 0
PALPITATIONS: 0
COUGH: 0
MYALGIAS: 1
FALLS: 0
FOCAL WEAKNESS: 0
FEVER: 0
SHORTNESS OF BREATH: 0
DEPRESSION: 0
DIARRHEA: 0
VOMITING: 0
FLANK PAIN: 0
NAUSEA: 1
ABDOMINAL PAIN: 0
DOUBLE VISION: 0
HEMOPTYSIS: 0
BLOOD IN STOOL: 0
CHILLS: 0
DIZZINESS: 0

## 2019-08-20 NOTE — ASSESSMENT & PLAN NOTE
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

## 2019-08-20 NOTE — PROGRESS NOTES
LIMB PRESERVATION SERVICE     Received consult for diabetic foot ulcer    73 y/o male with PVD, CKD III.  Admitted for infected prosthetic vascular graft.  S/P right to left femoral to femoral bypass graft, left superficial femoral and popliteal artery thrombectomy, left femoral to below-knee popliteal bypass graft by Dr. Grande on 7/3/2019.   Also had L 2nd and 3rd toe amputations 10/2018 by Dr. Grande that have healed.   Presents with left knee pain with effusion.      Patient is being followed by Dr. Grande for vascular, Dr. Cifuentes for left knee effusion and infectious disease.  Scheduled for surgery today with Dr. Grande to remove infected grafts, redo bypass and Dr. Cifuentes for left knee arthrotomy.        does not have diabetes.  Does not have any foot ulcers.  Denies neuropathy except to L foot which started around June 2019    Sensation intact to R foot, diminished to L foot  R foot: palpable PT  L foot: warm, unable to palpate pedal pulses      Per Lazaro RN, purulent drainage noted from L groin. Foot was cold and ischemic yesterday. Heparin drip started and ischemia has improved. Audible pedal pulses with doppler        Plan  NPO- surgery today with Dr. Grande/ Dr. Cifuentes  High risk for pressure injury.  Pressure injury protocol  heel float boots bilaterally  No further LPS needs at this time.  LPS to sign off.

## 2019-08-20 NOTE — PROGRESS NOTES
"Pt A&O x4. Tearful, crying. Pt can quickly start/ stop crying on demand. Pt states, \"My woman just passed away 8 days ago\".     Vitals: /72   Pulse 84   Temp 37.3 °C (99.2 °F) (Temporal)   Resp 18   Ht 1.702 m (5' 7\")   Wt 68.6 kg (151 lb 3.8 oz)   SpO2 95%   BMI 23.69 kg/m²     Pt rates pain 5 out of 10. Medicated for pain.    Neuro: SANDRA. Reports \"occasional\" numbness/ tingling in LLE.     Cardiac: Denies new onset of chest pain.    Vascular: Pulses 2+ BUE, RLE. Pedal pulse heard by doppler on LLE. 1+ edema noted to LLE, tight, warm, pink. No complaints on right side.     Respiratory: Lungs sound rhonchi in apices, diminished in bases. Pulling 1500 on IS, strong effort. On 4L O2 NC, added humidification.  on, satting in 90's. Denies SOB.    GI: Abdomen rounded. - BM today. On regular diet, tolerating well. NPO at midnight for scheduled surgery. - nausea/ vomiting.    : Pt voiding adequately. Using bedside urinal.     MSK: Pt refusing to get OOB, states, \"the pain in my left knee/ leg is too bad\". Left knee swollen, painful.     Integumentary: Generalize scabbing noted throughout. Bilateral groin scars, pink, intact. LLE pink/ pale, swelling, warm.     Labs noted.    Fall precautions in place: Bed locked in lowest position, Upper bed rails up, treaded socks in place, personal belongings within reach, call light within reach, appropriate mobility signs in place, + bed alarm. Pt calls appropriately.     Pt updated on POC.   "

## 2019-08-20 NOTE — PROGRESS NOTES
Mera from Lab called with critical APTT result of 117.5 at 1828. Critical lab result read back to Kelsea.

## 2019-08-20 NOTE — PROGRESS NOTES
C/o left knee pain  Large effusion  Aspirated at bedside  50 cc purulent fluid, sent to lab for cell count, gram stain/culture  Probable arthrotomy/drainage today at time of vascular surgery

## 2019-08-20 NOTE — DIETARY
"Nutrition services: Day 2 of admit.  Dc Patel is a 74 y.o. male with admitting DX of Infected prosthetic vascular graft, initial encounter (HCC)  Hx of peripheral arterial disease, osteoarthritis, CKD stage 3, on coumadin, weight loss PTA.    Consult received for MST score = 2, weight loss PTA, reduced PO intake PTA.    Assessment:  Height: 170.2 cm (5' 7\")  Weight: 68.6 kg (151 lb 3.8 oz)  Body mass index is 23.69 kg/m²., BMI classification: WNL  Diet/Intake: NPO    Evaluation:   1. Patient is currently NPO for procedure this afternoon.  2. Pt reports a weight loss over the last 10-14 days of ~15 lbs d/t complete loss of appetite r/t increased pain.  Pt states his SO recently passed away by overdosing on \"all of my pain meds, so there wasn't anything left for me to take\".  Pt tearful during interview with dietitian today.   3. Pt states that when his pain is under control, he does all of his own grocery shopping and prepares all meals at home.  Prior to losing SO, patient tolerated COW, sandwiches, beef stir gupta, pot roast, \"meat and potato dishes\" very well, he states.   4. % wt change x2 weeks = 9; severe loss r/t inadequate nutrition status.  5. On visual exam, patient noted to have prominent muscle wasting to temple region (slight depression), clavicle bone region (protrusion), dorsal hand region (slight depression), anterior thigh region (depression along thighs; very thin), posterior calf region (slight firmness), interosseus spaces of forearms (marked depression).  Noted pale skin.     Malnutrition Risk: Patient with severe malnutrition in the context of social/environmental circumstances, starvation related malnutrition r/t recent loss of SO, inadequate supply of pain medication (pt states), as evidenced by severe muscle loss, severe weight loss of 9% over the last two weeks.      Recommendations/Plan:  1. ADAT - consider starting Diabetic diet, given elevated a1c in July 2019.   2. Encourage intake " of meals and supplements - RD to send supplements (Boost GC), once PO diet starts.   3. Document intake of all meals and supplements  as % taken in ADL's to provide interdisciplinary communication across all shifts.   4. Monitor weight.  5. Nutrition rep will continue to see patient for ongoing meal and snack preferences.

## 2019-08-20 NOTE — ANESTHESIA PROCEDURE NOTES
Airway  Date/Time: 8/20/2019 2:48 PM  Performed by: Jeremy Forbes M.D.  Authorized by: Jeremy Forbes M.D.     Location:  OR  Urgency:  Elective  Difficult Airway: No    Indications for Airway Management:  Anesthesia  Spontaneous Ventilation: absent    Sedation Level:  Deep  Preoxygenated: Yes    Patient Position:  Sniffing  Final Airway Type:  Endotracheal airway  Final Endotracheal Airway:  ETT  Cuffed: Yes    Technique Used for Successful ETT Placement:  Direct laryngoscopy  Insertion Site:  Oral  Blade Type:  Javad  Laryngoscope Blade/Videolaryngoscope Blade Size:  4  ETT Size (mm):  8.0  Measured from:  Lips  ETT to Lips (cm):  23  Placement Verified by: auscultation and capnometry    Cormack-Lehane Classification:  Grade I - full view of glottis  Number of Attempts at Approach:  1

## 2019-08-20 NOTE — CARE PLAN
Problem: Safety  Goal: Will remain free from falls  Outcome: PROGRESSING AS EXPECTED  Intervention: Assess risk factors for falls  Flowsheets (Taken 8/20/2019 0401)  Pt Calls for Assistance: Yes  Fall Risk: High Risk to Fall - 2 or more points   History of fall: 0  Mobility Status Assessment: 2-2 Healthcare Providers Required for Assistance with Ambulation & Transfer  Risk for Injury-Any positive answers results in the pt being at high risk for fall related injury: Coagulation:  Therapeutic anticoagulation use (NOT prophylaxis)  Intervention: Implement fall precautions  Flowsheets  Taken 8/20/2019 0402  Bed Alarm: Yes - Alarm On  IV Pole on Same Side of Bed as Bathroom: Yes  Bedrails: Bedrails Closest to Bathroom Down  Taken 8/19/2019 2000  Environmental Precautions: Treaded Slipper Socks on Patient;Personal Belongings, Wastebasket, Call Bell etc. in Easy Reach;Report Given to Other Health Care Providers Regarding Fall Risk;Bed in Low Position;Communication Sign for Patients & Families;Mobility Assessed & Appropriate Sign Placed     Problem: Venous Thromboembolism (VTW)/Deep Vein Thrombosis (DVT) Prevention:  Goal: Patient will participate in Venous Thrombosis (VTE)/Deep Vein Thrombosis (DVT)Prevention Measures  Outcome: PROGRESSING AS EXPECTED  Flowsheets (Taken 8/19/2019 2000)  Risk Assessment Score: 2  VTE RISK: Moderate  Pharmacologic Prophylaxis Used: -- (Heparin gtt)     Problem: Bowel/Gastric:  Goal: Normal bowel function is maintained or improved  Outcome: PROGRESSING SLOWER THAN EXPECTED  Flowsheets (Taken 8/19/2019 2000)  Last BM: 08/14/19 (PTA)  Number of Times Stooled: 0  Note:   Constipated.

## 2019-08-20 NOTE — ASSESSMENT & PLAN NOTE
Previous hemoglobin 6.7 consistent with type 2 diabetes mellitus  Sliding scale insulin therapy #1

## 2019-08-20 NOTE — ANESTHESIA PROCEDURE NOTES
Peripheral IV  Date/Time: 8/20/2019 2:54 PM  Performed by: Jeremy Forbes M.D.  Authorized by: Jeremy Forbes M.D.     Size:  16 G  Laterality:  Right  Site Prep:  Alcohol  Technique:  Direct puncture  Attempts:  1

## 2019-08-20 NOTE — PROGRESS NOTES
Update received from NOC shift RN.   Pt is A/O X 4.   4L O2 by cannula.   VSS.     Labs reviewed.   Knee synovial fluid collected this am.     Coags   PT 32.6.   INR at 3.04  APTT 60.0    Heparin drip stopped.  FFP three units initiated.    NS infusion started.   MD aware.     LLE presentation has improved since yesterday.   Limb is warmer to touch.     Pt reports 10/10 pain from LLE.   Medicated for pain.     Groin sites are red and swollen.     BS normoactive X 4.  -BM. +void.     Pt has still not ambulated.     Call light at bedside. No additional needs at this time.

## 2019-08-20 NOTE — CARE PLAN
Problem: Nutritional:  Goal: Achieve adequate nutritional intake  Description  Patient will consume 50% of meals    Rec: Diabetic diet    Outcome: NOT MET

## 2019-08-20 NOTE — ANESTHESIA PROCEDURE NOTES
Arterial Line  Performed by: Jeremy Forbes M.D.  Authorized by: Jeremy Forbes M.D.     Start Time:  8/20/2019 3:08 PM  End Time:  8/20/2019 3:15 PM  Localization: ultrasound guidance and surface landmarks    Patient Location:  OR  Indication: continuous blood pressure monitoring and blood sampling needed    Catheter Size:  20 G  Seldinger Technique?: Yes    Laterality:  Right  Site:  Radial artery  Line Secured:  Antimicrobial disc, tape and transparent dressing  Events: patient tolerated procedure well with no complications and greater than 3 attempts     Unable to advance guide wire on first 3 attempts.  Successful on 4th attempt.

## 2019-08-20 NOTE — PROGRESS NOTES
Knee aspiration by orthopedic service yielded purulent drainage, source of graft infection?    Plan for removal of infected grafts and redo bypasses using Cryovein as GSV's are too small.  All questions were answered.

## 2019-08-20 NOTE — PROGRESS NOTES
Infectious Disease Progress Note    Author: Harvinder Head M.D. Date & Time of service: 2019  8:36 AM    Chief Complaint:  Follow-up for MSSA bacteremia    Interval History:   T-max 100.2, no CBC today.  Per report, Ortho tap of the left knee reveals purulence.  Washout planned.  Patient reports some improvement in his left knee pain.    Labs Reviewed and Medications Reviewed.    Review of Systems:  Review of Systems   Constitutional: Negative for chills, fever and weight loss.   Respiratory: Negative for cough and shortness of breath.    Cardiovascular: Negative for chest pain.   Gastrointestinal: Negative for abdominal pain, diarrhea, nausea and vomiting.   Musculoskeletal: Positive for joint pain.   Skin: Positive for rash.   Neurological: Negative for focal weakness and headaches.   All other systems reviewed and are negative.    Hemodynamics:  Temp (24hrs), Av.3 °C (99.2 °F), Min:36.7 °C (98.1 °F), Max:37.9 °C (100.2 °F)  Temperature: 37.2 °C (99 °F)  Pulse  Av.2  Min: 47  Max: 111   Blood Pressure : 121/76       Physical Exam:  Physical Exam   Constitutional: He is oriented to person, place, and time. No distress.   Thin   HENT:   Mouth/Throat: Oropharynx is clear and moist.   Eyes: Pupils are equal, round, and reactive to light. Conjunctivae are normal. No scleral icterus.   Neck: Normal range of motion. No JVD present.   Cardiovascular: Normal rate, regular rhythm and normal heart sounds.   No murmur heard.  Pulmonary/Chest: Effort normal and breath sounds normal. No respiratory distress. He has no wheezes.   Abdominal: Soft. He exhibits no distension. There is tenderness.   Bilateral lower quadrant incisions with some swelling, erythema, mild TTP, no active drainage   Musculoskeletal: He exhibits edema and tenderness.   Left knee swollen with significant pain on motion  Proximal medial left leg with surgical incision, no gross external evidence of infection   Lymphadenopathy:     He has  no cervical adenopathy.   Neurological: He is alert and oriented to person, place, and time. No cranial nerve deficit.   Skin: He is not diaphoretic.   Multiple excoriations bilateral upper extremities   Psychiatric:   Emotionally labile   Nursing note and vitals reviewed.      Meds:    Current Facility-Administered Medications:   •  lidocaine  •  NS  •  NS  •  [COMPLETED] heparin **AND** heparin **AND** heparin **AND** Protocol 440 Heparin Weight Based DO NOT GIVE ANY HEPARIN BOLUS TO STROKE PATIENT **AND** Protocol 440 Heparin Weight Based Discontinue Enoxaparin (Lovenox), Dabigatran (Pradaxa), Rivaroxaban (Xarelto), Apixaban (Eliquis), Edoxaban (Savaysa, Lixiana), Fondaparinux (Arixtra) and Argatroban prior to heparin administration **AND** Protocol 440 Heparin Weight Based Draw baseline aPTT, PT, and platelet count if not already done **AND** Protocol 440 Heparin Weight Based Draw aPTT 6 hours after beginning infusion.  **AND** Protocol 440 Heparin Weight Based Draw Platelet count every three days. Contact MD if platelet is 50% lower than baseline count. **AND** Protocol 440 Heparin Weight Based Record Patient Data **AND** Protocol 440 Heparin Weight Based INSTRUCTIONS **AND** Protocol 440 Heparin Weight Based Review aPTT results 6 hours after infusion is begun as detailed **AND** Protocol 440 Heparin Weight Based Adjust heparin to maintain aPTT between 55-96 sec **AND** Protocol 440 Heparin Weight Based Order aPTT 6 hours after any rate change or hold until aPTT is therapeutic (55-96 seconds) **AND** Protocol 440 Heparin Weight Based Documentation and verification  •  riFAMPin  •  ceFAZolin  •  iron sucrose  •  atorvastatin  •  NS  •  senna-docusate **AND** polyethylene glycol/lytes **AND** magnesium hydroxide **AND** bisacodyl  •  Respiratory Care per Protocol  •  ondansetron  •  ondansetron  •  oxyCODONE immediate-release  •  HYDROmorphone  •  acetaminophen    Labs:  Recent Labs     08/18/19  1340  08/18/19  2344   WBC 19.4* 15.3*   RBC 3.26* 3.27*   HEMOGLOBIN 9.4* 9.1*   HEMATOCRIT 28.7* 28.3*   MCV 88.0 86.5   MCH 28.8 27.8   RDW 47.6 47.9   PLATELETCT 326 311   MPV 9.4 9.3   NEUTSPOLYS 85.10* 80.90*   LYMPHOCYTES 4.40* 4.30*   MONOCYTES 6.10 7.80   EOSINOPHILS 0.00 0.00   BASOPHILS 0.00 0.00   RBCMORPHOLO Present Present     Recent Labs     08/18/19  1340 08/18/19  2344   SODIUM 131* 127*   POTASSIUM 4.1 4.0   CHLORIDE 96 95*   CO2 28 24   GLUCOSE 155* 136*   BUN 27* 27*     Recent Labs     08/18/19  1340 08/18/19  2344   ALBUMIN 3.2  --    TBILIRUBIN 0.3  --    ALKPHOSPHAT 110*  --    TOTPROTEIN 6.7  --    ALTSGPT 24  --    ASTSGOT 24  --    CREATININE 1.17 1.25       Imaging:  Ct-cta Lower Ext With & W/o-post Process Left    Result Date: 8/18/2019 8/18/2019 2:55 PM HISTORY/REASON FOR EXAM:  Acute limb ischemia, leg worsening pain and decreased pulse to left leg possible DVT vs arterial clot. He had a fem pop bypass in last 6 weeks. Now has +leg swelling, redness to lower abdomen, pain in back of knee TECHNIQUE/EXAM DESCRIPTION:  CT angiogram of the left lower extremity with contrast, with reconstructions. 100 mL of Omnipaque 350 nonionic contrast was administered at 5.0 mL/sec and helical scanning obtained from the distal femur through the ankle. Thin- and thick-section multiplanar volume reformats were generated from the axial data set in the sagittal and coronal planes. 3D angiographic images were reviewed on PACS. Maximum intensity projection (MIP) images were generated and reviewed. Low dose optimization technique was utilized for this CT exam including automated exposure control and adjustment of the mA and/or kV according to patient size. COMPARISON: Ultrasound 8/18/2019. FINDINGS: Partially visualized femoral-femoral bypass that appears patent. There is small amount of clot in the junction between the femorofemoral bypass graft and the common femoral artery. The left femoral popliteal graft is  completely occluded. There is gas within the lumen. There is small amount of fluid surrounding the entire portion of the graft. The native common femoral and external femoral artery demonstrate multiple focal narrowing there is complete occlusion of the superficial femoral artery from the mid to distal portion at the area of prior stent extending to the popliteal artery. There is partial reconstitution at the trifurcation but flow is very sluggish. No flow appreciated in the anterior tibial artery, posterior tibial artery and peroneal artery at 10 cm from the ankle. 3D angiographic/MIP images of the vasculature confirm the vascular findings as described above.     1. Completely occluded left femoral-popliteal bypass graft with gas within the lumen and fluid surrounding the graft. The finding is concerning for infected occluded graft. 2. Partially visualized femoral-femoral bypass that appears patent. There is small amount of clot in the junction between the femorofemoral bypass graft and the common femoral artery. 3. Complete occlusion of the native superficial femoral artery from the mid to distal thigh at the area of prior stenting. Complete occlusion of the left popliteal artery. Partial flow reconstitution at the trifurcation but flow is very sluggish. No distal flow flow appreciated in the anterior tibial artery, posterior tibial artery and peroneal artery at 10 cm from the ankle. CRITICAL RESULT READ BACK: Preliminary findings discussed with and critical read back performed by Dr. BERNA PARR in the Emergency Department via telephone on 8/18/2019 3:51 PM    Us-extremity Artery Lower Unilat Left    Result Date: 8/18/2019  Lower Extremity  Arterial Duplex Report  Vascular Laboratory  CONCLUSIONS  1.  The LEFT femoral popliteal bypass graft is occluded.  2.  THere is no flow seen in the mid/distal SFA through the distal  popliteal artery consistent with thrombosis.  3.  The bifemoral bypass graft is patent.  4.   There is minimal 3 vessel runoff tothe Left foot.  4.  THere is fluid surrounding both grafts.  Findings called to Dr. Ambrosio at 2:48 pm on 19.  ARUN KATHRYN  Exam Date:     2019 12:44  Room #:     Inpatient  Priority:     Stat  Ht (in):             Wt (lb):  Ordering Physician:        BERNA AMBROSIO  Referring Physician:       BERNA AMBROSIO  Sonographer:               Marcelino Miranda RVT  Study Type:                Complete Unilateral  Technical Quality:         Adequate  Age:    74    Gender:     M  MRN:    [id number]  :    1945      BSA:  Indications:     Pain in Limb  CPT Codes:       12417  ICD Codes:       729.5  History:         Left lower extremity pain. History of right-to-left fem-fem                   bypass graft and left fem-pop byppass graft  Limitations:                RIGHT  Waveform        Peak Systolic Velocity (cm/s)                  Prox    Prox-Mid  Mid    Mid-Dist  Distal                                                             CFA                                                             PFA                                                             SFA                                                             POP                                                             AT                                                             PT                                                             AL                LEFT  Waveform        Peak Systolic Velocity (cm/s)                  Prox    Prox-Mid  Mid    Mid-Dist  Distal  Bi, non-                          173                      CFA  reversed  Triphasic       108                                        PFA  Absent          59                36               0       SFA  Absent                            0                        POP  Monophasic      6                                  6       AT  Monophasic      7                                  7       PT  Monophasic      5                                  6        AL  FINDINGS  Left.  With the exception of the very proximal fem-pop bypass graft, no flow can  be demonstrated throughout the graft's length. Echolucent material  consistent with arterial thrombus is visualized at the proximal thigh.  Retrograde flow seen in the very proximal segment. Throughout the graft's  length, there appears to be fluid collecting around the graft  No flow can be demonstrated from the mid-distal superficial femoral artery  through the distal popliteal artery. Echolucent material consistent with  arterial thrombus is visualized throughout this segment.  3-Vessel, severely diminished and monophasic runoff to the foot.  The fem-fem bypass graft is widely patent throughout its length. There  appears to be a fluid collection surrounding this graft.  Tayler Huynh  (Electronically Signed)  Final Date:      2019                   14:38  Amended:         2019 14:49    Us-extremity Venous Lower Unilat Left    Result Date: 2019   Vascular Laboratory  CONCLUSIONS  1.  No evidence of LEFT lower extremity DVT.  KATHRYN DIAS  Exam Date:     2019 12:31  Room #:     Inpatient  Priority:     Stat  Ht (in):             Wt (lb):  Ordering Physician:        BERNA PARR  Referring Physician:       [id number] Sheffield  Sonographer:               Marcelino Miranda RVT  Study Type:                Complete Unilateral  Technical Quality:         Adequate  Age:    74    Gender:     M  MRN:    [id number]  :    1945      BSA:  Indications:     Swelling of Limb  CPT Codes:       99071  ICD Codes:       729.81  History:         Left lower extremity edema  Limitations:     Pain in groin and popliteal fossa. Long axis images obtained                    instead of compressions.  PROCEDURES:  Left lower extremity venous duplex imaging.  The following venous structures were evaluated: common femoral, profunda  femoral, greater saphenous, femoral, popliteal , peroneal and posterior   tibial veins.  Serial compression, augmentation maneuvers,  color and spectral Doppler  flow evaluations were performed.  FINDINGS:  Left lower extremity -.  Complete color filling and compressibility with normal venous flow dynamics  including spontaneous flow, response to augmentation maneuvers, and  respiratory phasicity.  No evidence of superficial or deep venous thrombosis.  Flow was evaluated in the contralateral common femoral vein and normal  venous flow dynamics including spontaneous flow, respiratory phasic  variation and augmentation were demonstrated.  Tayler Huynh  (Electronically Signed)  Final Date:      2019                   14:39    Us-vein Mapping Lower Extremity Bilat    Result Date: 2019   Lower Extremity  Vein Map  Vascular Laboratory  CONCLUSIONS  KATHRYN DIAS  Exam Date:     2019 13:20  Room #:     Inpatient  Priority:     Routine  Ht (in):             Wt (lb):  Ordering Physician:        MARIANN GOOD  Referring Physician:       FLORENTINO Wilson  Sonographer:               Miki Green RDCS, RVT  Study Type:                Complete Bilateral  Technical Quality:         Adequate  Age:    74    Gender:     M  MRN:    [id number]  :    1945      BSA:  Indications:     Peripheral vascular disease, unspecified  CPT Codes:       59716  ICD Codes:       I73.9  History:         Evaluate for arterial bypass conduit.  Limitations:            RIGHT                  Diameter                  (cm)  Characteristics  Normal             0.25      SFJ  Normal             0.21      HT  Normal             0.25      MT  Normal             0.26      LT  Normal             0.25      Knee  Normal             0.18      HC  Normal             0.23      LC  Normal             0.18      Ankle                LEFT       Diameter       (cm)                  Characteristics  SFJ    0.47     Normal  HT     0.46     Normal  MT     0.47     Normal  LT     0.41      "Normal  Knee   0.37     Normal  HC     0.21     Normal  LC     0.22     Normal  Ankle  0.20     Normal                  Diameter                  (cm)  Characteristics  Normal             0.33      PF  Normal             0.21      MC                               Ankle         Diameter         (cm)                    Characteristics    PF     0.30     Normal    MC     0.21     Normal    Ankle  FINDINGS  Right. The greater saphenous vein is small in caliber. The small saphenous  vein is small in caliber.  Left. The greater saphenous vein is patent to the knee and essentially  normal in caliber. The greater saphenous is small in calibre in the high  calf and to the ankle.  The small saphenous vein is small in caliber.      Micro:  Results     Procedure Component Value Units Date/Time    FLUID CULTURE W/GRAM STAIN [[id number]] Collected:  08/20/19 0730    Order Status:  Completed Specimen:  Body Fluid from Synovial Fluid Updated:  08/20/19 0803    Narrative:       Collected By:[id number] MARY FAUSTIN  Left knee    BLOOD CULTURE x2 [[id number]]  (Abnormal) Collected:  08/18/19 1335    Order Status:  Completed Specimen:  Blood from Peripheral Updated:  08/19/19 0649     Significant Indicator POS     Source BLD     Site PERIPHERAL     Culture Result Growth detected by Bactec instrument. 08/19/2019  06:04  Gram Stain: Gram positive cocci: Possible Staphylococcus sp.      Narrative:       Per Hospital Policy: Only change Specimen Src: to \"Line\" if  specified by physician order.  No site indicated    BLOOD CULTURE x2 [[id number]]  (Abnormal) Collected:  08/18/19 1340    Order Status:  Completed Specimen:  Blood from Peripheral Updated:  08/19/19 0649     Significant Indicator POS     Source BLD     Site PERIPHERAL     Culture Result Growth detected by Bactec instrument. 08/19/2019  Gram Stain: Gram positive cocci: Possible Staphylococcus sp.  Staphylococcus aureus (methicillin sensitive)  detected by PCR.  Susceptibility to " "follow.      Narrative:       CALL  [facility]  141 tel. [phone],  CALLED  141 tel. [phone] 08/19/2019, 06:49, RB PERF. RESULTS CALLED  TO:[facility]  Per Hospital Policy: Only change Specimen Src: to \"Line\" if  specified by physician order.  No site indicated    BLOOD CULTURE [[id number]]     Order Status:  Sent Specimen:  Blood from Peripheral     BLOOD CULTURE [[id number]]     Order Status:  Sent Specimen:  Blood from Peripheral           Assessment:  Dc Patel is a 74 y.o. male with a history of peripheral artery disease on Coumadin, CKD stage III, history of multiple revascularization procedures, admitted with infected left femoral-femoral and femoral-popliteal bypass grafts, MSSA bacteremia, left knee septic arthritis.  Source of Staph aureus likely is his multiple upper extremity excoriations.     Pertinent Diagnoses:  Sepsis  MSSA bacteremia  Infected vascular grafts  Left knee septic arthritis  History of blood clots  Chronic anticoagulant use  CKD stage III    Plan:  Sepsis, MSSA bacteremia, infected vascular grafts  -Continue IV Ancef.  Will renally dose to 2 g every 12 hours  -Added p.o. rifampin 300 mg 3 times daily 8/19.  Note interaction with Coumadin.  Will need close monitoring of INR  -Obtain TTE -pending  -Follow repeat blood cultures from 8/19  -Will follow operative report.  Given his multiple grafts in place including an aortic stent graft which cannot be removed per vascular surgery, patient will likely need chronic p.o. suppression following 6 weeks of IV antibiotics    Left knee septic arthritis  -Orthopedics on board.  Washout is planned    CKD stage III  -Creatinine at baseline.  Monitor, renally dose antibiotics    Discussed with internal medicine, Dr. Smallwood.  ID will follow.  Please call with questions.  "

## 2019-08-20 NOTE — PROGRESS NOTES
Hospital Medicine Daily Progress Note    Date of Service  8/20/2019    Chief Complaint  74 y.o. male admitted 8/18/2019 with Groin pain    Hospital Course    Patient with underlying history of peripheral arterial disease, left lower extremity predominantly who recently underwent femoropopliteal bypass, underlying CKD stage III, diabetes mellitus type 2.  Previous surgery by Dr. Grande from vascular surgery.  He now presented with groin pain, discharge from the left groin postoperative site with findings of occlusion of the left femoropopliteal bypass graft, no fluid in the mid/distal SFA/distal popliteal artery consistent with thrombosis, negative DVT study, CT lower extremity with runoff revealing completely occluded left femoral-popliteal bypass graft with gas within the lumen and fluid surrounding the graft.  Concerning for infected occluded graft.  Partially visualized femoral femoral bypass that appears patent.  Small amount of clot in the femorofemoral bypass graft in the common femoral artery junction.  Complete occlusion of the native superficial femoral artery from mid to distal thigh, complete occlusion of the left popliteal artery.  Partial flow reconstitution at the trifurcation but fluid is very sluggish.  No distal flow appreciated in the anterior tibial artery, posterior tibial artery and peroneal artery at 10 cm from the ankle.  Vascular surgery/Dr. Grande consulted, patient initiated on intravenous heparin and admitted to the hospital, infectious disease team consultation obtained.  Patient with findings of bacteremia, appears to be MSSA with findings of left knee septic arthritis.  Orthopedics team consulted.      Interval Problem Update  Patient seen and evaluated on rounds  Discussed with nursing staff on rounds  Crying and tearful on evaluation, hearing loss is noted  Complains of ongoing persistent left groin pain  Purulence from left groin  Poor historian, not compliant with providing interval  history  Nursing team working on establishing good peripheral IV access  Orthopedics, infectious disease, vascular surgery following  N.p.o., plan to proceed to the operating room today    Consultants/Specialty  Vascular surgery  Orthopedics  Infectious disease    Code Status  Full code    Disposition  Anticipate need for postacute placement, LTAC versus skilled nursing facility based on postoperative recovery and course    Review of Systems  Review of Systems   Constitutional: Positive for malaise/fatigue. Negative for chills, fever and weight loss.   HENT: Negative for hearing loss.    Eyes: Negative for blurred vision and double vision.   Respiratory: Negative for cough, hemoptysis and shortness of breath.    Cardiovascular: Positive for leg swelling. Negative for chest pain, palpitations and PND.   Gastrointestinal: Positive for nausea. Negative for abdominal pain, blood in stool, constipation, diarrhea, melena and vomiting.   Genitourinary: Negative for flank pain, frequency and hematuria.        Groin pain   Musculoskeletal: Positive for joint pain and myalgias. Negative for falls.   Skin: Negative for itching and rash.   Neurological: Negative for dizziness and headaches.   Psychiatric/Behavioral: Negative for depression, substance abuse and suicidal ideas.        Physical Exam  Temp:  [36.7 °C (98.1 °F)-37.4 °C (99.4 °F)] 36.7 °C (98.1 °F)  Pulse:  [82-92] 86  Resp:  [14-20] 15  BP: (108-134)/(61-81) 134/81  SpO2:  [89 %-96 %] 93 %    Physical Exam   Constitutional: He is oriented to person, place, and time. He appears well-developed and well-nourished. No distress.   Body mass index is 23.69 kg/m².   HENT:   Head: Normocephalic.   Mouth/Throat: Oropharynx is clear and moist. No oropharyngeal exudate.   Eyes: Pupils are equal, round, and reactive to light. Conjunctivae and EOM are normal. No scleral icterus.   Neck: Normal range of motion. No JVD present. No thyromegaly present.   Cardiovascular: Normal rate  and regular rhythm.   Murmur heard.  Good right lower extremity pulse, poor left lower extremity pulses   Pulmonary/Chest: No stridor. No respiratory distress. He has no wheezes.   Diminished in bases.   Abdominal: Soft. Bowel sounds are normal. He exhibits no distension. There is no tenderness. There is no rebound.   Musculoskeletal: He exhibits edema and tenderness.   Tenderness, decreased range of motion of left knee.  Erythema, warmth of left knee.   Lymphadenopathy:     He has no cervical adenopathy.   Neurological: He is alert and oriented to person, place, and time. He has normal reflexes. No cranial nerve deficit.   Skin: He is not diaphoretic.   Left groin wound, purulence significantly noted.   Psychiatric: He is agitated. He expresses impulsivity.       Fluids    Intake/Output Summary (Last 24 hours) at 8/20/2019 1557  Last data filed at 8/20/2019 1546  Gross per 24 hour   Intake 2868.14 ml   Output 1000 ml   Net 1868.14 ml       Laboratory  Recent Labs     08/18/19  1340 08/18/19  2344   WBC 19.4* 15.3*   RBC 3.26* 3.27*   HEMOGLOBIN 9.4* 9.1*   HEMATOCRIT 28.7* 28.3*   MCV 88.0 86.5   MCH 28.8 27.8   MCHC 32.8* 32.2*   RDW 47.6 47.9   PLATELETCT 326 311   MPV 9.4 9.3     Recent Labs     08/18/19  1340 08/18/19  2344   SODIUM 131* 127*   POTASSIUM 4.1 4.0   CHLORIDE 96 95*   CO2 28 24   GLUCOSE 155* 136*   BUN 27* 27*   CREATININE 1.17 1.25   CALCIUM 8.8 8.3*     Recent Labs     08/19/19  2350 08/20/19  0613 08/20/19  0825 08/20/19  0850 08/20/19  1242   APTT 100.4* 126.2*  --  60.0*  --    INR 2.76*  --  3.04*  --  2.49*         Recent Labs     08/19/19  2350   TRIGLYCERIDE 236*   HDL 7*   LDL 28       Imaging  US-VEIN MAPPING LOWER EXTREMITY BILAT   Final Result      CT-CTA LOWER EXT WITH & W/O-POST PROCESS LEFT   Final Result         1. Completely occluded left femoral-popliteal bypass graft with gas within the lumen and fluid surrounding the graft. The finding is concerning for infected occluded  graft.      2. Partially visualized femoral-femoral bypass that appears patent. There is small amount of clot in the junction between the femorofemoral bypass graft and the common femoral artery.      3. Complete occlusion of the native superficial femoral artery from the mid to distal thigh at the area of prior stenting.      Complete occlusion of the left popliteal artery.      Partial flow reconstitution at the trifurcation but flow is very sluggish. No distal flow flow appreciated in the anterior tibial artery, posterior tibial artery and peroneal artery at 10 cm from the ankle.         CRITICAL RESULT READ BACK: Preliminary findings discussed with and critical read back performed by Dr. BERNA PARR in the Emergency Department via telephone on 8/18/2019 3:51 PM      US-EXTREMITY VENOUS LOWER UNILAT LEFT   Final Result      US-EXTREMITY ARTERY LOWER UNILAT LEFT   Final Result      EC-ECHOCARDIOGRAM COMPLETE W/ CONT    (Results Pending)   DX-PORTABLE FLUORO > 1 HOUR    (Results Pending)        Assessment/Plan  * Sepsis due to methicillin susceptible Staphylococcus aureus (HCC)- (present on admission)  Assessment & Plan  This is sepsis (without associated acute organ dysfunction).   Source infected left groin vascular graft, bacteremia, left knee septic arthritis  Source control per surgical team, vascular surgery/orthopedics following  Antibiotic guidance per infectious disease team  Continue close clinical monitoring    MSSA bacteremia- (present on admission)  Assessment & Plan  Likely from underlying left groin graft infection  With seeding of left knee, septic arthritis  Echocardiogram pending to rule out endocarditis  Vascular surgery following, orthopedics following, defer source control to surgical team  ID following, defer antibiotic needs to ID team  Continue close clinical monitoring    Staphylococcal arthritis of left knee (HCC)- (present on admission)  Assessment & Plan  Continue antibiotics per ID  team  Source control, washout for orthopedics team    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  Revascularization, I&D per vascular surgery team  Antibiotics per ID team  Continue close clinical monitoring    PAD (peripheral artery disease) ([facility])- (present on admission)  Assessment & Plan  Currently with occluded grafts of the left lower extremity with poor circulation    Continue the patient on IV heparin, monitor APTT, adverse effects related to anticoagulation, Daily CBC, BMP  Vascular surgery following, defer surgical interventions, revascularization to surgical team    Hyponatremia- (present on admission)  Assessment & Plan  Hypovolemic hyponatremia  IV fluid hydration   Monitor BMP and assess response  Interval chemistry panel in the morning tomorrow    Chronic anticoagulation- (present on admission)  Assessment & Plan  Likely for underlying peripheral arterial disease  INR supratherapeutic on presentation  At this time Coumadin held, patient on intravenous heparin    Dyslipidemia- (present on admission)  Assessment & Plan  Continue high intensity statin therapy    Iron deficiency anemia- (present on admission)  Assessment & Plan  Avoid IV iron given underlying infectious issues    Hyperglycemia- (present on admission)  Assessment & Plan  Previous hemoglobin 6.7 consistent with type 2 diabetes mellitus  Sliding scale insulin therapy once out of surgery, not n.p.o. any further  Currently not requiring coverage    Chronic kidney disease (CKD), stage III (moderate) ([facility])- (present on admission)  Assessment & Plan  Monitor renal function / Avoid nephrotoxins and dose medications renally       VTE prophylaxis: IV heparin

## 2019-08-20 NOTE — PROGRESS NOTES
[facility] Medicine Daily Progress Note    Date of Service  8/19/2019    Chief Complaint  74 y.o. male admitted 8/18/2019 with history of peripheral vascular disease status post femorofemoral bypass 6 weeks ago, osteoarthritis, stage III kidney disease comes in for abdominal pain and leg pain.    Hospital Course    Patient arrived to the hospital with a blood pressure 112/70, pulse 94, temperature of 100.  W BC count 19.4, hemoglobin 9.4, sodium 131, creatinine 1.17, INR 3.5.  Arterial Doppler and CTA studies found evidence of occluded graft.  Patient was started on empiric vancomycin and Zosyn.  Vascular surgery was consulted.      Interval Problem Update  8/19: Patient blood cultures came back positive for MSSA and he was transitioned to Ancef and rifampin per ID.  Is likely the graft will not be able to be taken out in which he would likely need lifelong antibiotics.  Patient also had evidence of left knee effusion and orthopedics was consulted.  Cardiac echo has been ordered to rule out endocarditis.  He is to be taken to the OR tomorrow.    Consultants/Specialty  Vascular  Infectious disease  Orthopedics    Code Status  Full    Disposition  After PT OT eval    Review of Systems  Review of Systems   Constitutional: Positive for chills and fever. Negative for diaphoresis and malaise/fatigue.   HENT: Negative for congestion, ear discharge, ear pain, hearing loss, nosebleeds, sinus pain, sore throat and tinnitus.    Eyes: Negative for blurred vision, double vision, photophobia and pain.   Respiratory: Negative for cough, hemoptysis, sputum production, shortness of breath, wheezing and stridor.    Cardiovascular: Positive for claudication and leg swelling. Negative for chest pain, palpitations, orthopnea and PND.   Gastrointestinal: Positive for abdominal pain, nausea and vomiting. Negative for blood in stool, constipation, diarrhea, heartburn and melena.   Genitourinary: Negative for dysuria, flank pain, frequency,  hematuria and urgency.   Musculoskeletal: Negative for back pain, falls, joint pain, myalgias and neck pain.   Skin: Negative for itching and rash.   Neurological: Negative for dizziness, tingling, tremors, weakness and headaches.   Endo/Heme/Allergies: Negative for environmental allergies and polydipsia. Does not bruise/bleed easily.        Physical Exam  Temp:  [36.6 °C (97.8 °F)-38.8 °C (101.9 °F)] 37.9 °C (100.2 °F)  Pulse:  [] 102  Resp:  [16-18] 17  BP: (102-142)/(60-80) 113/76  SpO2:  [95 %-97 %] 95 %    Physical Exam   Constitutional: He is oriented to person, place, and time. He appears well-developed and well-nourished.   HENT:   Head: Normocephalic and atraumatic.   Mouth/Throat: No oropharyngeal exudate.   Eyes: Conjunctivae are normal. No scleral icterus.   Neck: Normal range of motion. Neck supple. No JVD present. No tracheal deviation present. No thyromegaly present.   Cardiovascular: Normal rate, regular rhythm and intact distal pulses. Exam reveals no gallop and no friction rub.   No murmur heard.  Pulmonary/Chest: Effort normal and breath sounds normal. No stridor. No respiratory distress. He has no wheezes. He has no rales. He exhibits no tenderness.   Abdominal: Soft. Bowel sounds are normal. He exhibits distension. He exhibits no mass. There is no tenderness. There is no rebound and no guarding.   Musculoskeletal: Normal range of motion. He exhibits edema.   Left knee swelling   Lymphadenopathy:     He has no cervical adenopathy.   Neurological: He is alert and oriented to person, place, and time. He has normal reflexes.   Nursing note and vitals reviewed.      Fluids    Intake/Output Summary (Last 24 hours) at 8/19/2019 2047  Last data filed at 8/19/2019 1550  Gross per 24 hour   Intake 1240 ml   Output 990 ml   Net 250 ml       Laboratory  Recent Labs     08/18/19  1340 08/18/19  2344   WBC 19.4* 15.3*   RBC 3.26* 3.27*   HEMOGLOBIN 9.4* 9.1*   HEMATOCRIT 28.7* 28.3*   MCV 88.0 86.5    MCH 28.8 27.8   MCHC 32.8* 32.2*   RDW 47.6 47.9   PLATELETCT 326 311   MPV 9.4 9.3     Recent Labs     08/18/19  1340 08/18/19  2344   SODIUM 131* 127*   POTASSIUM 4.1 4.0   CHLORIDE 96 95*   CO2 28 24   GLUCOSE 155* 136*   BUN 27* 27*   CREATININE 1.17 1.25   CALCIUM 8.8 8.3*     Recent Labs     08/18/19  1340 08/19/19  1218 08/19/19  1759   APTT  --   --  117.5*   INR 3.50* 2.67*  --                Imaging  US-VEIN MAPPING LOWER EXTREMITY BILAT         CT-CTA LOWER EXT WITH & W/O-POST PROCESS LEFT   Final Result         1. Completely occluded left femoral-popliteal bypass graft with gas within the lumen and fluid surrounding the graft. The finding is concerning for infected occluded graft.      2. Partially visualized femoral-femoral bypass that appears patent. There is small amount of clot in the junction between the femorofemoral bypass graft and the common femoral artery.      3. Complete occlusion of the native superficial femoral artery from the mid to distal thigh at the area of prior stenting.      Complete occlusion of the left popliteal artery.      Partial flow reconstitution at the trifurcation but flow is very sluggish. No distal flow flow appreciated in the anterior tibial artery, posterior tibial artery and peroneal artery at 10 cm from the ankle.         CRITICAL RESULT READ BACK: Preliminary findings discussed with and critical read back performed by Dr. 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)  Assessment & Plan  Patient is septic, is likely that the stents cannot be removed and the patient would need long-term antibiotics for life  IV antibiotics with Ancef and rifampin per rec of ID  Surgery on  case for surgical intervention-patient scheduled for the OR tomorrow    PAD (peripheral artery disease) (HCC)- (present on admission)  Assessment & Plan  Status post bypass-arterial Dopplers found evidence of occluded grafts  Patient started on heparin GTT and will have to follow patient's PTT levels    Acute renal failure superimposed on stage 3 chronic kidney disease (HCC)- (present on admission)  Assessment & Plan  Likely prerenal vs vancomycin  IV fluid hydration with LR  Monitor BMP and assess response  Avoid IV contrast/nephrotoxins/NSAIDs  Dose adjust meds for decreased GFR    Follow up urine electrolytes    MSSA (methicillin susceptible Staphylococcus aureus)  Assessment & Plan  From infected grafts  We will order a cardiac echo to rule out endocarditis  On Ancef IV  Repeat blood cultures    Hyponatremia  Assessment & Plan  Hypovolemic hyponatremia  IV fluid hydration   Monitor BMP and assess response  Follow-up serum and urine osmolarity    Osteoarthritis of left knee- (present on admission)  Assessment & Plan  Pain control with Tylenol and oxycodone    Chronic anticoagulation- (present on admission)  Assessment & Plan  INR was supratherapeutic  Continue to hold warfarin  Patient is now currently on heparin    Sepsis (HCC)- (present on admission)  Assessment & Plan  This is sepsis (without associated acute organ dysfunction).     Antibiotics per ID recommendations  Follow lactic acid has resolved  Continue IV hydration    Dyslipidemia- (present on admission)  Assessment & Plan  Started on atorvastatin 40 mg  Follow-up lipid panel    Iron deficiency anemia- (present on admission)  Assessment & Plan  Severe and I have ordered IV Venofer    Hyperglycemia- (present on admission)  Assessment & Plan  Check hemoglobin A1c           VTE prophylaxis: Heparin

## 2019-08-20 NOTE — ANESTHESIA PREPROCEDURE EVALUATION
Relevant Problems   CARDIAC   (+) Femoral artery occlusion, left (HCC)   (+) Infected prosthetic vascular graft (HCC)   (+) Ischemia of toe         (+) Acute renal failure superimposed on stage 3 chronic kidney disease (HCC)      Other   (+) Cellulitis of left lower extremity   (+) Chronic anticoagulation   (+) Hyponatremia   (+) Osteoarthritis of left knee   (+) PAD (peripheral artery disease) (HCC)       Physical Exam    Airway   Mallampati: I  TM distance: >3 FB  Neck ROM: full       Cardiovascular - normal exam  Rhythm: regular  Rate: normal  (-) murmur     Dental   (+) upper dentures, lower dentures         Pulmonary   (+) rhonchi, wheezes     Abdominal    Neurological - normal exam                 Anesthesia Plan    ASA 4   ASA physical status 4 criteria: sepsis    Plan - general       Airway plan will be ETT        Induction: intravenous      Pertinent diagnostic labs and testing reviewed    Informed Consent:    Anesthetic plan and risks discussed with patient.    Use of blood products discussed with: patient whom.

## 2019-08-21 ENCOUNTER — APPOINTMENT (OUTPATIENT)
Dept: CARDIOLOGY | Facility: MEDICAL CENTER | Age: 74
DRG: 252 | End: 2019-08-21
Attending: HOSPITALIST
Payer: MEDICARE

## 2019-08-21 LAB
ACTION RANGE TRIGGERED IACRT: NO
ACTION RANGE TRIGGERED IACRT: YES
ALBUMIN SERPL BCP-MCNC: 2.5 G/DL (ref 3.2–4.9)
ALBUMIN/GLOB SERPL: 1 G/DL
ALP SERPL-CCNC: 113 U/L (ref 30–99)
ALT SERPL-CCNC: 12 U/L (ref 2–50)
ANION GAP SERPL CALC-SCNC: 6 MMOL/L (ref 0–11.9)
ANISOCYTOSIS BLD QL SMEAR: ABNORMAL
APTT PPP: 54.2 SEC (ref 24.7–36)
AST SERPL-CCNC: 19 U/L (ref 12–45)
BACTERIA BLD CULT: ABNORMAL
BASE EXCESS BLDA CALC-SCNC: 0 MMOL/L (ref -4–3)
BASE EXCESS BLDA CALC-SCNC: 2 MMOL/L (ref -4–3)
BASOPHILS # BLD AUTO: 0 % (ref 0–1.8)
BASOPHILS # BLD: 0 K/UL (ref 0–0.12)
BILIRUB SERPL-MCNC: 1 MG/DL (ref 0.1–1.5)
BODY TEMPERATURE: ABNORMAL DEGREES
BODY TEMPERATURE: ABNORMAL DEGREES
BUN SERPL-MCNC: 19 MG/DL (ref 8–22)
CA-I BLD ISE-SCNC: 1.09 MMOL/L (ref 1.1–1.3)
CA-I BLD ISE-SCNC: 1.14 MMOL/L (ref 1.1–1.3)
CALCIUM SERPL-MCNC: 8.3 MG/DL (ref 8.5–10.5)
CHLORIDE SERPL-SCNC: 98 MMOL/L (ref 96–112)
CO2 BLDA-SCNC: 27 MMOL/L (ref 20–33)
CO2 BLDA-SCNC: 29 MMOL/L (ref 20–33)
CO2 SERPL-SCNC: 29 MMOL/L (ref 20–33)
CREAT SERPL-MCNC: 1.17 MG/DL (ref 0.5–1.4)
CRP SERPL HS-MCNC: 23.62 MG/DL (ref 0–0.75)
EOSINOPHIL # BLD AUTO: 0 K/UL (ref 0–0.51)
EOSINOPHIL NFR BLD: 0 % (ref 0–6.9)
ERYTHROCYTE [DISTWIDTH] IN BLOOD BY AUTOMATED COUNT: 50.3 FL (ref 35.9–50)
ERYTHROCYTE [DISTWIDTH] IN BLOOD BY AUTOMATED COUNT: 51.7 FL (ref 35.9–50)
ERYTHROCYTE [SEDIMENTATION RATE] IN BLOOD BY WESTERGREN METHOD: 63 MM/HOUR (ref 0–20)
GLOBULIN SER CALC-MCNC: 2.5 G/DL (ref 1.9–3.5)
GLUCOSE BLD-MCNC: 108 MG/DL (ref 65–99)
GLUCOSE BLD-MCNC: 125 MG/DL (ref 65–99)
GLUCOSE BLD-MCNC: 157 MG/DL (ref 65–99)
GLUCOSE SERPL-MCNC: 114 MG/DL (ref 65–99)
GRAM STN SPEC: NORMAL
HCO3 BLDA-SCNC: 25.7 MMOL/L (ref 17–25)
HCO3 BLDA-SCNC: 27.7 MMOL/L (ref 17–25)
HCT VFR BLD AUTO: 21.3 % (ref 42–52)
HCT VFR BLD AUTO: 21.7 % (ref 42–52)
HCT VFR BLD CALC: 21 % (ref 42–52)
HGB BLD-MCNC: 6.7 G/DL (ref 14–18)
HGB BLD-MCNC: 7 G/DL (ref 14–18)
HGB BLD-MCNC: 7.1 G/DL (ref 14–18)
HOROWITZ INDEX BLDA+IHG-RTO: 163 MM[HG]
HOROWITZ INDEX BLDA+IHG-RTO: 91 MM[HG]
HYPOCHROMIA BLD QL SMEAR: ABNORMAL
INR PPP: 2.89 (ref 0.87–1.13)
INST. QUALIFIED PATIENT IIQPT: YES
INST. QUALIFIED PATIENT IIQPT: YES
LV EJECT FRACT  99904: 65
LV EJECT FRACT MOD 2C 99903: 39.61
LV EJECT FRACT MOD 4C 99902: 56.19
LV EJECT FRACT MOD BP 99901: 50.06
LYMPHOCYTES # BLD AUTO: 0.79 K/UL (ref 1–4.8)
LYMPHOCYTES NFR BLD: 4.4 % (ref 22–41)
MAGNESIUM SERPL-MCNC: 2.1 MG/DL (ref 1.5–2.5)
MANUAL DIFF BLD: NORMAL
MCH RBC QN AUTO: 28.8 PG (ref 27–33)
MCH RBC QN AUTO: 28.9 PG (ref 27–33)
MCHC RBC AUTO-ENTMCNC: 31.5 G/DL (ref 33.7–35.3)
MCHC RBC AUTO-ENTMCNC: 31.9 G/DL (ref 33.7–35.3)
MCV RBC AUTO: 90 FL (ref 81.4–97.8)
MCV RBC AUTO: 91.8 FL (ref 81.4–97.8)
METAMYELOCYTES NFR BLD MANUAL: 1.7 %
MONOCYTES # BLD AUTO: 0.63 K/UL (ref 0–0.85)
MONOCYTES NFR BLD AUTO: 3.5 % (ref 0–13.4)
MORPHOLOGY BLD-IMP: NORMAL
MYELOCYTES NFR BLD MANUAL: 1.7 %
NEUTROPHILS # BLD AUTO: 15.88 K/UL (ref 1.82–7.42)
NEUTROPHILS NFR BLD: 88.7 % (ref 44–72)
NRBC # BLD AUTO: 0 K/UL
NRBC BLD-RTO: 0 /100 WBC
O2/TOTAL GAS SETTING VFR VENT: 100 %
O2/TOTAL GAS SETTING VFR VENT: 100 %
OVALOCYTES BLD QL SMEAR: NORMAL
PCO2 BLDA: 45.9 MMHG (ref 26–37)
PCO2 BLDA: 51.1 MMHG (ref 26–37)
PCO2 TEMP ADJ BLDA: 45.1 MMHG (ref 26–37)
PCO2 TEMP ADJ BLDA: 51.3 MMHG (ref 26–37)
PH BLDA: 7.34 [PH] (ref 7.4–7.5)
PH BLDA: 7.36 [PH] (ref 7.4–7.5)
PH TEMP ADJ BLDA: 7.34 [PH] (ref 7.4–7.5)
PH TEMP ADJ BLDA: 7.36 [PH] (ref 7.4–7.5)
PHOSPHATE SERPL-MCNC: 3.3 MG/DL (ref 2.5–4.5)
PLATELET # BLD AUTO: 338 K/UL (ref 164–446)
PLATELET # BLD AUTO: 453 K/UL (ref 164–446)
PLATELET BLD QL SMEAR: NORMAL
PMV BLD AUTO: 9.3 FL (ref 9–12.9)
PMV BLD AUTO: 9.4 FL (ref 9–12.9)
PO2 BLDA: 163 MMHG (ref 64–87)
PO2 BLDA: 91 MMHG (ref 64–87)
PO2 TEMP ADJ BLDA: 164 MMHG (ref 64–87)
PO2 TEMP ADJ BLDA: 88 MMHG (ref 64–87)
POIKILOCYTOSIS BLD QL SMEAR: NORMAL
POTASSIUM BLD-SCNC: 3 MMOL/L (ref 3.6–5.5)
POTASSIUM BLD-SCNC: 3.5 MMOL/L (ref 3.6–5.5)
POTASSIUM SERPL-SCNC: 3.1 MMOL/L (ref 3.6–5.5)
PROT SERPL-MCNC: 5 G/DL (ref 6–8.2)
PROTHROMBIN TIME: 31.3 SEC (ref 12–14.6)
RBC # BLD AUTO: 2.32 M/UL (ref 4.7–6.1)
RBC # BLD AUTO: 2.4 M/UL (ref 4.7–6.1)
RBC BLD AUTO: PRESENT
SAO2 % BLDA: 97 % (ref 93–99)
SAO2 % BLDA: 99 % (ref 93–99)
SIGNIFICANT IND 70042: ABNORMAL
SIGNIFICANT IND 70042: ABNORMAL
SIGNIFICANT IND 70042: NORMAL
SITE SITE: ABNORMAL
SITE SITE: ABNORMAL
SITE SITE: NORMAL
SODIUM BLD-SCNC: 133 MMOL/L (ref 135–145)
SODIUM BLD-SCNC: 134 MMOL/L (ref 135–145)
SODIUM SERPL-SCNC: 133 MMOL/L (ref 135–145)
SOURCE SOURCE: ABNORMAL
SOURCE SOURCE: ABNORMAL
SOURCE SOURCE: NORMAL
SPECIMEN DRAWN FROM PATIENT: ABNORMAL
SPECIMEN DRAWN FROM PATIENT: ABNORMAL
WBC # BLD AUTO: 17.9 K/UL (ref 4.8–10.8)
WBC # BLD AUTO: 27.1 K/UL (ref 4.8–10.8)

## 2019-08-21 PROCEDURE — 87186 SC STD MICRODIL/AGAR DIL: CPT

## 2019-08-21 PROCEDURE — 700102 HCHG RX REV CODE 250 W/ 637 OVERRIDE(OP): Performed by: INTERNAL MEDICINE

## 2019-08-21 PROCEDURE — 700111 HCHG RX REV CODE 636 W/ 250 OVERRIDE (IP): Performed by: SURGERY

## 2019-08-21 PROCEDURE — A9270 NON-COVERED ITEM OR SERVICE: HCPCS | Performed by: HOSPITALIST

## 2019-08-21 PROCEDURE — 80053 COMPREHEN METABOLIC PANEL: CPT

## 2019-08-21 PROCEDURE — 700111 HCHG RX REV CODE 636 W/ 250 OVERRIDE (IP): Performed by: INTERNAL MEDICINE

## 2019-08-21 PROCEDURE — 85007 BL SMEAR W/DIFF WBC COUNT: CPT

## 2019-08-21 PROCEDURE — 84100 ASSAY OF PHOSPHORUS: CPT

## 2019-08-21 PROCEDURE — 700112 HCHG RX REV CODE 229: Performed by: SURGERY

## 2019-08-21 PROCEDURE — 770006 HCHG ROOM/CARE - MED/SURG/GYN SEMI*

## 2019-08-21 PROCEDURE — 700102 HCHG RX REV CODE 250 W/ 637 OVERRIDE(OP): Performed by: SURGERY

## 2019-08-21 PROCEDURE — 700105 HCHG RX REV CODE 258: Performed by: SURGERY

## 2019-08-21 PROCEDURE — 700102 HCHG RX REV CODE 250 W/ 637 OVERRIDE(OP): Performed by: HOSPITALIST

## 2019-08-21 PROCEDURE — 99232 SBSQ HOSP IP/OBS MODERATE 35: CPT | Performed by: INTERNAL MEDICINE

## 2019-08-21 PROCEDURE — 93306 TTE W/DOPPLER COMPLETE: CPT | Mod: 26 | Performed by: INTERNAL MEDICINE

## 2019-08-21 PROCEDURE — 85027 COMPLETE CBC AUTOMATED: CPT

## 2019-08-21 PROCEDURE — A9270 NON-COVERED ITEM OR SERVICE: HCPCS | Performed by: INTERNAL MEDICINE

## 2019-08-21 PROCEDURE — 99233 SBSQ HOSP IP/OBS HIGH 50: CPT | Performed by: INTERNAL MEDICINE

## 2019-08-21 PROCEDURE — 36415 COLL VENOUS BLD VENIPUNCTURE: CPT

## 2019-08-21 PROCEDURE — A9270 NON-COVERED ITEM OR SERVICE: HCPCS | Performed by: SURGERY

## 2019-08-21 PROCEDURE — 87077 CULTURE AEROBIC IDENTIFY: CPT

## 2019-08-21 PROCEDURE — 85730 THROMBOPLASTIN TIME PARTIAL: CPT

## 2019-08-21 PROCEDURE — 85610 PROTHROMBIN TIME: CPT

## 2019-08-21 PROCEDURE — 85652 RBC SED RATE AUTOMATED: CPT

## 2019-08-21 PROCEDURE — 86140 C-REACTIVE PROTEIN: CPT

## 2019-08-21 PROCEDURE — 87040 BLOOD CULTURE FOR BACTERIA: CPT | Mod: 91

## 2019-08-21 PROCEDURE — 700101 HCHG RX REV CODE 250: Performed by: SURGERY

## 2019-08-21 PROCEDURE — 82962 GLUCOSE BLOOD TEST: CPT

## 2019-08-21 PROCEDURE — 83735 ASSAY OF MAGNESIUM: CPT

## 2019-08-21 PROCEDURE — 93306 TTE W/DOPPLER COMPLETE: CPT

## 2019-08-21 RX ORDER — ASCORBIC ACID 500 MG
500 TABLET ORAL
Status: DISCONTINUED | OUTPATIENT
Start: 2019-08-21 | End: 2019-08-26 | Stop reason: HOSPADM

## 2019-08-21 RX ORDER — FERROUS GLUCONATE 324(38)MG
324 TABLET ORAL
Status: DISCONTINUED | OUTPATIENT
Start: 2019-08-21 | End: 2019-08-26 | Stop reason: HOSPADM

## 2019-08-21 RX ORDER — AMOXICILLIN 250 MG
2 CAPSULE ORAL 2 TIMES DAILY
Status: DISCONTINUED | OUTPATIENT
Start: 2019-08-21 | End: 2019-08-26 | Stop reason: HOSPADM

## 2019-08-21 RX ORDER — POLYETHYLENE GLYCOL 3350 17 G/17G
1 POWDER, FOR SOLUTION ORAL 2 TIMES DAILY
Status: DISCONTINUED | OUTPATIENT
Start: 2019-08-21 | End: 2019-08-26 | Stop reason: HOSPADM

## 2019-08-21 RX ORDER — POTASSIUM CHLORIDE 20 MEQ/1
60 TABLET, EXTENDED RELEASE ORAL ONCE
Status: COMPLETED | OUTPATIENT
Start: 2019-08-21 | End: 2019-08-21

## 2019-08-21 RX ORDER — SODIUM CHLORIDE AND POTASSIUM CHLORIDE 300; 900 MG/100ML; MG/100ML
INJECTION, SOLUTION INTRAVENOUS CONTINUOUS
Status: DISCONTINUED | OUTPATIENT
Start: 2019-08-21 | End: 2019-08-22

## 2019-08-21 RX ORDER — FOLIC ACID 1 MG/1
1 TABLET ORAL DAILY
Status: DISCONTINUED | OUTPATIENT
Start: 2019-08-22 | End: 2019-08-26 | Stop reason: HOSPADM

## 2019-08-21 RX ADMIN — SODIUM CHLORIDE, POTASSIUM CHLORIDE, SODIUM LACTATE AND CALCIUM CHLORIDE: 600; 310; 30; 20 INJECTION, SOLUTION INTRAVENOUS at 05:10

## 2019-08-21 RX ADMIN — Medication: at 15:06

## 2019-08-21 RX ADMIN — ATORVASTATIN CALCIUM 40 MG: 40 TABLET, FILM COATED ORAL at 17:14

## 2019-08-21 RX ADMIN — CEFAZOLIN 2 G: 10 INJECTION, POWDER, FOR SOLUTION INTRAVENOUS at 05:10

## 2019-08-21 RX ADMIN — POTASSIUM CHLORIDE AND SODIUM CHLORIDE: 900; 300 INJECTION, SOLUTION INTRAVENOUS at 16:25

## 2019-08-21 RX ADMIN — OXYCODONE HYDROCHLORIDE AND ACETAMINOPHEN 500 MG: 500 TABLET ORAL at 18:36

## 2019-08-21 RX ADMIN — DOCUSATE SODIUM 100 MG: 100 CAPSULE, LIQUID FILLED ORAL at 05:13

## 2019-08-21 RX ADMIN — ASPIRIN 325 MG: 325 TABLET, FILM COATED ORAL at 17:14

## 2019-08-21 RX ADMIN — MAGNESIUM HYDROXIDE 30 ML: 400 SUSPENSION ORAL at 05:14

## 2019-08-21 RX ADMIN — RIFAMPIN 300 MG: 300 CAPSULE ORAL at 12:05

## 2019-08-21 RX ADMIN — POTASSIUM CHLORIDE 60 MEQ: 1500 TABLET, EXTENDED RELEASE ORAL at 16:10

## 2019-08-21 RX ADMIN — RIFAMPIN 300 MG: 300 CAPSULE ORAL at 05:13

## 2019-08-21 RX ADMIN — INSULIN HUMAN 1 UNITS: 100 INJECTION, SOLUTION PARENTERAL at 21:40

## 2019-08-21 RX ADMIN — RIFAMPIN 300 MG: 300 CAPSULE ORAL at 17:14

## 2019-08-21 RX ADMIN — Medication: at 04:26

## 2019-08-21 RX ADMIN — DOCUSATE SODIUM 100 MG: 100 CAPSULE, LIQUID FILLED ORAL at 17:14

## 2019-08-21 RX ADMIN — SENNOSIDES, DOCUSATE SODIUM 2 TABLET: 50; 8.6 TABLET, FILM COATED ORAL at 18:36

## 2019-08-21 RX ADMIN — FERROUS GLUCONATE 324 MG: 324 TABLET ORAL at 18:36

## 2019-08-21 RX ADMIN — CEFAZOLIN 2 G: 10 INJECTION, POWDER, FOR SOLUTION INTRAVENOUS at 17:14

## 2019-08-21 RX ADMIN — SODIUM CHLORIDE, POTASSIUM CHLORIDE, SODIUM LACTATE AND CALCIUM CHLORIDE: 600; 310; 30; 20 INJECTION, SOLUTION INTRAVENOUS at 11:33

## 2019-08-21 ASSESSMENT — ENCOUNTER SYMPTOMS
NAUSEA: 1
PALPITATIONS: 0
FOCAL WEAKNESS: 0
FALLS: 0
BLOOD IN STOOL: 0
BLURRED VISION: 0
MYALGIAS: 1
PND: 0
VOMITING: 0
HEMOPTYSIS: 0
DIZZINESS: 0
COUGH: 0
DEPRESSION: 0
FLANK PAIN: 0
FEVER: 0
DIARRHEA: 0
WEIGHT LOSS: 0
CONSTIPATION: 0
SHORTNESS OF BREATH: 0
CHILLS: 0
DOUBLE VISION: 0
ABDOMINAL PAIN: 0
NAUSEA: 0
HEADACHES: 0

## 2019-08-21 ASSESSMENT — LIFESTYLE VARIABLES: SUBSTANCE_ABUSE: 0

## 2019-08-21 NOTE — CARE PLAN
Problem: Safety  Goal: Will remain free from injury  Outcome: PROGRESSING AS EXPECTED  Note:   Patient educated on bed rest and not to get out of bed at this time.     Problem: Pain Management  Goal: Pain level will decrease to patient's comfort goal  Outcome: PROGRESSING AS EXPECTED  Note:   Morphine PCA in use with adequate pain control

## 2019-08-21 NOTE — PROGRESS NOTES
Giorgio from Lab called with critical synovial fluid result of many WBC and gram + cocci at 0907. Critical lab result read back to Giorgio.

## 2019-08-21 NOTE — PROGRESS NOTES
"Awake, no complaints.  Legs feel \"better.\"  Tm - 100.2, VSS    Gen - Pleasant male in NAD.  LE - Wounds clean, 1 small bleeding point on muscle flap --> sutured by myself.  Feet are warm.  Palpable R PT pulses with multiphasic Doppler flow signals.  Hyperemic flow in left pedal arteries.  Reperfusion hyperemia in left foot.    Labs: Reviewed.    Assessment: S/P removal of infected fem-fem and left fem-pop bypass grafts, redo bypasses using cadaveric veins, left leg angiogram, and bilateral sartorius flaps.    Plan:  Stable.  Supplement K+.  Change IVF.  Recheck CBC, transfused if Hgb < 7.  Advance diet as tolerated.  Ensure supplementation.  Wound consult for wound vacs.  Continue antibiotics.    Discussed with patient and RN.        "

## 2019-08-21 NOTE — PROGRESS NOTES
"Assumed care of patient from night shift Rn.  Patient is alert and oriented times 4, states pain of 7/10, Morphine PCA in use.  VSS /57   Pulse 94   Temp 36.3 °C (97.4 °F) (Temporal)   Resp (!) 21   Ht 1.702 m (5' 7\")   Wt 68.6 kg (151 lb 3.8 oz)   SpO2 94%   BMI 23.69 kg/m²   PIV in the R wrist, patent and saline locked.  PIV in the BRYANT, patent and running LR at 125mL/hr.  On 4L oxygen with saturations in the mid 90s.  Last BM 8/14, PTA.  Diamond catheter, stat lock in place and draining to gravity.  Urine orange.  Clear liquid diet, tolerating well.  Bilateral groin incisions, old drainage noted to dressings, no orders to change at this time.  Dressing to the L knee, hemovac in use.  No drainage noted.  Bed rest orders at this time.  Encouraged patient to turn himself from side to side while in bed.  Moderate fall risk, bed alarm in use.  POC discussed for the day, bed is locked and in the lowest position, call light is within reach.  All needs are met at this time, hourly rounding is in place.    "

## 2019-08-21 NOTE — PROGRESS NOTES
"   Orthopaedic PA Progress Note    Interval changes:scant HV output overnight, dressing saturated this AM    ROS - Patient denies any new issues. No chest pain, dyspnea, or fever.  Pain well controlled.    /57   Pulse 94   Temp 36.3 °C (97.4 °F) (Temporal)   Resp (!) 21   Ht 1.702 m (5' 7\")   Wt 68.6 kg (151 lb 3.8 oz)   SpO2 94%     Patient seen and examined  No acute distress  Breathing non labored  RRR  Surgical dressing is clean, dry, and intact. Patient clearly fires tibialis anterior, EHL, and gastrocnemius/soleus. Sensation is intact to light touch throughout superficial peroneal, deep peroneal, tibial, saphenous, and sural nerve distributions. Strong and palpable 2+ dorsalis pedis and posterior tibial pulses with capillary refill less than 2 seconds. No lower leg tenderness or discomfort.    Recent Labs     08/18/19  1340 08/18/19  2344 08/21/19  0444   WBC 19.4* 15.3* 17.9*   RBC 3.26* 3.27* 2.40*   HEMOGLOBIN 9.4* 9.1* 7.0*   HEMATOCRIT 28.7* 28.3* 21.7*   MCV 88.0 86.5 90.0   MCH 28.8 27.8 28.8   MCHC 32.8* 32.2* 31.9*   RDW 47.6 47.9 50.3*   PLATELETCT 326 311 338   MPV 9.4 9.3 9.4       Active Hospital Problems    Diagnosis   • Sepsis due to methicillin susceptible Staphylococcus aureus ([facility]) [A41.01]     Priority: High   • Infected prosthetic vascular graft ([facility]) [T82.7XXA]     Priority: Medium   • Staphylococcal arthritis of left knee ([facility]) [M00.062]     Priority: Medium   • MSSA bacteremia [R78.81]     Priority: Medium   • PAD (peripheral artery disease) ([facility]) [I73.9]     Priority: Medium   • Iron deficiency anemia [D50.9]     Priority: Low   • Dyslipidemia [E78.5]     Priority: Low   • Chronic anticoagulation [Z79.01]     Priority: Low   • Hyponatremia [E87.1]     Priority: Low   • Chronic kidney disease (CKD), stage III (moderate) ([facility]) [N18.3]     Priority: Low   • Hyperglycemia [R73.9]     Priority: Low       Assessment/Plan:  POD#1 S/P L knee arthrotomy; I+D  Wt bearing status - per " Vascular, WBAT from Ortho standpoint  PT/OT-initiated  Wound care:dressing change today  Drains - scant HV output, will recheck after lunch and likely remove if out still scant  Diamond-no  Sutures/Staples out- 10-14 days post operatively  Antibiotics: per ID

## 2019-08-21 NOTE — OR NURSING
"PT from OR w/ ANES/RN, R radial A line in place, zeroed and correlated with NIBP, PT w/ b/l groin dressings, L groin dressing shadowed w/ betadine from packing in groin, R groin w/ betadine soaked packing as well, no shadowing noted, dressings are taped w/ medipore tape and CDI, LLE w/ dressing, dry gauze w/ hemovac clamped w/ scant amount of serous drainage, b/l pedal pulses found w/ doppler and \"X\" placed where found, good pulses in b/l posterior tibial w/ doppler, extremities cool, pale, cap refill approx 3 seconds noted.  PT given Fentanyl 100 mcg for pain, per Dr. Forbes, SBP to remain less than 160, given Hydralazine 15 mg for NIBP now 134/74 , PT NSR on monitor, frequent PVC's noted.  PT currently reports pain is tolerable, no crying or restlessness noted, mckeon in place draining urine, orange in color, stat lock applied emptied for 250 cc. Report given to floor RN.   "

## 2019-08-21 NOTE — ANESTHESIA QCDR
2019 [facility] Clinical Data Registry (for Quality Improvement)     Postoperative nausea/vomiting risk protocol (Adult = 18 yrs and Pediatric 3-17 yrs)- (430 and 463)  General inhalation anesthetic (NOT TIVA) with PONV risk factors: Yes  Provision of anti-emetic therapy with at least 2 different classes of agents: Yes   Patient DID NOT receive anti-emetic therapy and reason is documented in Medical Record:  N/A    Multimodal Pain Management- (AQI59)  Patient undergoing Elective Surgery (i.e. Outpatient, or ASC, or Prescheduled Surgery prior to Hospital Admission): Yes  Use of Multimodal Pain Management, two or more drugs and/or interventions, NOT including systemic opioids: Yes   Exception: Documented allergy to multiple classes of analgesics:  N/A    PACU assessment of acute postoperative pain prior to Anesthesia Care End- Applies to Patients Age = 18- (ABG7)  Initial PACU pain score is which of the following: Pain not assessed  Patient unable to report pain score: Yes (Patient Unable to Report)    Post-anesthetic transfer of care checklist/protocol to PACU/ICU- (426 and 427)  Upon conclusion of case, patient transferred to which of the following locations: PACU/Non-ICU  Use of transfer checklist/protocol: Yes  Exclusion: Service Performed in Patient Hospital Room (and thus did not require transfer): N/A    PACU Reintubation- (AQI31)  General anesthesia requiring endotracheal intubation (ETT) along with subsequent extubation in OR or PACU: Yes  Required reintubation in the PACU: No   Extubation was a planned trial documented in the medical record prior to removal of the original airway device:  N/A    Unplanned admission to ICU related to anesthesia service up through end of PACU care- (MD51)  Unplanned admission to ICU (not initially anticipated at anesthesia start time): No

## 2019-08-21 NOTE — DISCHARGE PLANNING
Agency/Facility Name: [facility]   Referral sent per Choice form @ 1099

## 2019-08-21 NOTE — OR SURGEON
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    PostOp Diagnosis: Same.    Procedure(s):  Removal of infected fem-fem and left fem-pop bypass grafts.  Left popliteal thrombectomy.  Redo fem-fem bypass and left fem-pop bypass using Cryoveins.  Left leg angiogram.  Bilateral sartorius flap.    Surgeon:  Ana Grande M.D.    Assistant:  Garret Martinez M.D.    Anesthesiologist/Type of Anesthesia:  Anesthesiologist: Jeremy Forbes M.D./General    Surgical Staff:  Circulator: Sharon Riddle R.N.  Relief Circulator: Denisa Tai R.N.; Gabriel Medina R.N.  Relief Scrub: Angelita Acevedo  Scrub Person: Nati King  Radiology Technologist: Harvinder Mcdonald    Specimens removed if any:  ID Type Source Tests Collected by Time Destination   1 : left femoral wound  Other Other AEROBIC/ANAEROBIC CULTURE (SURGERY) Ana Grande M.D. 8/20/2019  3:00 PM        Estimated Blood Loss: 750ml.    IVF: 4 units FFP's.  1 unit pRBC.  2l crystalloids.    Contrast: 4ml Visipaque.    UO: 450ml.    Findings: Extensive pus collection.  Good flow post bypass.    Complications: None.    Dictated, #[id number].        8/20/2019 7:48 PM Ana Grande M.D.

## 2019-08-21 NOTE — DISCHARGE PLANNING
Anticipated Discharge Disposition: LTAC    Action: Discussed patient's case during IDT Rounds.  Per MD, this patient is appropriate for LTAC.    LSW met with the patient at bedside to discuss LTAC.  Patient agreed, choice form signed.  LSW provide the LTAC Choice Form to [facility].    Barriers to Discharge: None.    Plan: LTAC, pending acceptance from LTAC and medical clearance.

## 2019-08-21 NOTE — OP REPORT
DATE OF SERVICE:  08/20/2019    SURGEON:  Ana Grande MD    ASSISTANT:  Garret Martinez MD    ANESTHESIOLOGIST:  Jeremy Forbes MD    TYPE OF ANESTHESIA:  General anesthesia.    PREOPERATIVE DIAGNOSIS:  1.  Infected fem-fem bypass graft.  2.  Infected and thrombosed left femoral to below knee popliteal bypass graft.  3.  Left leg ischemia.    PROCEDURES:  1.  Removal of infected fem-fem bypass graft.  2.  Removal of infected left femoral to below knee popliteal bypass graft.  3.  Left popliteal artery thrombectomy using #3 López thromboembolectomy   catheter.  4.  Redo right-to-left femorofemoral bypass graft using the cryo superficial   femoral vein.  5.  Redo left femoral to below knee popliteal bypass using cryo saphenous   vein.  6.  Needle, left leg.  7.  Left leg angiogram.  8.  Bilateral sartorius flaps.    INDICATION FOR PROCEDURE:  The patient is a pleasant gentleman with multiple   medical problems who was admitted for sepsis.  He was found to have a septic   left knee as well as evidence of infection of the fem-fem and a left fem-pop   bypass graft.  His left leg was ischemic secondary to the left fem-pop bypass   graft became thrombosed.  A discussion was made with patient.  He would like   to undergo above procedure, fully understanding all risks.  The greater   saphenous vein mapping was done preoperatively and showed the veins to be too   small for us bypass conduit.    DESCRIPTION OF PROCEDURE:  Informed consent was obtained.  Patient was taken   to the operating room and was placed in the supine position.  He was given   Ancef intravenously.  General anesthesia was induced.  Diamond catheter was   placed under sterile condition.    Next, his lower abdomen, both legs, and perineum were sterilely prepped and   draped in the normal fashion.  A time-out procedure was done.  An incision was   made in the right groin, centering over the old scar.  The incision was   extended through subcutaneous tissue  using the electrocautery.  Large amount   of pus was encountered.  The graft was found to be not incorporated to the   surrounding tissues.  The dissection was then proceeded proximally.  The   inguinal ligament was retracted superiorly.  The external iliac artery was   identified and carefully dissected.  A vessel loop was doubly placed around   the external iliac artery.  Dissection was then extended into the distal   common femoral artery.  The distal common femoral artery was identified and carefully   dissected free from the surrounding tissues.    Next, on the left side, the incision was also made over the old scar.  There   was large amount of pus encountered as well.  The external iliac artery,   common femoral and profunda femoral arteries were identified and carefully   dissected.  The superficial femoral artery was found to be chronically   occluded.    Next, an incision was made over the old scar in the medial left lower leg.    The incision was extended through subcutaneous tissue using the   electrocautery.  The pus was also encountered in the popliteal fossa.  The   popliteal artery below the bypass graft was carefully dissected free from the   surrounding tissues.  Proximally, it was found to be not healthy secondary to   the infection.    Next, the devitalized skin and subcutaneous tissues in the wounds were completely resected   using Metzenbaum scissor.  The wounds as well as the old graft tunnels were also copiously irrigated with   sterile saline solution using Pulsavac device, after the wrap was removed.      Patient was given heparin 4000 units intravenously.  He was subsequently   redosed with heparin throughout the case.  After the heparin was allowed to   circulate systemically for 3 minutes, vascular control of the distal right   common femoral artery and distal external iliac artery was obtained with   vascular clamps.  The old fem-fem bypass graft was completely disconnected at   the  common femoral artery.  The edges of the common femoral artery were   resected to healthy tissues.  A superficial femoral vein was brought into the   operative field, beveled and anastomosed end-to-side to the common femoral   artery using running 5-0 Prolene suture.  After this was done, backbleeding   and flushing were obtained through the graft.  The graft was clamped above the   anastomosis.  Flow was restored distally.    Next, the cryo superficial femoral vein graft was brought through a new   subcutaneous tunnel, away from the infected area.  Vascular control of the   profunda femoral artery and common femoral artery were obtained with vascular   clamps.  The graft was cut to appropriate length, beveled, and anastomosed   end-to-side to the common femoral and profunda femoral artery using running   5-0 Prolene sutures.  Prior to completing the anastomosis, backbleeding and   flushing were obtained.  The anastomosis was completed.    Next, the cryo saphenous vein graft was brought into the operative field.    Vascular control of the fem-fem bypass graft on the left side was obtained   with vascular clamps.  A graftotomy was made.  A lip of the fem-fem bypass   graft was also resected.  The saphenous CryoVein was beveled and anastomosed   end-to-side to the fem-fem bypass graft using running 6-0 Prolene sutures.    After this was done, backbleeding and flushing were obtained through the graft.    The graft was clamped above the anastomosis.    Next, the graft was tunneled anatomically away from the infected tunnel site   with care taken to avoid twisting of the graft.  The popliteal artery was   ligated proximal to the old bypass graft and transected below the old bypass   graft anastomosis.  There was thrombus seen.  Thrombectomy of the popliteal   artery was performed using a #3 López embolectomy catheter.  Small amount of   clot was removed.  Good backbleeding was seen.  The saphenous vein graft was   cut  to appropriate length, beveled and anastomosed end-to-side to the   popliteal artery below the knee using running 5-0 Prolene suture.  Prior to   completing the anastomosis, backbleeding and flushing was obtained.  The   anastomosis was completed.  Flow was restored distally.    It should be noted that the devitalized skin and subcutaneous tissues in the wounds were completely resected   using Metzenbaum scissor.  The wounds as well as the old graft tunnels were also copiously irrigated with   sterile saline solution using Pulsavac device, after the infected grafts were removed.      Next, the fem-fem bypass graft was cannulated using a 20-gauge angiocatheter.   A left leg angiogram was obtained.  There was prompt flow into the popliteal   artery below the knee.  There appeared to be 2-vessel runoffs since; namely,   the posterior and peroneal artery, which communicates into the foot.  The   angiocatheter was removed and the puncture site was repaired with 6-0 Prolene   suture.    Next, attention was then turned to performing the bilateral sartorius flaps.    The sartorius muscles were mobilized bilaterally and swung over to cover the   grafts and the arteries and kept in place with interrupted 3-0 Vicryl sutures.    The muscle in the left leg below knee wound was reapproximated with   interrupted 3-0 Vicryl suture to cover up the graft and the artery.  The   wounds were then packed with Betadine moistened gauze as well as covered with   dry gauzes.    It should be noted that Dr. Cifuentes performed washout of the left knee   intraoperatively during the case prior to redo bypass.  This part of the   operation will be dictated separately by him.    Patient was then awakened, taken to recovery area in stable condition with   good Doppler flow signals over the pedal arteries bilaterally.    ESTIMATED BLOOD LOSS:  750 mL.    INTRAVENOUS FLUID:  4 units FFP, 1 unit packed red blood cells and 2 liter    crystalloids.    CONTRAST USED:  4 mL Visipaque.    URINE OUTPUT:  450 mL.       ____________________________________     MD ANDREA FRITZ / RICHA    DD:  08/20/2019 20:04:19  DT:  08/20/2019 21:19:47    D#:  [id number]  Job#:  [id number]    cc: Garret Martinez MD, Jeremy Forbes MD

## 2019-08-21 NOTE — ANESTHESIA POSTPROCEDURE EVALUATION
Patient: Dc Patel    Procedure Summary     Date:  08/20/19 Room / Location:  [facility] OR 04 / SURGERY [facility]    Anesthesia Start:  1443 Anesthesia Stop:  1945    Procedures:       CREATION, BYPASS, ARTERIAL, FEMORAL TO FEMORAL, USING GRAFT- REMOVAL AND REDO, CRYOVEIN (Left Groin)      CREATION, BYPASS, ARTERIAL, FEMORAL TO POPLITEAL, USING GRAFT (Left Groin)      ANGIOGRAM (Left Leg Upper)      IRRIGATION AND DEBRIDEMENT, WOUND-KNEE (Left Knee) Diagnosis:  (infected prosthetic vascular graft )    Surgeon:  Ana Grande M.D.; Iván Cifuentes M.D. Responsible Provider:  Jeremy Forbes M.D.    Anesthesia Type:  general ASA Status:  4          Final Anesthesia Type: general  Last vitals  BP   Blood Pressure : 106/57, Arterial BP: 159/71    Temp   36.3 °C (97.4 °F)    Pulse   Pulse: 94   Resp   (!) 21    SpO2   94 %      Anesthesia Post Evaluation    Patient location during evaluation: PACU  Patient participation: complete - patient cannot participate  Level of consciousness: sleepy but conscious    Airway patency: patent  Anesthetic complications: no  Cardiovascular status: hemodynamically stable  Respiratory status: acceptable  Hydration status: euvolemic    PONV: none           Nurse Pain Score: 10 (NPRS)    Pt with chronic pain, given multiple modalities of pain control, unable to improve pain any further.

## 2019-08-21 NOTE — ANESTHESIA TIME REPORT
Anesthesia Start and Stop Event Times     Date Time Event    8/20/2019 1330 Ready for Procedure     1443 Anesthesia Start     1945 Anesthesia Stop        Responsible Staff  08/20/19    Name Role Begin End    Jeremy Forbes M.D. Anesth 1443 1945        Preop Diagnosis (Free Text):  Pre-op Diagnosis     infected prosthetic vascular graft         Preop Diagnosis (Codes):    Post op Diagnosis  Infected prosthetic vascular graft (HCC)      Premium Reason  A. 3PM - 7AM    Comments:

## 2019-08-21 NOTE — OP REPORT
DATE OF SERVICE:  08/20/2019    PREOPERATIVE DIAGNOSES:  1.  Septic arthritis, left knee.  2.  Infected fem-fem bypass graft.  3.  Peripheral vascular disease.    POSTOPERATIVE DIAGNOSES:  1.  Septic arthritis, left knee.  2.  Infected fem-fem bypass graft.  3.  Peripheral vascular disease.    SURGICAL PROCEDURES:  Arthrotomy and drainage of left knee.    SURGEON:  Iván Cifuentes MD    ASSISTANT:  Garret Martinez MD    ANESTHESIOLOGIST:  Jeremy Forbes MD    ANESTHETIC:  General.    ESTIMATED BLOOD LOSS:  2 mL    INDICATIONS:  The patient is 74 years old.  He has staph bacteremia.  He has   pus draining from his groin.  He had a previous fem-fem bypass.  The graft   material infected.  He is being taken back to the OR today by Dr. Grande.    However, he does have pain in his left knee as well.  He cannot move it.  The   CT scan shows a large effusion and clinically this is evident as well.  I   aspirated this today and there was lolly pus.  There were gram-positive cocci   on the Gram stain.  I recommended arthrotomy and drainage during this surgical   procedure as already planned.    DESCRIPTION OF PROCEDURE:  The patient was already in the operating room and   had his infected graft material removed.  Please see Dr. Grande's separate   operative note describing positioning and anesthesia.  Prior to reconstructing   the patient's lower extremity vasculature, I then made a longitudinal   incision anteromedially over the knee, carried this down to the knee and   performed a median parapatellar arthrotomy.  Gross pus was encountered.  The   knee was then irrigated with sterile saline via pulsatile lavage until the   irrigant was clean.  It was then closed over a Hemovac drain within the knee   using 0 Vicryl for the arthrotomy and then 2-0 Vicryl and 2-0 nylon for the   skin.  The drain was sutured to the skin.    The patient was turned back over to Dr. Grande for ongoing treatment of his   vascular  issues.    POSTOPERATIVE PLAN:  1.  Intravenous antibiotics and follow cultures.  2.  Hemovac drain for 48-72 hours.  3.  Ongoing preoperative medical management per hospitalist.       ____________________________________     MD FRANCISCO CHARLES / RICHA    DD:  08/20/2019 18:15:36  DT:  08/20/2019 19:00:13    D#:  [id number]  Job#:  [id number]

## 2019-08-21 NOTE — PROGRESS NOTES
"Pt A&O x4. Drowsy after surgery, irritable.     Vitals: /80   Pulse (!) 102   Temp 36.9 °C (98.4 °F) (Temporal)   Resp 16   Ht 1.702 m (5' 7\")   Wt 68.6 kg (151 lb 3.8 oz)   SpO2 95%   BMI 23.69 kg/m²     Pt rates pain 10 out of 10. Set up and started morphine PCA, pt educated and demonstrated correctly how to use bolus button.    Neuro: SANDRA. Numbness/ tingling to LLE, baseline.     Cardiac: Denies new onset of chest pain. Tachycardic.     Vascular: Pulses 2+ BUE, BLE post-tibial pulses heard by doppler. 1+ edema noted to LLE, pale/ red, hot. RLE foot is pale, cool, no edema.     Respiratory: Lungs sound rhonchi in apices, diminished in bases. On 10L O2 oxy mask.  on, satting in 90's. Denies SOB. Cough is strong, productive, congested. Pt is coughing up a large amount of sputum.     GI: Abdomen soft, rounded, distended. + bowel sounds, - BM today. On clear liquid diet, tolerating well. - nausea/ vomiting.    : Diamond catheter secured in place via stat lock, orange/ clear urine, adeaute output.      MSK: - OOB, ordered bedrest. LLE pain with movement. Generalized weakness noted throughout.     Integumentary: Bilateral groin surgical incision dressing saturated with Betadine, CDI, observed. LLE knee incision dressing CDI, observed. LLE knee Hemovac in place, chamber compressed to suction, little to no output. LLE is elevated on two pillows as ordered. Both knees and elbows padded with Mepilex as ordered.     Labs noted.    Fall precautions in place: Bed locked in lowest position, Upper bed rails up, personal belongings within reach, call light within reach, appropriate mobility signs in place, + bed alarm. Pt calls appropriately.     Pt updated on POC.   "

## 2019-08-21 NOTE — PROGRESS NOTES
Infectious Disease Progress Note    Author: Harvinder Head M.D. Date & Time of service: 2019  12:45 PM    Chief Complaint:  Follow-up for MSSA bacteremia    Interval History:   T-max 100.2, no CBC today.  Per report, Ortho tap of the left knee reveals purulence.  Washout planned.  Patient reports some improvement in his left knee pain.   afebrile, white count 17.9.  Yesterday, patient underwent left knee arthrotomy with I&D, as well as removal of infected fem-fem and left fem-pop bypass grafts, left popliteal thrombectomy, redo fem-fem bypass and left fem-pop bypass using Cryoveins.  Knee aspirate also growing Staph aureus.  Patient happy with his procedures.    Labs Reviewed and Medications Reviewed.    Review of Systems:  Review of Systems   Constitutional: Negative for chills, fever and weight loss.   Respiratory: Negative for cough and shortness of breath.    Cardiovascular: Negative for chest pain.   Gastrointestinal: Negative for abdominal pain, diarrhea, nausea and vomiting.   Musculoskeletal: Positive for joint pain.   Skin: Positive for rash.   Neurological: Negative for focal weakness and headaches.   All other systems reviewed and are negative.    Hemodynamics:  Temp (24hrs), Av.9 °C (98.5 °F), Min:36.3 °C (97.4 °F), Max:37.6 °C (99.7 °F)  Temperature: 36.3 °C (97.4 °F)  Pulse  Av.9  Min: 47  Max: 111   Blood Pressure : 106/57, Arterial BP: 159/71       Physical Exam:  Physical Exam   Constitutional: He is oriented to person, place, and time. No distress.   Thin   HENT:   Mouth/Throat: Oropharynx is clear and moist.   Eyes: Pupils are equal, round, and reactive to light. Conjunctivae are normal. No scleral icterus.   Neck: Normal range of motion. No JVD present.   Cardiovascular: Normal rate, regular rhythm and normal heart sounds.   No murmur heard.  Pulmonary/Chest: Effort normal and breath sounds normal. No respiratory distress. He has no wheezes.   Abdominal: Soft. He exhibits  no distension. There is tenderness.   Bilateral lower quadrant incisions with dressings in place   Musculoskeletal: He exhibits edema and tenderness.   Left knee with dressing in place   Lymphadenopathy:     He has no cervical adenopathy.   Neurological: He is alert and oriented to person, place, and time. No cranial nerve deficit.   Skin: He is not diaphoretic.   Multiple excoriations bilateral upper extremities   Psychiatric: His behavior is normal.   Nursing note and vitals reviewed.      Meds:    Current Facility-Administered Medications:   •  Pharmacy Consult Request  •  ondansetron  •  dexamethasone  •  scopolamine  •  LR  •  morphine  •  labetalol  •  hydrALAZINE  •  haloperidol lactate  •  aspirin  •  magnesium hydroxide  •  docusate sodium  •  acetaminophen  •  NS  •  NS  •  riFAMPin  •  ceFAZolin  •  atorvastatin  •  NS  •  Respiratory Care per Protocol  •  ondansetron  •  oxyCODONE immediate-release  •  HYDROmorphone    Labs:  Recent Labs     08/18/19  1340 08/18/19  2344 08/21/19  0444   WBC 19.4* 15.3* 17.9*   RBC 3.26* 3.27* 2.40*   HEMOGLOBIN 9.4* 9.1* 7.0*   HEMATOCRIT 28.7* 28.3* 21.7*   MCV 88.0 86.5 90.0   MCH 28.8 27.8 28.8   RDW 47.6 47.9 50.3*   PLATELETCT 326 311 338   MPV 9.4 9.3 9.4   NEUTSPOLYS 85.10* 80.90* 88.70*   LYMPHOCYTES 4.40* 4.30* 4.40*   MONOCYTES 6.10 7.80 3.50   EOSINOPHILS 0.00 0.00 0.00   BASOPHILS 0.00 0.00 0.00   RBCMORPHOLO Present Present Present     Recent Labs     08/18/19  1340 08/18/19  2344 08/21/19  0444   SODIUM 131* 127* 133*   POTASSIUM 4.1 4.0 3.1*   CHLORIDE 96 95* 98   CO2 28 24 29   GLUCOSE 155* 136* 114*   BUN 27* 27* 19     Recent Labs     08/18/19  1340 08/18/19  2344 08/21/19  0444   ALBUMIN 3.2  --  2.5*   TBILIRUBIN 0.3  --  1.0   ALKPHOSPHAT 110*  --  113*   TOTPROTEIN 6.7  --  5.0*   ALTSGPT 24  --  12   ASTSGOT 24  --  19   CREATININE 1.17 1.25 1.17       Imaging:  Ct-cta Lower Ext With & W/o-post Process Left    Result Date: 8/18/2019 8/18/2019  2:55 PM HISTORY/REASON FOR EXAM:  Acute limb ischemia, leg worsening pain and decreased pulse to left leg possible DVT vs arterial clot. He had a fem pop bypass in last 6 weeks. Now has +leg swelling, redness to lower abdomen, pain in back of knee TECHNIQUE/EXAM DESCRIPTION:  CT angiogram of the left lower extremity with contrast, with reconstructions. 100 mL of Omnipaque 350 nonionic contrast was administered at 5.0 mL/sec and helical scanning obtained from the distal femur through the ankle. Thin- and thick-section multiplanar volume reformats were generated from the axial data set in the sagittal and coronal planes. 3D angiographic images were reviewed on PACS. Maximum intensity projection (MIP) images were generated and reviewed. Low dose optimization technique was utilized for this CT exam including automated exposure control and adjustment of the mA and/or kV according to patient size. COMPARISON: Ultrasound 8/18/2019. FINDINGS: Partially visualized femoral-femoral bypass that appears patent. There is small amount of clot in the junction between the femorofemoral bypass graft and the common femoral artery. The left femoral popliteal graft is completely occluded. There is gas within the lumen. There is small amount of fluid surrounding the entire portion of the graft. The native common femoral and external femoral artery demonstrate multiple focal narrowing there is complete occlusion of the superficial femoral artery from the mid to distal portion at the area of prior stent extending to the popliteal artery. There is partial reconstitution at the trifurcation but flow is very sluggish. No flow appreciated in the anterior tibial artery, posterior tibial artery and peroneal artery at 10 cm from the ankle. 3D angiographic/MIP images of the vasculature confirm the vascular findings as described above.     1. Completely occluded left femoral-popliteal bypass graft with gas within the lumen and fluid surrounding the  graft. The finding is concerning for infected occluded graft. 2. Partially visualized femoral-femoral bypass that appears patent. There is small amount of clot in the junction between the femorofemoral bypass graft and the common femoral artery. 3. Complete occlusion of the native superficial femoral artery from the mid to distal thigh at the area of prior stenting. Complete occlusion of the left popliteal artery. Partial flow reconstitution at the trifurcation but flow is very sluggish. No distal flow flow appreciated in the anterior tibial artery, posterior tibial artery and peroneal artery at 10 cm from the ankle. CRITICAL RESULT READ BACK: Preliminary findings discussed with and critical read back performed by Dr. BERNA AMBROSIO in the Emergency Department via telephone on 2019 3:51 PM    Us-extremity Artery Lower Unilat Left    Result Date: 2019  Lower Extremity  Arterial Duplex Report  Vascular Laboratory  CONCLUSIONS  1.  The LEFT femoral popliteal bypass graft is occluded.  2.  THere is no flow seen in the mid/distal SFA through the distal  popliteal artery consistent with thrombosis.  3.  The bifemoral bypass graft is patent.  4.  There is minimal 3 vessel runoff tothe Left foot.  4.  THere is fluid surrounding both grafts.  Findings called to Dr. Ambrosio at 2:48 pm on 19.  KATHRYN DIAS  Exam Date:     2019 12:44  Room #:     Inpatient  Priority:     Stat  Ht (in):             Wt (lb):  Ordering Physician:        BERNA AMBROSIO  Referring Physician:       BERNA AMBROSIO  Sonographer:               Marcelino Miranda RVT  Study Type:                Complete Unilateral  Technical Quality:         Adequate  Age:    74    Gender:     M  MRN:    [id number]  :    1945      BSA:  Indications:     Pain in Limb  CPT Codes:       50355  ICD Codes:       729.5  History:         Left lower extremity pain. History of right-to-left fem-fem                   bypass graft and left fem-pop byppass  graft  Limitations:                RIGHT  Waveform        Peak Systolic Velocity (cm/s)                  Prox    Prox-Mid  Mid    Mid-Dist  Distal                                                             CFA                                                             PFA                                                             SFA                                                             POP                                                             AT                                                             PT                                                             AL                LEFT  Waveform        Peak Systolic Velocity (cm/s)                  Prox    Prox-Mid  Mid    Mid-Dist  Distal  Bi, non-                          173                      CFA  reversed  Triphasic       108                                        PFA  Absent          59                36               0       SFA  Absent                            0                        POP  Monophasic      6                                  6       AT  Monophasic      7                                  7       PT  Monophasic      5                                  6       AL  FINDINGS  Left.  With the exception of the very proximal fem-pop bypass graft, no flow can  be demonstrated throughout the graft's length. Echolucent material  consistent with arterial thrombus is visualized at the proximal thigh.  Retrograde flow seen in the very proximal segment. Throughout the graft's  length, there appears to be fluid collecting around the graft  No flow can be demonstrated from the mid-distal superficial femoral artery  through the distal popliteal artery. Echolucent material consistent with  arterial thrombus is visualized throughout this segment.  3-Vessel, severely diminished and monophasic runoff to the foot.  The fem-fem bypass graft is widely patent throughout its length. There  appears to be a fluid collection surrounding this  graft.  Tayler Huynh  (Electronically Signed)  Final Date:      2019                   14:38  Amended:         2019 14:49    Us-extremity Venous Lower Unilat Left    Result Date: 2019   Vascular Laboratory  CONCLUSIONS  1.  No evidence of LEFT lower extremity DVT.  KATHRYN DIAS  Exam Date:     2019 12:31  Room #:     Inpatient  Priority:     Stat  Ht (in):             Wt (lb):  Ordering Physician:        BERNA PARR  Referring Physician:       KAROLYN Vidales  Sonographer:               Marcelino Miranda RVT  Study Type:                Complete Unilateral  Technical Quality:         Adequate  Age:    74    Gender:     M  MRN:    [id number]  :    1945      BSA:  Indications:     Swelling of Limb  CPT Codes:       42984  ICD Codes:       729.81  History:         Left lower extremity edema  Limitations:     Pain in groin and popliteal fossa. Long axis images obtained                    instead of compressions.  PROCEDURES:  Left lower extremity venous duplex imaging.  The following venous structures were evaluated: common femoral, profunda  femoral, greater saphenous, femoral, popliteal , peroneal and posterior  tibial veins.  Serial compression, augmentation maneuvers,  color and spectral Doppler  flow evaluations were performed.  FINDINGS:  Left lower extremity -.  Complete color filling and compressibility with normal venous flow dynamics  including spontaneous flow, response to augmentation maneuvers, and  respiratory phasicity.  No evidence of superficial or deep venous thrombosis.  Flow was evaluated in the contralateral common femoral vein and normal  venous flow dynamics including spontaneous flow, respiratory phasic  variation and augmentation were demonstrated.  Tayler Huynh  (Electronically Signed)  Final Date:      2019                   14:39    Us-vein Mapping Lower Extremity Bilat    Result Date: 2019   Lower Extremity  Vein Map  Vascular  Laboratory  CONCLUSIONS  KATHRYN DIAS  Exam Date:     2019 13:20  Room #:     Inpatient  Priority:     Routine  Ht (in):             Wt (lb):  Ordering Physician:        MARIANN GOOD  Referring Physician:       FLORENTINO Wilson  Sonographer:               Miki Green RDCS, RVT  Study Type:                Complete Bilateral  Technical Quality:         Adequate  Age:    74    Gender:     M  MRN:    [id number]  :    1945      BSA:  Indications:     Peripheral vascular disease, unspecified  CPT Codes:       83147  ICD Codes:       I73.9  History:         Evaluate for arterial bypass conduit.  Limitations:            RIGHT                  Diameter                  (cm)  Characteristics  Normal             0.25      SFJ  Normal             0.21      HT  Normal             0.25      MT  Normal             0.26      LT  Normal             0.25      Knee  Normal             0.18      HC  Normal             0.23      LC  Normal             0.18      Ankle                LEFT       Diameter       (cm)                  Characteristics  SFJ    0.47     Normal  HT     0.46     Normal  MT     0.47     Normal  LT     0.41     Normal  Knee   0.37     Normal  HC     0.21     Normal  LC     0.22     Normal  Ankle  0.20     Normal                  Diameter                  (cm)  Characteristics  Normal             0.33      PF  Normal             0.21      MC                               Ankle         Diameter         (cm)                    Characteristics    PF     0.30     Normal    MC     0.21     Normal    Ankle  FINDINGS  Right. The greater saphenous vein is small in caliber. The small saphenous  vein is small in caliber.  Left. The greater saphenous vein is patent to the knee and essentially  normal in caliber. The greater saphenous is small in calibre in the high  calf and to the ankle.  The small saphenous vein is small in caliber.      Micro:  Results     Procedure Component  "Value Units Date/Time    FLUID CULTURE W/GRAM STAIN [[id number]]  (Abnormal) Collected:  08/20/19 0730    Order Status:  Completed Specimen:  Synovial Fluid Updated:  08/21/19 1158     Significant Indicator POS     Source SYNO     Site Left Knee     Culture Result -     Gram Stain Result Many WBCs.  Moderate Gram positive cocci.       Culture Result Staphylococcus aureus  Light growth      Narrative:       CALL  Carlson  141 tel. [phone],  CALLED  141 tel. [phone] 08/20/2019, 09:08, RB PERF. RESULTS CALLED TO:RN  [id number]  Collected By:[id number] MARY FAUSTIN  Left knee  Collected By:[id number] MARY FAUSTIN    Anaerobic Culture [[id number]] Collected:  08/20/19 1500    Order Status:  Completed Specimen:  Wound Updated:  08/21/19 1059     Significant Indicator NEG     Source WND     Site Left Femoral     Culture Result Culture in progress.    Narrative:       Surgery - swabs received    BLOOD CULTURE x2 [[id number]]  (Abnormal) Collected:  08/18/19 1340    Order Status:  Completed Specimen:  Blood from Peripheral Updated:  08/21/19 0952     Significant Indicator POS     Source BLD     Site PERIPHERAL     Culture Result Growth detected by Bactec instrument. 08/19/2019  Staphylococcus aureus (methicillin sensitive)  detected by PCR.        Staphylococcus aureus  See previous culture for sensitivity report.      Narrative:       CALL  Carlson  141 tel. [phone],  CALLED  141 tel. [phone] 08/19/2019, 06:49, RB PERF. RESULTS CALLED  TO:[facility]  Per Hospital Policy: Only change Specimen Src: to \"Line\" if  specified by physician order.  No site indicated    BLOOD CULTURE x2 [[id number]]  (Abnormal)  (Susceptibility) Collected:  08/18/19 1335    Order Status:  Completed Specimen:  Blood from Peripheral Updated:  08/21/19 0948     Significant Indicator POS     Source BLD     Site PERIPHERAL     Culture Result Growth detected by Bactec instrument. 08/19/2019  06:04      Staphylococcus aureus    Narrative:   " "    CALL  Carlson  141 tel. [phone],  CALLED  141 tel. [phone] 08/19/2019, 06:50, RB PERF. RESULTS CALLED  TO:Dhara Walker Rn  Per Hospital Policy: Only change Specimen Src: to \"Line\" if  specified by physician order.  No site indicated    Susceptibility     Staphylococcus aureus (1)     Antibiotic Interpretation Microscan Method Status    Clindamycin Sensitive <=0.5 mcg/mL AILEEN Final    Daptomycin Sensitive 1 mcg/mL AILEEN Final    Erythromycin Resistant >4 mcg/mL AILEEN Final    Moxifloxacin Sensitive 2 mcg/mL AILEEN Final    Ampicillin/sulbactam Sensitive <=8/4 mcg/mL AILEEN Final    Oxacillin Sensitive 1 mcg/mL AILEEN Final    Vancomycin Sensitive 2 mcg/mL AILEEN Final    Penicillin Resistant >8 mcg/mL AILEEN Final    Trimeth/Sulfa Sensitive <=0.5/9.5 mcg/mL AILEEN Final    Tetracycline Sensitive <=4 mcg/mL AILEEN Final                   BLOOD CULTURE [[id number]] Collected:  08/21/19 0444    Order Status:  Completed Specimen:  Blood from Peripheral Updated:  08/21/19 0455    Narrative:       Per Hospital Policy: Only change Specimen Src: to \"Line\" if  specified by physician order.    BLOOD CULTURE [[id number]] Collected:  08/21/19 0444    Order Status:  Completed Specimen:  Blood from Peripheral Updated:  08/21/19 0455    Narrative:       Per Hospital Policy: Only change Specimen Src: to \"Line\" if  specified by physician order.    GRAM STAIN [[id number]] Collected:  08/20/19 1500    Order Status:  Completed Specimen:  Wound Updated:  08/21/19 0007     Significant Indicator .     Source WND     Site Left Femoral     Gram Stain Result Many WBCs.  Few Gram positive cocci.      Narrative:       Surgery - swabs received    CULTURE WOUND W/ GRAM STAIN [[id number]] Collected:  08/20/19 1500    Order Status:  Completed Specimen:  Other Updated:  08/20/19 1758    GRAM STAIN [[id number]] Collected:  08/20/19 0730    Order Status:  Completed Specimen:  Synovial Updated:  08/20/19 0908     Significant Indicator .     Source SYNO     Site Left Knee     " Gram Stain Result Many WBCs.  Moderate Gram positive cocci.      Narrative:       CALL  Carlson  141 tel. [phone],  CALLED  141 tel. [phone] 08/20/2019, 09:08, RB PERF. RESULTS CALLED TO:RN  [id number]  Collected By:[id number] MARY FAUSTIN  Left knee  Collected By:[id number] MARY FAUSTIN    BLOOD CULTURE [[id number]]     Order Status:  Canceled Specimen:  Blood from Peripheral     BLOOD CULTURE [[id number]]     Order Status:  Canceled Specimen:  Blood from Peripheral           Assessment:  Dc Patel is a 74 y.o. male with a history of peripheral artery disease on Coumadin, CKD stage III, history of multiple revascularization procedures, admitted with infected left femoral-femoral and femoral-popliteal bypass grafts, MSSA bacteremia, left knee septic arthritis.  Source of Staph aureus likely is his multiple upper extremity excoriations.     Pertinent Diagnoses:  Sepsis  MSSA bacteremia  Infected vascular grafts  Left knee septic arthritis  History of blood clots  Chronic anticoagulant use  CKD stage III    Plan:  Sepsis, MSSA bacteremia, infected vascular grafts  -Continue IV Ancef renally dosed 2 g every 12 hours  -Added p.o. rifampin 300 mg 3 times daily 8/19.  Note interaction with Coumadin.  Will need close monitoring of INR  -TTE with no evidence of infective endocarditis  -Follow repeat blood cultures from 8/21  -On 8/20, patient underwent left knee arthrotomy with I&D, as well as removal of infected fem-fem and left fem-pop bypass grafts, left popliteal thrombectomy, redo fem-fem bypass and left fem-pop bypass using Cryoveins.  Knee aspirate also growing Staph aureus.  -Given his multiple grafts in place including an aortic stent graft which cannot be removed per vascular surgery, patient will likely need chronic p.o. suppression following 6 weeks of IV antibiotics  -End date of antibiotics to be determined, based on clearance of blood cultures    Left knee septic arthritis  -Orthopedics on board.  Left knee  arthrotomy with I&D performed 8/20  -Knee aspirate cultures growing Staph aureus    CKD stage III  -Creatinine at baseline.  Monitor, renally dose antibiotics    Discussed with internal medicine, Dr. Smallwood.  ID will follow.  Please call with questions.

## 2019-08-21 NOTE — CARE PLAN
Problem: Safety  Goal: Will remain free from injury  Outcome: PROGRESSING AS EXPECTED  Intervention: Provide assistance with mobility  Flowsheets (Taken 8/21/2019 0019)  Assistance: Assistance of Two or More  Ambulation Tolerance: Does Not Tolerate  Goal: Will remain free from falls  Outcome: PROGRESSING AS EXPECTED  Intervention: Assess risk factors for falls  Flowsheets  Taken 8/21/2019 0019  Pt Calls for Assistance: Yes  Fall Risk: High Risk to Fall - 2 or more points   History of fall: 0  Risk for Injury-Any positive answers results in the pt being at high risk for fall related injury: Surgery:  Thoracic or Abdominal Surgery or Lower Limb Amputation  Taken 8/20/2019 2200  Mobility Status Assessment: 2-2 Healthcare Providers Required for Assistance with Ambulation & Transfer  Intervention: Implement fall precautions  Flowsheets  Taken 8/21/2019 0019  Bed Alarm: Yes - Alarm On  IV Pole on Same Side of Bed as Bathroom: Yes  Bedrails: Bedrails Closest to Bathroom Down  Taken 8/20/2019 2200  Environmental Precautions: Treaded Slipper Socks on Patient;Personal Belongings, Wastebasket, Call Bell etc. in Easy Reach;Report Given to Other Health Care Providers Regarding Fall Risk;Bed in Low Position;Communication Sign for Patients & Families;Mobility Assessed & Appropriate Sign Placed     Problem: Pain Management  Goal: Pain level will decrease to patient's comfort goal  Outcome: PROGRESSING AS EXPECTED  Intervention: Follow pain managment plan developed in collaboration with patient and Interdisciplinary Team  Note:   Morphine PCA set up after pt returned from surgery. Pt using bolus button correctly.      Problem: Bowel/Gastric:  Goal: Normal bowel function is maintained or improved  Outcome: PROGRESSING SLOWER THAN EXPECTED  Flowsheets (Taken 8/20/2019 2200)  Last BM: 08/14/19  Number of Times Stooled: 0  Note:   Pt has not had a BM since the 14th. Constipated.

## 2019-08-22 LAB
ALBUMIN SERPL BCP-MCNC: 2.4 G/DL (ref 3.2–4.9)
ALBUMIN/GLOB SERPL: 0.9 G/DL
ALP SERPL-CCNC: 112 U/L (ref 30–99)
ALT SERPL-CCNC: 6 U/L (ref 2–50)
ANION GAP SERPL CALC-SCNC: 7 MMOL/L (ref 0–11.9)
AST SERPL-CCNC: 18 U/L (ref 12–45)
BACTERIA FLD AEROBE CULT: ABNORMAL
BACTERIA FLD AEROBE CULT: ABNORMAL
BACTERIA WND AEROBE CULT: ABNORMAL
BACTERIA WND AEROBE CULT: ABNORMAL
BASOPHILS # BLD AUTO: 0.2 % (ref 0–1.8)
BASOPHILS # BLD: 0.04 K/UL (ref 0–0.12)
BILIRUB SERPL-MCNC: 0.4 MG/DL (ref 0.1–1.5)
BUN SERPL-MCNC: 17 MG/DL (ref 8–22)
CALCIUM SERPL-MCNC: 7.9 MG/DL (ref 8.5–10.5)
CHLORIDE SERPL-SCNC: 101 MMOL/L (ref 96–112)
CO2 SERPL-SCNC: 27 MMOL/L (ref 20–33)
CREAT SERPL-MCNC: 1.02 MG/DL (ref 0.5–1.4)
EOSINOPHIL # BLD AUTO: 0.09 K/UL (ref 0–0.51)
EOSINOPHIL NFR BLD: 0.4 % (ref 0–6.9)
ERYTHROCYTE [DISTWIDTH] IN BLOOD BY AUTOMATED COUNT: 53.2 FL (ref 35.9–50)
GLOBULIN SER CALC-MCNC: 2.7 G/DL (ref 1.9–3.5)
GLUCOSE BLD-MCNC: 111 MG/DL (ref 65–99)
GLUCOSE BLD-MCNC: 134 MG/DL (ref 65–99)
GLUCOSE BLD-MCNC: 135 MG/DL (ref 65–99)
GLUCOSE BLD-MCNC: 138 MG/DL (ref 65–99)
GLUCOSE SERPL-MCNC: 129 MG/DL (ref 65–99)
GRAM STN SPEC: ABNORMAL
GRAM STN SPEC: ABNORMAL
HCT VFR BLD AUTO: 20.7 % (ref 42–52)
HCT VFR BLD CALC: 24 % (ref 42–52)
HGB BLD-MCNC: 6.4 G/DL (ref 14–18)
HGB BLD-MCNC: 8.2 G/DL (ref 14–18)
IMM GRANULOCYTES # BLD AUTO: 1 K/UL (ref 0–0.11)
IMM GRANULOCYTES NFR BLD AUTO: 4.9 % (ref 0–0.9)
INR PPP: 4.46 (ref 0.87–1.13)
LYMPHOCYTES # BLD AUTO: 1.47 K/UL (ref 1–4.8)
LYMPHOCYTES NFR BLD: 7.3 % (ref 22–41)
MAGNESIUM SERPL-MCNC: 2 MG/DL (ref 1.5–2.5)
MCH RBC QN AUTO: 28.6 PG (ref 27–33)
MCHC RBC AUTO-ENTMCNC: 30.9 G/DL (ref 33.7–35.3)
MCV RBC AUTO: 92.4 FL (ref 81.4–97.8)
MONOCYTES # BLD AUTO: 1.15 K/UL (ref 0–0.85)
MONOCYTES NFR BLD AUTO: 5.7 % (ref 0–13.4)
NEUTROPHILS # BLD AUTO: 16.47 K/UL (ref 1.82–7.42)
NEUTROPHILS NFR BLD: 81.5 % (ref 44–72)
NRBC # BLD AUTO: 0.04 K/UL
NRBC BLD-RTO: 0.2 /100 WBC
PHOSPHATE SERPL-MCNC: 1.9 MG/DL (ref 2.5–4.5)
PLATELET # BLD AUTO: 399 K/UL (ref 164–446)
PMV BLD AUTO: 9.3 FL (ref 9–12.9)
POTASSIUM SERPL-SCNC: 4 MMOL/L (ref 3.6–5.5)
PROT SERPL-MCNC: 5.1 G/DL (ref 6–8.2)
PROTHROMBIN TIME: 44.3 SEC (ref 12–14.6)
RBC # BLD AUTO: 2.24 M/UL (ref 4.7–6.1)
SIGNIFICANT IND 70042: ABNORMAL
SIGNIFICANT IND 70042: ABNORMAL
SITE SITE: ABNORMAL
SITE SITE: ABNORMAL
SODIUM SERPL-SCNC: 135 MMOL/L (ref 135–145)
SOURCE SOURCE: ABNORMAL
SOURCE SOURCE: ABNORMAL
WBC # BLD AUTO: 20.2 K/UL (ref 4.8–10.8)

## 2019-08-22 PROCEDURE — 700102 HCHG RX REV CODE 250 W/ 637 OVERRIDE(OP): Performed by: SURGERY

## 2019-08-22 PROCEDURE — 700111 HCHG RX REV CODE 636 W/ 250 OVERRIDE (IP): Performed by: SURGERY

## 2019-08-22 PROCEDURE — 84100 ASSAY OF PHOSPHORUS: CPT

## 2019-08-22 PROCEDURE — 97606 NEG PRS WND THER DME>50 SQCM: CPT

## 2019-08-22 PROCEDURE — 30233N1 TRANSFUSION OF NONAUTOLOGOUS RED BLOOD CELLS INTO PERIPHERAL VEIN, PERCUTANEOUS APPROACH: ICD-10-PCS | Performed by: INTERNAL MEDICINE

## 2019-08-22 PROCEDURE — 82962 GLUCOSE BLOOD TEST: CPT | Mod: 91

## 2019-08-22 PROCEDURE — 86923 COMPATIBILITY TEST ELECTRIC: CPT

## 2019-08-22 PROCEDURE — 700101 HCHG RX REV CODE 250: Performed by: INTERNAL MEDICINE

## 2019-08-22 PROCEDURE — A9270 NON-COVERED ITEM OR SERVICE: HCPCS | Performed by: INTERNAL MEDICINE

## 2019-08-22 PROCEDURE — 770006 HCHG ROOM/CARE - MED/SURG/GYN SEMI*

## 2019-08-22 PROCEDURE — 36415 COLL VENOUS BLD VENIPUNCTURE: CPT

## 2019-08-22 PROCEDURE — 85025 COMPLETE CBC W/AUTO DIFF WBC: CPT

## 2019-08-22 PROCEDURE — P9016 RBC LEUKOCYTES REDUCED: HCPCS

## 2019-08-22 PROCEDURE — A9270 NON-COVERED ITEM OR SERVICE: HCPCS | Performed by: SURGERY

## 2019-08-22 PROCEDURE — 700102 HCHG RX REV CODE 250 W/ 637 OVERRIDE(OP): Performed by: HOSPITALIST

## 2019-08-22 PROCEDURE — 80053 COMPREHEN METABOLIC PANEL: CPT

## 2019-08-22 PROCEDURE — 700102 HCHG RX REV CODE 250 W/ 637 OVERRIDE(OP): Performed by: INTERNAL MEDICINE

## 2019-08-22 PROCEDURE — 700111 HCHG RX REV CODE 636 W/ 250 OVERRIDE (IP): Performed by: INTERNAL MEDICINE

## 2019-08-22 PROCEDURE — 85610 PROTHROMBIN TIME: CPT

## 2019-08-22 PROCEDURE — 36430 TRANSFUSION BLD/BLD COMPNT: CPT

## 2019-08-22 PROCEDURE — 306263 VAC CANNISTER W/GEL 500ML: Performed by: SURGERY

## 2019-08-22 PROCEDURE — 99233 SBSQ HOSP IP/OBS HIGH 50: CPT | Performed by: INTERNAL MEDICINE

## 2019-08-22 PROCEDURE — 700105 HCHG RX REV CODE 258

## 2019-08-22 PROCEDURE — 99232 SBSQ HOSP IP/OBS MODERATE 35: CPT | Performed by: INTERNAL MEDICINE

## 2019-08-22 PROCEDURE — 83735 ASSAY OF MAGNESIUM: CPT

## 2019-08-22 PROCEDURE — A9270 NON-COVERED ITEM OR SERVICE: HCPCS | Performed by: HOSPITALIST

## 2019-08-22 PROCEDURE — 700105 HCHG RX REV CODE 258: Performed by: INTERNAL MEDICINE

## 2019-08-22 RX ORDER — SODIUM CHLORIDE 9 MG/ML
INJECTION, SOLUTION INTRAVENOUS
Status: COMPLETED
Start: 2019-08-22 | End: 2019-08-22

## 2019-08-22 RX ORDER — PHYTONADIONE 10 MG/ML
2 INJECTION, EMULSION INTRAMUSCULAR; INTRAVENOUS; SUBCUTANEOUS ONCE
Status: COMPLETED | OUTPATIENT
Start: 2019-08-22 | End: 2019-08-22

## 2019-08-22 RX ORDER — SODIUM CHLORIDE, SODIUM LACTATE, POTASSIUM CHLORIDE, CALCIUM CHLORIDE 600; 310; 30; 20 MG/100ML; MG/100ML; MG/100ML; MG/100ML
INJECTION, SOLUTION INTRAVENOUS CONTINUOUS
Status: DISCONTINUED | OUTPATIENT
Start: 2019-08-22 | End: 2019-08-23

## 2019-08-22 RX ADMIN — SENNOSIDES, DOCUSATE SODIUM 2 TABLET: 50; 8.6 TABLET, FILM COATED ORAL at 17:34

## 2019-08-22 RX ADMIN — ASPIRIN 325 MG: 325 TABLET, FILM COATED ORAL at 17:30

## 2019-08-22 RX ADMIN — FERROUS GLUCONATE 324 MG: 324 TABLET ORAL at 17:30

## 2019-08-22 RX ADMIN — FOLIC ACID 1 MG: 1 TABLET ORAL at 04:38

## 2019-08-22 RX ADMIN — SENNOSIDES, DOCUSATE SODIUM 2 TABLET: 50; 8.6 TABLET, FILM COATED ORAL at 04:37

## 2019-08-22 RX ADMIN — CEFAZOLIN 2 G: 10 INJECTION, POWDER, FOR SOLUTION INTRAVENOUS at 04:38

## 2019-08-22 RX ADMIN — OXYCODONE HYDROCHLORIDE AND ACETAMINOPHEN 500 MG: 500 TABLET ORAL at 17:29

## 2019-08-22 RX ADMIN — CEFAZOLIN 2 G: 10 INJECTION, POWDER, FOR SOLUTION INTRAVENOUS at 17:29

## 2019-08-22 RX ADMIN — Medication: at 16:48

## 2019-08-22 RX ADMIN — PHYTONADIONE 2 MG: 10 INJECTION, EMULSION INTRAMUSCULAR; INTRAVENOUS; SUBCUTANEOUS at 09:49

## 2019-08-22 RX ADMIN — Medication: at 00:51

## 2019-08-22 RX ADMIN — OXYCODONE HYDROCHLORIDE AND ACETAMINOPHEN 500 MG: 500 TABLET ORAL at 12:55

## 2019-08-22 RX ADMIN — OXYCODONE HYDROCHLORIDE AND ACETAMINOPHEN 500 MG: 500 TABLET ORAL at 10:00

## 2019-08-22 RX ADMIN — RIFAMPIN 300 MG: 300 CAPSULE ORAL at 17:29

## 2019-08-22 RX ADMIN — FERROUS GLUCONATE 324 MG: 324 TABLET ORAL at 09:59

## 2019-08-22 RX ADMIN — FERROUS GLUCONATE 324 MG: 324 TABLET ORAL at 12:55

## 2019-08-22 RX ADMIN — RIFAMPIN 300 MG: 300 CAPSULE ORAL at 12:55

## 2019-08-22 RX ADMIN — RIFAMPIN 300 MG: 300 CAPSULE ORAL at 04:37

## 2019-08-22 RX ADMIN — ATORVASTATIN CALCIUM 40 MG: 40 TABLET, FILM COATED ORAL at 17:29

## 2019-08-22 RX ADMIN — POTASSIUM PHOSPHATE, MONOBASIC AND POTASSIUM PHOSPHATE, DIBASIC 15 MMOL: 224; 236 INJECTION, SOLUTION, CONCENTRATE INTRAVENOUS at 18:02

## 2019-08-22 RX ADMIN — SODIUM CHLORIDE 500 ML: 9 INJECTION, SOLUTION INTRAVENOUS at 10:02

## 2019-08-22 RX ADMIN — SODIUM CHLORIDE, POTASSIUM CHLORIDE, SODIUM LACTATE AND CALCIUM CHLORIDE: 600; 310; 30; 20 INJECTION, SOLUTION INTRAVENOUS at 17:43

## 2019-08-22 RX ADMIN — POLYETHYLENE GLYCOL 3350 1 PACKET: 17 POWDER, FOR SOLUTION ORAL at 04:37

## 2019-08-22 RX ADMIN — THERA TABS 1 TABLET: TAB at 04:38

## 2019-08-22 RX ADMIN — POLYETHYLENE GLYCOL 3350 1 PACKET: 17 POWDER, FOR SOLUTION ORAL at 17:29

## 2019-08-22 ASSESSMENT — ENCOUNTER SYMPTOMS
CHILLS: 0
FALLS: 0
VOMITING: 0
NAUSEA: 0
PND: 0
DOUBLE VISION: 0
SHORTNESS OF BREATH: 0
ABDOMINAL PAIN: 0
DIARRHEA: 0
BLURRED VISION: 0
COUGH: 0
BLOOD IN STOOL: 0
HEMOPTYSIS: 0
PALPITATIONS: 0
DIZZINESS: 0
FOCAL WEAKNESS: 0
FLANK PAIN: 0
WEIGHT LOSS: 0
NAUSEA: 1
HEADACHES: 0
DEPRESSION: 0
FEVER: 0
CONSTIPATION: 0
MYALGIAS: 1

## 2019-08-22 ASSESSMENT — LIFESTYLE VARIABLES: SUBSTANCE_ABUSE: 0

## 2019-08-22 NOTE — PROGRESS NOTES
Patient with Hgb of 6.4, paged Dr Smallwood for updates.    0704: Orders received to transfuse 1 unit PRBC.  Obtained consent.

## 2019-08-22 NOTE — CARE PLAN
Problem: Safety  Goal: Will remain free from injury  Outcome: PROGRESSING AS EXPECTED  Note:   High fall risk, patient educated not to get out of bed, currently on bed rest orders.  Bed alarm in use.     Problem: Pain Management  Goal: Pain level will decrease to patient's comfort goal  Outcome: PROGRESSING AS EXPECTED  Note:   Morphine PCA in use, patient reports adequate pain control

## 2019-08-22 NOTE — PROGRESS NOTES
"Assumed are of patient from night shift RN.  Patient is alert and oriented times 4, states pain of 5/10, Morphine PCA in use.  VSS /71   Pulse 90   Temp 37.2 °C (98.9 °F) (Temporal)   Resp 16   Ht 1.702 m (5' 7\")   Wt 68.6 kg (151 lb 3.8 oz)   SpO2 98%   BMI 23.69 kg/m²   PIV in the BRYANT, patent and running NS with 40K at 100mL/hr.  New IV obtained in the RAC, patent and saline locked.  On 4L oxygen with saturations in the high 90s,  in use.  Last BM 8/14, bowel protocol in use.  Diamond catheter, stat lock in place and draining to gravity.  Full liquid diet, tolerating well.  Incision to bilateral groin, dressing with old drainage noted.  Incision to LLE, dressing CDI.  Incision to L knee, dressing CDI.  Hemovac to the L knee, minimal drainage noted.  Bed rest at this time.  POC discussed for the day, bed is locked and in the lowest position, call light is within reach.  All needs are met at this time, hourly rounding is in place.  "

## 2019-08-22 NOTE — PROGRESS NOTES
Diamond catheter removed per MD orders, patient tolerated well.  Patient needs to void by 1600.  Urinal provided.

## 2019-08-22 NOTE — DISCHARGE PLANNING
Agency/Facility Name: [facility]  Spoke To: Will  Outcome: Patient accepted. Will to contact this [facility] tomorrow AM regarding bed availability.     JULIO Pelayo notified.

## 2019-08-22 NOTE — DISCHARGE PLANNING
Agency/Facility Name: [facility]  Spoke To: Will  Outcome: Currently waiting for insurance authorization.

## 2019-08-22 NOTE — PROGRESS NOTES
Infectious Disease Progress Note    Author: Harvinder Head M.D. Date & Time of service: 2019  2:39 PM    Chief Complaint:  Follow-up for MSSA bacteremia    Interval History:   T-max 100.2, no CBC today.  Per report, Ortho tap of the left knee reveals purulence.  Washout planned.  Patient reports some improvement in his left knee pain.   afebrile, white count 17.9.  Yesterday, patient underwent left knee arthrotomy with I&D, as well as removal of infected fem-fem and left fem-pop bypass grafts, left popliteal thrombectomy, redo fem-fem bypass and left fem-pop bypass using Cryoveins.  Knee aspirate also growing Staph aureus.  Patient happy with his procedures.   afebrile, white count 27.1 down to 20.2 today.  Knee swelling improving.  Pain controlled.  Wound VAC to be placed over left leg wound today    Labs Reviewed and Medications Reviewed.    Review of Systems:  Review of Systems   Constitutional: Negative for chills, fever and weight loss.   Respiratory: Negative for cough and shortness of breath.    Cardiovascular: Negative for chest pain.   Gastrointestinal: Negative for abdominal pain, diarrhea, nausea and vomiting.   Musculoskeletal: Positive for joint pain.   Skin: Positive for rash.   Neurological: Negative for focal weakness and headaches.   All other systems reviewed and are negative.    Hemodynamics:  Temp (24hrs), Av °C (98.6 °F), Min:36.7 °C (98 °F), Max:37.3 °C (99.1 °F)  Temperature: 37.2 °C (98.9 °F)  Pulse  Av.4  Min: 47  Max: 111   Blood Pressure : 126/71       Physical Exam:  Physical Exam   Constitutional: He is oriented to person, place, and time. No distress.   Thin   HENT:   Mouth/Throat: Oropharynx is clear and moist.   Eyes: Pupils are equal, round, and reactive to light. Conjunctivae are normal. No scleral icterus.   Neck: Normal range of motion. No JVD present.   Cardiovascular: Normal rate, regular rhythm and normal heart sounds.   No murmur  heard.  Pulmonary/Chest: Effort normal and breath sounds normal. No respiratory distress. He has no wheezes.   Abdominal: Soft. He exhibits no distension. There is tenderness.   Bilateral lower quadrant incisions with dressings in place   Musculoskeletal: He exhibits edema and tenderness.   Left knee with dressing in place.  Dressing open today with wound care.  Knee incision with no gross evidence of infection, he appears to be healing well.  Drain in place with no gross evidence of infection.  Proximal medial left leg incision over graft appears healthy with minimal slough   Lymphadenopathy:     He has no cervical adenopathy.   Neurological: He is alert and oriented to person, place, and time. No cranial nerve deficit.   Skin: He is not diaphoretic.   Multiple excoriations bilateral upper extremities   Psychiatric: His behavior is normal.   Nursing note and vitals reviewed.      Meds:    Current Facility-Administered Medications:   •  silver nitrate  •  0.9 % NaCl with KCl 40 mEq 1,000 mL  •  insulin regular **AND** Accu-Chek ACHS **AND** NOTIFY MD and PharmD **AND** glucose **AND** dextrose 10% bolus  •  polyethylene glycol/lytes  •  senna-docusate  •  multivitamin  •  ascorbic acid  •  folic acid  •  ferrous gluconate  •  Pharmacy Consult Request  •  ondansetron  •  morphine  •  labetalol  •  hydrALAZINE  •  aspirin  •  acetaminophen  •  riFAMPin  •  ceFAZolin  •  atorvastatin  •  Respiratory Care per Protocol  •  ondansetron    Labs:  Recent Labs     08/21/19  0444 08/21/19  2036 08/22/19  0321   WBC 17.9* 27.1* 20.2*   RBC 2.40* 2.32* 2.24*   HEMOGLOBIN 7.0* 6.7* 6.4*   HEMATOCRIT 21.7* 21.3* 20.7*   MCV 90.0 91.8 92.4   MCH 28.8 28.9 28.6   RDW 50.3* 51.7* 53.2*   PLATELETCT 338 453* 399   MPV 9.4 9.3 9.3   NEUTSPOLYS 88.70*  --  81.50*   LYMPHOCYTES 4.40*  --  7.30*   MONOCYTES 3.50  --  5.70   EOSINOPHILS 0.00  --  0.40   BASOPHILS 0.00  --  0.20   RBCMORPHOLO Present  --   --      Recent Labs      08/21/19 0444 08/22/19  0321   SODIUM 133* 135   POTASSIUM 3.1* 4.0   CHLORIDE 98 101   CO2 29 27   GLUCOSE 114* 129*   BUN 19 17     Recent Labs     08/21/19  0444 08/22/19  0321   ALBUMIN 2.5* 2.4*   TBILIRUBIN 1.0 0.4   ALKPHOSPHAT 113* 112*   TOTPROTEIN 5.0* 5.1*   ALTSGPT 12 6   ASTSGOT 19 18   CREATININE 1.17 1.02       Imaging:  Ct-cta Lower Ext With & W/o-post Process Left    Result Date: 8/18/2019 8/18/2019 2:55 PM HISTORY/REASON FOR EXAM:  Acute limb ischemia, leg worsening pain and decreased pulse to left leg possible DVT vs arterial clot. He had a fem pop bypass in last 6 weeks. Now has +leg swelling, redness to lower abdomen, pain in back of knee TECHNIQUE/EXAM DESCRIPTION:  CT angiogram of the left lower extremity with contrast, with reconstructions. 100 mL of Omnipaque 350 nonionic contrast was administered at 5.0 mL/sec and helical scanning obtained from the distal femur through the ankle. Thin- and thick-section multiplanar volume reformats were generated from the axial data set in the sagittal and coronal planes. 3D angiographic images were reviewed on PACS. Maximum intensity projection (MIP) images were generated and reviewed. Low dose optimization technique was utilized for this CT exam including automated exposure control and adjustment of the mA and/or kV according to patient size. COMPARISON: Ultrasound 8/18/2019. FINDINGS: Partially visualized femoral-femoral bypass that appears patent. There is small amount of clot in the junction between the femorofemoral bypass graft and the common femoral artery. The left femoral popliteal graft is completely occluded. There is gas within the lumen. There is small amount of fluid surrounding the entire portion of the graft. The native common femoral and external femoral artery demonstrate multiple focal narrowing there is complete occlusion of the superficial femoral artery from the mid to distal portion at the area of prior stent extending to the  popliteal artery. There is partial reconstitution at the trifurcation but flow is very sluggish. No flow appreciated in the anterior tibial artery, posterior tibial artery and peroneal artery at 10 cm from the ankle. 3D angiographic/MIP images of the vasculature confirm the vascular findings as described above.     1. Completely occluded left femoral-popliteal bypass graft with gas within the lumen and fluid surrounding the graft. The finding is concerning for infected occluded graft. 2. Partially visualized femoral-femoral bypass that appears patent. There is small amount of clot in the junction between the femorofemoral bypass graft and the common femoral artery. 3. Complete occlusion of the native superficial femoral artery from the mid to distal thigh at the area of prior stenting. Complete occlusion of the left popliteal artery. Partial flow reconstitution at the trifurcation but flow is very sluggish. No distal flow flow appreciated in the anterior tibial artery, posterior tibial artery and peroneal artery at 10 cm from the ankle. CRITICAL RESULT READ BACK: Preliminary findings discussed with and critical read back performed by Dr. BERNA AMBRSOIO in the Emergency Department via telephone on 8/18/2019 3:51 PM    Us-extremity Artery Lower Unilat Left    Result Date: 8/18/2019  Lower Extremity  Arterial Duplex Report  Vascular Laboratory  CONCLUSIONS  1.  The LEFT femoral popliteal bypass graft is occluded.  2.  THere is no flow seen in the mid/distal SFA through the distal  popliteal artery consistent with thrombosis.  3.  The bifemoral bypass graft is patent.  4.  There is minimal 3 vessel runoff tothe Left foot.  4.  THere is fluid surrounding both grafts.  Findings called to Dr. Ambrosio at 2:48 pm on 8/18/19.  KATHRYN DIAS  Exam Date:     08/18/2019 12:44  Room #:     Inpatient  Priority:     Stat  Ht (in):             Wt (lb):  Ordering Physician:        BERNA AMBROSIO  Referring Physician:       KAROLYN  BERNA ZENG  Sonographer:               Marcelino Miranda RVT  Study Type:                Complete Unilateral  Technical Quality:         Adequate  Age:    74    Gender:     M  MRN:    [id number]  :    1945      BSA:  Indications:     Pain in Limb  CPT Codes:       62618  ICD Codes:       729.5  History:         Left lower extremity pain. History of right-to-left fem-fem                   bypass graft and left fem-pop byppass graft  Limitations:                RIGHT  Waveform        Peak Systolic Velocity (cm/s)                  Prox    Prox-Mid  Mid    Mid-Dist  Distal                                                             CFA                                                             PFA                                                             SFA                                                             POP                                                             AT                                                             PT                                                             AL                LEFT  Waveform        Peak Systolic Velocity (cm/s)                  Prox    Prox-Mid  Mid    Mid-Dist  Distal  Bi, non-                          173                      CFA  reversed  Triphasic       108                                        PFA  Absent          59                36               0       SFA  Absent                            0                        POP  Monophasic      6                                  6       AT  Monophasic      7                                  7       PT  Monophasic      5                                  6       AL  FINDINGS  Left.  With the exception of the very proximal fem-pop bypass graft, no flow can  be demonstrated throughout the graft's length. Echolucent material  consistent with arterial thrombus is visualized at the proximal thigh.  Retrograde flow seen in the very proximal segment. Throughout the graft's  length, there appears to be fluid  collecting around the graft  No flow can be demonstrated from the mid-distal superficial femoral artery  through the distal popliteal artery. Echolucent material consistent with  arterial thrombus is visualized throughout this segment.  3-Vessel, severely diminished and monophasic runoff to the foot.  The fem-fem bypass graft is widely patent throughout its length. There  appears to be a fluid collection surrounding this graft.  Tayler Huynh  (Electronically Signed)  Final Date:      2019                   14:38  Amended:         2019 14:49    Us-extremity Venous Lower Unilat Left    Result Date: 2019   Vascular Laboratory  CONCLUSIONS  1.  No evidence of LEFT lower extremity DVT.  KATHRYN DIAS  Exam Date:     2019 12:31  Room #:     Inpatient  Priority:     Stat  Ht (in):             Wt (lb):  Ordering Physician:        BERNA PARR  Referring Physician:       [id number] Maldonado  Sonographer:               Marcelino Miranda RVT  Study Type:                Complete Unilateral  Technical Quality:         Adequate  Age:    74    Gender:     M  MRN:    [id number]  :    1945      BSA:  Indications:     Swelling of Limb  CPT Codes:       69076  ICD Codes:       729.81  History:         Left lower extremity edema  Limitations:     Pain in groin and popliteal fossa. Long axis images obtained                    instead of compressions.  PROCEDURES:  Left lower extremity venous duplex imaging.  The following venous structures were evaluated: common femoral, profunda  femoral, greater saphenous, femoral, popliteal , peroneal and posterior  tibial veins.  Serial compression, augmentation maneuvers,  color and spectral Doppler  flow evaluations were performed.  FINDINGS:  Left lower extremity -.  Complete color filling and compressibility with normal venous flow dynamics  including spontaneous flow, response to augmentation maneuvers, and  respiratory phasicity.  No evidence of  superficial or deep venous thrombosis.  Flow was evaluated in the contralateral common femoral vein and normal  venous flow dynamics including spontaneous flow, respiratory phasic  variation and augmentation were demonstrated.  Tayler FARMER Jesusrito  (Electronically Signed)  Final Date:      2019                   14:39    Us-vein Mapping Lower Extremity Bilat    Result Date: 2019   Lower Extremity  Vein Map  Vascular Laboratory  CONCLUSIONS  KATHRYN DIAS  Exam Date:     2019 13:20  Room #:     Inpatient  Priority:     Routine  Ht (in):             Wt (lb):  Ordering Physician:        MARIANN GOOD  Referring Physician:       FLORENTINO iWlson  Sonographer:               Miki Green RDCS, RVT  Study Type:                Complete Bilateral  Technical Quality:         Adequate  Age:    74    Gender:     M  MRN:    [id number]  :    1945      BSA:  Indications:     Peripheral vascular disease, unspecified  CPT Codes:       63852  ICD Codes:       I73.9  History:         Evaluate for arterial bypass conduit.  Limitations:            RIGHT                  Diameter                  (cm)  Characteristics  Normal             0.25      SFJ  Normal             0.21      HT  Normal             0.25      MT  Normal             0.26      LT  Normal             0.25      Knee  Normal             0.18      HC  Normal             0.23      LC  Normal             0.18      Ankle                LEFT       Diameter       (cm)                  Characteristics  SFJ    0.47     Normal  HT     0.46     Normal  MT     0.47     Normal  LT     0.41     Normal  Knee   0.37     Normal  HC     0.21     Normal  LC     0.22     Normal  Ankle  0.20     Normal                  Diameter                  (cm)  Characteristics  Normal             0.33      PF  Normal             0.21      MC                               Ankle         Diameter         (cm)                    Characteristics    PF    "  0.30     Normal    MC     0.21     Normal    Ankle  FINDINGS  Right. The greater saphenous vein is small in caliber. The small saphenous  vein is small in caliber.  Left. The greater saphenous vein is patent to the knee and essentially  normal in caliber. The greater saphenous is small in calibre in the high  calf and to the ankle.  The small saphenous vein is small in caliber.      Micro:  Results     Procedure Component Value Units Date/Time    BLOOD CULTURE [[id number]] Collected:  08/21/19 0444    Order Status:  Completed Specimen:  Blood from Peripheral Updated:  08/22/19 0917     Significant Indicator NEG     Source BLD     Site PERIPHERAL     Culture Result No Growth  Note: Blood cultures are incubated for 5 days and  are monitored continuously.Positive blood cultures  are called to the RN and reported as soon as  they are identified.      Narrative:       Per Hospital Policy: Only change Specimen Src: to \"Line\" if  specified by physician order.  Left AC    BLOOD CULTURE [[id number]] Collected:  08/21/19 0444    Order Status:  Completed Specimen:  Blood from Peripheral Updated:  08/22/19 0917     Significant Indicator NEG     Source BLD     Site PERIPHERAL     Culture Result No Growth  Note: Blood cultures are incubated for 5 days and  are monitored continuously.Positive blood cultures  are called to the RN and reported as soon as  they are identified.      Narrative:       Per Hospital Policy: Only change Specimen Src: to \"Line\" if  specified by physician order.  Right AC    CULTURE WOUND W/ GRAM STAIN [[id number]]  (Abnormal) Collected:  08/20/19 1500    Order Status:  Completed Specimen:  Wound Updated:  08/22/19 0902     Significant Indicator POS     Source WND     Site Left Femoral     Culture Result -     Gram Stain Result Many WBCs.  Few Gram positive cocci.       Culture Result Staphylococcus aureus  Light growth  See previous culture for sensitivity report.      Narrative:       Surgery - swabs " received    Anaerobic Culture [[id number]] Collected:  08/20/19 1500    Order Status:  Completed Specimen:  Wound Updated:  08/22/19 0902     Significant Indicator NEG     Source WND     Site Left Femoral     Culture Result Culture in progress.    Narrative:       Surgery - swabs received    FLUID CULTURE W/GRAM STAIN [[id number]]  (Abnormal)  (Susceptibility) Collected:  08/20/19 0730    Order Status:  Completed Specimen:  Synovial Fluid Updated:  08/22/19 0829     Significant Indicator POS     Source SYNO     Site Left Knee     Culture Result -     Gram Stain Result Many WBCs.  Moderate Gram positive cocci.       Culture Result Staphylococcus aureus  Light growth      Narrative:       CALL  Carlson  141 tel. [phone],  CALLED  141 tel. [phone] 08/20/2019, 09:08, RB PERF. RESULTS CALLED TO:RN  [id number]  Collected By:[id number] MARY FAUSTIN  Left knee  Collected By:[id number] MARY FAUSTIN    Susceptibility     Staphylococcus aureus (1)     Antibiotic Interpretation Microscan Method Status    Clindamycin Sensitive <=0.5 mcg/mL AILEEN Final    Daptomycin Sensitive 1 mcg/mL AILEEN Final    Erythromycin Resistant >4 mcg/mL AILEEN Final    Moxifloxacin Sensitive 2 mcg/mL AILEEN Final    Ampicillin/sulbactam Sensitive <=8/4 mcg/mL AILEEN Final    Oxacillin Sensitive 1 mcg/mL AILEEN Final    Vancomycin Sensitive 2 mcg/mL AILEEN Final    Penicillin Resistant >8 mcg/mL AILEEN Final    Trimeth/Sulfa Sensitive <=0.5/9.5 mcg/mL AILEEN Final    Tetracycline Sensitive <=4 mcg/mL AILEEN Final                   BLOOD CULTURE x2 [[id number]]  (Abnormal) Collected:  08/18/19 1340    Order Status:  Completed Specimen:  Blood from Peripheral Updated:  08/21/19 0952     Significant Indicator POS     Source BLD     Site PERIPHERAL     Culture Result Growth detected by Bactec instrument. 08/19/2019  Staphylococcus aureus (methicillin sensitive)  detected by PCR.        Staphylococcus aureus  See previous culture for sensitivity report.      Narrative:       CALL  Carlson   "141 tel. [phone],  CALLED  141 tel. [phone] 08/19/2019, 06:49, RB PERF. RESULTS CALLED  TO:[facility]  Per Hospital Policy: Only change Specimen Src: to \"Line\" if  specified by physician order.  No site indicated    BLOOD CULTURE x2 [[id number]]  (Abnormal)  (Susceptibility) Collected:  08/18/19 1335    Order Status:  Completed Specimen:  Blood from Peripheral Updated:  08/21/19 0948     Significant Indicator POS     Source BLD     Site PERIPHERAL     Culture Result Growth detected by Bactec instrument. 08/19/2019  06:04      Staphylococcus aureus    Narrative:       CALL  Carlson  141 tel. [phone],  CALLED  141 tel. [phone] 08/19/2019, 06:50, RB PERF. RESULTS CALLED  TO:Dhara Walker Rn  Per Hospital Policy: Only change Specimen Src: to \"Line\" if  specified by physician order.  No site indicated    Susceptibility     Staphylococcus aureus (1)     Antibiotic Interpretation Microscan Method Status    Clindamycin Sensitive <=0.5 mcg/mL AILEEN Final    Daptomycin Sensitive 1 mcg/mL AILEEN Final    Erythromycin Resistant >4 mcg/mL AILEEN Final    Moxifloxacin Sensitive 2 mcg/mL AILEEN Final    Ampicillin/sulbactam Sensitive <=8/4 mcg/mL AILEEN Final    Oxacillin Sensitive 1 mcg/mL AILEEN Final    Vancomycin Sensitive 2 mcg/mL AILEEN Final    Penicillin Resistant >8 mcg/mL AILEEN Final    Trimeth/Sulfa Sensitive <=0.5/9.5 mcg/mL AILEEN Final    Tetracycline Sensitive <=4 mcg/mL AILEEN Final                   GRAM STAIN [[id number]] Collected:  08/20/19 1500    Order Status:  Completed Specimen:  Wound Updated:  08/21/19 0007     Significant Indicator .     Source WND     Site Left Femoral     Gram Stain Result Many WBCs.  Few Gram positive cocci.      Narrative:       Surgery - swabs received    GRAM STAIN [[id number]] Collected:  08/20/19 0730    Order Status:  Completed Specimen:  Synovial Updated:  08/20/19 0908     Significant Indicator .     Source SYNO     Site Left Knee     Gram Stain Result Many WBCs.  Moderate Gram " positive cocci.      Narrative:       CALL  Carlson  141 tel. [phone],  CALLED  141 tel. [phone] 08/20/2019, 09:08, RB PERF. RESULTS CALLED TO:RN  [id number]  Collected By:[id number] MARY FAUSTIN  Left knee  Collected By:[id number] MARY FAUSTIN    BLOOD CULTURE [[id number]]     Order Status:  Canceled Specimen:  Blood from Peripheral     BLOOD CULTURE [[id number]]     Order Status:  Canceled Specimen:  Blood from Peripheral           Assessment:  Dc Patel is a 74 y.o. male with a history of peripheral artery disease on Coumadin, CKD stage III, history of multiple revascularization procedures, admitted with infected left femoral-femoral and femoral-popliteal bypass grafts, MSSA bacteremia, left knee septic arthritis.  Source of Staph aureus likely is his multiple upper extremity excoriations.     Pertinent Diagnoses:  Sepsis  MSSA bacteremia  Infected vascular grafts  Left knee septic arthritis  History of blood clots  Chronic anticoagulant use  CKD stage III    Plan:  Sepsis, MSSA bacteremia, infected vascular grafts  -Continue IV Ancef renally dosed 2 g every 12 hours  -Added p.o. rifampin 300 mg 3 times daily 8/19.  Note interaction with Coumadin.  Will need close monitoring of INR  -TTE with no evidence of infective endocarditis  -Follow repeat blood cultures from 8/21 -no growth till date  -On 8/20, patient underwent left knee arthrotomy with I&D, as well as removal of infected fem-fem and left fem-pop bypass grafts, left popliteal thrombectomy, redo fem-fem bypass and left fem-pop bypass using Cryoveins.  Knee aspirate also growing Staph aureus.  -Given his multiple grafts in place including an aortic stent graft which cannot be removed per vascular surgery, patient will likely need chronic p.o. suppression following 6 weeks of IV antibiotics  -End date of antibiotics to be determined, based on clearance of blood cultures    Left knee septic arthritis  -Orthopedics on board.  Left knee arthrotomy with I&D  performed 8/20  -Knee aspirate cultures also growing Staph aureus    CKD stage III  -Creatinine at baseline.  Monitor, renally dose antibiotics    Discussed with internal medicine, Dr. Smallwood.  ID will follow.  Please call with questions.

## 2019-08-22 NOTE — PROCEDURES
DATE OF SERVICE:  08/20/2019    DIAGNOSES:  1.  Left knee pain.  2.  Left knee effusion.  3.  Staph bacteremia.    PROCEDURES:  Arthrocentesis, left knee.    SURGEON:  Iván Cifuentes MD    INDICATIONS:  The patient has an infected femoral - femoral graft material.    He is planned for surgery later today for graft removal.  He also has pain and   swelling in his left knee.  CT scan assessing his graft material also   demonstrates large effusion.  I have recommended arthrocentesis to rule out   septic arthritis.    The lateral aspect of the knee was prepped with ChloraPrep.  I then inserted   an 18-gauge needle into the knee and aspirated 55 mL of pus.  A Band-Aid was   applied.    The fluid was sent to the lab for cell count, Gram stain and culture.  Likely,   we will recommend arthrotomy and drainage during the anesthetic later today.       ____________________________________     MD ALONZO CHARLESO / NTS    DD:  08/21/2019 21:39:52  DT:  08/21/2019 22:35:42    D#:  [id number]  Job#:  [id number]

## 2019-08-22 NOTE — PROGRESS NOTES
Hospital Medicine Daily Progress Note    Date of Service  8/21/2019    Chief Complaint  74 y.o. male admitted 8/18/2019 with Groin pain    Hospital Course    Patient with underlying history of peripheral arterial disease, left lower extremity predominantly who recently underwent femoropopliteal bypass, underlying CKD stage III, diabetes mellitus type 2.  Previous surgery by Dr. Grande from vascular surgery.  He now presented with groin pain, discharge from the left groin postoperative site with findings of occlusion of the left femoropopliteal bypass graft, no fluid in the mid/distal SFA/distal popliteal artery consistent with thrombosis, negative DVT study, CT lower extremity with runoff revealing completely occluded left femoral-popliteal bypass graft with gas within the lumen and fluid surrounding the graft.  Concerning for infected occluded graft.  Partially visualized femoral femoral bypass that appears patent.  Small amount of clot in the femorofemoral bypass graft in the common femoral artery junction.  Complete occlusion of the native superficial femoral artery from mid to distal thigh, complete occlusion of the left popliteal artery.  Partial flow reconstitution at the trifurcation but fluid is very sluggish.  No distal flow appreciated in the anterior tibial artery, posterior tibial artery and peroneal artery at 10 cm from the ankle.  Vascular surgery/Dr. Grande consulted, patient initiated on intravenous heparin and admitted to the hospital, infectious disease team consultation obtained.  Patient with findings of bacteremia, appears to be MSSA with findings of left knee septic arthritis.  Orthopedics team consulted.      Interval Problem Update  Patient seen and evaluated on rounds  Discussed with nursing staff on rounds  Feels significantly better today, happy with the progress and care he received yesterday  Status post surgical I&D, washout of the left knee yesterday  Orthopedics, vascular surgery  following  Hypokalemia, on potassium containing fluids  ID team following, titrating antibiotics  Pain is improved  Left groin wound VAC in place  No other acute issues at this time, monitor hemoglobin  Diamond catheter in place, discontinue and monitor the response    Consultants/Specialty  Vascular surgery  Orthopedics  Infectious disease    Code Status  Full code    Disposition  Anticipate need for postacute placement, LTAC versus skilled nursing facility based on postoperative recovery and course, LTAC referral placed, social workers/case management working on placement at this time.  Anticipate most likely placement will be LTAC.    Review of Systems  Review of Systems   Constitutional: Positive for malaise/fatigue. Negative for chills, fever and weight loss.   HENT: Negative for hearing loss.    Eyes: Negative for blurred vision and double vision.   Respiratory: Negative for cough, hemoptysis and shortness of breath.    Cardiovascular: Positive for leg swelling. Negative for chest pain, palpitations and PND.   Gastrointestinal: Positive for nausea. Negative for abdominal pain, blood in stool, constipation, diarrhea, melena and vomiting.   Genitourinary: Negative for flank pain, frequency and hematuria.        Groin pain   Musculoskeletal: Positive for joint pain and myalgias. Negative for falls.   Skin: Negative for itching and rash.   Neurological: Negative for dizziness and headaches.   Psychiatric/Behavioral: Negative for depression, substance abuse and suicidal ideas.        Physical Exam  Temp:  [36.3 °C (97.4 °F)-37.6 °C (99.7 °F)] 36.8 °C (98.2 °F)  Pulse:  [] 84  Resp:  [13-21] 16  BP: (100-170)/(57-98) 100/69  SpO2:  [93 %-99 %] 93 %    Physical Exam   Constitutional: He is oriented to person, place, and time. He appears well-developed and well-nourished. No distress.   Body mass index is 23.69 kg/m².   HENT:   Head: Normocephalic.   Mouth/Throat: Oropharynx is clear and moist. No oropharyngeal  exudate.   Eyes: Pupils are equal, round, and reactive to light. Conjunctivae and EOM are normal. No scleral icterus.   Neck: Normal range of motion. No JVD present. No thyromegaly present.   Cardiovascular: Normal rate and regular rhythm.   Murmur heard.  Good right lower extremity pulse, poor left lower extremity pulses   Pulmonary/Chest: No stridor. No respiratory distress. He has no wheezes.   Diminished in bases.   Abdominal: Soft. Bowel sounds are normal. He exhibits no distension. There is no tenderness. There is no rebound.   Musculoskeletal: He exhibits edema and tenderness.   Left groin wound VAC in place, surgical dressing to left knee, decreased range of motion of the left hip/knee.   Lymphadenopathy:     He has no cervical adenopathy.   Neurological: He is alert and oriented to person, place, and time. He has normal reflexes. No cranial nerve deficit.   Skin: He is not diaphoretic.   Psychiatric: He has a normal mood and affect. His behavior is normal. Judgment and thought content normal.       Fluids    Intake/Output Summary (Last 24 hours) at 8/21/2019 1742  Last data filed at 8/21/2019 1200  Gross per 24 hour   Intake 4626.81 ml   Output 2700 ml   Net 1926.81 ml       Laboratory  Recent Labs     08/18/19  2344 08/21/19  0444   WBC 15.3* 17.9*   RBC 3.27* 2.40*   HEMOGLOBIN 9.1* 7.0*   HEMATOCRIT 28.3* 21.7*   MCV 86.5 90.0   MCH 27.8 28.8   MCHC 32.2* 31.9*   RDW 47.9 50.3*   PLATELETCT 311 338   MPV 9.3 9.4     Recent Labs     08/18/19  2344 08/21/19  0444   SODIUM 127* 133*   POTASSIUM 4.0 3.1*   CHLORIDE 95* 98   CO2 24 29   GLUCOSE 136* 114*   BUN 27* 19   CREATININE 1.25 1.17   CALCIUM 8.3* 8.3*     Recent Labs     08/20/19  0613  08/20/19  0850 08/20/19  1242 08/20/19  1618 08/21/19  0444   APTT 126.2*  --  60.0*  --   --  54.2*   INR  --    < >  --  2.49* 2.26* 2.89*    < > = values in this interval not displayed.         Recent Labs     08/19/19  2350   TRIGLYCERIDE 236*   HDL 7*   LDL 28        Imaging  EC-ECHOCARDIOGRAM COMPLETE W/O CONT   Final Result      DX-PORTABLE FLUORO > 1 HOUR   Final Result      Digitized intraoperative radiograph is submitted for review.  This examination is not for diagnostic purpose but for guidance during a surgical procedure.         US-VEIN MAPPING LOWER EXTREMITY BILAT   Final Result      CT-CTA LOWER EXT WITH & W/O-POST PROCESS LEFT   Final Result         1. Completely occluded left femoral-popliteal bypass graft with gas within the lumen and fluid surrounding the graft. The finding is concerning for infected occluded graft.      2. Partially visualized femoral-femoral bypass that appears patent. There is small amount of clot in the junction between the femorofemoral bypass graft and the common femoral artery.      3. Complete occlusion of the native superficial femoral artery from the mid to distal thigh at the area of prior stenting.      Complete occlusion of the left popliteal artery.      Partial flow reconstitution at the trifurcation but flow is very sluggish. No distal flow flow appreciated in the anterior tibial artery, posterior tibial artery and peroneal artery at 10 cm from the ankle.         CRITICAL RESULT READ BACK: Preliminary findings discussed with and critical read back performed by Dr. BERNA PARR in the Emergency Department via telephone on 8/18/2019 3:51 PM      US-EXTREMITY VENOUS LOWER UNILAT LEFT   Final Result      US-EXTREMITY ARTERY LOWER UNILAT LEFT   Final Result           Assessment/Plan  * Sepsis due to methicillin susceptible Staphylococcus aureus (HCC)- (present on admission)  Assessment & Plan  This is sepsis (without associated acute organ dysfunction).   Source infected left groin vascular graft, bacteremia, left knee septic arthritis  Source control with I&D left groin, excision of infected graft, left knee washout  Antibiotic guidance per infectious disease team  Continue close clinical monitoring    MSSA bacteremia- (present  on admission)  Assessment & Plan  Likely from underlying left groin graft infection  With seeding of left knee, septic arthritis    Status post excision of left groin graft, I&D and left knee washout    Echocardiogram is negative for any evidence of infective endocarditis, do not believe that patient would benefit from a CHAPIS given this would not alter the course of management as he would require prolonged IV antibiotic therapy, irrespectively would defer to infectious disease team    Ongoing source control per surgical team  ID following, defer antibiotic needs to ID team  Continue close clinical monitoring    Staphylococcal arthritis of left knee (HCC)- (present on admission)  Assessment & Plan  Status post washout per orthopedics team  Continue antibiotics per ID team    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 Grande from vascular surgery this stay    Patient on IV antibiotic therapy  Vascular interventions, postoperative follow-up/care per vascular surgery    Antibiotics per ID team  Continue close clinical monitoring    PAD (peripheral artery disease) ([facility])- (present on admission)  Assessment & Plan  Currently with occluded grafts of the left lower extremity with poor circulation on presentation  Status post revascularization by vascular surgery  Vascular surgery following, defer anticoagulation needs to vascular surgery, medical management to vascular surgery    Hyponatremia- (present on admission)  Assessment & Plan  Hypovolemic hyponatremia  IV fluid hydration   Monitor BMP and assess response  Interval chemistry panel in the morning tomorrow    Chronic anticoagulation- (present on admission)  Assessment & Plan  Likely for underlying peripheral arterial disease  INR supratherapeutic on presentation  At this time anticoagulation is held  Vascular surgery following, will defer  need for anticoagulation to vascular surgery    Dyslipidemia- (present on admission)  Assessment & Plan  Continue high intensity statin therapy    Iron deficiency anemia- (present on admission)  Assessment & Plan  Avoid IV iron given underlying infectious issues    In addition there is associated anemia of acute blood loss postoperative, cooperative.    Monitor Hb / Restrictive transfusion strategy    Iron / MV supplementation ordered     Hyperglycemia- (present on admission)  Assessment & Plan  Previous hemoglobin 6.7 consistent with type 2 diabetes mellitus  Sliding scale insulin therapy #1    Chronic kidney disease (CKD), stage III (moderate) (HCC)- (present on admission)  Assessment & Plan  Monitor renal function / Avoid nephrotoxins and dose medications renally    Hypokalemia- (present on admission)  Assessment & Plan  Patient is on IV fluids containing potassium, interval chemistry panel tomorrow       VTE prophylaxis: Continues to have a therapeutic INR at this time, monitor, defer anticoagulation needs to surgical team/vascular surgery

## 2019-08-22 NOTE — DISCHARGE PLANNING
Agency/Facility Name: [facility]  Spoke To: Will  Outcome: Confirmed referral received. Currently in review.

## 2019-08-22 NOTE — PROGRESS NOTES
Left knee pain improving  Decreased swelling  AVSS  WBC 20  Cx - MSSA  D/c drain tomorrow  ABX  D/c planning

## 2019-08-22 NOTE — CARE PLAN
Problem: Nutritional:  Goal: Achieve adequate nutritional intake  Description  Patient will consume 50% of meals    Rec: Diabetic diet    Outcome: MET    PO >50% for most recent four meals.

## 2019-08-23 LAB
ANION GAP SERPL CALC-SCNC: 7 MMOL/L (ref 0–11.9)
ANISOCYTOSIS BLD QL SMEAR: ABNORMAL
BACTERIA SPEC ANAEROBE CULT: NORMAL
BASOPHILS # BLD AUTO: 0 % (ref 0–1.8)
BASOPHILS # BLD: 0 K/UL (ref 0–0.12)
BUN SERPL-MCNC: 15 MG/DL (ref 8–22)
CALCIUM SERPL-MCNC: 8.3 MG/DL (ref 8.5–10.5)
CHLORIDE SERPL-SCNC: 100 MMOL/L (ref 96–112)
CO2 SERPL-SCNC: 28 MMOL/L (ref 20–33)
CREAT SERPL-MCNC: 0.93 MG/DL (ref 0.5–1.4)
EOSINOPHIL # BLD AUTO: 0 K/UL (ref 0–0.51)
EOSINOPHIL NFR BLD: 0 % (ref 0–6.9)
ERYTHROCYTE [DISTWIDTH] IN BLOOD BY AUTOMATED COUNT: 52.6 FL (ref 35.9–50)
GLUCOSE BLD-MCNC: 119 MG/DL (ref 65–99)
GLUCOSE BLD-MCNC: 125 MG/DL (ref 65–99)
GLUCOSE BLD-MCNC: 125 MG/DL (ref 65–99)
GLUCOSE BLD-MCNC: 136 MG/DL (ref 65–99)
GLUCOSE SERPL-MCNC: 105 MG/DL (ref 65–99)
HCT VFR BLD AUTO: 23.5 % (ref 42–52)
HGB BLD-MCNC: 7.6 G/DL (ref 14–18)
HYPOCHROMIA BLD QL SMEAR: ABNORMAL
INR PPP: 1.47 (ref 0.87–1.13)
LYMPHOCYTES # BLD AUTO: 0.56 K/UL (ref 1–4.8)
LYMPHOCYTES NFR BLD: 2.6 % (ref 22–41)
MACROCYTES BLD QL SMEAR: ABNORMAL
MAGNESIUM SERPL-MCNC: 2 MG/DL (ref 1.5–2.5)
MANUAL DIFF BLD: NORMAL
MCH RBC QN AUTO: 29.3 PG (ref 27–33)
MCHC RBC AUTO-ENTMCNC: 32.3 G/DL (ref 33.7–35.3)
MCV RBC AUTO: 90.7 FL (ref 81.4–97.8)
METAMYELOCYTES NFR BLD MANUAL: 1.8 %
MONOCYTES # BLD AUTO: 0.56 K/UL (ref 0–0.85)
MONOCYTES NFR BLD AUTO: 2.6 % (ref 0–13.4)
MORPHOLOGY BLD-IMP: NORMAL
MYELOCYTES NFR BLD MANUAL: 2.6 %
NEUTROPHILS # BLD AUTO: 19.53 K/UL (ref 1.82–7.42)
NEUTROPHILS NFR BLD: 90.4 % (ref 44–72)
NRBC # BLD AUTO: 0.13 K/UL
NRBC BLD-RTO: 0.6 /100 WBC
PHOSPHATE SERPL-MCNC: 2 MG/DL (ref 2.5–4.5)
PLATELET # BLD AUTO: 458 K/UL (ref 164–446)
PLATELET BLD QL SMEAR: NORMAL
PMV BLD AUTO: 9 FL (ref 9–12.9)
POTASSIUM SERPL-SCNC: 4.2 MMOL/L (ref 3.6–5.5)
PROTHROMBIN TIME: 18.2 SEC (ref 12–14.6)
RBC # BLD AUTO: 2.59 M/UL (ref 4.7–6.1)
RBC BLD AUTO: PRESENT
SIGNIFICANT IND 70042: NORMAL
SITE SITE: NORMAL
SODIUM SERPL-SCNC: 135 MMOL/L (ref 135–145)
SOURCE SOURCE: NORMAL
TOXIC GRANULES BLD QL SMEAR: SLIGHT
WBC # BLD AUTO: 21.6 K/UL (ref 4.8–10.8)

## 2019-08-23 PROCEDURE — A9270 NON-COVERED ITEM OR SERVICE: HCPCS | Performed by: HOSPITALIST

## 2019-08-23 PROCEDURE — 82962 GLUCOSE BLOOD TEST: CPT | Mod: 91

## 2019-08-23 PROCEDURE — A9270 NON-COVERED ITEM OR SERVICE: HCPCS | Performed by: INTERNAL MEDICINE

## 2019-08-23 PROCEDURE — 700102 HCHG RX REV CODE 250 W/ 637 OVERRIDE(OP): Performed by: INTERNAL MEDICINE

## 2019-08-23 PROCEDURE — 700111 HCHG RX REV CODE 636 W/ 250 OVERRIDE (IP): Performed by: INTERNAL MEDICINE

## 2019-08-23 PROCEDURE — 83735 ASSAY OF MAGNESIUM: CPT

## 2019-08-23 PROCEDURE — 770006 HCHG ROOM/CARE - MED/SURG/GYN SEMI*

## 2019-08-23 PROCEDURE — A9270 NON-COVERED ITEM OR SERVICE: HCPCS | Performed by: SURGERY

## 2019-08-23 PROCEDURE — 85610 PROTHROMBIN TIME: CPT

## 2019-08-23 PROCEDURE — 84100 ASSAY OF PHOSPHORUS: CPT

## 2019-08-23 PROCEDURE — 85007 BL SMEAR W/DIFF WBC COUNT: CPT

## 2019-08-23 PROCEDURE — 99233 SBSQ HOSP IP/OBS HIGH 50: CPT | Performed by: INTERNAL MEDICINE

## 2019-08-23 PROCEDURE — 700105 HCHG RX REV CODE 258: Performed by: INTERNAL MEDICINE

## 2019-08-23 PROCEDURE — 36415 COLL VENOUS BLD VENIPUNCTURE: CPT

## 2019-08-23 PROCEDURE — 80048 BASIC METABOLIC PNL TOTAL CA: CPT

## 2019-08-23 PROCEDURE — 700102 HCHG RX REV CODE 250 W/ 637 OVERRIDE(OP): Performed by: HOSPITALIST

## 2019-08-23 PROCEDURE — 85027 COMPLETE CBC AUTOMATED: CPT

## 2019-08-23 PROCEDURE — 700102 HCHG RX REV CODE 250 W/ 637 OVERRIDE(OP): Performed by: SURGERY

## 2019-08-23 PROCEDURE — 700111 HCHG RX REV CODE 636 W/ 250 OVERRIDE (IP): Performed by: SURGERY

## 2019-08-23 PROCEDURE — 87040 BLOOD CULTURE FOR BACTERIA: CPT | Mod: 91

## 2019-08-23 RX ORDER — ACETAMINOPHEN 325 MG/1
650 TABLET ORAL EVERY 6 HOURS
Status: DISCONTINUED | OUTPATIENT
Start: 2019-08-23 | End: 2019-08-26 | Stop reason: HOSPADM

## 2019-08-23 RX ORDER — CEFAZOLIN SODIUM 2 G/100ML
2 INJECTION, SOLUTION INTRAVENOUS EVERY 8 HOURS
Status: DISCONTINUED | OUTPATIENT
Start: 2019-08-23 | End: 2019-08-26 | Stop reason: HOSPADM

## 2019-08-23 RX ORDER — OXYCODONE HYDROCHLORIDE 5 MG/1
5 TABLET ORAL EVERY 4 HOURS PRN
Status: DISCONTINUED | OUTPATIENT
Start: 2019-08-23 | End: 2019-08-26 | Stop reason: HOSPADM

## 2019-08-23 RX ORDER — MORPHINE SULFATE 4 MG/ML
4 INJECTION, SOLUTION INTRAMUSCULAR; INTRAVENOUS EVERY 4 HOURS PRN
Status: DISCONTINUED | OUTPATIENT
Start: 2019-08-23 | End: 2019-08-26 | Stop reason: HOSPADM

## 2019-08-23 RX ORDER — OXYCODONE HYDROCHLORIDE 10 MG/1
10 TABLET ORAL EVERY 4 HOURS PRN
Status: DISCONTINUED | OUTPATIENT
Start: 2019-08-23 | End: 2019-08-26 | Stop reason: HOSPADM

## 2019-08-23 RX ADMIN — RIFAMPIN 300 MG: 300 CAPSULE ORAL at 12:58

## 2019-08-23 RX ADMIN — OXYCODONE HYDROCHLORIDE AND ACETAMINOPHEN 500 MG: 500 TABLET ORAL at 07:55

## 2019-08-23 RX ADMIN — SODIUM CHLORIDE, POTASSIUM CHLORIDE, SODIUM LACTATE AND CALCIUM CHLORIDE: 600; 310; 30; 20 INJECTION, SOLUTION INTRAVENOUS at 16:46

## 2019-08-23 RX ADMIN — FERROUS GLUCONATE 324 MG: 324 TABLET ORAL at 13:01

## 2019-08-23 RX ADMIN — CEFAZOLIN 2 G: 10 INJECTION, POWDER, FOR SOLUTION INTRAVENOUS at 05:48

## 2019-08-23 RX ADMIN — POLYETHYLENE GLYCOL 3350 1 PACKET: 17 POWDER, FOR SOLUTION ORAL at 16:45

## 2019-08-23 RX ADMIN — DIBASIC SODIUM PHOSPHATE, MONOBASIC POTASSIUM PHOSPHATE AND MONOBASIC SODIUM PHOSPHATE 2 TABLET: 852; 155; 130 TABLET ORAL at 21:27

## 2019-08-23 RX ADMIN — OXYCODONE HYDROCHLORIDE AND ACETAMINOPHEN 500 MG: 500 TABLET ORAL at 16:45

## 2019-08-23 RX ADMIN — ATORVASTATIN CALCIUM 40 MG: 40 TABLET, FILM COATED ORAL at 16:45

## 2019-08-23 RX ADMIN — POLYETHYLENE GLYCOL 3350 1 PACKET: 17 POWDER, FOR SOLUTION ORAL at 05:48

## 2019-08-23 RX ADMIN — Medication: at 03:43

## 2019-08-23 RX ADMIN — THERA TABS 1 TABLET: TAB at 05:47

## 2019-08-23 RX ADMIN — ASPIRIN 325 MG: 325 TABLET, FILM COATED ORAL at 16:45

## 2019-08-23 RX ADMIN — OXYCODONE HYDROCHLORIDE AND ACETAMINOPHEN 500 MG: 500 TABLET ORAL at 12:58

## 2019-08-23 RX ADMIN — ACETAMINOPHEN 650 MG: 325 TABLET, FILM COATED ORAL at 20:41

## 2019-08-23 RX ADMIN — FERROUS GLUCONATE 324 MG: 324 TABLET ORAL at 07:55

## 2019-08-23 RX ADMIN — SENNOSIDES, DOCUSATE SODIUM 2 TABLET: 50; 8.6 TABLET, FILM COATED ORAL at 16:45

## 2019-08-23 RX ADMIN — SENNOSIDES, DOCUSATE SODIUM 2 TABLET: 50; 8.6 TABLET, FILM COATED ORAL at 05:47

## 2019-08-23 RX ADMIN — CEFAZOLIN SODIUM 2 G: 2 INJECTION, SOLUTION INTRAVENOUS at 13:33

## 2019-08-23 RX ADMIN — FERROUS GLUCONATE 324 MG: 324 TABLET ORAL at 16:45

## 2019-08-23 RX ADMIN — CEFAZOLIN SODIUM 2 G: 2 INJECTION, SOLUTION INTRAVENOUS at 20:41

## 2019-08-23 RX ADMIN — OXYCODONE HYDROCHLORIDE 5 MG: 5 TABLET ORAL at 21:27

## 2019-08-23 RX ADMIN — RIFAMPIN 300 MG: 300 CAPSULE ORAL at 16:45

## 2019-08-23 RX ADMIN — RIFAMPIN 300 MG: 300 CAPSULE ORAL at 05:47

## 2019-08-23 RX ADMIN — FOLIC ACID 1 MG: 1 TABLET ORAL at 05:47

## 2019-08-23 ASSESSMENT — ENCOUNTER SYMPTOMS
VOMITING: 0
WEIGHT LOSS: 0
PALPITATIONS: 0
FEVER: 0
DIARRHEA: 0
HEADACHES: 0
FLANK PAIN: 0
DEPRESSION: 0
PND: 0
HEMOPTYSIS: 0
SHORTNESS OF BREATH: 0
CHILLS: 0
NAUSEA: 1
EYE DISCHARGE: 1
DIZZINESS: 0
FALLS: 0
MYALGIAS: 1
DOUBLE VISION: 0
COUGH: 0
BLURRED VISION: 0
ABDOMINAL PAIN: 0
BLOOD IN STOOL: 0
CONSTIPATION: 0

## 2019-08-23 ASSESSMENT — LIFESTYLE VARIABLES: SUBSTANCE_ABUSE: 0

## 2019-08-23 NOTE — CARE PLAN
Problem: Communication  Goal: The ability to communicate needs accurately and effectively will improve  Outcome: PROGRESSING AS EXPECTED  Patient hard of hearing and needs re education regarding lines and plan of care.    Problem: Mobility  Goal: Risk for activity intolerance will decrease  Outcome: PROGRESSING SLOWER THAN EXPECTED  Patient with limited mobility at home. Patient has many lines and poor focus. Education and increased staff presence need to mobilize patient. Will complete today.

## 2019-08-23 NOTE — DISCHARGE PLANNING
Agency/Facility Name: [facility]  Spoke To: Will  Outcome: No bed available for patient today.     JULIO Pelayo notified.

## 2019-08-23 NOTE — WOUND TEAM
"[facility]  Inpatient Services  Initial Wound and Skin Care Evaluation    Admission Date: 8/18/2019     Consult Date: 8/22/19   HPI, PMH, SH: Reviewed    Unit where seen by Wound Team: [id number]     WOUND CONSULT RELATED TO:  Placement of NPWT dressing bilateral groin and medial left knee/calf     SUBJECTIVE:  \"Do whatever you want\"      Self Report / Pain Level:  Has PCA which he used throughout procedure with good effect       OBJECTIVE:  Clean appearing wound with saturated dressings with serosanguinous drainage intact    WOUND TYPE, LOCATION, CHARACTERISTICS (Pressure Injuries: location, stage, POA or date identified)       Negative Pressure Wound Therapy Groin Right;Left (Active)   NPWT Pump Mode / Pressure Setting Continuous;125 mmHg 8/22/2019  3:00 PM   Dressing Type Large;Black foam 8/22/2019  3:00 PM   Canister Changed Yes 8/22/2019  3:00 PM       Negative Pressure Wound Therapy Knee Inner;Left (Active)   NPWT Pump Mode / Pressure Setting Continuous;125 mmHg 8/22/2019  3:00 PM   Dressing Type Black foam 8/22/2019  3:00 PM   Canister Changed Yes 8/22/2019  3:00 PM     Wound 08/22/19 Full Thickness Wound Knee open surgical wound medial left knee (Active)   Wound Image   8/22/2019  3:00 PM   Site Assessment Clean;Intact;Red 8/22/2019  3:00 PM   Yadi-wound Assessment Clean;Intact 8/22/2019  3:00 PM   Margins Unattached edges;Attached edges 8/22/2019  3:00 PM   Wound Length (cm) 10.5 cm 8/22/2019  3:00 PM   Wound Width (cm) 3.8 cm 8/22/2019  3:00 PM   Wound Depth (cm) 3.5 cm 8/22/2019  3:00 PM   Wound Surface Area (cm^2) 39.9 cm^2 8/22/2019  3:00 PM   Tunneling 0 cm 8/22/2019  3:00 PM   Closure Secondary intention 8/22/2019  3:00 PM   Drainage Amount Moderate 8/22/2019  3:00 PM   Drainage Description Serosanguineous 8/22/2019  3:00 PM   Non-staged Wound Description Full thickness 8/22/2019  3:00 PM   Treatments Cleansed;Site care 8/22/2019  3:00 PM   Cleansing Approved Wound Cleanser 8/22/2019  " 3:00 PM   Periwound Protectant Drape;Skin Protectant wipes to Periwound 8/22/2019  3:00 PM   Dressing Options Wound Vac 8/22/2019  3:00 PM   Dressing Cleansing/Solutions Not Applicable 8/22/2019  3:00 PM   Dressing Changed New 8/22/2019  3:00 PM   Dressing Status Intact 8/22/2019  3:00 PM   Dressing Change Frequency Tuesday, Thursday, Saturday 8/22/2019  3:00 PM   NEXT Dressing Change  08/24/19 8/22/2019  3:00 PM   NEXT Weekly Photo (Inpatient Only) 08/29/19 8/22/2019  3:00 PM   WOUND NURSE ONLY - Odor None 8/22/2019  3:00 PM   WOUND NURSE ONLY - Exposed Structures Muscle 8/22/2019  3:00 PM   WOUND NURSE ONLY - Tissue Type and Percentage red 100% 8/22/2019  3:00 PM       Wound 08/22/19 Full Thickness Wound Groin open surgical wounds bilateral groin (Active)   Wound Image    8/22/2019  3:00 PM   Site Assessment Clean;Intact 8/22/2019  3:00 PM   Yadi-wound Assessment Clean;Intact 8/22/2019  3:00 PM   Margins Attached edges;Unattached edges 8/22/2019  3:00 PM   Tunneling 0 cm 8/22/2019  3:00 PM   Closure Secondary intention 8/22/2019  3:00 PM   Drainage Amount Moderate 8/22/2019  3:00 PM   Drainage Description Serosanguineous 8/22/2019  3:00 PM   Non-staged Wound Description Full thickness 8/22/2019  3:00 PM   Treatments Cleansed;Site care 8/22/2019  3:00 PM   Cleansing Approved Wound Cleanser 8/22/2019  3:00 PM   Periwound Protectant Drape;Skin Protectant wipes to Periwound 8/22/2019  3:00 PM   Dressing Options Wound Vac 8/22/2019  3:00 PM   Dressing Cleansing/Solutions Not Applicable 8/22/2019  3:00 PM   Dressing Changed New 8/22/2019  3:00 PM   Dressing Status Intact 8/22/2019  3:00 PM   Dressing Change Frequency Tuesday, Thursday, Saturday 8/22/2019  3:00 PM   NEXT Dressing Change  08/24/19 8/22/2019  3:00 PM   NEXT Weekly Photo (Inpatient Only) 08/29/19 8/22/2019  3:00 PM   WOUND NURSE ONLY - Odor None 8/22/2019  3:00 PM   WOUND NURSE ONLY - Exposed Structures None 8/22/2019  3:00 PM   WOUND NURSE ONLY - Tissue  Type and Percentage red 100% both wounds 8/22/2019  3:00 PM   WOUND NURSE ONLY - Time Spent with Patient (mins) 120 8/22/2019  3:00 PM   RIGHT GROIN= 8.5CM X2CM X 1.5CM  UNDERMINING 2.5CM 9-1200  LEFT GROIN= 8CM X 6.5CM X 3CM UNDERMINING 5CM 3 O'CLOCK    Vascular:   Not assessed    AGNIESZKA:  NA    Lab Values:    Lab Results   Component Value Date/Time    WBC 20.2 (H) 08/22/2019 03:21 AM    RBC 2.24 (L) 08/22/2019 03:21 AM    HEMOGLOBIN 6.4 (L) 08/22/2019 03:21 AM    HEMATOCRIT 20.7 (L) 08/22/2019 03:21 AM        Lab Results   Component Value Date/Time    HBA1C 6.7 (H) 07/04/2019 02:27 AM           Culture:  Staphylococcus aureus   Light growth   See previous culture for sensitivity report  Obtained 8/20/19      INTERVENTIONS BY WOUND TEAM:  Met with patient and explained dressing rationale.  Removed dressing from drain site and incision left knee (need to change for NPWT drape).  Both are clean, well approximated and intact.  Removed packing from left knee/calf wound and cleansed with wound cleanser.  Measurements and photo taken and skin prep and drape to gavin wound skin.  Filled wound with 2 pieces black foam and secured with drape.  Trac pad placed over black foam button and intiated NPWT at 125mmHg continuously.  Total 3 pieces black foam.  Bilateral groin wounds were cared for with same procedure.  Each groin wound was filled with two pieces black foam and then bridged anteriorly with trac pad over black foam button and secured with drape. Total black foam each wound= 4 pieces.  Y connector used and initiated NPWT at 125mmHg continuously.  Replaced drain sponge and dry gauze over left knee incision and drain site and secured with tape.  Discussed with staff RN.    Dressing Selection:  See above         Interdisciplinary consultation: Patient, Bedside RN     EVALUATION: clean but fragile wounds that should progress with NPWT to promote granulation tissue    Factors affecting wound healing: DM, infection   Goals:  Steady decrease in wound area and depth weekly.    NURSING PLAN OF CARE ORDERS (X):    Dressing changes: See Dressing Care orders: X  Skin care: See Skin Care orders:   Rectal tube care: See Rectal Tube Care orders:   Other orders:    RSKIN: CURRENT (X) ORDERED (O):   Q shift Joseph:  X  Q shift pressure point assessments:  X  Pressure redistribution mattress    X        KATIE          Bariatric KATIE         Bariatric foam           Heel float boots          Float Heels off Bed with Pillows  X             Barrier wipes         Barrier Cream         Barrier paste          Sacral silicone dressing X        Silicone O2 tubing         Anchorfast         Cannula fixation Device (Tender )          Gray Foam Ear protectors           Trach with Optifoam split foam                 Waffle cushion        Waffle Overlay  X       Rectal tube or BMS         Antifungal tx      Interdry          Reposition q 2 hours      X  Up to chair        Ambulate      PT/OT        Dietician        Diabetes Education      PO  X   TF     TPN     NPO   # days   Other        WOUND TEAM PLAN OF CARE (X):   NPWT change 3 x week:     X   Dressing changes by wound team:       Follow up as needed:       Other (explain):     Anticipated discharge plans (X):   SNF:           Home Care:           Outpatient Wound Center:            Self Care:            Other:   X LTAC

## 2019-08-23 NOTE — PROGRESS NOTES
Assumed care of patient. Patient resting, and seemed mildly confused upon waking. Patient was, however, oriented x4. Patient hard of hearing and need explanation on lines, PCA, and being mindful and careful not to pull or tug any lines. Patient verbalized understanding at this time. Inspiratory wheezes noted. Patient has bilateral groin wound vacs to one cannister. Wound vacs clean and intact with sero sang drainage. Left knee with clean, dry and intact gauze with tape. Hemovac in place to this knee with scant output. Condom cath in place due to incontinence. Patient has not mobilized to this RN's knowledge. Patient is a high fall risk and not focused, also impulsive with risk to pull lines. Teamwork will be provided to get patient up and at least in chair today. No other needs at this time. Call light within reach.

## 2019-08-23 NOTE — DISCHARGE PLANNING
Anticipated Discharge Disposition: LTAC    Action: Per [facility], LTAC is not able to accept this patient today due to lack of beds.  Weekend CM will follow up tomorrow morning with LTAC.     Barriers to Discharge: Lack of Bed.    Plan: LTAC, pending bed and transfer arrangements.

## 2019-08-23 NOTE — PROGRESS NOTES
Hospital Medicine Daily Progress Note    Date of Service  8/22/2019    Chief Complaint  74 y.o. male admitted 8/18/2019 with Groin pain    Hospital Course    Patient with underlying history of peripheral arterial disease, left lower extremity predominantly who recently underwent femoropopliteal bypass, underlying CKD stage III, diabetes mellitus type 2.  Previous surgery by Dr. Grande from vascular surgery.  He now presented with groin pain, discharge from the left groin postoperative site with findings of occlusion of the left femoropopliteal bypass graft, no fluid in the mid/distal SFA/distal popliteal artery consistent with thrombosis, negative DVT study, CT lower extremity with runoff revealing completely occluded left femoral-popliteal bypass graft with gas within the lumen and fluid surrounding the graft.  Concerning for infected occluded graft.  Partially visualized femoral femoral bypass that appears patent.  Small amount of clot in the femorofemoral bypass graft in the common femoral artery junction.  Complete occlusion of the native superficial femoral artery from mid to distal thigh, complete occlusion of the left popliteal artery.  Partial flow reconstitution at the trifurcation but fluid is very sluggish.  No distal flow appreciated in the anterior tibial artery, posterior tibial artery and peroneal artery at 10 cm from the ankle.  Vascular surgery/Dr. Grande consulted, patient initiated on intravenous heparin and admitted to the hospital, infectious disease team consultation obtained.  Patient with findings of bacteremia, appears to be MSSA with findings of left knee septic arthritis.  Orthopedics team consulted.      Interval Problem Update  Patient seen and evaluated on rounds  Discussed with nursing staff on rounds  Wants to continue his morphine PCA at this time  Orthopedics, vascular surgery, infectious disease following  Potassium stable, phosphorus replaced, sodium stable  Pain is improved  Left  groin wound VAC in place  No other acute issues at this time, monitor hemoglobin, provided 1 unit of packed red blood cell today, vitamin K subcutaneous 2 mg  Diamond catheter in place, discontinue and monitor the response, improved, [place] catheter recommended    Consultants/Specialty  Vascular surgery  Orthopedics  Infectious disease    Code Status  Full code    Disposition  Anticipate need for postacute placement, LTAC versus skilled nursing facility based on postoperative recovery and course, LTAC referral placed, social workers/case management working on placement at this time.  Anticipate most likely placement will be LTAC.    Review of Systems  Review of Systems   Constitutional: Positive for malaise/fatigue. Negative for chills, fever and weight loss.   HENT: Negative for hearing loss.    Eyes: Negative for blurred vision and double vision.   Respiratory: Negative for cough, hemoptysis and shortness of breath.    Cardiovascular: Positive for leg swelling. Negative for chest pain, palpitations and PND.   Gastrointestinal: Positive for nausea. Negative for abdominal pain, blood in stool, constipation, diarrhea, melena and vomiting.   Genitourinary: Negative for flank pain, frequency and hematuria.        Groin pain   Musculoskeletal: Positive for joint pain and myalgias. Negative for falls.   Skin: Negative for itching and rash.   Neurological: Negative for dizziness and headaches.   Psychiatric/Behavioral: Negative for depression, substance abuse and suicidal ideas.        Physical Exam  Temp:  [36.7 °C (98 °F)-37.3 °C (99.1 °F)] 36.8 °C (98.3 °F)  Pulse:  [68-94] 87  Resp:  [16-18] 18  BP: (102-140)/(65-85) 139/85  SpO2:  [91 %-98 %] 91 %    Physical Exam   Constitutional: He is oriented to person, place, and time. He appears well-developed and well-nourished. No distress.   Body mass index is 23.69 kg/m².   HENT:   Head: Normocephalic.   Mouth/Throat: Oropharynx is clear and moist. No oropharyngeal exudate.    Eyes: Pupils are equal, round, and reactive to light. Conjunctivae and EOM are normal. No scleral icterus.   Neck: Normal range of motion. No JVD present. No thyromegaly present.   Cardiovascular: Normal rate and regular rhythm.   Murmur heard.  Good right lower extremity pulse, poor left lower extremity pulses   Pulmonary/Chest: No stridor. No respiratory distress. He has wheezes.   Diminished in bases.  Minimal wheezing.   Abdominal: Soft. Bowel sounds are normal. He exhibits no distension. There is no tenderness. There is no rebound.   Musculoskeletal: He exhibits edema and tenderness.   Left groin wound VAC in place, surgical dressing to left knee, decreased range of motion of the left hip/knee.   Lymphadenopathy:     He has no cervical adenopathy.   Neurological: He is alert and oriented to person, place, and time. He has normal reflexes. No cranial nerve deficit.   Skin: He is not diaphoretic.   Psychiatric: He has a normal mood and affect. His behavior is normal. Judgment and thought content normal.       Fluids    Intake/Output Summary (Last 24 hours) at 8/22/2019 1729  Last data filed at 8/22/2019 1530  Gross per 24 hour   Intake 1566.5 ml   Output 850 ml   Net 716.5 ml       Laboratory  Recent Labs     08/21/19 0444 08/21/19 2036 08/22/19  0321   WBC 17.9* 27.1* 20.2*   RBC 2.40* 2.32* 2.24*   HEMOGLOBIN 7.0* 6.7* 6.4*   HEMATOCRIT 21.7* 21.3* 20.7*   MCV 90.0 91.8 92.4   MCH 28.8 28.9 28.6   MCHC 31.9* 31.5* 30.9*   RDW 50.3* 51.7* 53.2*   PLATELETCT 338 453* 399   MPV 9.4 9.3 9.3     Recent Labs     08/21/19 0444 08/22/19  0321   SODIUM 133* 135   POTASSIUM 3.1* 4.0   CHLORIDE 98 101   CO2 29 27   GLUCOSE 114* 129*   BUN 19 17   CREATININE 1.17 1.02   CALCIUM 8.3* 7.9*     Recent Labs     08/20/19  0613  08/20/19  0850  08/20/19  1618 08/21/19 0444 08/22/19  0321   APTT 126.2*  --  60.0*  --   --  54.2*  --    INR  --    < >  --    < > 2.26* 2.89* 4.46*    < > = values in this interval not  displayed.         Recent Labs     08/19/19  2350   TRIGLYCERIDE 236*   HDL 7*   LDL 28       Imaging  EC-ECHOCARDIOGRAM COMPLETE W/O CONT   Final Result      DX-PORTABLE FLUORO > 1 HOUR   Final Result      Digitized intraoperative radiograph is submitted for review.  This examination is not for diagnostic purpose but for guidance during a surgical procedure.         US-VEIN MAPPING LOWER EXTREMITY BILAT   Final Result      CT-CTA LOWER EXT WITH & W/O-POST PROCESS LEFT   Final Result         1. Completely occluded left femoral-popliteal bypass graft with gas within the lumen and fluid surrounding the graft. The finding is concerning for infected occluded graft.      2. Partially visualized femoral-femoral bypass that appears patent. There is small amount of clot in the junction between the femorofemoral bypass graft and the common femoral artery.      3. Complete occlusion of the native superficial femoral artery from the mid to distal thigh at the area of prior stenting.      Complete occlusion of the left popliteal artery.      Partial flow reconstitution at the trifurcation but flow is very sluggish. No distal flow flow appreciated in the anterior tibial artery, posterior tibial artery and peroneal artery at 10 cm from the ankle.         CRITICAL RESULT READ BACK: Preliminary findings discussed with and critical read back performed by Dr. BERNA PARR in the Emergency Department via telephone on 8/18/2019 3:51 PM      US-EXTREMITY VENOUS LOWER UNILAT LEFT   Final Result      US-EXTREMITY ARTERY LOWER UNILAT LEFT   Final Result           Assessment/Plan  * Sepsis due to methicillin susceptible Staphylococcus aureus (HCC)- (present on admission)  Assessment & Plan  This is sepsis (without associated acute organ dysfunction).   Source infected left groin vascular graft, bacteremia, left knee septic arthritis  Source control with I&D left groin, excision of infected graft, left knee washout  Antibiotic guidance per  infectious disease team  Continue close clinical monitoring    MSSA bacteremia- (present on admission)  Assessment & Plan  Likely from underlying left groin graft infection  With seeding of left knee, septic arthritis    Status post excision of left groin graft, I&D and left knee washout    Echocardiogram is negative for any evidence of infective endocarditis, do not believe that patient would benefit from a CHAPIS given this would not alter the course of management as he would require prolonged IV antibiotic therapy, irrespectively would defer to infectious disease team    Ongoing source control per surgical team  ID following, defer antibiotic needs to ID team  Continue close clinical monitoring    Staphylococcal arthritis of left knee (HCC)- (present on admission)  Assessment & Plan  Status post washout per orthopedics team  Continue antibiotics per ID team    Remains on morphine PCA, monitoring for adverse effects related to the PCA    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 Grande from vascular surgery this stay    Patient on IV antibiotic therapy  Vascular interventions, postoperative follow-up/care per vascular surgery    Antibiotics per ID team  Continue close clinical monitoring    PAD (peripheral artery disease) (HCC)- (present on admission)  Assessment & Plan  Currently with occluded grafts of the left lower extremity with poor circulation on presentation  Status post revascularization by vascular surgery  Vascular surgery following, defer anticoagulation needs to vascular surgery, medical management to vascular surgery    Hyponatremia- (present on admission)  Assessment & Plan  Improved, resolved    Chronic anticoagulation- (present on admission)  Assessment & Plan  Likely for underlying peripheral arterial disease  INR supratherapeutic on presentation, remains  persistent  At this time anticoagulation is held  Vascular surgery following, will defer need for anticoagulation to vascular surgery    Dyslipidemia- (present on admission)  Assessment & Plan  Continue high intensity statin therapy    Iron deficiency anemia- (present on admission)  Assessment & Plan  Avoid IV iron given underlying infectious issues    In addition there is associated anemia of acute blood loss postoperative, cooperative.  Hemoglobin less than 7 today, requiring blood transfusion  INR supratherapeutic, vitamin K subcutaneous 2 mg ordered  Monitor Hb / Restrictive transfusion strategy    Iron / MV supplementation ordered     Hyperglycemia- (present on admission)  Assessment & Plan  Previous hemoglobin 6.7 consistent with type 2 diabetes mellitus  Sliding scale insulin therapy #1    Chronic kidney disease (CKD), stage III (moderate) (HCC)- (present on admission)  Assessment & Plan  Monitor renal function / Avoid nephrotoxins and dose medications renally    Hypokalemia- (present on admission)  Assessment & Plan  Improved,        VTE prophylaxis: Continues to have a therapeutic INR at this time, monitor, defer anticoagulation needs to surgical team/vascular surgery

## 2019-08-23 NOTE — PROGRESS NOTES
Positive blood culture results, paged Dr Smallwood.    1854:  New orders received for new blood cultures times 2.

## 2019-08-23 NOTE — PROGRESS NOTES
Pt aox 4. Patient is impulsive at times. No visible signs of distress. VSS.  Tolerating medication regimen without any signs or symptoms of adverse reactions.  Pt reports 2 /10 pain, medicated per MAR.  Tolerating diet without any n/v. 111 BS, No new BM. Condom cath in place. Area is CDI.   Bedrest.   Pelvic and LLE woundvac dressings were reinforced and are now CDI. Bedding was changed. Pt was cleaned.   On 2L no complaints of SOB.  Bed locked and in low position.  Call light within reach and able to make all needs be known.  Will continue to monitor.

## 2019-08-23 NOTE — PROGRESS NOTES
Awake, no complaints.   AF, VSS     Gen - Pleasant male in NAD.  LE - Wound vacs in place with no surrounding erythema or fluctuaiton.  Palpable R PT pulses with multiphasic Doppler flow signals.  Hyperemic flow in left pedal arteries.  Reperfusion hyperemia in left foot.     Labs: Reviewed.     Assessment: S/P removal of infected fem-fem and left fem-pop bypass grafts, redo bypasses using cadaveric veins, left leg angiogram, and bilateral sartorius flaps.     Plan:  Stable.  Agree with transfusion.  Continue wound vac.  Continue antibiotics.    May be out out bed to chair.  PT consult for mobilization / ambulation.     Discussed with patient.

## 2019-08-24 ENCOUNTER — APPOINTMENT (OUTPATIENT)
Dept: RADIOLOGY | Facility: MEDICAL CENTER | Age: 74
DRG: 252 | End: 2019-08-24
Attending: INTERNAL MEDICINE
Payer: MEDICARE

## 2019-08-24 LAB
ALBUMIN SERPL BCP-MCNC: 2.5 G/DL (ref 3.2–4.9)
ALBUMIN/GLOB SERPL: 0.9 G/DL
ALP SERPL-CCNC: 151 U/L (ref 30–99)
ALT SERPL-CCNC: 9 U/L (ref 2–50)
ANION GAP SERPL CALC-SCNC: 8 MMOL/L (ref 0–11.9)
ANISOCYTOSIS BLD QL SMEAR: ABNORMAL
AST SERPL-CCNC: 42 U/L (ref 12–45)
BACTERIA BLD CULT: ABNORMAL
BACTERIA BLD CULT: ABNORMAL
BASOPHILS # BLD AUTO: 0 % (ref 0–1.8)
BASOPHILS # BLD: 0 K/UL (ref 0–0.12)
BILIRUB SERPL-MCNC: 0.7 MG/DL (ref 0.1–1.5)
BUN SERPL-MCNC: 12 MG/DL (ref 8–22)
CALCIUM SERPL-MCNC: 8.1 MG/DL (ref 8.5–10.5)
CHLORIDE SERPL-SCNC: 100 MMOL/L (ref 96–112)
CO2 SERPL-SCNC: 28 MMOL/L (ref 20–33)
CREAT SERPL-MCNC: 0.94 MG/DL (ref 0.5–1.4)
EOSINOPHIL # BLD AUTO: 0.15 K/UL (ref 0–0.51)
EOSINOPHIL NFR BLD: 0.8 % (ref 0–6.9)
ERYTHROCYTE [DISTWIDTH] IN BLOOD BY AUTOMATED COUNT: 53.4 FL (ref 35.9–50)
GLOBULIN SER CALC-MCNC: 2.9 G/DL (ref 1.9–3.5)
GLUCOSE BLD-MCNC: 109 MG/DL (ref 65–99)
GLUCOSE BLD-MCNC: 123 MG/DL (ref 65–99)
GLUCOSE BLD-MCNC: 137 MG/DL (ref 65–99)
GLUCOSE BLD-MCNC: 153 MG/DL (ref 65–99)
GLUCOSE SERPL-MCNC: 109 MG/DL (ref 65–99)
HCT VFR BLD AUTO: 24.5 % (ref 42–52)
HGB BLD-MCNC: 7.5 G/DL (ref 14–18)
HYPOCHROMIA BLD QL SMEAR: ABNORMAL
LYMPHOCYTES # BLD AUTO: 1.33 K/UL (ref 1–4.8)
LYMPHOCYTES NFR BLD: 6.9 % (ref 22–41)
MACROCYTES BLD QL SMEAR: ABNORMAL
MAGNESIUM SERPL-MCNC: 1.8 MG/DL (ref 1.5–2.5)
MANUAL DIFF BLD: NORMAL
MCH RBC QN AUTO: 28.4 PG (ref 27–33)
MCHC RBC AUTO-ENTMCNC: 30.6 G/DL (ref 33.7–35.3)
MCV RBC AUTO: 92.8 FL (ref 81.4–97.8)
METAMYELOCYTES NFR BLD MANUAL: 0.9 %
MONOCYTES # BLD AUTO: 1 K/UL (ref 0–0.85)
MONOCYTES NFR BLD AUTO: 5.2 % (ref 0–13.4)
MORPHOLOGY BLD-IMP: NORMAL
NEUTROPHILS # BLD AUTO: 16.64 K/UL (ref 1.82–7.42)
NEUTROPHILS NFR BLD: 84.5 % (ref 44–72)
NEUTS BAND NFR BLD MANUAL: 1.7 % (ref 0–10)
NRBC # BLD AUTO: 0.04 K/UL
NRBC BLD-RTO: 0.2 /100 WBC
PHOSPHATE SERPL-MCNC: 2.7 MG/DL (ref 2.5–4.5)
PLATELET # BLD AUTO: 502 K/UL (ref 164–446)
PLATELET BLD QL SMEAR: NORMAL
PMV BLD AUTO: 9.1 FL (ref 9–12.9)
POLYCHROMASIA BLD QL SMEAR: NORMAL
POTASSIUM SERPL-SCNC: 4 MMOL/L (ref 3.6–5.5)
PROT SERPL-MCNC: 5.4 G/DL (ref 6–8.2)
RBC # BLD AUTO: 2.64 M/UL (ref 4.7–6.1)
RBC BLD AUTO: PRESENT
SIGNIFICANT IND 70042: ABNORMAL
SITE SITE: ABNORMAL
SODIUM SERPL-SCNC: 136 MMOL/L (ref 135–145)
SOURCE SOURCE: ABNORMAL
WBC # BLD AUTO: 19.3 K/UL (ref 4.8–10.8)

## 2019-08-24 PROCEDURE — 700102 HCHG RX REV CODE 250 W/ 637 OVERRIDE(OP): Performed by: SURGERY

## 2019-08-24 PROCEDURE — A9270 NON-COVERED ITEM OR SERVICE: HCPCS | Performed by: PHYSICIAN ASSISTANT

## 2019-08-24 PROCEDURE — 85007 BL SMEAR W/DIFF WBC COUNT: CPT

## 2019-08-24 PROCEDURE — 99232 SBSQ HOSP IP/OBS MODERATE 35: CPT | Performed by: INTERNAL MEDICINE

## 2019-08-24 PROCEDURE — A9270 NON-COVERED ITEM OR SERVICE: HCPCS | Performed by: INTERNAL MEDICINE

## 2019-08-24 PROCEDURE — A9270 NON-COVERED ITEM OR SERVICE: HCPCS | Performed by: SURGERY

## 2019-08-24 PROCEDURE — 80053 COMPREHEN METABOLIC PANEL: CPT

## 2019-08-24 PROCEDURE — 97535 SELF CARE MNGMENT TRAINING: CPT

## 2019-08-24 PROCEDURE — 97162 PT EVAL MOD COMPLEX 30 MIN: CPT

## 2019-08-24 PROCEDURE — 770006 HCHG ROOM/CARE - MED/SURG/GYN SEMI*

## 2019-08-24 PROCEDURE — 700102 HCHG RX REV CODE 250 W/ 637 OVERRIDE(OP): Performed by: HOSPITALIST

## 2019-08-24 PROCEDURE — A7000 DISPOSABLE CANISTER FOR PUMP: HCPCS | Performed by: INTERNAL MEDICINE

## 2019-08-24 PROCEDURE — 97606 NEG PRS WND THER DME>50 SQCM: CPT

## 2019-08-24 PROCEDURE — 700102 HCHG RX REV CODE 250 W/ 637 OVERRIDE(OP): Performed by: INTERNAL MEDICINE

## 2019-08-24 PROCEDURE — 700111 HCHG RX REV CODE 636 W/ 250 OVERRIDE (IP): Performed by: INTERNAL MEDICINE

## 2019-08-24 PROCEDURE — 84100 ASSAY OF PHOSPHORUS: CPT

## 2019-08-24 PROCEDURE — 36415 COLL VENOUS BLD VENIPUNCTURE: CPT

## 2019-08-24 PROCEDURE — 83735 ASSAY OF MAGNESIUM: CPT

## 2019-08-24 PROCEDURE — 700102 HCHG RX REV CODE 250 W/ 637 OVERRIDE(OP): Performed by: PHYSICIAN ASSISTANT

## 2019-08-24 PROCEDURE — 85027 COMPLETE CBC AUTOMATED: CPT

## 2019-08-24 PROCEDURE — 82962 GLUCOSE BLOOD TEST: CPT

## 2019-08-24 PROCEDURE — A9270 NON-COVERED ITEM OR SERVICE: HCPCS | Performed by: HOSPITALIST

## 2019-08-24 RX ORDER — PREGABALIN 150 MG/1
150 CAPSULE ORAL 2 TIMES DAILY
Status: DISCONTINUED | OUTPATIENT
Start: 2019-08-24 | End: 2019-08-26 | Stop reason: HOSPADM

## 2019-08-24 RX ORDER — CLOPIDOGREL BISULFATE 75 MG/1
75 TABLET ORAL DAILY
Status: DISCONTINUED | OUTPATIENT
Start: 2019-08-24 | End: 2019-08-26 | Stop reason: HOSPADM

## 2019-08-24 RX ADMIN — OXYCODONE HYDROCHLORIDE AND ACETAMINOPHEN 500 MG: 500 TABLET ORAL at 16:27

## 2019-08-24 RX ADMIN — OXYCODONE HYDROCHLORIDE 10 MG: 10 TABLET ORAL at 07:40

## 2019-08-24 RX ADMIN — MORPHINE SULFATE 4 MG: 4 INJECTION INTRAVENOUS at 06:04

## 2019-08-24 RX ADMIN — RIFAMPIN 300 MG: 300 CAPSULE ORAL at 10:33

## 2019-08-24 RX ADMIN — OXYCODONE HYDROCHLORIDE AND ACETAMINOPHEN 500 MG: 500 TABLET ORAL at 07:40

## 2019-08-24 RX ADMIN — CEFAZOLIN SODIUM 2 G: 2 INJECTION, SOLUTION INTRAVENOUS at 21:42

## 2019-08-24 RX ADMIN — OXYCODONE HYDROCHLORIDE 5 MG: 5 TABLET ORAL at 02:53

## 2019-08-24 RX ADMIN — ENOXAPARIN SODIUM 40 MG: 100 INJECTION SUBCUTANEOUS at 06:00

## 2019-08-24 RX ADMIN — OXYCODONE HYDROCHLORIDE 10 MG: 10 TABLET ORAL at 11:39

## 2019-08-24 RX ADMIN — MORPHINE SULFATE 4 MG: 4 INJECTION INTRAVENOUS at 10:29

## 2019-08-24 RX ADMIN — SENNOSIDES, DOCUSATE SODIUM 2 TABLET: 50; 8.6 TABLET, FILM COATED ORAL at 16:26

## 2019-08-24 RX ADMIN — ACETAMINOPHEN 650 MG: 325 TABLET, FILM COATED ORAL at 06:01

## 2019-08-24 RX ADMIN — ATORVASTATIN CALCIUM 40 MG: 40 TABLET, FILM COATED ORAL at 16:26

## 2019-08-24 RX ADMIN — CEFAZOLIN SODIUM 2 G: 2 INJECTION, SOLUTION INTRAVENOUS at 12:57

## 2019-08-24 RX ADMIN — MORPHINE SULFATE 4 MG: 4 INJECTION INTRAVENOUS at 14:16

## 2019-08-24 RX ADMIN — POLYETHYLENE GLYCOL 3350 1 PACKET: 17 POWDER, FOR SOLUTION ORAL at 06:01

## 2019-08-24 RX ADMIN — RIFAMPIN 300 MG: 300 CAPSULE ORAL at 16:26

## 2019-08-24 RX ADMIN — RIFAMPIN 300 MG: 300 CAPSULE ORAL at 06:01

## 2019-08-24 RX ADMIN — FERROUS GLUCONATE 324 MG: 324 TABLET ORAL at 10:29

## 2019-08-24 RX ADMIN — FERROUS GLUCONATE 324 MG: 324 TABLET ORAL at 07:40

## 2019-08-24 RX ADMIN — OXYCODONE HYDROCHLORIDE 10 MG: 10 TABLET ORAL at 16:27

## 2019-08-24 RX ADMIN — OXYCODONE HYDROCHLORIDE AND ACETAMINOPHEN 500 MG: 500 TABLET ORAL at 10:29

## 2019-08-24 RX ADMIN — PREGABALIN 150 MG: 150 CAPSULE ORAL at 10:29

## 2019-08-24 RX ADMIN — FOLIC ACID 1 MG: 1 TABLET ORAL at 06:01

## 2019-08-24 RX ADMIN — SENNOSIDES, DOCUSATE SODIUM 2 TABLET: 50; 8.6 TABLET, FILM COATED ORAL at 06:01

## 2019-08-24 RX ADMIN — CLOPIDOGREL BISULFATE 75 MG: 75 TABLET ORAL at 12:56

## 2019-08-24 RX ADMIN — ACETAMINOPHEN 650 MG: 325 TABLET, FILM COATED ORAL at 16:28

## 2019-08-24 RX ADMIN — INSULIN HUMAN 1 UNITS: 100 INJECTION, SOLUTION PARENTERAL at 21:52

## 2019-08-24 RX ADMIN — PREGABALIN 150 MG: 150 CAPSULE ORAL at 16:30

## 2019-08-24 RX ADMIN — FERROUS GLUCONATE 324 MG: 324 TABLET ORAL at 16:27

## 2019-08-24 RX ADMIN — ASPIRIN 81 MG: 81 TABLET, COATED ORAL at 12:56

## 2019-08-24 RX ADMIN — THERA TABS 1 TABLET: TAB at 06:01

## 2019-08-24 RX ADMIN — POLYETHYLENE GLYCOL 3350 1 PACKET: 17 POWDER, FOR SOLUTION ORAL at 16:27

## 2019-08-24 RX ADMIN — CEFAZOLIN SODIUM 2 G: 2 INJECTION, SOLUTION INTRAVENOUS at 06:00

## 2019-08-24 ASSESSMENT — ENCOUNTER SYMPTOMS
DIZZINESS: 0
CHILLS: 0
HEADACHES: 0
COUGH: 0
WEIGHT LOSS: 0
EYE DISCHARGE: 1
FLANK PAIN: 0
NAUSEA: 1
FEVER: 0
SHORTNESS OF BREATH: 0
MYALGIAS: 1
FALLS: 0
ABDOMINAL PAIN: 0
DOUBLE VISION: 0
BLURRED VISION: 0
DIARRHEA: 0
DEPRESSION: 0
PND: 0
VOMITING: 0
CONSTIPATION: 0
PALPITATIONS: 0
BLOOD IN STOOL: 0
HEMOPTYSIS: 0

## 2019-08-24 ASSESSMENT — COGNITIVE AND FUNCTIONAL STATUS - GENERAL
CLIMB 3 TO 5 STEPS WITH RAILING: TOTAL
TURNING FROM BACK TO SIDE WHILE IN FLAT BAD: UNABLE
SUGGESTED CMS G CODE MODIFIER MOBILITY: CM
WALKING IN HOSPITAL ROOM: A LOT
MOVING FROM LYING ON BACK TO SITTING ON SIDE OF FLAT BED: UNABLE
MOVING TO AND FROM BED TO CHAIR: UNABLE
STANDING UP FROM CHAIR USING ARMS: A LITTLE
MOBILITY SCORE: 9

## 2019-08-24 ASSESSMENT — GAIT ASSESSMENTS: GAIT LEVEL OF ASSIST: UNABLE TO PARTICIPATE

## 2019-08-24 ASSESSMENT — LIFESTYLE VARIABLES: SUBSTANCE_ABUSE: 0

## 2019-08-24 NOTE — PROGRESS NOTES
Awake, complains of pain not under control.   AF, VSS     Gen - Pleasant male in NAD.  LE - Wound vacs in place with no surrounding erythema or fluctuaiton.  Palpable R PT pulses with multiphasic Doppler flow signals.  Hyperemic flow in left pedal arteries.  1+ edema left foot.     Labs: Reviewed.     Assessment: S/P removal of infected fem-fem and left fem-pop bypass grafts, redo bypasses using cadaveric veins, left leg angiogram, and bilateral sartorius flaps.     Plan:  Stable from vascular standpoint.  Patient was on Warfarin for left leg DVT, per report; however, recent left leg venous duplex showed no DVT.  From the arterial / bypass standpoint, patient does NOT need to be on Warfarin.  I add Plavix in addition to Aspirin 81mg once a day.    Pain control.    Long term antibiotic as per ID.     Discussed with patient and RN.

## 2019-08-24 NOTE — PROGRESS NOTES
Pt aox 4. Patient is impulsive at times. No visible signs of distress. VSS.  Tolerating medication regimen without any signs or symptoms of adverse reactions.  Pt reports 2 /10 pain, medicated per MAR. Patient is no longer on PCA. Tolerating PO pain meds just fine.   Tolerating diet without any n/v. 124 BS, No new BM.  Woundvac dressings intact. Cannister was changed for pelvic woundvac.   On 2L no complaints of SOB.  Bed locked and in low position.  Call light within reach and able to make all needs be known.  Will continue to monitor.

## 2019-08-24 NOTE — PROGRESS NOTES
Hospital Medicine Daily Progress Note    Date of Service  8/24/2019    Chief Complaint  74 y.o. male admitted 8/18/2019 with Groin pain    Hospital Course    Patient with underlying history of peripheral arterial disease, left lower extremity predominantly who recently underwent femoropopliteal bypass, underlying CKD stage III, diabetes mellitus type 2.  Previous surgery by Dr. Grande from vascular surgery.  He now presented with groin pain, discharge from the left groin postoperative site with findings of occlusion of the left femoropopliteal bypass graft, no fluid in the mid/distal SFA/distal popliteal artery consistent with thrombosis, negative DVT study, CT lower extremity with runoff revealing completely occluded left femoral-popliteal bypass graft with gas within the lumen and fluid surrounding the graft.  Concerning for infected occluded graft.  Partially visualized femoral femoral bypass that appears patent.  Small amount of clot in the femorofemoral bypass graft in the common femoral artery junction.  Complete occlusion of the native superficial femoral artery from mid to distal thigh, complete occlusion of the left popliteal artery.  Partial flow reconstitution at the trifurcation but fluid is very sluggish.  No distal flow appreciated in the anterior tibial artery, posterior tibial artery and peroneal artery at 10 cm from the ankle.  Vascular surgery/Dr. Grande consulted, patient initiated on intravenous heparin and admitted to the hospital, infectious disease team consultation obtained.  Patient with findings of bacteremia, appears to be MSSA with findings of left knee septic arthritis.  Orthopedics team consulted.      Interval Problem Update  Patient seen and evaluated on rounds  Discussed with nursing staff on rounds  Continue goals to work pain control  Discussed with Dr. Grande, cleared for discharge  No further interventions from orthopedic perspective  Electrolyte stable today  Interval lab  studies tomorrow  Plan discharge to LTAC tomorrow      Consultants/Specialty  Vascular surgery  Orthopedics  Infectious disease    Code Status  Full code    Disposition  Contact anticipated tomorrow    Review of Systems  Review of Systems   Constitutional: Positive for malaise/fatigue. Negative for chills, fever and weight loss.   HENT: Negative for hearing loss.    Eyes: Positive for discharge. Negative for blurred vision and double vision.   Respiratory: Negative for cough, hemoptysis and shortness of breath.    Cardiovascular: Positive for leg swelling. Negative for chest pain, palpitations and PND.   Gastrointestinal: Positive for nausea. Negative for abdominal pain, blood in stool, constipation, diarrhea, melena and vomiting.   Genitourinary: Negative for flank pain, frequency and hematuria.        Groin pain   Musculoskeletal: Positive for joint pain and myalgias. Negative for falls.   Skin: Negative for itching and rash.   Neurological: Negative for dizziness and headaches.   Psychiatric/Behavioral: Negative for depression, substance abuse and suicidal ideas.        Physical Exam  Temp:  [36.7 °C (98.1 °F)-37.4 °C (99.3 °F)] 37.4 °C (99.3 °F)  Pulse:  [] 80  Resp:  [16-18] 16  BP: (120-133)/(71-86) 133/86  SpO2:  [90 %-100 %] 100 %    Physical Exam   Constitutional: He is oriented to person, place, and time. He appears well-developed and well-nourished. No distress.   Body mass index is 23.69 kg/m².   HENT:   Head: Normocephalic.   Mouth/Throat: Oropharynx is clear and moist. No oropharyngeal exudate.   Eyes: Pupils are equal, round, and reactive to light. Conjunctivae and EOM are normal. No scleral icterus.   Neck: Normal range of motion. No JVD present. No thyromegaly present.   Cardiovascular: Normal rate and regular rhythm.   Murmur heard.  Good right lower extremity pulse, poor left lower extremity pulses   Pulmonary/Chest: No stridor. No respiratory distress. He has wheezes.   Diminished in bases.   Minimal wheezing.   Abdominal: Soft. Bowel sounds are normal. He exhibits no distension. There is no tenderness. There is no rebound.   Musculoskeletal: He exhibits edema and tenderness.   Left groin wound VAC in place, surgical dressing to left knee, decreased range of motion of the left hip/knee.   Lymphadenopathy:     He has no cervical adenopathy.   Neurological: He is alert and oriented to person, place, and time. He has normal reflexes. No cranial nerve deficit.   Skin: He is not diaphoretic.   Psychiatric: He has a normal mood and affect. His behavior is normal. Judgment and thought content normal.     Exam unchanged     Fluids    Intake/Output Summary (Last 24 hours) at 8/24/2019 1553  Last data filed at 8/24/2019 1300  Gross per 24 hour   Intake 314 ml   Output 1100 ml   Net -786 ml       Laboratory  Recent Labs     08/22/19 0321 08/23/19  0453 08/24/19  0510   WBC 20.2* 21.6* 19.3*   RBC 2.24* 2.59* 2.64*   HEMOGLOBIN 6.4* 7.6* 7.5*   HEMATOCRIT 20.7* 23.5* 24.5*   MCV 92.4 90.7 92.8   MCH 28.6 29.3 28.4   MCHC 30.9* 32.3* 30.6*   RDW 53.2* 52.6* 53.4*   PLATELETCT 399 458* 502*   MPV 9.3 9.0 9.1     Recent Labs     08/22/19 0321 08/23/19  0453 08/24/19  0510   SODIUM 135 135 136   POTASSIUM 4.0 4.2 4.0   CHLORIDE 101 100 100   CO2 27 28 28   GLUCOSE 129* 105* 109*   BUN 17 15 12   CREATININE 1.02 0.93 0.94   CALCIUM 7.9* 8.3* 8.1*     Recent Labs     08/22/19  0321 08/23/19  0453   INR 4.46* 1.47*               Imaging  EC-ECHOCARDIOGRAM COMPLETE W/O CONT   Final Result      DX-PORTABLE FLUORO > 1 HOUR   Final Result      Digitized intraoperative radiograph is submitted for review.  This examination is not for diagnostic purpose but for guidance during a surgical procedure.         US-VEIN MAPPING LOWER EXTREMITY BILAT   Final Result      CT-CTA LOWER EXT WITH & W/O-POST PROCESS LEFT   Final Result         1. Completely occluded left femoral-popliteal bypass graft with gas within the lumen and fluid  surrounding the graft. The finding is concerning for infected occluded graft.      2. Partially visualized femoral-femoral bypass that appears patent. There is small amount of clot in the junction between the femorofemoral bypass graft and the common femoral artery.      3. Complete occlusion of the native superficial femoral artery from the mid to distal thigh at the area of prior stenting.      Complete occlusion of the left popliteal artery.      Partial flow reconstitution at the trifurcation but flow is very sluggish. No distal flow flow appreciated in the anterior tibial artery, posterior tibial artery and peroneal artery at 10 cm from the ankle.         CRITICAL RESULT READ BACK: Preliminary findings discussed with and critical read back performed by Dr. BERNA PARR in the Emergency Department via telephone on 8/18/2019 3:51 PM      US-EXTREMITY VENOUS LOWER UNILAT LEFT   Final Result      US-EXTREMITY ARTERY LOWER UNILAT LEFT   Final Result      US-EXTREMITY VENOUS LOWER BILAT    (Results Pending)        Assessment/Plan  * Sepsis due to methicillin susceptible Staphylococcus aureus (HCC)- (present on admission)  Assessment & Plan  This is sepsis (without associated acute organ dysfunction).   Source infected left groin vascular graft, bacteremia, left knee septic arthritis  Source control with I&D left groin, excision of infected graft, left knee washout  Antibiotic guidance per infectious disease team  Continue close clinical monitoring    MSSA bacteremia- (present on admission)  Assessment & Plan  Likely from underlying left groin graft infection  With seeding of left knee, septic arthritis    Status post excision of left groin graft, I&D and left knee washout    Echocardiogram is negative for any evidence of infective endocarditis, do not believe that patient would benefit from a CHAPIS given this would not alter the course of management as he would require prolonged IV antibiotic therapy, irrespectively  would defer to infectious disease team    Ongoing source control per surgical team  ID following, defer antibiotic needs to ID team  Continue close clinical monitoring    Staphylococcal arthritis of left knee (HCC)- (present on admission)  Assessment & Plan  Status post washout per orthopedics team  Continue antibiotics per ID team    Pain management with oral oxycodone, as needed IV morphine, monitor for adverse effects due to the use of morphine    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 Grande from vascular surgery this stay    Patient on IV antibiotic therapy  Vascular interventions, postoperative follow-up/care per vascular surgery    Antibiotics per ID team  Continue close clinical monitoring    PAD (peripheral artery disease) (HCC)- (present on admission)  Assessment & Plan  Currently with occluded grafts of the left lower extremity with poor circulation on presentation  Status post revascularization by vascular surgery  Vascular surgery following, defer anticoagulation needs to vascular surgery, medical management to vascular surgery    Hyponatremia- (present on admission)  Assessment & Plan  Resolved    Chronic anticoagulation- (present on admission)  Assessment & Plan  Patient was on anticoagulation for reported DVT at [place] in [place], per Dr. Grande  INR supratherapeutic on presentation, improved now  Pending DVT studies of the lower extremities  At this time anticoagulation is held    I have discussed with Dr. Grande from vascular surgery, aspirin and Plavix from his perspective, findings of DVT requiring anticoagulation than aspirin and anticoagulation should be continued while Plavix held.    Dyslipidemia- (present on admission)  Assessment & Plan  Continue high intensity statin therapy    Iron deficiency anemia- (present on admission)  Assessment & Plan  Avoid  IV iron given underlying infectious issues    In addition there is associated anemia of acute blood loss postoperative, operative losses    Monitor Hb / Restrictive transfusion strategy    Iron / MV supplementation ordered     Hyperglycemia- (present on admission)  Assessment & Plan  Previous hemoglobin 6.7 consistent with type 2 diabetes mellitus  Sliding scale insulin therapy #1    Chronic kidney disease (CKD), stage III (moderate) (HCC)- (present on admission)  Assessment & Plan  Monitor renal function / Avoid nephrotoxins and dose medications renally    Hypokalemia- (present on admission)  Assessment & Plan  Resolved       VTE prophylaxis: SC Lovenox

## 2019-08-24 NOTE — CARE PLAN
"  Problem: Bowel/Gastric:  Goal: Normal bowel function is maintained or improved  Outcome: PROGRESSING SLOWER THAN EXPECTED  Patient has not had bowel movement since 8/14. MD aware. Suppository to be administered.    Problem: Pain Management  Goal: Pain level will decrease to patient's comfort goal  Outcome: PROGRESSING SLOWER THAN EXPECTED  Patient has been crying intermittently regarding pain and stating it is \"terrible\". Patient on three pain medications and MD aware. MD states he will adjust pain medications if patient has a BM. Patient aware.   "

## 2019-08-24 NOTE — THERAPY
"Physical Therapy Evaluation completed.   Bed Mobility:  Supine to Sit: Stand by Assist(increased time, needing line management)  Transfers: Sit to Stand: Minimal Assist(with FWW x 3 reps )  Gait: Level Of Assist: Unable to Participate   Plan of Care: Will benefit from Physical Therapy 3 times per week  Discharge Recommendations: Equipment: Will Continue to Assess for Equipment Needs.     Pt presents with impaired activity tolerance, dynamic balance, strength and pain s/p removal infected fem-fem and left fem-pop bypass graft with left popliteal thrombectomy and redo fem-fem bypass/left fem-pop bypass and bilateral sartorius flap as well as arthrocentesis of left knee for bactermia. Pt is most limited by significant acute pain that is exacerbated by emotional stress from recent loss of significant other. From a PT perspective, pt is quite strong and moves fairly well considering entry sites and septic knee. Would recommend placement at this time, will follow.       See \"Rehab Therapy-Acute\" Patient Summary Report for complete documentation.     "

## 2019-08-24 NOTE — PROGRESS NOTES
"   Orthopaedic PA Progress Note    Interval changes:c/o burning pain LLE. Dressing change by WCT today    ROS - Patient denies any new issues. No chest pain, dyspnea, or fever.  Pain well controlled.    /86   Pulse 80   Temp 37.4 °C (99.3 °F) (Temporal)   Resp 16   Ht 1.702 m (5' 7\")   Wt 68.6 kg (151 lb 3.8 oz)   SpO2 100%     Patient seen and examined  No acute distress  Breathing non labored  RRR  Surgical dressing is clean, dry, and intact. Patient clearly fires tibialis anterior, EHL, and gastrocnemius/soleus. Sensation is intact to light touch throughout superficial peroneal, deep peroneal, tibial, saphenous, and sural nerve distributions. Strong and palpable 2+ dorsalis pedis and posterior tibial pulses with capillary refill less than 2 seconds. No lower leg tenderness or discomfort.    Recent Labs     08/22/19  0321 08/23/19  0453 08/24/19  0510   WBC 20.2* 21.6* 19.3*   RBC 2.24* 2.59* 2.64*   HEMOGLOBIN 6.4* 7.6* 7.5*   HEMATOCRIT 20.7* 23.5* 24.5*   MCV 92.4 90.7 92.8   MCH 28.6 29.3 28.4   MCHC 30.9* 32.3* 30.6*   RDW 53.2* 52.6* 53.4*   PLATELETCT 399 458* 502*   MPV 9.3 9.0 9.1       Active Hospital Problems    Diagnosis   • Sepsis due to methicillin susceptible Staphylococcus aureus ([facility]) [A41.01]     Priority: High   • Infected prosthetic vascular graft ([facility]) [T82.7XXA]     Priority: Medium   • Staphylococcal arthritis of left knee ([facility]) [M00.062]     Priority: Medium   • MSSA bacteremia [R78.81]     Priority: Medium   • PAD (peripheral artery disease) ([facility]) [I73.9]     Priority: Medium   • Iron deficiency anemia [D50.9]     Priority: Low   • Dyslipidemia [E78.5]     Priority: Low   • Chronic anticoagulation [Z79.01]     Priority: Low   • Hyponatremia [E87.1]     Priority: Low   • Chronic kidney disease (CKD), stage III (moderate) ([facility]) [N18.3]     Priority: Low   • Hyperglycemia [R73.9]     Priority: Low   • Hypokalemia [E87.6]     Priority: Low       Assessment/Plan:  POD#4 S/P L knee " arthrotomy in concert with vascular reconstruction of infected arterial grafts, Vsurg note read and appreaciated  Wt bearing status - as tolerated  PT/OT-initiated  Wound care:per WCT/Med  Drains - no  Diamond-no  Sutures/Staples out- 10-14 days post operatively  Antibiotics: per ID  DVT Prophylaxis- TEDS/SCDs/Foot pumps.   ASA/Plavix per vasc.  Future Procedures - not anticipated  Case Coordination for Discharge Planning - Disposition per Med  Pregabalin added for neuropathic pain

## 2019-08-24 NOTE — PROGRESS NOTES
Hospital Medicine Daily Progress Note    Date of Service  8/23/2019    Chief Complaint  74 y.o. male admitted 8/18/2019 with Groin pain    Hospital Course    Patient with underlying history of peripheral arterial disease, left lower extremity predominantly who recently underwent femoropopliteal bypass, underlying CKD stage III, diabetes mellitus type 2.  Previous surgery by Dr. Grande from vascular surgery.  He now presented with groin pain, discharge from the left groin postoperative site with findings of occlusion of the left femoropopliteal bypass graft, no fluid in the mid/distal SFA/distal popliteal artery consistent with thrombosis, negative DVT study, CT lower extremity with runoff revealing completely occluded left femoral-popliteal bypass graft with gas within the lumen and fluid surrounding the graft.  Concerning for infected occluded graft.  Partially visualized femoral femoral bypass that appears patent.  Small amount of clot in the femorofemoral bypass graft in the common femoral artery junction.  Complete occlusion of the native superficial femoral artery from mid to distal thigh, complete occlusion of the left popliteal artery.  Partial flow reconstitution at the trifurcation but fluid is very sluggish.  No distal flow appreciated in the anterior tibial artery, posterior tibial artery and peroneal artery at 10 cm from the ankle.  Vascular surgery/Dr. Grande consulted, patient initiated on intravenous heparin and admitted to the hospital, infectious disease team consultation obtained.  Patient with findings of bacteremia, appears to be MSSA with findings of left knee septic arthritis.  Orthopedics team consulted.      Interval Problem Update  Patient seen and evaluated on rounds  Discussed with nursing staff on rounds  Doing well stop PCA   Oral pain control   Orthopedics, vascular surgery, infectious disease following  Ph replaced  Dr Grande to provide recommendations regarding anticoagulation if any    Continue    Wound vac in place  Plan LTAC placement if okay from ID - ortho - VS perspective     Discussed with Dr. Grande over the telephone, no recommendation for anticoagulation from his perspective but he reports that he received a call from [facility] that patient had a DVT and hence he was on anticoagulation for that, I have requested a DVT duplex in line with recommendations from Dr. Grande, if evidence of DVT he recommends that anticoagulation be pursued, if no evidence of DVT then patient should be on aspirin and Plavix, to hold off on to initiation of Plavix therapy until clearance regarding findings of venous thrombosis are not per Dr. Grande.    Consultants/Specialty  Vascular surgery  Orthopedics  Infectious disease    Code Status  Full code    Disposition  LTAC Once okay from VS / ID / Ortho perspective - discussed with CM / SW     Review of Systems  Review of Systems   Constitutional: Positive for malaise/fatigue. Negative for chills, fever and weight loss.   HENT: Negative for hearing loss.    Eyes: Positive for discharge. Negative for blurred vision and double vision.   Respiratory: Negative for cough, hemoptysis and shortness of breath.    Cardiovascular: Positive for leg swelling. Negative for chest pain, palpitations and PND.   Gastrointestinal: Positive for nausea. Negative for abdominal pain, blood in stool, constipation, diarrhea, melena and vomiting.   Genitourinary: Negative for flank pain, frequency and hematuria.        Groin pain   Musculoskeletal: Positive for joint pain and myalgias. Negative for falls.   Skin: Negative for itching and rash.   Neurological: Negative for dizziness and headaches.   Psychiatric/Behavioral: Negative for depression, substance abuse and suicidal ideas.        Physical Exam  Temp:  [37 °C (98.6 °F)-37.4 °C (99.3 °F)] 37.3 °C (99.1 °F)  Pulse:  [] 102  Resp:  [15-18] 18  BP: (107-131)/(70-83) 131/83  SpO2:  [91 %-97 %] 97 %    Physical  Exam   Constitutional: He is oriented to person, place, and time. He appears well-developed and well-nourished. No distress.   Body mass index is 23.69 kg/m².   HENT:   Head: Normocephalic.   Mouth/Throat: Oropharynx is clear and moist. No oropharyngeal exudate.   Eyes: Pupils are equal, round, and reactive to light. Conjunctivae and EOM are normal. No scleral icterus.   Neck: Normal range of motion. No JVD present. No thyromegaly present.   Cardiovascular: Normal rate and regular rhythm.   Murmur heard.  Good right lower extremity pulse, poor left lower extremity pulses   Pulmonary/Chest: No stridor. No respiratory distress. He has wheezes.   Diminished in bases.  Minimal wheezing.   Abdominal: Soft. Bowel sounds are normal. He exhibits no distension. There is no tenderness. There is no rebound.   Musculoskeletal: He exhibits edema and tenderness.   Left groin wound VAC in place, surgical dressing to left knee, decreased range of motion of the left hip/knee.   Lymphadenopathy:     He has no cervical adenopathy.   Neurological: He is alert and oriented to person, place, and time. He has normal reflexes. No cranial nerve deficit.   Skin: He is not diaphoretic.   Psychiatric: He has a normal mood and affect. His behavior is normal. Judgment and thought content normal.     Exam unchanged     Fluids    Intake/Output Summary (Last 24 hours) at 8/23/2019 1800  Last data filed at 8/23/2019 0352  Gross per 24 hour   Intake 194.5 ml   Output 705 ml   Net -510.5 ml       Laboratory  Recent Labs     08/21/19 2036 08/22/19 0321 08/23/19  0453   WBC 27.1* 20.2* 21.6*   RBC 2.32* 2.24* 2.59*   HEMOGLOBIN 6.7* 6.4* 7.6*   HEMATOCRIT 21.3* 20.7* 23.5*   MCV 91.8 92.4 90.7   MCH 28.9 28.6 29.3   MCHC 31.5* 30.9* 32.3*   RDW 51.7* 53.2* 52.6*   PLATELETCT 453* 399 458*   MPV 9.3 9.3 9.0     Recent Labs     08/21/19 0444 08/22/19  0321 08/23/19  0453   SODIUM 133* 135 135   POTASSIUM 3.1* 4.0 4.2   CHLORIDE 98 101 100   CO2 29 27  28   GLUCOSE 114* 129* 105*   BUN 19 17 15   CREATININE 1.17 1.02 0.93   CALCIUM 8.3* 7.9* 8.3*     Recent Labs     08/21/19  0444 08/22/19  0321 08/23/19  0453   APTT 54.2*  --   --    INR 2.89* 4.46* 1.47*               Imaging  EC-ECHOCARDIOGRAM COMPLETE W/O CONT   Final Result      DX-PORTABLE FLUORO > 1 HOUR   Final Result      Digitized intraoperative radiograph is submitted for review.  This examination is not for diagnostic purpose but for guidance during a surgical procedure.         US-VEIN MAPPING LOWER EXTREMITY BILAT   Final Result      CT-CTA LOWER EXT WITH & W/O-POST PROCESS LEFT   Final Result         1. Completely occluded left femoral-popliteal bypass graft with gas within the lumen and fluid surrounding the graft. The finding is concerning for infected occluded graft.      2. Partially visualized femoral-femoral bypass that appears patent. There is small amount of clot in the junction between the femorofemoral bypass graft and the common femoral artery.      3. Complete occlusion of the native superficial femoral artery from the mid to distal thigh at the area of prior stenting.      Complete occlusion of the left popliteal artery.      Partial flow reconstitution at the trifurcation but flow is very sluggish. No distal flow flow appreciated in the anterior tibial artery, posterior tibial artery and peroneal artery at 10 cm from the ankle.         CRITICAL RESULT READ BACK: Preliminary findings discussed with and critical read back performed by Dr. BERNA PARR in the Emergency Department via telephone on 8/18/2019 3:51 PM      US-EXTREMITY VENOUS LOWER UNILAT LEFT   Final Result      US-EXTREMITY ARTERY LOWER UNILAT LEFT   Final Result           Assessment/Plan  * Sepsis due to methicillin susceptible Staphylococcus aureus (HCC)- (present on admission)  Assessment & Plan  This is sepsis (without associated acute organ dysfunction).   Source infected left groin vascular graft, bacteremia, left  knee septic arthritis  Source control with I&D left groin, excision of infected graft, left knee washout  Antibiotic guidance per infectious disease team  Continue close clinical monitoring    MSSA bacteremia- (present on admission)  Assessment & Plan  Likely from underlying left groin graft infection  With seeding of left knee, septic arthritis    Status post excision of left groin graft, I&D and left knee washout    Echocardiogram is negative for any evidence of infective endocarditis, do not believe that patient would benefit from a CHAPIS given this would not alter the course of management as he would require prolonged IV antibiotic therapy, irrespectively would defer to infectious disease team    Ongoing source control per surgical team  ID following, defer antibiotic needs to ID team  Continue close clinical monitoring    Staphylococcal arthritis of left knee (HCC)- (present on admission)  Assessment & Plan  Status post washout per orthopedics team  Continue antibiotics per ID team    Wean off PCA - Oral regimen ordered     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 Grande from vascular surgery this stay    Patient on IV antibiotic therapy  Vascular interventions, postoperative follow-up/care per vascular surgery    Antibiotics per ID team  Continue close clinical monitoring    PAD (peripheral artery disease) (HCC)- (present on admission)  Assessment & Plan  Currently with occluded grafts of the left lower extremity with poor circulation on presentation  Status post revascularization by vascular surgery  Vascular surgery following, defer anticoagulation needs to vascular surgery, medical management to vascular surgery    Hyponatremia- (present on admission)  Assessment & Plan  Resolved    Chronic anticoagulation- (present on admission)  Assessment & Plan  Likely for underlying  peripheral arterial disease  INR supratherapeutic on presentation, remains persistent  At this time anticoagulation is held  Vascular surgery following, will defer need for anticoagulation to vascular surgery    Dyslipidemia- (present on admission)  Assessment & Plan  Continue high intensity statin therapy    Iron deficiency anemia- (present on admission)  Assessment & Plan  Avoid IV iron given underlying infectious issues    In addition there is associated anemia of acute blood loss postoperative, operative losses    Monitor Hb / Restrictive transfusion strategy    Iron / MV supplementation ordered     Hyperglycemia- (present on admission)  Assessment & Plan  Previous hemoglobin 6.7 consistent with type 2 diabetes mellitus  Sliding scale insulin therapy #1    Chronic kidney disease (CKD), stage III (moderate) (HCC)- (present on admission)  Assessment & Plan  Monitor renal function / Avoid nephrotoxins and dose medications renally    Hypokalemia- (present on admission)  Assessment & Plan  Resolved       VTE prophylaxis: SC Lovenox

## 2019-08-24 NOTE — WOUND TEAM
[facility]  Inpatient Services  Wound and Skin Care Progress Note    Admission Date:  8/18/2019   HPI, PMH, SH: Reviewed  Unit where seen by Wound Team: [id number]/[id number]    WOUND TEAM FOLLOW UP:  VAC change     SUBJECTIVE:  Reports he is hoping to have a nap      Self Report / Pain Level:  Premedicated earlier by RN. Painful to remove dressing       OBJECTIVE:      WOUND TYPE, LOCATION, CHARACTERISTICS (Pressure ulcers: location, stage, POA or date identified)     Negative Pressure Wound Therapy Groin Right;Left (Active)   NPWT Pump Mode / Pressure Setting 125 mmHg;Continuous    Dressing Type Medium;Black foam    Number of Foam Pieces Used 5    Negative Pressure Wound Therapy Knee Inner;Left (Active)   NPWT Pump Mode / Pressure Setting Continuous;125 mmHg    Dressing Type Black foam      Wound 08/22/19 Full Thickness Wound Knee open surgical wound medial left knee (Active)   Wound Image   8/22/2019   Site Assessment Red    Yadi-wound Assessment Intact    Margins Unattached edges    Wound Length (cm) 10.5 cm Measured 8/22/2019   Wound Width (cm) 3.8 cm    Wound Depth (cm) 3.5 cm    Wound Surface Area (cm^2) 39.9 cm^2    Undermining 3.5  cm    Drainage Amount Moderate    Drainage Description Sanguineous    Non-staged Wound Description Full thickness    Treatments Cleansed;Site care    Cleansing Approved Wound Cleanser    Periwound Protectant Benzoin;Drape    Dressing Options Wound Vac    Dressing Changed changed    Dressing Change Frequency Tuesday, Thursday, Saturday    NEXT Dressing Change  08/27/19    NEXT Weekly Photo (Inpatient Only) 08/27/19    WOUND NURSE ONLY - Odor None    WOUND NURSE ONLY - Exposed Structures Muscle    WOUND NURSE ONLY - Tissue Type and Percentage red 100        Wound 08/22/19 Full Thickness Wound Groin open surgical wounds bilateral groin (Active)   Wound Image    8/22/2019   Site Assessment Red    Yadi-wound Assessment Intact    Margins Unattached edges    Undermining 2.5 cm     Drainage Amount Moderate    Drainage Description Serosanguineous    Non-staged Wound Description Full thickness    Treatments Cleansed    Cleansing Approved Wound Cleanser    Periwound Protectant Drape;Skin Protectant wipes to Periwound    Dressing Options Wound Vac    Dressing Changed changed    Dressing Change Frequency Tuesday, Thursday, Saturday    NEXT Dressing Change  08/27/19    NEXT Weekly Photo (Inpatient Only) 08/27/19    WOUND NURSE ONLY - Odor None 8/24/2019  1:00 PM   WOUND NURSE ONLY - Exposed Structures Muscle 8/24/2019  1:00 PM   WOUND NURSE ONLY - Tissue Type and Percentage red 100 8/24/2019  1:00 PM     Lab Values:    WBC:     @LABLAST(WBC)@  AIC:      Lab Results   Component Value Date/Time    HBA1C 6.7 (H) 07/04/2019 02:27 AM         Culture:   Completed 8/20    INTERVENTIONS BY WOUND TEAM:  Removed previous dressing. Started with knee wound. Cleaned with wound cleanser. Benzoin and drape to periwound skin. One black foam into wound bed, VAC resumed at 125 mmHg continuous on separate VAC machine. Next R groin, same cleaning, the filled wound bed with one black foam, and second for TRAC pad, started VAC at 125 mmHg continuous. Lastly, L groin wound, same cleaning and drape, one black foam into wound bed, 2nd and third piece for TRAC pad. Y connected with R groin tubing and VAC resumed at 125 mmHg continuous.         Interdisciplinary consultation:  RN, patient     EVALUATION:  Red wound beds, no granular tissue as yet. Knee wound has bright red blood drawn into VAC tubing. Nursing to monitor output.      Factors affecting wound healing:  PAD, iron deficiency, infected graft  Goals:  Steady decrease in wound area and depth weekly     NURSING PLAN OF CARE ORDERS (X):    Dressing changes: See Dressing Care orders:   X  Skin care: See Skin Care orders:   Rectal tube care: See Rectal Tube Care orders:   Other orders:      WOUND TEAM PLAN OF CARE (X):   NPWT change 3 x week:  X      Dressing changes  by wound team:       Follow up as needed:       Other (explain):    Anticipated discharge plans (X):  SNF:   X        Home Care:           Outpatient Wound Center:            Self Care:            Other:

## 2019-08-24 NOTE — DISCHARGE PLANNING
Agency/Facility Name: [facility]  Spoke To: Zoë  Outcome: Bed available for patient tomorrow. Requesting a 12n transport.     JULIO Mendiola notified.

## 2019-08-24 NOTE — PROGRESS NOTES
Assumed care of patient. Patient in tears regarding breakfast, pain and social situation. Patient hard to calm down and needs reorienting regarding times for pain medications and what the plan of care is. This RN wrote times for PRN narcotics on the board for patient. Wound vac to right groin is slightly leaky. Vac to be changed today. Left groin incision with intact wound vac. Left knee s/p hemovac. Dressing is clean, dry and intact. No other needs at this time. Call light within reach.

## 2019-08-25 ENCOUNTER — APPOINTMENT (OUTPATIENT)
Dept: RADIOLOGY | Facility: MEDICAL CENTER | Age: 74
DRG: 252 | End: 2019-08-25
Attending: INTERNAL MEDICINE
Payer: MEDICARE

## 2019-08-25 LAB
ABO GROUP BLD: NORMAL
ALBUMIN SERPL BCP-MCNC: 2.2 G/DL (ref 3.2–4.9)
ALBUMIN/GLOB SERPL: 0.7 G/DL
ALP SERPL-CCNC: 137 U/L (ref 30–99)
ALT SERPL-CCNC: 5 U/L (ref 2–50)
ANION GAP SERPL CALC-SCNC: 4 MMOL/L (ref 0–11.9)
ANISOCYTOSIS BLD QL SMEAR: ABNORMAL
AST SERPL-CCNC: 27 U/L (ref 12–45)
BARCODED ABORH UBTYP: 5100
BARCODED PRD CODE UBPRD: NORMAL
BARCODED UNIT NUM UBUNT: NORMAL
BASOPHILS # BLD AUTO: 0 % (ref 0–1.8)
BASOPHILS # BLD: 0 K/UL (ref 0–0.12)
BILIRUB SERPL-MCNC: 0.4 MG/DL (ref 0.1–1.5)
BLD GP AB SCN SERPL QL: NORMAL
BUN SERPL-MCNC: 12 MG/DL (ref 8–22)
CALCIUM SERPL-MCNC: 7.9 MG/DL (ref 8.5–10.5)
CHLORIDE SERPL-SCNC: 100 MMOL/L (ref 96–112)
CO2 SERPL-SCNC: 34 MMOL/L (ref 20–33)
COMPONENT R 8504R: NORMAL
CREAT SERPL-MCNC: 1.04 MG/DL (ref 0.5–1.4)
EOSINOPHIL # BLD AUTO: 0 K/UL (ref 0–0.51)
EOSINOPHIL NFR BLD: 0 % (ref 0–6.9)
ERYTHROCYTE [DISTWIDTH] IN BLOOD BY AUTOMATED COUNT: 54.4 FL (ref 35.9–50)
GLOBULIN SER CALC-MCNC: 3 G/DL (ref 1.9–3.5)
GLUCOSE BLD-MCNC: 143 MG/DL (ref 65–99)
GLUCOSE BLD-MCNC: 143 MG/DL (ref 65–99)
GLUCOSE BLD-MCNC: 148 MG/DL (ref 65–99)
GLUCOSE BLD-MCNC: 153 MG/DL (ref 65–99)
GLUCOSE SERPL-MCNC: 159 MG/DL (ref 65–99)
HCT VFR BLD AUTO: 21.6 % (ref 42–52)
HGB BLD-MCNC: 6.8 G/DL (ref 14–18)
HYPOCHROMIA BLD QL SMEAR: ABNORMAL
LYMPHOCYTES # BLD AUTO: 1.11 K/UL (ref 1–4.8)
LYMPHOCYTES NFR BLD: 7 % (ref 22–41)
MACROCYTES BLD QL SMEAR: ABNORMAL
MAGNESIUM SERPL-MCNC: 1.8 MG/DL (ref 1.5–2.5)
MANUAL DIFF BLD: NORMAL
MCH RBC QN AUTO: 28.9 PG (ref 27–33)
MCHC RBC AUTO-ENTMCNC: 31.5 G/DL (ref 33.7–35.3)
MCV RBC AUTO: 91.9 FL (ref 81.4–97.8)
MONOCYTES # BLD AUTO: 0.41 K/UL (ref 0–0.85)
MONOCYTES NFR BLD AUTO: 2.6 % (ref 0–13.4)
MORPHOLOGY BLD-IMP: NORMAL
NEUTROPHILS # BLD AUTO: 14.37 K/UL (ref 1.82–7.42)
NEUTROPHILS NFR BLD: 90.4 % (ref 44–72)
NRBC # BLD AUTO: 0.02 K/UL
NRBC BLD-RTO: 0.1 /100 WBC
PHOSPHATE SERPL-MCNC: 2.8 MG/DL (ref 2.5–4.5)
PLATELET # BLD AUTO: 485 K/UL (ref 164–446)
PLATELET BLD QL SMEAR: NORMAL
PMV BLD AUTO: 9.2 FL (ref 9–12.9)
POTASSIUM SERPL-SCNC: 3.5 MMOL/L (ref 3.6–5.5)
PRODUCT TYPE UPROD: NORMAL
PROT SERPL-MCNC: 5.2 G/DL (ref 6–8.2)
RBC # BLD AUTO: 2.35 M/UL (ref 4.7–6.1)
RBC BLD AUTO: PRESENT
RH BLD: NORMAL
SODIUM SERPL-SCNC: 138 MMOL/L (ref 135–145)
UNIT STATUS USTAT: NORMAL
WBC # BLD AUTO: 15.9 K/UL (ref 4.8–10.8)

## 2019-08-25 PROCEDURE — A9270 NON-COVERED ITEM OR SERVICE: HCPCS | Performed by: INTERNAL MEDICINE

## 2019-08-25 PROCEDURE — 84100 ASSAY OF PHOSPHORUS: CPT

## 2019-08-25 PROCEDURE — A9270 NON-COVERED ITEM OR SERVICE: HCPCS | Performed by: PHYSICIAN ASSISTANT

## 2019-08-25 PROCEDURE — 770006 HCHG ROOM/CARE - MED/SURG/GYN SEMI*

## 2019-08-25 PROCEDURE — 30233N1 TRANSFUSION OF NONAUTOLOGOUS RED BLOOD CELLS INTO PERIPHERAL VEIN, PERCUTANEOUS APPROACH: ICD-10-PCS | Performed by: INTERNAL MEDICINE

## 2019-08-25 PROCEDURE — 86900 BLOOD TYPING SEROLOGIC ABO: CPT

## 2019-08-25 PROCEDURE — 36415 COLL VENOUS BLD VENIPUNCTURE: CPT

## 2019-08-25 PROCEDURE — 700111 HCHG RX REV CODE 636 W/ 250 OVERRIDE (IP): Performed by: INTERNAL MEDICINE

## 2019-08-25 PROCEDURE — 306372 DRESSING,VAC SIMPLACE MED: Performed by: INTERNAL MEDICINE

## 2019-08-25 PROCEDURE — A9270 NON-COVERED ITEM OR SERVICE: HCPCS | Performed by: SURGERY

## 2019-08-25 PROCEDURE — 97606 NEG PRS WND THER DME>50 SQCM: CPT

## 2019-08-25 PROCEDURE — A9270 NON-COVERED ITEM OR SERVICE: HCPCS | Performed by: HOSPITALIST

## 2019-08-25 PROCEDURE — 82962 GLUCOSE BLOOD TEST: CPT

## 2019-08-25 PROCEDURE — 700102 HCHG RX REV CODE 250 W/ 637 OVERRIDE(OP): Performed by: PHYSICIAN ASSISTANT

## 2019-08-25 PROCEDURE — 700102 HCHG RX REV CODE 250 W/ 637 OVERRIDE(OP): Performed by: INTERNAL MEDICINE

## 2019-08-25 PROCEDURE — 86850 RBC ANTIBODY SCREEN: CPT

## 2019-08-25 PROCEDURE — P9016 RBC LEUKOCYTES REDUCED: HCPCS

## 2019-08-25 PROCEDURE — 85027 COMPLETE CBC AUTOMATED: CPT

## 2019-08-25 PROCEDURE — 85007 BL SMEAR W/DIFF WBC COUNT: CPT

## 2019-08-25 PROCEDURE — 700102 HCHG RX REV CODE 250 W/ 637 OVERRIDE(OP): Performed by: HOSPITALIST

## 2019-08-25 PROCEDURE — 36430 TRANSFUSION BLD/BLD COMPNT: CPT

## 2019-08-25 PROCEDURE — 05HY33Z INSERTION OF INFUSION DEVICE INTO UPPER VEIN, PERCUTANEOUS APPROACH: ICD-10-PCS | Performed by: INTERNAL MEDICINE

## 2019-08-25 PROCEDURE — 93970 EXTREMITY STUDY: CPT

## 2019-08-25 PROCEDURE — 86923 COMPATIBILITY TEST ELECTRIC: CPT

## 2019-08-25 PROCEDURE — 99232 SBSQ HOSP IP/OBS MODERATE 35: CPT | Performed by: INTERNAL MEDICINE

## 2019-08-25 PROCEDURE — 86901 BLOOD TYPING SEROLOGIC RH(D): CPT

## 2019-08-25 PROCEDURE — 700102 HCHG RX REV CODE 250 W/ 637 OVERRIDE(OP): Performed by: SURGERY

## 2019-08-25 PROCEDURE — 700105 HCHG RX REV CODE 258

## 2019-08-25 PROCEDURE — 83735 ASSAY OF MAGNESIUM: CPT

## 2019-08-25 PROCEDURE — 80053 COMPREHEN METABOLIC PANEL: CPT

## 2019-08-25 RX ORDER — POTASSIUM CHLORIDE 20 MEQ/1
40 TABLET, EXTENDED RELEASE ORAL ONCE
Status: COMPLETED | OUTPATIENT
Start: 2019-08-25 | End: 2019-08-25

## 2019-08-25 RX ORDER — MAGNESIUM SULFATE HEPTAHYDRATE 40 MG/ML
2 INJECTION, SOLUTION INTRAVENOUS ONCE
Status: COMPLETED | OUTPATIENT
Start: 2019-08-25 | End: 2019-08-25

## 2019-08-25 RX ORDER — SODIUM CHLORIDE 9 MG/ML
INJECTION, SOLUTION INTRAVENOUS
Status: COMPLETED
Start: 2019-08-25 | End: 2019-08-25

## 2019-08-25 RX ADMIN — OXYCODONE HYDROCHLORIDE AND ACETAMINOPHEN 500 MG: 500 TABLET ORAL at 07:23

## 2019-08-25 RX ADMIN — ACETAMINOPHEN 650 MG: 325 TABLET, FILM COATED ORAL at 00:00

## 2019-08-25 RX ADMIN — POLYETHYLENE GLYCOL 3350 1 PACKET: 17 POWDER, FOR SOLUTION ORAL at 16:40

## 2019-08-25 RX ADMIN — ACETAMINOPHEN 650 MG: 325 TABLET, FILM COATED ORAL at 11:36

## 2019-08-25 RX ADMIN — RIFAMPIN 300 MG: 300 CAPSULE ORAL at 05:34

## 2019-08-25 RX ADMIN — ACETAMINOPHEN 650 MG: 325 TABLET, FILM COATED ORAL at 16:40

## 2019-08-25 RX ADMIN — FERROUS GLUCONATE 324 MG: 324 TABLET ORAL at 11:36

## 2019-08-25 RX ADMIN — OXYCODONE HYDROCHLORIDE 10 MG: 10 TABLET ORAL at 10:34

## 2019-08-25 RX ADMIN — SODIUM CHLORIDE 500 ML: 9 INJECTION, SOLUTION INTRAVENOUS at 05:20

## 2019-08-25 RX ADMIN — PREGABALIN 150 MG: 150 CAPSULE ORAL at 16:54

## 2019-08-25 RX ADMIN — FERROUS GLUCONATE 324 MG: 324 TABLET ORAL at 07:23

## 2019-08-25 RX ADMIN — THERA TABS 1 TABLET: TAB at 05:35

## 2019-08-25 RX ADMIN — ACETAMINOPHEN 650 MG: 325 TABLET, FILM COATED ORAL at 05:34

## 2019-08-25 RX ADMIN — MORPHINE SULFATE 4 MG: 4 INJECTION INTRAVENOUS at 15:29

## 2019-08-25 RX ADMIN — SENNOSIDES, DOCUSATE SODIUM 2 TABLET: 50; 8.6 TABLET, FILM COATED ORAL at 05:35

## 2019-08-25 RX ADMIN — OXYCODONE HYDROCHLORIDE AND ACETAMINOPHEN 500 MG: 500 TABLET ORAL at 16:40

## 2019-08-25 RX ADMIN — RIFAMPIN 300 MG: 300 CAPSULE ORAL at 11:36

## 2019-08-25 RX ADMIN — DIBASIC SODIUM PHOSPHATE, MONOBASIC POTASSIUM PHOSPHATE AND MONOBASIC SODIUM PHOSPHATE 2 TABLET: 852; 155; 130 TABLET ORAL at 16:39

## 2019-08-25 RX ADMIN — OXYCODONE HYDROCHLORIDE 10 MG: 10 TABLET ORAL at 21:38

## 2019-08-25 RX ADMIN — INSULIN HUMAN 1 UNITS: 100 INJECTION, SOLUTION PARENTERAL at 16:48

## 2019-08-25 RX ADMIN — CEFAZOLIN SODIUM 2 G: 2 INJECTION, SOLUTION INTRAVENOUS at 13:53

## 2019-08-25 RX ADMIN — FOLIC ACID 1 MG: 1 TABLET ORAL at 05:35

## 2019-08-25 RX ADMIN — POTASSIUM CHLORIDE 40 MEQ: 1500 TABLET, EXTENDED RELEASE ORAL at 16:39

## 2019-08-25 RX ADMIN — CEFAZOLIN SODIUM 2 G: 2 INJECTION, SOLUTION INTRAVENOUS at 21:37

## 2019-08-25 RX ADMIN — ASPIRIN 81 MG: 81 TABLET, COATED ORAL at 05:33

## 2019-08-25 RX ADMIN — SENNOSIDES, DOCUSATE SODIUM 2 TABLET: 50; 8.6 TABLET, FILM COATED ORAL at 16:39

## 2019-08-25 RX ADMIN — CEFAZOLIN SODIUM 2 G: 2 INJECTION, SOLUTION INTRAVENOUS at 05:22

## 2019-08-25 RX ADMIN — RIFAMPIN 300 MG: 300 CAPSULE ORAL at 16:39

## 2019-08-25 RX ADMIN — CLOPIDOGREL BISULFATE 75 MG: 75 TABLET ORAL at 05:36

## 2019-08-25 RX ADMIN — PREGABALIN 150 MG: 150 CAPSULE ORAL at 05:35

## 2019-08-25 RX ADMIN — ATORVASTATIN CALCIUM 40 MG: 40 TABLET, FILM COATED ORAL at 16:40

## 2019-08-25 RX ADMIN — MAGNESIUM SULFATE 2 G: 2 INJECTION INTRAVENOUS at 16:40

## 2019-08-25 RX ADMIN — OXYCODONE HYDROCHLORIDE 10 MG: 10 TABLET ORAL at 00:02

## 2019-08-25 RX ADMIN — MORPHINE SULFATE 4 MG: 4 INJECTION INTRAVENOUS at 19:45

## 2019-08-25 RX ADMIN — OXYCODONE HYDROCHLORIDE 10 MG: 10 TABLET ORAL at 17:40

## 2019-08-25 RX ADMIN — OXYCODONE HYDROCHLORIDE 10 MG: 10 TABLET ORAL at 04:29

## 2019-08-25 RX ADMIN — OXYCODONE HYDROCHLORIDE AND ACETAMINOPHEN 500 MG: 500 TABLET ORAL at 11:36

## 2019-08-25 RX ADMIN — FERROUS GLUCONATE 324 MG: 324 TABLET ORAL at 16:39

## 2019-08-25 ASSESSMENT — ENCOUNTER SYMPTOMS
DOUBLE VISION: 0
SHORTNESS OF BREATH: 0
PND: 0
DEPRESSION: 0
PALPITATIONS: 0
FEVER: 0
FALLS: 0
CONSTIPATION: 0
DIARRHEA: 0
VOMITING: 0
BLURRED VISION: 0
CHILLS: 0
DIZZINESS: 0
HEADACHES: 0
EYE DISCHARGE: 1
WEIGHT LOSS: 0
HEMOPTYSIS: 0
BLOOD IN STOOL: 0
NAUSEA: 1
FLANK PAIN: 0
ABDOMINAL PAIN: 0
MYALGIAS: 1
COUGH: 0

## 2019-08-25 ASSESSMENT — LIFESTYLE VARIABLES: SUBSTANCE_ABUSE: 0

## 2019-08-25 NOTE — PROGRESS NOTES
Assumed care of patient. Patient awake and pleasant. 1 unit of PRBC's running without difficulty.  Wound vacs to bilateral groin incisions are intact. Left medial calf with wet to dry dressing in place due to wound vac dying last night. Wound care to be called to replace today. Dressing to anterior knee s/p hemovac is cdi. Patient stating pain is tolerable at this time. No other needs. Call light within reach.

## 2019-08-25 NOTE — PROGRESS NOTES
DATE OF SERVICE:    TIME:  Approximately 12:30 p.m.    SUBJECTIVE:  The patient is currently sleeping.  I did not wake him up.  His   left knee dressing is dry.  His drain has been removed.    OBJECTIVE:  VITAL SIGNS:  Most recent vital signs include blood pressure 135/85, heart   rate 82, respirations 16, temperature 98.6.    LABORATORY DATA:  White blood cell count today is 15,900, which continues to   decline.    ASSESSMENT:  Septic arthritis, left knee.    PLAN:  Observation of the left knee.  Continue with antibiotics.  Primary   management per hospitalist and vascular surgery.  He can be weightbearing as   tolerated from my standpoint and be on a discharge planning regimen with   discharge whenever cleared by other services.       ____________________________________     MD FRANCISCO CHARLES / RICHA    DD:  08/25/2019 14:01:09  DT:  08/25/2019 14:07:51    D#:  [id number]  Job#:  [id number]

## 2019-08-25 NOTE — DISCHARGE PLANNING
Medical Social Work  Tiger Text to Dr. Smallwood to see if patient is m/c to discharge today.  YURI informed no, but can d/c tomorrow.  YURI faxed transport communication form/[facility] to [facility] for a tentative transport time of 12:00 on 8/26/19

## 2019-08-25 NOTE — DISCHARGE PLANNING
Received Transport Form at 1030  Spoke to [facility]    Transport is scheduled for 8/26 at 1200 going to [facility].    JULIO Prescott notified.   Confirmed with Zoë at [facility].

## 2019-08-25 NOTE — DISCHARGE PLANNING
Agency/Facility Name: [facility]  Spoke To: [place]  Outcome: Patient not medically cleared per LSW [place] on 8/25. Per LSW patient able to transport 8/26, facility requesting a 1200 transport time.

## 2019-08-25 NOTE — PROGRESS NOTES
Hospital Medicine Daily Progress Note    Date of Service  8/25/2019    Chief Complaint  74 y.o. male admitted 8/18/2019 with Groin pain    Hospital Course    Patient with underlying history of peripheral arterial disease, left lower extremity predominantly who recently underwent femoropopliteal bypass, underlying CKD stage III, diabetes mellitus type 2.  Previous surgery by Dr. Grande from vascular surgery.  He now presented with groin pain, discharge from the left groin postoperative site with findings of occlusion of the left femoropopliteal bypass graft, no fluid in the mid/distal SFA/distal popliteal artery consistent with thrombosis, negative DVT study, CT lower extremity with runoff revealing completely occluded left femoral-popliteal bypass graft with gas within the lumen and fluid surrounding the graft.  Concerning for infected occluded graft.  Partially visualized femoral femoral bypass that appears patent.  Small amount of clot in the femorofemoral bypass graft in the common femoral artery junction.  Complete occlusion of the native superficial femoral artery from mid to distal thigh, complete occlusion of the left popliteal artery.  Partial flow reconstitution at the trifurcation but fluid is very sluggish.  No distal flow appreciated in the anterior tibial artery, posterior tibial artery and peroneal artery at 10 cm from the ankle.  Vascular surgery/Dr. Grande consulted, patient initiated on intravenous heparin and admitted to the hospital, infectious disease team consultation obtained.  Patient with findings of bacteremia, appears to be MSSA with findings of left knee septic arthritis.  Orthopedics team consulted.      Interval Problem Update  Patient seen and evaluated on rounds  Discussed with nursing staff on rounds  Pain remains improved  Potassium, phosphorus, magnesium replaced  Cleared from orthopedics perspective for discharge  Status post 1 unit of PRBC today  PICC line ordered  DVT duplex  negative  Anticipate LTAC tomorrow    Consultants/Specialty  Vascular surgery  Orthopedics  Infectious disease    Code Status  Full code    Disposition  LTAC anticipated tomorrow    Review of Systems  Review of Systems   Constitutional: Positive for malaise/fatigue. Negative for chills, fever and weight loss.   HENT: Negative for hearing loss.    Eyes: Positive for discharge. Negative for blurred vision and double vision.   Respiratory: Negative for cough, hemoptysis and shortness of breath.    Cardiovascular: Positive for leg swelling. Negative for chest pain, palpitations and PND.   Gastrointestinal: Positive for nausea. Negative for abdominal pain, blood in stool, constipation, diarrhea, melena and vomiting.   Genitourinary: Negative for flank pain, frequency and hematuria.        Groin pain   Musculoskeletal: Positive for joint pain and myalgias. Negative for falls.   Skin: Negative for itching and rash.   Neurological: Negative for dizziness and headaches.   Psychiatric/Behavioral: Negative for depression, substance abuse and suicidal ideas.        Physical Exam  Temp:  [36.8 °C (98.3 °F)-37.4 °C (99.3 °F)] 37 °C (98.6 °F)  Pulse:  [] 82  Resp:  [16-20] 16  BP: ()/(63-86) 135/85  SpO2:  [93 %-99 %] 98 %    Physical Exam   Constitutional: He is oriented to person, place, and time. He appears well-developed and well-nourished. No distress.   Body mass index is 23.69 kg/m².   HENT:   Head: Normocephalic.   Mouth/Throat: Oropharynx is clear and moist. No oropharyngeal exudate.   Eyes: Pupils are equal, round, and reactive to light. Conjunctivae and EOM are normal. No scleral icterus.   Neck: Normal range of motion. No JVD present. No thyromegaly present.   Cardiovascular: Normal rate and regular rhythm.   Murmur heard.  Good right lower extremity pulse, poor left lower extremity pulses   Pulmonary/Chest: No stridor. No respiratory distress. He has wheezes.   Diminished in bases.  Minimal wheezing.    Abdominal: Soft. Bowel sounds are normal. He exhibits no distension. There is no tenderness. There is no rebound.   Musculoskeletal: He exhibits edema and tenderness.   Left groin wound VAC in place, surgical dressing to left knee, decreased range of motion of the left hip/knee.   Lymphadenopathy:     He has no cervical adenopathy.   Neurological: He is alert and oriented to person, place, and time. He has normal reflexes. No cranial nerve deficit.   Skin: He is not diaphoretic.   Psychiatric: He has a normal mood and affect. His behavior is normal. Judgment and thought content normal.     Exam unchanged     Fluids    Intake/Output Summary (Last 24 hours) at 8/25/2019 1607  Last data filed at 8/25/2019 1022  Gross per 24 hour   Intake 450 ml   Output 1220 ml   Net -770 ml       Laboratory  Recent Labs     08/23/19 0453 08/24/19  0510 08/25/19  0337   WBC 21.6* 19.3* 15.9*   RBC 2.59* 2.64* 2.35*   HEMOGLOBIN 7.6* 7.5* 6.8*   HEMATOCRIT 23.5* 24.5* 21.6*   MCV 90.7 92.8 91.9   MCH 29.3 28.4 28.9   MCHC 32.3* 30.6* 31.5*   RDW 52.6* 53.4* 54.4*   PLATELETCT 458* 502* 485*   MPV 9.0 9.1 9.2     Recent Labs     08/23/19 0453 08/24/19  0510 08/25/19  0337   SODIUM 135 136 138   POTASSIUM 4.2 4.0 3.5*   CHLORIDE 100 100 100   CO2 28 28 34*   GLUCOSE 105* 109* 159*   BUN 15 12 12   CREATININE 0.93 0.94 1.04   CALCIUM 8.3* 8.1* 7.9*     Recent Labs     08/23/19 0453   INR 1.47*               Imaging  US-EXTREMITY VENOUS LOWER BILAT   Final Result      EC-ECHOCARDIOGRAM COMPLETE W/O CONT   Final Result      DX-PORTABLE FLUORO > 1 HOUR   Final Result      Digitized intraoperative radiograph is submitted for review.  This examination is not for diagnostic purpose but for guidance during a surgical procedure.         US-VEIN MAPPING LOWER EXTREMITY BILAT   Final Result      CT-CTA LOWER EXT WITH & W/O-POST PROCESS LEFT   Final Result         1. Completely occluded left femoral-popliteal bypass graft with gas within the  lumen and fluid surrounding the graft. The finding is concerning for infected occluded graft.      2. Partially visualized femoral-femoral bypass that appears patent. There is small amount of clot in the junction between the femorofemoral bypass graft and the common femoral artery.      3. Complete occlusion of the native superficial femoral artery from the mid to distal thigh at the area of prior stenting.      Complete occlusion of the left popliteal artery.      Partial flow reconstitution at the trifurcation but flow is very sluggish. No distal flow flow appreciated in the anterior tibial artery, posterior tibial artery and peroneal artery at 10 cm from the ankle.         CRITICAL RESULT READ BACK: Preliminary findings discussed with and critical read back performed by Dr. BERNA PARR in the Emergency Department via telephone on 8/18/2019 3:51 PM      US-EXTREMITY VENOUS LOWER UNILAT LEFT   Final Result      US-EXTREMITY ARTERY LOWER UNILAT LEFT   Final Result      IR-PICC LINE PLACEMENT W/ GUIDANCE > AGE 5    (Results Pending)        Assessment/Plan  * Sepsis due to methicillin susceptible Staphylococcus aureus (HCC)- (present on admission)  Assessment & Plan  This is sepsis (without associated acute organ dysfunction).   Source infected left groin vascular graft, bacteremia, left knee septic arthritis  Source control with I&D left groin, excision of infected graft, left knee washout  Antibiotic guidance per infectious disease team  Continue close clinical monitoring    MSSA bacteremia- (present on admission)  Assessment & Plan  Likely from underlying left groin graft infection  With seeding of left knee, septic arthritis    Status post excision of left groin graft, I&D and left knee washout    Echocardiogram is negative for any evidence of infective endocarditis, do not believe that patient would benefit from a CHAPIS given this would not alter the course of management as he would require prolonged IV antibiotic  therapy, irrespectively would defer to infectious disease team    Ongoing source control per surgical team  ID following, defer antibiotic needs to ID team  Continue close clinical monitoring    Place PICC line    Staphylococcal arthritis of left knee (HCC)- (present on admission)  Assessment & Plan  Status post washout per orthopedics team  Continue antibiotics per ID team    Pain management with oral oxycodone, as needed IV morphine, monitor for adverse effects due to the use of morphine    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 Grande from vascular surgery this stay    Patient on IV antibiotic therapy  Vascular interventions, postoperative follow-up/care per vascular surgery    Antibiotics per ID team  Continue close clinical monitoring    PAD (peripheral artery disease) (HCC)- (present on admission)  Assessment & Plan  Currently with occluded grafts of the left lower extremity with poor circulation on presentation  Status post revascularization by vascular surgery  Vascular surgery following, defer anticoagulation needs to vascular surgery, medical management to vascular surgery    Hyponatremia- (present on admission)  Assessment & Plan  Resolved    Chronic anticoagulation- (present on admission)  Assessment & Plan  Patient was on anticoagulation for reported DVT at [place] in [place], per Dr. Grande  INR supratherapeutic on presentation, improved now  DVT studies negative here  At this time anticoagulation is held    I have discussed with Dr. Grande from vascular surgery, aspirin and Plavix from his perspective, findings of DVT requiring anticoagulation than aspirin and anticoagulation should be continued while Plavix held.    Dyslipidemia- (present on admission)  Assessment & Plan  Continue high intensity statin therapy    Iron deficiency anemia- (present on  admission)  Assessment & Plan  Avoid IV iron given underlying infectious issues    In addition there is associated anemia of acute blood loss postoperative, operative losses    Monitor Hb / Restrictive transfusion strategy    Iron / MV supplementation ordered     PRBC today    Hyperglycemia- (present on admission)  Assessment & Plan  Previous hemoglobin 6.7 consistent with type 2 diabetes mellitus  Sliding scale insulin therapy #1    Chronic kidney disease (CKD), stage III (moderate) (HCC)- (present on admission)  Assessment & Plan  Monitor renal function / Avoid nephrotoxins and dose medications renally    Hypokalemia- (present on admission)  Assessment & Plan  Resolved       VTE prophylaxis: SC Lovenox

## 2019-08-25 NOTE — CARE PLAN
Problem: Safety  Goal: Will remain free from injury  Outcome: PROGRESSING AS EXPECTED  Bed alarm in use, call light within reach, room next to nursing station.   Problem: Infection  Goal: Will remain free from infection  Outcome: PROGRESSING AS EXPECTED  PICC line to be placed today for IV antibiotics.

## 2019-08-25 NOTE — WOUND TEAM
"[facility]  Inpatient Services  Wound and Skin Care Progress Note    Admission Date:  8/18/2019   HPI, PMH, SH: Reviewed  Unit where seen by Wound Team: [id number]    WOUND TEAM FOLLOW UP:  Called by RN that wound VAC dressing to left medial thigh/knee dislodged at end of shift yesterday.      SUBJECTIVE:  \"I'm getting such good care here.\"    Self Report / Pain Level:  Tolerated well      OBJECTIVE:  In bed, wet to dry in place.  Dressing compromised at end of shift yesterday.       WOUND TYPE, LOCATION, CHARACTERISTICS (Pressure ulcers: location, stage, POA or date identified)         Negative Pressure Wound Therapy Knee Inner;Left (Active)   NPWT Pump Mode / Pressure Setting Continuous;125 mmHg 8/25/2019  1:06 PM   Dressing Type Medium;Black foam 8/25/2019  1:06 PM   Number of Foam Pieces Used 1 8/25/2019  1:06 PM   Canister Changed Yes 8/22/2019  3:00 PM   Output (mL) 150 mL 8/24/2019  7:26 PM     Wound 08/22/19 Full Thickness Wound Knee open surgical wound medial left knee (Active)   Wound Image      Site Assessment Pink;Red    Yadi-wound Assessment Intact    Margins Attached edges    Wound Length (cm) 10.5 cm    Wound Width (cm) 3.8 cm    Wound Depth (cm) 3.5 cm    Wound Surface Area (cm^2) 39.9 cm^2    Tunneling 0 cm    Undermining 1 cm    Closure Secondary intention    Drainage Amount Small    Drainage Description Serosanguineous    Non-staged Wound Description Full thickness    Treatments Cleansed;Site care    Cleansing Approved Wound Cleanser    Periwound Protectant Drape;Skin Protectant wipes to Periwound    Dressing Options Wound Vac    Dressing Cleansing/Solutions Not Applicable    Dressing Changed Changed    Dressing Status Clean;Dry;Intact    Dressing Change Frequency Tuesday, Thursday, Saturday    NEXT Dressing Change  08/27/19    NEXT Weekly Photo (Inpatient Only) 08/27/19    WOUND NURSE ONLY - Odor None    WOUND NURSE ONLY - Exposed Structures Tendon;Muscle    WOUND NURSE ONLY - Tissue " Type and Percentage 100% pink/red    WOUND NURSE ONLY - Time Spent with Patient (mins) 45          Lab Values:    AIC:      Lab Results   Component Value Date/Time    HBA1C 6.7 (H) 07/04/2019 02:27 AM      Culture:   Completed 8/20    INTERVENTIONS BY WOUND TEAM:  Removed previous dressing to left medial calf/knee.  Cleaned with wound cleanser. No sting skin prep and drape to periwound skin. One black foam into wound bed, VAC resumed.  Groin wound dressing intact, will change all dressings Tuesday.          Interdisciplinary consultation:  RN (Melinda), patient     EVALUATION:  Wound is cleans and should do well with VAC therapy.      Factors affecting wound healing:  PAD, iron deficiency, infected graft  Goals:  Steady decrease in wound area and depth weekly     NURSING PLAN OF CARE ORDERS (X):    Dressing changes: See Dressing Care orders:   X  Skin care: See Skin Care orders:   Rectal tube care: See Rectal Tube Care orders:   Other orders:    WOUND TEAM PLAN OF CARE (X):   NPWT change 3 x week:  X      Dressing changes by wound team:       Follow up as needed:       Other (explain):    Anticipated discharge plans (X):  SNF:   X        Home Care:           Outpatient Wound Center:            Self Care:            Other:

## 2019-08-25 NOTE — PROGRESS NOTES
"A & O x 4  1/10 pain   /76   Pulse 98   Temp 36.8 °C (98.3 °F) (Temporal)   Resp 16   Ht 1.702 m (5' 7\")   Wt 68.6 kg (151 lb 3.8 oz)   SpO2 97%   BMI 23.69 kg/m²   Bilateral groin incisions to wound vac, CDI  L calf incision wound vac dressing was saturated and leaking at change of shift, wet to dry dressing was placed by Melinda, day shift RN  L knee dressing CDI  Last BM 8/24, + flatus  Voiding fine in urinal with assistance to edge of bed, clear orange urine noted  Regular diet, no complaints of N/V  Bed alarm in place, call light within reach, pt calls appropriately  Pt resting in bed       "

## 2019-08-26 ENCOUNTER — HOSPITAL ENCOUNTER (OUTPATIENT)
Facility: MEDICAL CENTER | Age: 74
End: 2019-08-27
Attending: FAMILY MEDICINE | Admitting: FAMILY MEDICINE
Payer: MEDICARE

## 2019-08-26 ENCOUNTER — APPOINTMENT (OUTPATIENT)
Dept: RADIOLOGY | Facility: MEDICAL CENTER | Age: 74
DRG: 252 | End: 2019-08-26
Attending: INTERNAL MEDICINE
Payer: MEDICARE

## 2019-08-26 ENCOUNTER — APPOINTMENT (OUTPATIENT)
Dept: RADIOLOGY | Facility: MEDICAL CENTER | Age: 74
End: 2019-08-26
Attending: FAMILY MEDICINE
Payer: MEDICARE

## 2019-08-26 VITALS
HEART RATE: 97 BPM | OXYGEN SATURATION: 90 % | HEIGHT: 67 IN | TEMPERATURE: 98.7 F | DIASTOLIC BLOOD PRESSURE: 76 MMHG | SYSTOLIC BLOOD PRESSURE: 117 MMHG | RESPIRATION RATE: 17 BRPM | WEIGHT: 151.24 LBS | BODY MASS INDEX: 23.74 KG/M2

## 2019-08-26 PROBLEM — A41.01 SEPSIS DUE TO METHICILLIN SUSCEPTIBLE STAPHYLOCOCCUS AUREUS (HCC): Status: RESOLVED | Noted: 2019-08-19 | Resolved: 2019-08-26

## 2019-08-26 PROBLEM — R78.81 MSSA BACTEREMIA: Status: RESOLVED | Noted: 2019-08-19 | Resolved: 2019-08-26

## 2019-08-26 PROBLEM — E87.1 HYPONATREMIA: Status: RESOLVED | Noted: 2019-08-19 | Resolved: 2019-08-26

## 2019-08-26 PROBLEM — R73.9 HYPERGLYCEMIA: Status: RESOLVED | Noted: 2019-07-03 | Resolved: 2019-08-26

## 2019-08-26 PROBLEM — Z79.01 CHRONIC ANTICOAGULATION: Status: RESOLVED | Noted: 2019-08-19 | Resolved: 2019-08-26

## 2019-08-26 PROBLEM — B95.61 MSSA BACTEREMIA: Status: RESOLVED | Noted: 2019-08-19 | Resolved: 2019-08-26

## 2019-08-26 PROBLEM — E87.6 HYPOKALEMIA: Status: RESOLVED | Noted: 2018-10-19 | Resolved: 2019-08-26

## 2019-08-26 LAB
ALBUMIN SERPL BCP-MCNC: 2.3 G/DL (ref 3.2–4.9)
ALBUMIN/GLOB SERPL: 0.8 G/DL
ALP SERPL-CCNC: 132 U/L (ref 30–99)
ALT SERPL-CCNC: <5 U/L (ref 2–50)
ANION GAP SERPL CALC-SCNC: 4 MMOL/L (ref 0–11.9)
ANISOCYTOSIS BLD QL SMEAR: ABNORMAL
AST SERPL-CCNC: 19 U/L (ref 12–45)
BACTERIA BLD CULT: NORMAL
BASOPHILS # BLD AUTO: 0 % (ref 0–1.8)
BASOPHILS # BLD: 0 K/UL (ref 0–0.12)
BILIRUB SERPL-MCNC: 0.4 MG/DL (ref 0.1–1.5)
BUN SERPL-MCNC: 12 MG/DL (ref 8–22)
CALCIUM SERPL-MCNC: 7.7 MG/DL (ref 8.5–10.5)
CHLORIDE SERPL-SCNC: 101 MMOL/L (ref 96–112)
CO2 SERPL-SCNC: 33 MMOL/L (ref 20–33)
CREAT SERPL-MCNC: 1.09 MG/DL (ref 0.5–1.4)
EOSINOPHIL # BLD AUTO: 0 K/UL (ref 0–0.51)
EOSINOPHIL NFR BLD: 0 % (ref 0–6.9)
ERYTHROCYTE [DISTWIDTH] IN BLOOD BY AUTOMATED COUNT: 53.8 FL (ref 35.9–50)
GLOBULIN SER CALC-MCNC: 3 G/DL (ref 1.9–3.5)
GLUCOSE BLD-MCNC: 139 MG/DL (ref 65–99)
GLUCOSE BLD-MCNC: 168 MG/DL (ref 65–99)
GLUCOSE SERPL-MCNC: 119 MG/DL (ref 65–99)
HCT VFR BLD AUTO: 24.3 % (ref 42–52)
HGB BLD-MCNC: 7.7 G/DL (ref 14–18)
HYPOCHROMIA BLD QL SMEAR: ABNORMAL
LYMPHOCYTES # BLD AUTO: 1.31 K/UL (ref 1–4.8)
LYMPHOCYTES NFR BLD: 8.7 % (ref 22–41)
MACROCYTES BLD QL SMEAR: ABNORMAL
MAGNESIUM SERPL-MCNC: 2.1 MG/DL (ref 1.5–2.5)
MANUAL DIFF BLD: NORMAL
MCH RBC QN AUTO: 29.5 PG (ref 27–33)
MCHC RBC AUTO-ENTMCNC: 31.7 G/DL (ref 33.7–35.3)
MCV RBC AUTO: 93.1 FL (ref 81.4–97.8)
METAMYELOCYTES NFR BLD MANUAL: 0.9 %
MONOCYTES # BLD AUTO: 0.39 K/UL (ref 0–0.85)
MONOCYTES NFR BLD AUTO: 2.6 % (ref 0–13.4)
MORPHOLOGY BLD-IMP: NORMAL
MYELOCYTES NFR BLD MANUAL: 0.9 %
NEUTROPHILS # BLD AUTO: 13.12 K/UL (ref 1.82–7.42)
NEUTROPHILS NFR BLD: 86.9 % (ref 44–72)
NRBC # BLD AUTO: 0.02 K/UL
NRBC BLD-RTO: 0.1 /100 WBC
PHOSPHATE SERPL-MCNC: 2.7 MG/DL (ref 2.5–4.5)
PLATELET # BLD AUTO: 548 K/UL (ref 164–446)
PLATELET BLD QL SMEAR: NORMAL
PMV BLD AUTO: 9.2 FL (ref 9–12.9)
POLYCHROMASIA BLD QL SMEAR: NORMAL
POTASSIUM SERPL-SCNC: 3.8 MMOL/L (ref 3.6–5.5)
PROT SERPL-MCNC: 5.3 G/DL (ref 6–8.2)
RBC # BLD AUTO: 2.61 M/UL (ref 4.7–6.1)
RBC BLD AUTO: PRESENT
SIGNIFICANT IND 70042: NORMAL
SITE SITE: NORMAL
SODIUM SERPL-SCNC: 138 MMOL/L (ref 135–145)
SOURCE SOURCE: NORMAL
WBC # BLD AUTO: 15.1 K/UL (ref 4.8–10.8)

## 2019-08-26 PROCEDURE — 700102 HCHG RX REV CODE 250 W/ 637 OVERRIDE(OP): Performed by: INTERNAL MEDICINE

## 2019-08-26 PROCEDURE — 83735 ASSAY OF MAGNESIUM: CPT

## 2019-08-26 PROCEDURE — 85027 COMPLETE CBC AUTOMATED: CPT

## 2019-08-26 PROCEDURE — 71045 X-RAY EXAM CHEST 1 VIEW: CPT

## 2019-08-26 PROCEDURE — 99239 HOSP IP/OBS DSCHRG MGMT >30: CPT | Performed by: INTERNAL MEDICINE

## 2019-08-26 PROCEDURE — 82962 GLUCOSE BLOOD TEST: CPT | Mod: 91

## 2019-08-26 PROCEDURE — 700111 HCHG RX REV CODE 636 W/ 250 OVERRIDE (IP): Performed by: INTERNAL MEDICINE

## 2019-08-26 PROCEDURE — A9270 NON-COVERED ITEM OR SERVICE: HCPCS | Performed by: INTERNAL MEDICINE

## 2019-08-26 PROCEDURE — A9270 NON-COVERED ITEM OR SERVICE: HCPCS | Performed by: SURGERY

## 2019-08-26 PROCEDURE — A9270 NON-COVERED ITEM OR SERVICE: HCPCS | Performed by: PHYSICIAN ASSISTANT

## 2019-08-26 PROCEDURE — 700102 HCHG RX REV CODE 250 W/ 637 OVERRIDE(OP): Performed by: SURGERY

## 2019-08-26 PROCEDURE — 700102 HCHG RX REV CODE 250 W/ 637 OVERRIDE(OP): Performed by: PHYSICIAN ASSISTANT

## 2019-08-26 PROCEDURE — 87641 MR-STAPH DNA AMP PROBE: CPT

## 2019-08-26 PROCEDURE — 82962 GLUCOSE BLOOD TEST: CPT

## 2019-08-26 PROCEDURE — 84100 ASSAY OF PHOSPHORUS: CPT

## 2019-08-26 PROCEDURE — 80053 COMPREHEN METABOLIC PANEL: CPT

## 2019-08-26 PROCEDURE — 85007 BL SMEAR W/DIFF WBC COUNT: CPT

## 2019-08-26 PROCEDURE — 36573 INSJ PICC RS&I 5 YR+: CPT

## 2019-08-26 PROCEDURE — 99232 SBSQ HOSP IP/OBS MODERATE 35: CPT | Performed by: INTERNAL MEDICINE

## 2019-08-26 PROCEDURE — 36415 COLL VENOUS BLD VENIPUNCTURE: CPT

## 2019-08-26 RX ORDER — FOLIC ACID 1 MG/1
1 TABLET ORAL DAILY
Qty: 30 TAB
Start: 2019-08-27

## 2019-08-26 RX ORDER — OXYCODONE HYDROCHLORIDE 10 MG/1
10 TABLET ORAL EVERY 4 HOURS PRN
Qty: 28 TAB
Start: 2019-08-26 | End: 2019-08-27

## 2019-08-26 RX ORDER — CEFAZOLIN SODIUM 2 G/100ML
2 INJECTION, SOLUTION INTRAVENOUS EVERY 8 HOURS
Qty: 3000 ML
Start: 2019-08-26

## 2019-08-26 RX ORDER — HYDRALAZINE HYDROCHLORIDE 20 MG/ML
10 INJECTION INTRAMUSCULAR; INTRAVENOUS EVERY 4 HOURS PRN
Refills: 0
Start: 2019-08-26

## 2019-08-26 RX ORDER — POLYETHYLENE GLYCOL 3350 17 G/17G
17 POWDER, FOR SOLUTION ORAL 2 TIMES DAILY
Start: 2019-08-26

## 2019-08-26 RX ORDER — FERROUS GLUCONATE 324(38)MG
324 TABLET ORAL
Qty: 30 TAB
Start: 2019-08-26

## 2019-08-26 RX ORDER — RIFAMPIN 300 MG/1
300 CAPSULE ORAL 3 TIMES DAILY
Qty: 60 CAP | Refills: 11
Start: 2019-08-26

## 2019-08-26 RX ORDER — ATORVASTATIN CALCIUM 40 MG/1
40 TABLET, FILM COATED ORAL EVERY EVENING
Qty: 30 TAB | Refills: 0
Start: 2019-08-26

## 2019-08-26 RX ORDER — LABETALOL HYDROCHLORIDE 5 MG/ML
10 INJECTION, SOLUTION INTRAVENOUS EVERY 4 HOURS PRN
Refills: 0
Start: 2019-08-26

## 2019-08-26 RX ORDER — MORPHINE SULFATE 4 MG/ML
4 INJECTION, SOLUTION INTRAMUSCULAR; INTRAVENOUS EVERY 4 HOURS PRN
Qty: 5 ML
Start: 2019-08-26 | End: 2019-08-27

## 2019-08-26 RX ORDER — PREGABALIN 150 MG/1
150 CAPSULE ORAL 2 TIMES DAILY
Qty: 30 CAP | Refills: 11
Start: 2019-08-26 | End: 2019-08-27

## 2019-08-26 RX ORDER — ASCORBIC ACID 500 MG
500 TABLET ORAL
Qty: 30 TAB | Refills: 3
Start: 2019-08-26

## 2019-08-26 RX ORDER — AMOXICILLIN 250 MG
2 CAPSULE ORAL 2 TIMES DAILY
Qty: 30 TAB | Refills: 0
Start: 2019-08-26

## 2019-08-26 RX ORDER — CLOPIDOGREL BISULFATE 75 MG/1
75 TABLET ORAL DAILY
Qty: 30 TAB
Start: 2019-08-27

## 2019-08-26 RX ADMIN — ENOXAPARIN SODIUM 40 MG: 100 INJECTION SUBCUTANEOUS at 06:22

## 2019-08-26 RX ADMIN — MORPHINE SULFATE 4 MG: 4 INJECTION INTRAVENOUS at 07:50

## 2019-08-26 RX ADMIN — MORPHINE SULFATE 4 MG: 4 INJECTION INTRAVENOUS at 03:21

## 2019-08-26 RX ADMIN — OXYCODONE HYDROCHLORIDE 10 MG: 10 TABLET ORAL at 02:17

## 2019-08-26 RX ADMIN — ASPIRIN 81 MG: 81 TABLET, COATED ORAL at 06:22

## 2019-08-26 RX ADMIN — OXYCODONE HYDROCHLORIDE 10 MG: 10 TABLET ORAL at 06:29

## 2019-08-26 RX ADMIN — ACETAMINOPHEN 650 MG: 325 TABLET, FILM COATED ORAL at 11:47

## 2019-08-26 RX ADMIN — ACETAMINOPHEN 650 MG: 325 TABLET, FILM COATED ORAL at 06:22

## 2019-08-26 RX ADMIN — FOLIC ACID 1 MG: 1 TABLET ORAL at 06:22

## 2019-08-26 RX ADMIN — FERROUS GLUCONATE 324 MG: 324 TABLET ORAL at 06:29

## 2019-08-26 RX ADMIN — CEFAZOLIN SODIUM 2 G: 2 INJECTION, SOLUTION INTRAVENOUS at 06:21

## 2019-08-26 RX ADMIN — INSULIN HUMAN 1 UNITS: 100 INJECTION, SOLUTION PARENTERAL at 10:49

## 2019-08-26 RX ADMIN — OXYCODONE HYDROCHLORIDE 10 MG: 10 TABLET ORAL at 10:43

## 2019-08-26 RX ADMIN — MORPHINE SULFATE 4 MG: 4 INJECTION INTRAVENOUS at 11:47

## 2019-08-26 RX ADMIN — ACETAMINOPHEN 650 MG: 325 TABLET, FILM COATED ORAL at 00:05

## 2019-08-26 RX ADMIN — RIFAMPIN 300 MG: 300 CAPSULE ORAL at 10:43

## 2019-08-26 RX ADMIN — CLOPIDOGREL BISULFATE 75 MG: 75 TABLET ORAL at 06:21

## 2019-08-26 RX ADMIN — RIFAMPIN 300 MG: 300 CAPSULE ORAL at 06:22

## 2019-08-26 RX ADMIN — OXYCODONE HYDROCHLORIDE AND ACETAMINOPHEN 500 MG: 500 TABLET ORAL at 10:43

## 2019-08-26 RX ADMIN — THERA TABS 1 TABLET: TAB at 06:22

## 2019-08-26 RX ADMIN — OXYCODONE HYDROCHLORIDE AND ACETAMINOPHEN 500 MG: 500 TABLET ORAL at 06:22

## 2019-08-26 RX ADMIN — PREGABALIN 150 MG: 150 CAPSULE ORAL at 06:00

## 2019-08-26 ASSESSMENT — ENCOUNTER SYMPTOMS
DIARRHEA: 0
CHILLS: 0
DIZZINESS: 0
COUGH: 0
ABDOMINAL PAIN: 0
SHORTNESS OF BREATH: 0
MYALGIAS: 1
NAUSEA: 0
VOMITING: 0
WEIGHT LOSS: 0
HEADACHES: 0
FOCAL WEAKNESS: 0
FEVER: 0

## 2019-08-26 NOTE — DISCHARGE SUMMARY
Discharge Summary    CHIEF COMPLAINT ON ADMISSION  Chief Complaint   Patient presents with   • Leg Pain   • Deep Vein Thrombosis       Reason for Admission  EMS Transfer     Admission Date  8/18/2019    CODE STATUS  Full Code    HPI & HOSPITAL COURSE   Patient with underlying history of peripheral arterial disease, left lower extremity predominantly who recently underwent femoropopliteal bypass, underlying CKD stage III, diabetes mellitus type 2.  Previous surgery by Dr. Grande from vascular surgery.  He now presented with groin pain, discharge from the left groin postoperative site with findings of occlusion of the left femoropopliteal bypass graft, no flow in the mid/distal SFA/distal popliteal artery consistent with thrombosis, negative DVT study, CT lower extremity with runoff revealing completely occluded left femoral-popliteal bypass graft with gas within the lumen and fluid surrounding the graft.  Concerning for infected occluded graft.  Partially visualized to have femoral femoral bypass that appears patent.  Small amount of clot in the femorofemoral bypass graft in the common femoral artery junction.  Complete occlusion of the native superficial femoral artery from mid to distal thigh, complete occlusion of the left popliteal artery.  Partial flow reconstitution at the trifurcation but fluid is very sluggish.  No distal flow appreciated in the anterior tibial artery, posterior tibial artery and peroneal artery at 10 cm from the ankle.  Vascular surgery/Dr. Grande consulted, patient initiated on intravenous heparin and admitted to the hospital, infectious disease team consultation obtained.  Patient with findings of bacteremia, appears to be MSSA with findings of left knee septic arthritis.  Orthopedics team consulted.     Patient found to have MSSA bacteremia.  During hospitalization, patient was taken to the operating room on August 20 by orthopedics and vascular surgery.  Orthopedics team performed  arthrotomy and drainage of the left knee for underlying septic arthritis of the left knee with MSSA, vascular surgery performed removal of infected grafts, I&D and redo vascularization.  Findings of MSSA bacteremia which has cleared, left knee synovial fluid growing MSSA and femoral wound growing MSSA.  Seen by infectious disease team during his hospitalization, antibiotic de-escalated to IV Ancef and oral rifampin at this time.  Echocardiogram with no findings of endocarditis.  At this time anticipation is for 6 weeks of IV antibiotic therapy, given multiple graft in place which could not be completely removed, per ID team patient will need ongoing suppression following completion of antibiotic therapy.  Overall and date of antibiotics to be determined based on ongoing ID input, recommend ongoing consultation with infectious disease team during hospitalization at [facility].  Patient has been evaluated by Dr. Head from [facility] infectious disease team.    Otherwise patient with a wound VAC to the left groin area, postoperative anemia secondary to oozing/bleeding which has stabilized at this time, he has required transfusions to stabilize his anemia, will need ongoing monitoring of his anemia closely at the [facility] facility.  Patient with needs of ongoing wound care, aggressive rounding/intervention/antibiotic therapy for which she is being discharged to [facility].    Pertinent issues at the time of discharge are noted below:    1.  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 Head from [facility] infectious disease, consult with infectious disease team at [facility], requested placement of a  PICC line, this may be facilitated prior to his discharge from acute hospital today, if not this will need to be facilitated while at the long-term acute care facility.  Patient has been cleared for discharge from vascular surgery/orthopedics perspective at this time.  Continue ongoing wound care, wound consultation recommended at [facility].    2.  Peripheral arterial disease: Vascular interventions as reviewed, followed by Dr. Grande from vascular surgery.  Dr. Grande recommends patient be continued on aspirin 81, Plavix 75 mg following discharge and ongoing risk factor modifications including dyslipidemia/other risk factor control.  No indications for anticoagulation from vascular surgery perspective.    3.  History of deep venous thrombosis: No findings of deep venous thrombosis during this hospitalization, continue DVT preventive prophylaxis with subcutaneous Lovenox while at the [facility].    4.  Chronic iron deficiency anemia complicated by anemia of operative blood loss: Will need ongoing close monitoring of hemoglobin and replacement as clinically appropriate.  Patient would need daily monitoring of his hemoglobin.    5.  Electrolyte abnormalities: Stable, will need daily monitoring of electrolytes/renal function at the [facility].    6.  Chronic comorbid conditions include underlying psychiatric concerns, dyslipidemia, type 2 diabetes mellitus, glaucoma: Will need ongoing management per physician at the [facility] with an appropriate discharge plan.     At this time patient will be discharged to long-term acute care facility, [facility] for ongoing acute hospitalization needs.    Therefore, he is discharged in good and stable condition to a long-term acute [facility] hospital.    The patient met 2-midnight criteria for an inpatient stay at the time of discharge.    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  No follow-up provider specified.    MEDICATIONS ON DISCHARGE     Medication List      START taking these medications      Instructions   ascorbic acid 500 MG tablet  Commonly known as:  VITAMIN C   Take 1 Tab by mouth 3 times a day, with meals.  Dose:  500 mg     ceFAZolin in dextrose 2 g/100mL IVPB  Commonly known as:  ANCEF   100 mL by Intravenous route every 8 hours.  Dose:  2 g     clopidogrel 75 MG Tabs  Start taking on:  8/27/2019  Commonly known as:  PLAVIX   Take 1 Tab by mouth every day.  Dose:  75 mg     enoxaparin 40 MG/0.4ML Soln inj  Start taking on:  8/27/2019  Commonly known as:  LOVENOX   Inject 40 mg as instructed every day.  Dose:  40 mg     ferrous gluconate 324 (38 Fe) MG Tabs  Commonly known as:  FERGON   Take 1 Tab by mouth 3 times a day, with meals.  Dose:  324 mg     folic acid 1 MG Tabs  Start taking on:  8/27/2019  Commonly known as:  FOLVITE   Take 1 Tab by mouth every day.  Dose:  1 mg     hydrALAZINE 20 MG/ML Soln  Commonly known as:  APRESOLINE   0.5 mL by Intravenous route every four hours as needed (Hypertension, if clonidine ineffective or not ordered.).  Dose:  10 mg     insulin regular 100 Unit/mL Soln  Commonly known as:  HUMULIN R   Inject 1-6 Units as instructed 4 Times a Day,Before Meals and at Bedtime.  Dose:  1-6 Units     labetalol 5  MG/ML Soln  Commonly known as:  NORMODYNE,TRANDATE   2 mL by Intravenous route every four hours as needed (Hypertension, if clonidine not ordered or ineffective).  Dose:  10 mg     morphine (pf) 4 mg/ml 4 MG/ML Soln   1 mL by Intravenous route every four hours as needed (Breakthrough pain non responsive to other interventions) for up to 1 day.  Dose:  4 mg     oxyCODONE immediate release 10 MG immediate release tablet  Commonly known as:  ROXICODONE   Take 1 Tab by mouth every four hours as needed for up to 1 day.  Dose:  10 mg     polyethylene glycol/lytes Pack  Commonly known as:  MIRALAX   Take 1 Packet by mouth 2 times a day.  Dose:  17 g     pregabalin 150 MG Caps  Commonly known as:  LYRICA   Take 1 Cap by mouth 2 Times a Day for 1 day.  Dose:  150 mg     riFAMPin 300 MG Caps  Commonly known as:  RIFADINE   Take 1 Cap by mouth 3 times a day.  Dose:  300 mg     senna-docusate 8.6-50 MG Tabs  Commonly known as:  PERICOLACE or SENOKOT S   Take 2 Tabs by mouth 2 Times a Day.  Dose:  2 Tab        CHANGE how you take these medications      Instructions   atorvastatin 40 MG Tabs  What changed:    · medication strength  · how much to take  · when to take this  Commonly known as:  LIPITOR   Take 1 Tab by mouth every evening.  Dose:  40 mg        CONTINUE taking these medications      Instructions   acetaminophen 325 MG Tabs  Commonly known as:  TYLENOL   Take 2 Tabs by mouth every 6 hours as needed (Mild Pain; (Pain scale 1-3); Temp greater than 100.5 F).  Dose:  650 mg     aspirin 81 MG Chew chewable tablet  Commonly known as:  ASA   Take 1 Tab by mouth every day.  Dose:  81 mg     brimonidine 0.2 % Soln  Commonly known as:  ALPHAGAN   Place 1 Drop in right eye 2 Times a Day.  Dose:  1 Drop     CENTRUM SILVER Tabs   Take 1 Tab by mouth every day.  Dose:  1 Tab     timolol 0.5 % Soln  Commonly known as:  TIMOPTIC   Place 1 Drop in right eye 2 Times a Day.  Dose:  1 Drop        STOP taking these medications    warfarin  5 MG Tabs  Commonly known as:  COUMADIN            Allergies  No Known Allergies    DIET  Orders Placed This Encounter   Procedures   • Diet Order Regular     Standing Status:   Standing     Number of Occurrences:   1     Order Specific Question:   Diet:     Answer:   Regular [1]       ACTIVITY  As tolerated.  Weight bearing as tolerated    CONSULTATIONS  Vascular surgery  Infectious disease  Orthopedics    PROCEDURES  As noted in operative notes     LABORATORY  Lab Results   Component Value Date    SODIUM 138 08/26/2019    POTASSIUM 3.8 08/26/2019    CHLORIDE 101 08/26/2019    CO2 33 08/26/2019    GLUCOSE 119 (H) 08/26/2019    BUN 12 08/26/2019    CREATININE 1.09 08/26/2019        Lab Results   Component Value Date    WBC 15.1 (H) 08/26/2019    HEMOGLOBIN 7.7 (L) 08/26/2019    HEMATOCRIT 24.3 (L) 08/26/2019    PLATELETCT 548 (H) 08/26/2019        Total time of the discharge process exceeds 40 minutes.

## 2019-08-26 NOTE — PROGRESS NOTES
Bedside report received.  Assessment complete.  A&O x 4. Patient calls appropriately.  Patient in bed with two person FWW assist. Bed alarm on.   Patient has 7/10 pain. Medicated per MAR.  Denies N&V. Tolerating regular with supplements diet.  Surgical wound vac on bilateral groins, wound vac on L calf, all are running, no leaks. L knee incision with sutures MARILIN.  + void, + flatus, + BM.  Patient denies SOB.  SCD's refused.  Review plan with of care with patient. Call light and personal belongings with in reach. Hourly rounding in place. All needs met at this time.

## 2019-08-26 NOTE — PROGRESS NOTES
1027-Pt's social security check given to Toi Shaikh LPN the clinical liaison for [facility]. Pt aware and appreciative of it.     1052- Report called to Heather ADAMSON at [facility]. No further questions. Transport time scheduled for noon.     1215- Pt voided and had a loose BM in bedside commode. OOB with assist x2. Transport arrived for pt. Pt ate lunch. Assisted onto stretcher for transport. Wound vacs clamped and cannisters removed. Pt's belongings bag sent with transporters.

## 2019-08-26 NOTE — PROGRESS NOTES
Infectious Disease Progress Note    Author: Karol Charles M.D. Date & Time of service: 2019  10:43 AM    Chief Complaint:  Follow-up for MSSA bacteremia    Interval History:   T-max 100.2, no CBC today.  Per report, Ortho tap of the left knee reveals purulence.  Washout planned.  Patient reports some improvement in his left knee pain.   afebrile, white count 17.9.  Yesterday, patient underwent left knee arthrotomy with I&D, as well as removal of infected fem-fem and left fem-pop bypass grafts, left popliteal thrombectomy, redo fem-fem bypass and left fem-pop bypass using Cryoveins.  Knee aspirate also growing Staph aureus.  Patient happy with his procedures.   afebrile, white count 27.1 down to 20.2 today.  Knee swelling improving.  Pain controlled.  Wound VAC to be placed over left leg wound today   afebrile WBC 15.1 patient feels okay without any new complaints.  Plan for transfer to [facility] today     Labs Reviewed and Medications Reviewed.    Review of Systems:  Review of Systems   Constitutional: Negative for chills, fever and weight loss.   Respiratory: Negative for cough and shortness of breath.    Cardiovascular: Negative for chest pain.   Gastrointestinal: Negative for abdominal pain, diarrhea, nausea and vomiting.   Musculoskeletal: Positive for joint pain and myalgias.   Skin: Positive for rash.   Neurological: Negative for dizziness, focal weakness and headaches.   All other systems reviewed and are negative.    Hemodynamics:  Temp (24hrs), Av.3 °C (99.1 °F), Min:37 °C (98.6 °F), Max:37.6 °C (99.6 °F)  Temperature: 37.1 °C (98.7 °F)  Pulse  Av  Min: 47  Max: 111   Blood Pressure : 117/76       Physical Exam:  Physical Exam   Constitutional: He is oriented to person, place, and time. No distress.   Thin  Elderly   HENT:   Mouth/Throat: Oropharynx is clear and moist.   Eyes: Pupils are equal, round, and reactive to light. Conjunctivae are normal. No scleral icterus.   Neck:  Normal range of motion. No JVD present.   Cardiovascular: Normal rate, regular rhythm and normal heart sounds.   No murmur heard.  Pulmonary/Chest: Effort normal and breath sounds normal. No respiratory distress. He has no wheezes.   Abdominal: Soft. He exhibits no distension. There is tenderness.   Musculoskeletal: He exhibits edema and tenderness.   Left lower extremity wound VAC in place    Bilateral groin wound VACs    Right upper extremity PICC line-nontender, no surrounding erythema or edema   Lymphadenopathy:     He has no cervical adenopathy.   Neurological: He is alert and oriented to person, place, and time. No cranial nerve deficit.   Skin: He is not diaphoretic.   Multiple excoriations bilateral upper extremities   Psychiatric: His behavior is normal.   Nursing note and vitals reviewed.      Meds:    Current Facility-Administered Medications:   •  pregabalin  •  clopidogrel  •  aspirin EC  •  ceFAZolin  •  acetaminophen  •  oxyCODONE immediate-release  •  oxyCODONE immediate-release  •  morphine injection  •  enoxaparin (LOVENOX) injection  •  insulin regular **AND** Accu-Chek ACHS **AND** NOTIFY MD and PharmD **AND** glucose **AND** dextrose 10% bolus  •  polyethylene glycol/lytes  •  senna-docusate  •  multivitamin  •  ascorbic acid  •  folic acid  •  ferrous gluconate  •  Pharmacy Consult Request  •  ondansetron  •  labetalol  •  hydrALAZINE  •  riFAMPin  •  atorvastatin  •  Respiratory Care per Protocol  •  ondansetron    Labs:  Recent Labs     08/24/19  0510 08/25/19  0337 08/26/19  0420   WBC 19.3* 15.9* 15.1*   RBC 2.64* 2.35* 2.61*   HEMOGLOBIN 7.5* 6.8* 7.7*   HEMATOCRIT 24.5* 21.6* 24.3*   MCV 92.8 91.9 93.1   MCH 28.4 28.9 29.5   RDW 53.4* 54.4* 53.8*   PLATELETCT 502* 485* 548*   MPV 9.1 9.2 9.2   NEUTSPOLYS 84.50* 90.40* 86.90*   LYMPHOCYTES 6.90* 7.00* 8.70*   MONOCYTES 5.20 2.60 2.60   EOSINOPHILS 0.80 0.00 0.00   BASOPHILS 0.00 0.00 0.00   RBCMORPHOLO Present Present Present     Recent  Labs     08/24/19  0510 08/25/19  0337 08/26/19  0420   SODIUM 136 138 138   POTASSIUM 4.0 3.5* 3.8   CHLORIDE 100 100 101   CO2 28 34* 33   GLUCOSE 109* 159* 119*   BUN 12 12 12     Recent Labs     08/24/19  0510 08/25/19  0337 08/26/19  0420   ALBUMIN 2.5* 2.2* 2.3*   TBILIRUBIN 0.7 0.4 0.4   ALKPHOSPHAT 151* 137* 132*   TOTPROTEIN 5.4* 5.2* 5.3*   ALTSGPT 9 5 <5   ASTSGOT 42 27 19   CREATININE 0.94 1.04 1.09       Imaging:  Ct-cta Lower Ext With & W/o-post Process Left    Result Date: 8/18/2019 8/18/2019 2:55 PM HISTORY/REASON FOR EXAM:  Acute limb ischemia, leg worsening pain and decreased pulse to left leg possible DVT vs arterial clot. He had a fem pop bypass in last 6 weeks. Now has +leg swelling, redness to lower abdomen, pain in back of knee TECHNIQUE/EXAM DESCRIPTION:  CT angiogram of the left lower extremity with contrast, with reconstructions. 100 mL of Omnipaque 350 nonionic contrast was administered at 5.0 mL/sec and helical scanning obtained from the distal femur through the ankle. Thin- and thick-section multiplanar volume reformats were generated from the axial data set in the sagittal and coronal planes. 3D angiographic images were reviewed on PACS. Maximum intensity projection (MIP) images were generated and reviewed. Low dose optimization technique was utilized for this CT exam including automated exposure control and adjustment of the mA and/or kV according to patient size. COMPARISON: Ultrasound 8/18/2019. FINDINGS: Partially visualized femoral-femoral bypass that appears patent. There is small amount of clot in the junction between the femorofemoral bypass graft and the common femoral artery. The left femoral popliteal graft is completely occluded. There is gas within the lumen. There is small amount of fluid surrounding the entire portion of the graft. The native common femoral and external femoral artery demonstrate multiple focal narrowing there is complete occlusion of the superficial  femoral artery from the mid to distal portion at the area of prior stent extending to the popliteal artery. There is partial reconstitution at the trifurcation but flow is very sluggish. No flow appreciated in the anterior tibial artery, posterior tibial artery and peroneal artery at 10 cm from the ankle. 3D angiographic/MIP images of the vasculature confirm the vascular findings as described above.     1. Completely occluded left femoral-popliteal bypass graft with gas within the lumen and fluid surrounding the graft. The finding is concerning for infected occluded graft. 2. Partially visualized femoral-femoral bypass that appears patent. There is small amount of clot in the junction between the femorofemoral bypass graft and the common femoral artery. 3. Complete occlusion of the native superficial femoral artery from the mid to distal thigh at the area of prior stenting. Complete occlusion of the left popliteal artery. Partial flow reconstitution at the trifurcation but flow is very sluggish. No distal flow flow appreciated in the anterior tibial artery, posterior tibial artery and peroneal artery at 10 cm from the ankle. CRITICAL RESULT READ BACK: Preliminary findings discussed with and critical read back performed by Dr. BERNA AMBROSIO in the Emergency Department via telephone on 8/18/2019 3:51 PM    Us-extremity Artery Lower Unilat Left    Result Date: 8/18/2019  Lower Extremity  Arterial Duplex Report  Vascular Laboratory  CONCLUSIONS  1.  The LEFT femoral popliteal bypass graft is occluded.  2.  THere is no flow seen in the mid/distal SFA through the distal  popliteal artery consistent with thrombosis.  3.  The bifemoral bypass graft is patent.  4.  There is minimal 3 vessel runoff tothe Left foot.  4.  THere is fluid surrounding both grafts.  Findings called to Dr. Ambrosio at 2:48 pm on 8/18/19.  KATHRYN DIAS  Exam Date:     08/18/2019 12:44  Room #:     Inpatient  Priority:     Stat  Ht (in):              Wt (lb):  Ordering Physician:        BERNA PARR  Referring Physician:       BERNA PARR  Sonographer:               Marcelino Miranda RVT  Study Type:                Complete Unilateral  Technical Quality:         Adequate  Age:    74    Gender:     M  MRN:    [id number]  :    1945      BSA:  Indications:     Pain in Limb  CPT Codes:       71190  ICD Codes:       729.5  History:         Left lower extremity pain. History of right-to-left fem-fem                   bypass graft and left fem-pop byppass graft  Limitations:                RIGHT  Waveform        Peak Systolic Velocity (cm/s)                  Prox    Prox-Mid  Mid    Mid-Dist  Distal                                                             CFA                                                             PFA                                                             SFA                                                             POP                                                             AT                                                             PT                                                             AL                LEFT  Waveform        Peak Systolic Velocity (cm/s)                  Prox    Prox-Mid  Mid    Mid-Dist  Distal  Bi, non-                          173                      CFA  reversed  Triphasic       108                                        PFA  Absent          59                36               0       SFA  Absent                            0                        POP  Monophasic      6                                  6       AT  Monophasic      7                                  7       PT  Monophasic      5                                  6       AL  FINDINGS  Left.  With the exception of the very proximal fem-pop bypass graft, no flow can  be demonstrated throughout the graft's length. Echolucent material  consistent with arterial thrombus is visualized at the proximal thigh.  Retrograde flow seen  in the very proximal segment. Throughout the graft's  length, there appears to be fluid collecting around the graft  No flow can be demonstrated from the mid-distal superficial femoral artery  through the distal popliteal artery. Echolucent material consistent with  arterial thrombus is visualized throughout this segment.  3-Vessel, severely diminished and monophasic runoff to the foot.  The fem-fem bypass graft is widely patent throughout its length. There  appears to be a fluid collection surrounding this graft.  Tayler Huynh  (Electronically Signed)  Final Date:      2019                   14:38  Amended:         2019 14:49    Us-extremity Venous Lower Unilat Left    Result Date: 2019   Vascular Laboratory  CONCLUSIONS  1.  No evidence of LEFT lower extremity DVT.  KATHRYN DIAS  Exam Date:     2019 12:31  Room #:     Inpatient  Priority:     Stat  Ht (in):             Wt (lb):  Ordering Physician:        BERNA PARR  Referring Physician:       [id number] Maldonado  Sonographer:               Marcelino Miranda RVT  Study Type:                Complete Unilateral  Technical Quality:         Adequate  Age:    74    Gender:     M  MRN:    [id number]  :    1945      BSA:  Indications:     Swelling of Limb  CPT Codes:       76255  ICD Codes:       729.81  History:         Left lower extremity edema  Limitations:     Pain in groin and popliteal fossa. Long axis images obtained                    instead of compressions.  PROCEDURES:  Left lower extremity venous duplex imaging.  The following venous structures were evaluated: common femoral, profunda  femoral, greater saphenous, femoral, popliteal , peroneal and posterior  tibial veins.  Serial compression, augmentation maneuvers,  color and spectral Doppler  flow evaluations were performed.  FINDINGS:  Left lower extremity -.  Complete color filling and compressibility with normal venous flow dynamics  including spontaneous flow,  response to augmentation maneuvers, and  respiratory phasicity.  No evidence of superficial or deep venous thrombosis.  Flow was evaluated in the contralateral common femoral vein and normal  venous flow dynamics including spontaneous flow, respiratory phasic  variation and augmentation were demonstrated.  Tayler FARMER Lino  (Electronically Signed)  Final Date:      2019                   14:39    Us-vein Mapping Lower Extremity Bilat    Result Date: 2019   Lower Extremity  Vein Map  Vascular Laboratory  CONCLUSIONS  KATHRYN DISA  Exam Date:     2019 13:20  Room #:     Inpatient  Priority:     Routine  Ht (in):             Wt (lb):  Ordering Physician:        MARIANN GOOD  Referring Physician:       [id number] Mccray  Sonographer:               Miki Green RDCS, RVT  Study Type:                Complete Bilateral  Technical Quality:         Adequate  Age:    74    Gender:     M  MRN:    [id number]  :    1945      BSA:  Indications:     Peripheral vascular disease, unspecified  CPT Codes:       31780  ICD Codes:       I73.9  History:         Evaluate for arterial bypass conduit.  Limitations:            RIGHT                  Diameter                  (cm)  Characteristics  Normal             0.25      SFJ  Normal             0.21      HT  Normal             0.25      MT  Normal             0.26      LT  Normal             0.25      Knee  Normal             0.18      HC  Normal             0.23      LC  Normal             0.18      Ankle                LEFT       Diameter       (cm)                  Characteristics  SFJ    0.47     Normal  HT     0.46     Normal  MT     0.47     Normal  LT     0.41     Normal  Knee   0.37     Normal  HC     0.21     Normal  LC     0.22     Normal  Ankle  0.20     Normal                  Diameter                  (cm)  Characteristics  Normal             0.33      PF  Normal             0.21      MC                               " Ankle         Diameter         (cm)                    Characteristics    PF     0.30     Normal    MC     0.21     Normal    Ankle  FINDINGS  Right. The greater saphenous vein is small in caliber. The small saphenous  vein is small in caliber.  Left. The greater saphenous vein is patent to the knee and essentially  normal in caliber. The greater saphenous is small in calibre in the high  calf and to the ankle.  The small saphenous vein is small in caliber.      Micro:  Results     Procedure Component Value Units Date/Time    BLOOD CULTURE [[id number]] Collected:  08/21/19 0444    Order Status:  Completed Specimen:  Blood from Peripheral Updated:  08/26/19 0700     Significant Indicator NEG     Source BLD     Site PERIPHERAL     Culture Result No growth after 5 days of incubation.    Narrative:       Per Hospital Policy: Only change Specimen Src: to \"Line\" if  specified by physician order.  Right AC    BLOOD CULTURE [[id number]]  (Abnormal)  (Susceptibility) Collected:  08/21/19 0444    Order Status:  Completed Specimen:  Blood from Peripheral Updated:  08/24/19 0943     Significant Indicator POS     Source BLD     Site PERIPHERAL     Culture Result Growth detected by Bactec instrument. 08/22/2019  15:01      Staphylococcus aureus    Narrative:       CALL  Carlson  141 tel. [phone],  CALLED  141 tel. [phone] 08/22/2019, 15:01, RB PERF. RESULTS CALLED TO:  [id number],RN  Per Hospital Policy: Only change Specimen Src: to \"Line\" if  specified by physician order.  Left AC    Susceptibility     Staphylococcus aureus (1)     Antibiotic Interpretation Microscan Method Status    Clindamycin Sensitive <=0.5 mcg/mL AILEEN Final    Daptomycin Sensitive <=0.5 mcg/mL AILEEN Final    Erythromycin Resistant >4 mcg/mL AILEEN Final    Moxifloxacin Sensitive 2 mcg/mL AILEEN Final    Ampicillin/sulbactam Sensitive <=8/4 mcg/mL AILEEN Final    Oxacillin Sensitive 0.5 mcg/mL AILEEN Final    Vancomycin Sensitive 1 mcg/mL AILEEN Final    Penicillin Resistant >8 " "mcg/mL AILEEN Final    Trimeth/Sulfa Sensitive <=0.5/9.5 mcg/mL AILEEN Final    Tetracycline Sensitive <=4 mcg/mL AILEEN Final                   BLOOD CULTURE [[id number]] Collected:  08/23/19 1826    Order Status:  Completed Specimen:  Blood from Peripheral Updated:  08/24/19 0840     Significant Indicator NEG     Source BLD     Site PERIPHERAL     Culture Result No Growth  Note: Blood cultures are incubated for 5 days and  are monitored continuously.Positive blood cultures  are called to the RN and reported as soon as  they are identified.      Narrative:       Per Hospital Policy: Only change Specimen Src: to \"Line\" if  specified by physician order.  Right AC    BLOOD CULTURE [[id number]] Collected:  08/23/19 0453    Order Status:  Completed Specimen:  Blood from Peripheral Updated:  08/24/19 0839     Significant Indicator NEG     Source BLD     Site PERIPHERAL     Culture Result No Growth  Note: Blood cultures are incubated for 5 days and  are monitored continuously.Positive blood cultures  are called to the RN and reported as soon as  they are identified.      Narrative:       Per Hospital Policy: Only change Specimen Src: to \"Line\" if  specified by physician order.  Right AC    Anaerobic Culture [[id number]] Collected:  08/20/19 1500    Order Status:  Completed Specimen:  Wound Updated:  08/23/19 1017     Significant Indicator NEG     Source WND     Site Left Femoral     Culture Result No Anaerobes isolated.    Narrative:       Surgery - swabs received    CULTURE WOUND W/ GRAM STAIN [[id number]]  (Abnormal) Collected:  08/20/19 1500    Order Status:  Completed Specimen:  Wound Updated:  08/23/19 1017     Significant Indicator POS     Source WND     Site Left Femoral     Culture Result -     Gram Stain Result Many WBCs.  Few Gram positive cocci.       Culture Result Staphylococcus aureus  Light growth  See previous culture for sensitivity report.      Narrative:       Surgery - swabs received    BLOOD CULTURE [[id number]] " "Collected:  08/23/19 0000    Order Status:  Canceled Specimen:  Other from Peripheral     FLUID CULTURE W/GRAM STAIN [[id number]]  (Abnormal)  (Susceptibility) Collected:  08/20/19 0730    Order Status:  Completed Specimen:  Synovial Fluid Updated:  08/22/19 0829     Significant Indicator POS     Source SYNO     Site Left Knee     Culture Result -     Gram Stain Result Many WBCs.  Moderate Gram positive cocci.       Culture Result Staphylococcus aureus  Light growth      Narrative:       CALL  Carlson  141 tel. [phone],  CALLED  141 tel. [phone] 08/20/2019, 09:08, RB PERF. RESULTS CALLED TO:RN  [id number]  Collected By:[id number] MARY FAUSTIN  Left knee  Collected By:[id number] MARY FAUSTIN    Susceptibility     Staphylococcus aureus (1)     Antibiotic Interpretation Microscan Method Status    Clindamycin Sensitive <=0.5 mcg/mL AILEEN Final    Daptomycin Sensitive 1 mcg/mL AILEEN Final    Erythromycin Resistant >4 mcg/mL AILEEN Final    Moxifloxacin Sensitive 2 mcg/mL AILEEN Final    Ampicillin/sulbactam Sensitive <=8/4 mcg/mL AILEEN Final    Oxacillin Sensitive 1 mcg/mL AILEEN Final    Vancomycin Sensitive 2 mcg/mL AILEEN Final    Penicillin Resistant >8 mcg/mL AILEEN Final    Trimeth/Sulfa Sensitive <=0.5/9.5 mcg/mL AILEEN Final    Tetracycline Sensitive <=4 mcg/mL AILEEN Final                   BLOOD CULTURE x2 [[id number]]  (Abnormal) Collected:  08/18/19 1340    Order Status:  Completed Specimen:  Blood from Peripheral Updated:  08/21/19 0952     Significant Indicator POS     Source BLD     Site PERIPHERAL     Culture Result Growth detected by Bactec instrument. 08/19/2019  Staphylococcus aureus (methicillin sensitive)  detected by PCR.        Staphylococcus aureus  See previous culture for sensitivity report.      Narrative:       CALL  Carlson  141 tel. [phone],  CALLED  141 tel. [phone] 08/19/2019, 06:49, RB PERF. RESULTS CALLED  TO:[facility]  Per Hospital Policy: Only change Specimen Src: to \"Line\" if  specified by " "physician order.  No site indicated    BLOOD CULTURE x2 [[id number]]  (Abnormal)  (Susceptibility) Collected:  08/18/19 1335    Order Status:  Completed Specimen:  Blood from Peripheral Updated:  08/21/19 0948     Significant Indicator POS     Source BLD     Site PERIPHERAL     Culture Result Growth detected by Bactec instrument. 08/19/2019  06:04      Staphylococcus aureus    Narrative:       CALL  Carlson  141 tel. [phone],  CALLED  141 tel. [phone] 08/19/2019, 06:50, RB PERF. RESULTS CALLED  TO:Dhara Walker Rn  Per Hospital Policy: Only change Specimen Src: to \"Line\" if  specified by physician order.  No site indicated    Susceptibility     Staphylococcus aureus (1)     Antibiotic Interpretation Microscan Method Status    Clindamycin Sensitive <=0.5 mcg/mL AILEEN Final    Daptomycin Sensitive 1 mcg/mL AILEEN Final    Erythromycin Resistant >4 mcg/mL AILEEN Final    Moxifloxacin Sensitive 2 mcg/mL AILEEN Final    Ampicillin/sulbactam Sensitive <=8/4 mcg/mL AILEEN Final    Oxacillin Sensitive 1 mcg/mL AILEEN Final    Vancomycin Sensitive 2 mcg/mL AILEEN Final    Penicillin Resistant >8 mcg/mL AILEEN Final    Trimeth/Sulfa Sensitive <=0.5/9.5 mcg/mL AILEEN Final    Tetracycline Sensitive <=4 mcg/mL AILEEN Final                   GRAM STAIN [[id number]] Collected:  08/20/19 1500    Order Status:  Completed Specimen:  Wound Updated:  08/21/19 0007     Significant Indicator .     Source WND     Site Left Femoral     Gram Stain Result Many WBCs.  Few Gram positive cocci.      Narrative:       Surgery - swabs received    GRAM STAIN [[id number]] Collected:  08/20/19 0730    Order Status:  Completed Specimen:  Synovial Updated:  08/20/19 0908     Significant Indicator .     Source SYNO     Site Left Knee     Gram Stain Result Many WBCs.  Moderate Gram positive cocci.      Narrative:       CALL  Carlson  141 tel. [phone],  CALLED  141 tel. [phone] 08/20/2019, 09:08, RB PERF. RESULTS CALLED TO:RN  [id number]  Collected By:[id number] MARY FAUSTIN  Left " knee  Collected By:[id number] MARY FAUSTIN          Assessment:  Dc Patel is a 74 y.o. male with a history of peripheral artery disease on Coumadin, CKD stage III, history of multiple revascularization procedures, admitted with infected left femoral-femoral and femoral-popliteal bypass grafts, MSSA bacteremia, left knee septic arthritis.  Source of Staph aureus likely is his multiple upper extremity excoriations.     Pertinent Diagnoses:  Sepsis  MSSA bacteremia  Infected vascular grafts  Left knee septic arthritis  History of blood clots  Chronic anticoagulant use  CKD stage III    Plan:  Sepsis, MSSA bacteremia, infected vascular grafts, on treatment  -Continue IV Ancef renally dosed 2 g every 12 hours  -Continue p.o. rifampin 300 mg 3 times daily 8/19.  Note interaction with Coumadin.  Will need close monitoring of INR  -TTE with no evidence of infective endocarditis  -Follow repeat blood cultures (1/2 sets) from 8/21 - MSSA  Repeat blood culture on 8/23-negative to date  -On 8/20, patient underwent left knee arthrotomy with I&D, as well as removal of infected fem-fem and left fem-pop bypass grafts, left popliteal thrombectomy, redo fem-fem bypass and left fem-pop bypass using Cryoveins.  Knee aspirate + MSSA  -Given his multiple grafts in place including an aortic stent graft which cannot be removed per vascular surgery, patient will likely need chronic p.o. suppression following 6 weeks of IV antibiotics  Stop date of IV antibiotics 10/04/2019 followed by p.o. doxy for lifelong suppression    Left knee septic arthritis, on treatment  -Orthopedics on board.  Left knee arthrotomy with I&D performed 8/20  -Knee aspirate cultures +MSSA  On antibiotics above    Leukocytosis, persistent   Multifactorial  On antibiotics above  Monitor    CKD stage III  -Creatinine at baseline.  Monitor, renally dose antibiotics    Disposition: Plan to transfer to [facility] today.  Will follow there    Discussed with internal  medicine, Dr. Smallwood.

## 2019-08-26 NOTE — DISCHARGE INSTRUCTIONS
Discharge Instructions    Discharged to other by ambulance with escort. Discharged via ambulance, hospital escort: Yes.  Special equipment needed: Not Applicable and Wound VAC    Be sure to schedule a follow-up appointment with your primary care doctor or any specialists as instructed.     Discharge Plan:   Diet Plan: Discussed  Activity Level: Discussed  Confirmed Follow up Appointment: Appointment Scheduled  Confirmed Symptoms Management: Discussed  Medication Reconciliation Updated: Yes  Influenza Vaccine Indication: Patient Refuses    I understand that a diet low in cholesterol, fat, and sodium is recommended for good health. Unless I have been given specific instructions below for another diet, I accept this instruction as my diet prescription.   Other diet: regular    Special Instructions:    · Is patient discharged on Warfarin / Coumadin?   No     Depression / Suicide Risk    As you are discharged from this [facility] facility, it is important to learn how to keep safe from harming yourself.    Recognize the warning signs:  · Abrupt changes in personality, positive or negative- including increase in energy   · Giving away possessions  · Change in eating patterns- significant weight changes-  positive or negative  · Change in sleeping patterns- unable to sleep or sleeping all the time   · Unwillingness or inability to communicate  · Depression  · Unusual sadness, discouragement and loneliness  · Talk of wanting to die  · Neglect of personal appearance   · Rebelliousness- reckless behavior  · Withdrawal from people/activities they love  · Confusion- inability to concentrate     If you or a loved one observes any of these behaviors or has concerns about self-harm, here's what you can do:  · Talk about it- your feelings and reasons for harming yourself  · Remove any means that you might use to hurt yourself (examples: pills, rope, extension cords, firearm)  · Get professional help from the community (Mental  Health, Substance Abuse, psychological counseling)  · Do not be alone:Call your Safe Contact- someone whom you trust who will be there for you.  · Call your local CRISIS HOTLINE 800-4087 or 604-868-8719  · Call your local Children's Mobile Crisis Response Team [place] (723) 182-9159 or www.[facility]  · Call the toll free National Suicide Prevention Hotlines   · National Suicide Prevention Lifeline 648-017-FOQZ (6850)  · National Hope Line Network 800-SUICIDE (941-9664)

## 2019-08-26 NOTE — PROCEDURES
Vascular Access Team     Date of Insertion: 8/26/19  Arm Circumference: 26  Internal length: 45  External Length: 0  Vein Occupancy %: 38  Reason for PICC: antibiotics  Labs: WBC 15.1, , BUN 12, Cr 1.09, GFR >60, INR 1.42    Consents confirmed, vessel patency confirmed with ultrasound. Risks and benefits of procedure explained to patient and education regarding central line associated bloodstream infections provided. Questions answered.     PICC placed in RUE per MD order with ultrasound guidance, initial arm circumference 26cm. 4 Fr, 01 lumen PICC placed in basilic vein after 01 attempt(s). 2 cc's of 1% lidocaine injected intradermally, 21 gauge microintroducer needle and modified Seldinger technique used. 45 cm catheter inserted with good blood return. Secured at 0 cm marker. Each lumen flushed without resistance with 10 mL 0.9% normal saline. PICC line secured with Biopatch and Tegaderm.     PICC placement is confirmed by nurse using 3CG technology. PICC line is appropriate for use at this time. Patient tolerated procedure well, without complications.  Patient condition relayed to unit RN or ordering physician via this post procedure note in the EMR.     Ultrasound images uploaded to PACS and viewable in the EMR - yes  Ultrasound imaged printed and placed in paper chart - no     Thumbs Up Power PICC ref # [id number], Lot # [id number]

## 2019-08-26 NOTE — PROGRESS NOTES
PICC placement is confirmed using 3CG technology. PICC is ok to use. Do NOT order chest x-ray for PICC placement confirmation.

## 2019-08-26 NOTE — CARE PLAN
Problem: Communication  Goal: The ability to communicate needs accurately and effectively will improve  Outcome: PROGRESSING AS EXPECTED  Patient communicating effectively with staff about pain, questions, and other concerns with treatment. All questions answered.       Problem: Safety  Goal: Will remain free from injury  Outcome: PROGRESSING AS EXPECTED  Patient's call light within reach, bed in lowest and locked position, threaded socks on, educated on waiting for assistance. Bed alarm on.

## 2019-08-27 ENCOUNTER — HOSPITAL ENCOUNTER (OUTPATIENT)
Facility: MEDICAL CENTER | Age: 74
End: 2019-09-17
Attending: FAMILY MEDICINE | Admitting: FAMILY MEDICINE
Payer: MEDICARE

## 2019-08-27 ENCOUNTER — HOSPITAL ENCOUNTER (EMERGENCY)
Facility: MEDICAL CENTER | Age: 74
End: 2019-08-27
Attending: EMERGENCY MEDICINE
Payer: COMMERCIAL

## 2019-08-27 VITALS
SYSTOLIC BLOOD PRESSURE: 122 MMHG | TEMPERATURE: 100 F | DIASTOLIC BLOOD PRESSURE: 72 MMHG | WEIGHT: 150 LBS | OXYGEN SATURATION: 97 % | BODY MASS INDEX: 23.54 KG/M2 | HEIGHT: 67 IN | HEART RATE: 88 BPM | RESPIRATION RATE: 20 BRPM

## 2019-08-27 DIAGNOSIS — M96.830 POSTOPERATIVE HEMORRHAGE OF MUSCULOSKELETAL STRUCTURE FOLLOWING MUSCULOSKELETAL PROCEDURE: ICD-10-CM

## 2019-08-27 LAB
ALBUMIN SERPL BCP-MCNC: 2.1 G/DL (ref 3.2–4.9)
ALBUMIN/GLOB SERPL: 0.6 G/DL
ALP SERPL-CCNC: 130 U/L (ref 30–99)
ALT SERPL-CCNC: <5 U/L (ref 2–50)
ANION GAP SERPL CALC-SCNC: 8 MMOL/L (ref 0–11.9)
AST SERPL-CCNC: 19 U/L (ref 12–45)
BILIRUB SERPL-MCNC: 0.4 MG/DL (ref 0.1–1.5)
BUN SERPL-MCNC: 12 MG/DL (ref 8–22)
CALCIUM SERPL-MCNC: 7.8 MG/DL (ref 8.4–10.2)
CHLORIDE SERPL-SCNC: 100 MMOL/L (ref 96–112)
CO2 SERPL-SCNC: 31 MMOL/L (ref 20–33)
CREAT SERPL-MCNC: 0.99 MG/DL (ref 0.5–1.4)
CRP SERPL HS-MCNC: 13.97 MG/DL (ref 0–0.75)
ERYTHROCYTE [DISTWIDTH] IN BLOOD BY AUTOMATED COUNT: 54.3 FL (ref 35.9–50)
GLOBULIN SER CALC-MCNC: 3.3 G/DL (ref 1.9–3.5)
GLUCOSE BLD-MCNC: 106 MG/DL (ref 65–99)
GLUCOSE BLD-MCNC: 180 MG/DL (ref 65–99)
GLUCOSE BLD-MCNC: 305 MG/DL (ref 65–99)
GLUCOSE SERPL-MCNC: 143 MG/DL (ref 65–99)
HCT VFR BLD AUTO: 24.1 % (ref 42–52)
HGB BLD-MCNC: 7.6 G/DL (ref 14–18)
INR PPP: 1.16 (ref 0.87–1.13)
MAGNESIUM SERPL-MCNC: 1.8 MG/DL (ref 1.5–2.5)
MCH RBC QN AUTO: 29.1 PG (ref 27–33)
MCHC RBC AUTO-ENTMCNC: 31.5 G/DL (ref 33.7–35.3)
MCV RBC AUTO: 92.3 FL (ref 81.4–97.8)
MRSA DNA SPEC QL NAA+PROBE: NORMAL
PHOSPHATE SERPL-MCNC: 2.9 MG/DL (ref 2.5–4.5)
PLATELET # BLD AUTO: 536 K/UL (ref 164–446)
PMV BLD AUTO: 9.1 FL (ref 9–12.9)
POTASSIUM SERPL-SCNC: 4.2 MMOL/L (ref 3.6–5.5)
PREALB SERPL-MCNC: 5 MG/DL (ref 18–38)
PROT SERPL-MCNC: 5.4 G/DL (ref 6–8.2)
PROTHROMBIN TIME: 15 SEC (ref 12–14.6)
RBC # BLD AUTO: 2.61 M/UL (ref 4.7–6.1)
SIGNIFICANT IND 70042: NORMAL
SITE SITE: NORMAL
SODIUM SERPL-SCNC: 139 MMOL/L (ref 135–145)
SOURCE SOURCE: NORMAL
WBC # BLD AUTO: 14.4 K/UL (ref 4.8–10.8)

## 2019-08-27 PROCEDURE — 304561 HCHG STAT O2

## 2019-08-27 PROCEDURE — 700111 HCHG RX REV CODE 636 W/ 250 OVERRIDE (IP): Performed by: SURGERY

## 2019-08-27 PROCEDURE — 85027 COMPLETE CBC AUTOMATED: CPT

## 2019-08-27 PROCEDURE — 80053 COMPREHEN METABOLIC PANEL: CPT

## 2019-08-27 PROCEDURE — 84100 ASSAY OF PHOSPHORUS: CPT

## 2019-08-27 PROCEDURE — 85610 PROTHROMBIN TIME: CPT

## 2019-08-27 PROCEDURE — 700111 HCHG RX REV CODE 636 W/ 250 OVERRIDE (IP): Performed by: EMERGENCY MEDICINE

## 2019-08-27 PROCEDURE — 83735 ASSAY OF MAGNESIUM: CPT

## 2019-08-27 PROCEDURE — 96374 THER/PROPH/DIAG INJ IV PUSH: CPT

## 2019-08-27 PROCEDURE — 82962 GLUCOSE BLOOD TEST: CPT | Mod: 91

## 2019-08-27 PROCEDURE — 86140 C-REACTIVE PROTEIN: CPT

## 2019-08-27 PROCEDURE — 84134 ASSAY OF PREALBUMIN: CPT

## 2019-08-27 PROCEDURE — 99284 EMERGENCY DEPT VISIT MOD MDM: CPT

## 2019-08-27 PROCEDURE — 306637 HCHG MISC ORTHO ITEM RC 0274

## 2019-08-27 PROCEDURE — 37799 UNLISTED PX VASCULAR SURGERY: CPT

## 2019-08-27 PROCEDURE — 82962 GLUCOSE BLOOD TEST: CPT

## 2019-08-27 RX ORDER — MORPHINE SULFATE 4 MG/ML
4 INJECTION, SOLUTION INTRAMUSCULAR; INTRAVENOUS ONCE
Status: COMPLETED | OUTPATIENT
Start: 2019-08-27 | End: 2019-08-27

## 2019-08-27 RX ADMIN — MORPHINE SULFATE 4 MG: 4 INJECTION INTRAVENOUS at 11:46

## 2019-08-27 ASSESSMENT — ENCOUNTER SYMPTOMS
CHILLS: 0
MYALGIAS: 1
DIARRHEA: 0
VOMITING: 0
ABDOMINAL PAIN: 0
FEVER: 0
NAUSEA: 0

## 2019-08-27 NOTE — ED NOTES
Pt has bulky dressing to left groin. No further bleeding noted at this time. Left DP pulse present by palpation.

## 2019-08-27 NOTE — ED NOTES
Pt to ED today from [facility] for bleeding from left groin. Pt was taken directly to ED rm #8B and repair was completed by Dr Hurst. Pt now awaiting transfer back to [facility]. States he feels better and denies need at this time.

## 2019-08-27 NOTE — ED TRIAGE NOTES
Pt BIB [facility] with c/o bleeding from fem pop surgical site. Pt was seen at [facility] on 8-18 where he had bilat occluded, infected grafts. Dr. Grande removed grafts that day. Pt has been at [facility] for IVabx therapy. At 0930 this morning [facility] RN noticed that pt wound vac was filled with blood. Dr. Grande was called and pt brought here.

## 2019-08-27 NOTE — PROGRESS NOTES
Infectious Disease Progress Note    Author: Harvinder Head M.D. Date & Time of service: 8/27/2019  10:14 AM    Chief Complaint:  Follow-up for MSSA bacteremia    Interval History:  8/27 afebrile, white count down to 14.4.  Patient transferred to [facility].  Tolerating antibiotics.  Saw patient during wound care eval.  Patient reports pain at the site.  Noted to have oozing from the left groin.  Primary team discussed with vascular surgery, plan is to have him transferred to the ER for possible sutures.    Labs Reviewed and Medications Reviewed.    Review of Systems:  Review of Systems   Constitutional: Negative for chills and fever.   Cardiovascular: Negative for chest pain.   Gastrointestinal: Negative for abdominal pain, diarrhea, nausea and vomiting.   Musculoskeletal: Positive for myalgias.   Skin: Negative for itching and rash.   All other systems reviewed and are negative.      Hemodynamics:  T 98.0 /64 HR 87 RR 18 O2 90%  Physical Exam:  Physical Exam   Constitutional: He is oriented to person, place, and time. He appears well-developed. He appears distressed.   Thin, uncomfortable during dressing change   HENT:   Mouth/Throat: Oropharynx is clear and moist.   Eyes: Pupils are equal, round, and reactive to light. Conjunctivae are normal. No scleral icterus.   Neck: No JVD present.   Cardiovascular: Normal rate, regular rhythm and normal heart sounds.   No murmur heard.  Pulmonary/Chest: Effort normal and breath sounds normal. No respiratory distress. He has no wheezes.   Abdominal: Soft. He exhibits no distension. There is no tenderness.   Musculoskeletal: He exhibits no edema.   Left groin being held under pressure due to bleeding  Left knee midline incision well approximated, no erythema or active drainage  Left anterior medial incision open with healthy tissue, no surrounding redness or active drainage   Neurological: He is alert and oriented to person, place, and time.   No gross FND   Skin: Skin  is warm and dry. No rash noted. He is not diaphoretic. No erythema.   Psychiatric:   Tearful   Nursing note and vitals reviewed.      Meds:  No current facility-administered medications for this encounter.     Labs:  Recent Labs     08/25/19 0337 08/26/19 0420 08/27/19  0340   WBC 15.9* 15.1* 14.4*   RBC 2.35* 2.61* 2.61*   HEMOGLOBIN 6.8* 7.7* 7.6*   HEMATOCRIT 21.6* 24.3* 24.1*   MCV 91.9 93.1 92.3   MCH 28.9 29.5 29.1   RDW 54.4* 53.8* 54.3*   PLATELETCT 485* 548* 536*   MPV 9.2 9.2 9.1   NEUTSPOLYS 90.40* 86.90*  --    LYMPHOCYTES 7.00* 8.70*  --    MONOCYTES 2.60 2.60  --    EOSINOPHILS 0.00 0.00  --    BASOPHILS 0.00 0.00  --    RBCMORPHOLO Present Present  --      Recent Labs     08/25/19 0337 08/26/19 0420 08/27/19  0340   SODIUM 138 138 139   POTASSIUM 3.5* 3.8 4.2   CHLORIDE 100 101 100   CO2 34* 33 31   GLUCOSE 159* 119* 143*   BUN 12 12 12     Recent Labs     08/25/19 0337 08/26/19 0420 08/27/19  0340   ALBUMIN 2.2* 2.3* 2.1*   TBILIRUBIN 0.4 0.4 0.4   ALKPHOSPHAT 137* 132* 130*   TOTPROTEIN 5.2* 5.3* 5.4*   ALTSGPT 5 <5 <5   ASTSGOT 27 19 19   CREATININE 1.04 1.09 0.99       Imaging:  Ct-cta Lower Ext With & W/o-post Process Left    Result Date: 8/18/2019 8/18/2019 2:55 PM HISTORY/REASON FOR EXAM:  Acute limb ischemia, leg worsening pain and decreased pulse to left leg possible DVT vs arterial clot. He had a fem pop bypass in last 6 weeks. Now has +leg swelling, redness to lower abdomen, pain in back of knee TECHNIQUE/EXAM DESCRIPTION:  CT angiogram of the left lower extremity with contrast, with reconstructions. 100 mL of Omnipaque 350 nonionic contrast was administered at 5.0 mL/sec and helical scanning obtained from the distal femur through the ankle. Thin- and thick-section multiplanar volume reformats were generated from the axial data set in the sagittal and coronal planes. 3D angiographic images were reviewed on PACS. Maximum intensity projection (MIP) images were generated and reviewed.  Low dose optimization technique was utilized for this CT exam including automated exposure control and adjustment of the mA and/or kV according to patient size. COMPARISON: Ultrasound 8/18/2019. FINDINGS: Partially visualized femoral-femoral bypass that appears patent. There is small amount of clot in the junction between the femorofemoral bypass graft and the common femoral artery. The left femoral popliteal graft is completely occluded. There is gas within the lumen. There is small amount of fluid surrounding the entire portion of the graft. The native common femoral and external femoral artery demonstrate multiple focal narrowing there is complete occlusion of the superficial femoral artery from the mid to distal portion at the area of prior stent extending to the popliteal artery. There is partial reconstitution at the trifurcation but flow is very sluggish. No flow appreciated in the anterior tibial artery, posterior tibial artery and peroneal artery at 10 cm from the ankle. 3D angiographic/MIP images of the vasculature confirm the vascular findings as described above.     1. Completely occluded left femoral-popliteal bypass graft with gas within the lumen and fluid surrounding the graft. The finding is concerning for infected occluded graft. 2. Partially visualized femoral-femoral bypass that appears patent. There is small amount of clot in the junction between the femorofemoral bypass graft and the common femoral artery. 3. Complete occlusion of the native superficial femoral artery from the mid to distal thigh at the area of prior stenting. Complete occlusion of the left popliteal artery. Partial flow reconstitution at the trifurcation but flow is very sluggish. No distal flow flow appreciated in the anterior tibial artery, posterior tibial artery and peroneal artery at 10 cm from the ankle. CRITICAL RESULT READ BACK: Preliminary findings discussed with and critical read back performed by Dr. BERNA PARR in  the Emergency Department via telephone on 2019 3:51 PM    Dx-chest-portable (1 View)    Result Date: 2019 3:25 PM HISTORY/REASON FOR EXAM: Right-sided PICC line placement. TECHNIQUE/EXAM DESCRIPTION AND NUMBER OF VIEWS: Single portable view of the chest. COMPARISON: 10/13/2018 FINDINGS: There is a right sided PICC line. The catheter tip overlies cavoatrial junction. There is tortuosity and ectasia of the aorta. There is no evidence of focal consolidation or evidence of pulmonary edema. There is no pleural effusion. The heart is normal in size.     Right PICC line tip overlies the cavoatrial junction.    Us-extremity Artery Lower Unilat Left    Result Date: 2019  Lower Extremity  Arterial Duplex Report  Vascular Laboratory  CONCLUSIONS  1.  The LEFT femoral popliteal bypass graft is occluded.  2.  THere is no flow seen in the mid/distal SFA through the distal  popliteal artery consistent with thrombosis.  3.  The bifemoral bypass graft is patent.  4.  There is minimal 3 vessel runoff tothe Left foot.  4.  THere is fluid surrounding both grafts.  Findings called to Dr. Ambrosio at 2:48 pm on 19.  KATHRYN DIAS  Exam Date:     2019 12:44  Room #:     Inpatient  Priority:     Stat  Ht (in):             Wt (lb):  Ordering Physician:        BERNA AMBROSIO  Referring Physician:       BERNA AMBROSIO  Sonographer:               Marcelino Miranda RVT  Study Type:                Complete Unilateral  Technical Quality:         Adequate  Age:    74    Gender:     M  MRN:    [id number]  :    1945      BSA:  Indications:     Pain in Limb  CPT Codes:       01867  ICD Codes:       729.5  History:         Left lower extremity pain. History of right-to-left fem-fem                   bypass graft and left fem-pop byppass graft  Limitations:                RIGHT  Waveform        Peak Systolic Velocity (cm/s)                  Prox    Prox-Mid  Mid    Mid-Dist  Distal                                                              CFA                                                             PFA                                                             SFA                                                             POP                                                             AT                                                             PT                                                             AL                LEFT  Waveform        Peak Systolic Velocity (cm/s)                  Prox    Prox-Mid  Mid    Mid-Dist  Distal  Bi, non-                          173                      CFA  reversed  Triphasic       108                                        PFA  Absent          59                36               0       SFA  Absent                            0                        POP  Monophasic      6                                  6       AT  Monophasic      7                                  7       PT  Monophasic      5                                  6       AL  FINDINGS  Left.  With the exception of the very proximal fem-pop bypass graft, no flow can  be demonstrated throughout the graft's length. Echolucent material  consistent with arterial thrombus is visualized at the proximal thigh.  Retrograde flow seen in the very proximal segment. Throughout the graft's  length, there appears to be fluid collecting around the graft  No flow can be demonstrated from the mid-distal superficial femoral artery  through the distal popliteal artery. Echolucent material consistent with  arterial thrombus is visualized throughout this segment.  3-Vessel, severely diminished and monophasic runoff to the foot.  The fem-fem bypass graft is widely patent throughout its length. There  appears to be a fluid collection surrounding this graft.  Tayler Huynh  (Electronically Signed)  Final Date:      18 August 2019                   14:38  Amended:         18 August 2019 14:49    Us-extremity Venous Lower  Bilat    Result Date: 2019   Vascular Laboratory  CONCLUSIONS  No deep venuos thrombosis bilaterally.  KATHRYN DIAS  Exam Date:     2019 13:56  Room #:     Inpatient  Priority:     Routine  Ht (in):             Wt (lb):  Ordering Physician:        KURT ADRIAN  Referring Physician:       [id number] Collazo  Sonographer:               Lacey Hayden RVT  Study Type:                Complete Bilateral  Technical Quality:         Adequate  Age:    74    Gender:     M  MRN:    [id number]  :    1945      BSA:  Indications:     Edema, Localized swelling, mass and lump, lower limb,                   bilateral  CPT Codes:       25337  ICD Codes:       782.3  R22.43  History:         Bilateral lower extremity edema/swelling s/p infected                   arterial graft removal; Wound vac at Left groin and Left                   proximal calf. Previous duplex 19.  Limitations:  PROCEDURES:  Bilateral lower extremity venous duplex imaging.  The following venous structures were evaluated: common femoral, profunda  femoral, greater saphenous, femoral, popliteal , peroneal and posterior  tibial veins.  Serial compression, augmentation maneuvers,  color and spectral Doppler  flow evaluations were performed.  FINDINGS:  Bilateral lower extremities -  Complete color filling and compressibility with normal venous flow dynamics  including spontaneous flow, response to augmentation maneuvers, and  respiratory phasicity.  The peroneal and posterior tibial veins are difficult to assess for  compressibility, but flow response to augmentation is demonstrated.  Anson Jacinto  (Electronically Signed)  Final Date:      2019                   15:17    Us-extremity Venous Lower Unilat Left    Result Date: 2019   Vascular Laboratory  CONCLUSIONS  1.  No evidence of LEFT lower extremity DVT.  KATHRYN DIAS  Exam Date:     2019 12:31  Room #:     Inpatient  Priority:     Stat  Ht (in):              Wt (lb):  Ordering Physician:        BERNA PARR  Referring Physician:       [id number]  Sonographer:               Marcelino Miranda RVT  Study Type:                Complete Unilateral  Technical Quality:         Adequate  Age:    74    Gender:     M  MRN:    [id number]  :    1945      BSA:  Indications:     Swelling of Limb  CPT Codes:       20490  ICD Codes:       729.81  History:         Left lower extremity edema  Limitations:     Pain in groin and popliteal fossa. Long axis images obtained                    instead of compressions.  PROCEDURES:  Left lower extremity venous duplex imaging.  The following venous structures were evaluated: common femoral, profunda  femoral, greater saphenous, femoral, popliteal , peroneal and posterior  tibial veins.  Serial compression, augmentation maneuvers,  color and spectral Doppler  flow evaluations were performed.  FINDINGS:  Left lower extremity -.  Complete color filling and compressibility with normal venous flow dynamics  including spontaneous flow, response to augmentation maneuvers, and  respiratory phasicity.  No evidence of superficial or deep venous thrombosis.  Flow was evaluated in the contralateral common femoral vein and normal  venous flow dynamics including spontaneous flow, respiratory phasic  variation and augmentation were demonstrated.  Tayler Huynh  (Electronically Signed)  Final Date:      2019                   14:39    Dx-portable Fluoro > 1 Hour    Result Date: 2019 5:00 PM Digitized Intraoperative Radiograph TECHNIQUE: 104 DSA images of the left lower extremity CLINICAL INFORMATION: Infected femoral-popliteal arterial bypass graft. COMPARISON: CTA 2019 FINDINGS: Faint contrast seen in the vessel Fluoroscopic time: 18 seconds .36 mGy    Digitized intraoperative radiograph is submitted for review.  This examination is not for diagnostic purpose but for guidance during a surgical procedure.      Ec-echocardiogram Complete W/o Cont    Result Date: 2019  Transthoracic Echo Report Echocardiography Laboratory CONCLUSIONS No prior study is available for comparison. Normal left ventricular chamber size. Left ventricular ejection fraction is visually estimated to be 65%. Moderate concentric left ventricular hypertrophy. Normal diastolic function. No significant valve abnormalities. KATHRYN DIAS Exam Date:         2019                    09:53 Exam Location:     Inpatient Priority:          Routine Ordering Physician:        STEPHANY AVILA Referring Physician: Sonographer:               Yina Aldridge RDCS Age:    74     Gender:    M MRN:    [id number] :    1945 BSA:    1.79   Ht (in):    67     Wt (lb):    151 Exam Type:     Complete Indications:     Endocarditis ICD Codes:       421 CPT Codes:       76384 BP:   113    /   76     HR:   66 Technical Quality:       Fair MEASUREMENTS  (Male / Female) Normal Values 2D ECHO LV Diastolic Diameter PLAX        4.7 cm                4.2 - 5.9 / 3.9 - 5.3 cm LV Systolic Diameter PLAX         2.6 cm                2.1 - 4.0 cm IVS Diastolic Thickness           1.4 cm                LVPW Diastolic Thickness          1.4 cm                LVOT Diameter                     2.5 cm                Estimated LV Ejection Fraction    65 %                  LV Ejection Fraction MOD BP       50.1 %                >= 55  % LV Ejection Fraction MOD 4C       56.2 %                LV Ejection Fraction MOD 2C       39.6 %                M-MODE Aortic Root Diameter MM           4.4 cm                DOPPLER AV Peak Velocity                  2.1 m/s               AV Peak Gradient                  18.1 mmHg             AV Mean Gradient                  9.8 mmHg              LVOT Peak Velocity                0.97 m/s              AV Area Cont Eq vti               2.1 cm²               Mitral E Point Velocity           0.53 m/s              Mitral E to A Ratio                0.63                  MV Pressure Half Time             64.3 ms               MV Area PHT                       3.4 cm²               MV Deceleration Time              222 ms                TR Peak Velocity                  272 cm/s              PV Peak Velocity                  0.96 m/s              PV Peak Gradient                  3.7 mmHg              RVOT Peak Velocity                0.96 m/s              * Indicates values subject to auto-interpretation LV EF:  65    % FINDINGS Left Ventricle Normal left ventricular chamber size. Moderate concentric left ventricular hypertrophy. Normal left ventricular systolic function. Left ventricular ejection fraction is visually estimated to be 65%. Normal regional wall motion. Normal diastolic function. Right Ventricle The right ventricle was normal in size and function. Right Atrium The right atrium is normal in size. Vena cava is not well visualized. Left Atrium The left atrium is normal in size.  Left atrial volume index is 29 mL/sq m. Mitral Valve Structurally normal mitral valve without significant stenosis or regurgitation. Aortic Valve Structurally normal aortic valve without significant stenosis or regurgitation. Tricuspid Valve Structurally normal tricuspid valve without significant stenosis. Trace tricuspid regurgitation. Right ventricular systolic pressure is estimated to be 20 mmHg + estimated RAP. Pulmonic Valve Structurally normal pulmonic valve without significant stenosis or regurgitation. Pericardium Normal pericardium without effusion. Aorta The aortic root is normal. Brandon Juárez MD (Electronically Signed) Final Date:     21 August 2019                 11:16    Us-vein Mapping Lower Extremity Bilat    Result Date: 8/20/2019   Lower Extremity  Vein Map  Vascular Laboratory  CONCLUSIONS  1) Patent but small right long saphenous vein with representative diameter  measurements as listed.  2) Patent left long saphenous vein with representative  diameter  measurements as listed.  This vein is small below the knee.  KATHRYN DIAS  Exam Date:     2019 13:20  Room #:     Inpatient  Priority:     Routine  Ht (in):             Wt (lb):  Ordering Physician:        MARIANN GOOD  Referring Physician:       [id number] Mccray  Sonographer:               Miki Green RDCS, RVT  Study Type:                Complete Bilateral  Technical Quality:         Adequate  Age:    74    Gender:     M  MRN:    [id number]  :    1945      BSA:  Indications:     Peripheral vascular disease, unspecified  CPT Codes:       52641  ICD Codes:       I73.9  History:         Evaluate for arterial bypass conduit.  Limitations:            RIGHT                  Diameter                  (cm)  Characteristics  Normal             0.25      SFJ  Normal             0.21      HT  Normal             0.25      MT  Normal             0.26      LT  Normal             0.25      Knee  Normal             0.18      HC  Normal             0.23      LC  Normal             0.18      Ankle                LEFT       Diameter       (cm)                  Characteristics  SFJ    0.47     Normal  HT     0.46     Normal  MT     0.47     Normal  LT     0.41     Normal  Knee   0.37     Normal  HC     0.21     Normal  LC     0.22     Normal  Ankle  0.20     Normal                  Diameter                  (cm)  Characteristics  Normal             0.33      PF  Normal             0.21      MC                               Ankle         Diameter         (cm)                    Characteristics    PF     0.30     Normal    MC     0.21     Normal    Ankle  FINDINGS  Right. The greater saphenous vein is small in caliber. The small saphenous  vein is small in caliber.  Left. The greater saphenous vein is patent to the knee and essentially  normal in caliber. The greater saphenous is small in calibre in the high  calf and to the ankle.  The small saphenous vein is small in caliber.   Ana Grande M.D.  (Electronically Signed)  Final Date:      20 August 2019                   08:59    Ir-picc Line Placement W/ Guidance > Age 5    Result Date: 8/26/2019  HISTORY/REASON FOR EXAM:   PICC placement. TECHNIQUE/EXAM DESCRIPTION AND NUMBER OF VIEWS:   PICC line insertion with ultrasound guidance.  The procedure was performed using maximal sterile barrier technique including sterile gown, mask, cap, and donning of sterile gloves following appropriate hand hygiene and/or sterile scrub. Patient skin site was prepped with 2% Chlorhexidine solution. FINDINGS:  PICC line insertion with Ultrasound Guidance was performed by qualified nursing staff without the assistance of a Radiologist. PICC positioning appropriateness confirmed by 3CG technology; chest xray only needed in the instance 3CG unable to confirm placement.              Ultrasound-guided PICC placement performed by qualified nursing staff as above.       Micro:  Results     Procedure Component Value Units Date/Time    MRSA By PCR (Amp) [[id number]]     Order Status:  No result Specimen:  Respirate from Nares     MRSA By PCR (Amp) [[id number]] Collected:  08/26/19 2000    Order Status:  No result           Assessment:  Dc Patel is a 74 y.o. male with a history of peripheral artery disease on Coumadin, CKD stage III, history of multiple revascularization procedures, admitted with infected left femoral-femoral and femoral-popliteal bypass grafts, MSSA bacteremia, left knee septic arthritis.  Source of Staph aureus likely is his multiple upper extremity excoriations.     Pertinent Diagnoses:  Sepsis  MSSA bacteremia  Infected vascular grafts  Left knee septic arthritis  Bleeding from left groin surgical site  History of blood clots  Chronic anticoagulant use     Plan:  Sepsis, MSSA bacteremia, infected vascular grafts, on treatment  -Continue IV Ancef 2 g every 8 hours  -Continue p.o. rifampin 300 mg 3 times daily 8/19.  Note interaction with  Coumadin.  Will need close monitoring of INR  -TTE with no evidence of infective endocarditis  -Follow repeat blood cultures (1/2 sets) from 8/21 - MSSA  Repeat blood culture on 8/23-negative to date  -On 8/20, patient underwent left knee arthrotomy with I&D, as well as removal of infected fem-fem and left fem-pop bypass grafts, left popliteal thrombectomy, redo fem-fem bypass and left fem-pop bypass using Cryoveins.  Knee aspirate + MSSA  -Given his multiple grafts in place including an aortic stent graft which cannot be removed per vascular surgery, patient will likely need chronic p.o. suppression following 6 weeks of IV antibiotics  Stop date of IV antibiotics 10/04/2019 followed by p.o. doxy for lifelong suppression     Left knee septic arthritis, on treatment  -Orthopedics on board.  Left knee arthrotomy with I&D performed 8/20  -Knee aspirate cultures +MSSA  On antibiotics above     Bleeding from left groin surgical site  Currently being held under pressure, plan is to transfer patient to the ER where vascular surgery may place sutures    Leukocytosis, persistent   Multifactorial  On antibiotics above  Improving slowly  Monitor     EVERT, resolved  -Initial report of CKD stage III, but creatinine is now down to normal  -Monitor, renally dose antibiotics     Discussed with pharmacy and Jayjay.  ID will follow.  Please call with questions.

## 2019-08-27 NOTE — PROGRESS NOTES
Called for bleeding from left groin wound.    On exam, there is brisk bleeding from a small muscular arterial branch on the sartorius flap.  I placed a stitch using 4-0 Nylon.  No further bleeding was seen afterward.  I cleaned wound and dressed wound with wet to dry dressings.  Strong Doppler flow signals over L PT.  Left foot is warm.    I contacted Dr. Ro and recommended leaving dressing on until tomorrow.  Wound vac may be reapplied tomorrow.    OK to be transferred back to [facility].    I appreciate ER RN's assistance.

## 2019-08-27 NOTE — ED PROVIDER NOTES
ED Provider Note    CHIEF COMPLAINT  Chief Complaint   Patient presents with   • Post Op Bleeding       HPI  Dc Patel is a 74 y.o. Male with a history of type 2 diabetes mellitus, peripheral vascular occlusive disease, chronic kidney disease stage III, status post femorofemoral bypass, femoropopliteal bypass, currently a resident at [facility] who presents to the ER for bleeding from his left groin area.  The patient recently was found to have an occlusion of the his left femoropopliteal bypass graft along with infection.  This was revised by Dr. Grande of vascular surgery.  The patient has been at [facility] covering from the surgery.  He had a wound VAC in place which was noted to be draining a significant amount of blood this morning.  Dr. Grande was contacted and asked the patient be sent to the ER.  The patient complains of pain to the left groin the left leg.  He is able to move his toes.  He has chronic numbness to the foot since his surgery.  He denies any head, neck, chest, back, or abdominal pain.    REVIEW OF SYSTEMS  See HPI for further details. All other systems are negative.     PAST MEDICAL HISTORY  Past Medical History:   Diagnosis Date   • PVD (peripheral vascular disease) (Formerly [facility])        FAMILY HISTORY  No family history on file.    SOCIAL HISTORY  Social History     Socioeconomic History   • Marital status:      Spouse name: Not on file   • Number of children: Not on file   • Years of education: Not on file   • Highest education level: Not on file   Occupational History   • Not on file   Social Needs   • Financial resource strain: Not on file   • Food insecurity:     Worry: Not on file     Inability: Not on file   • Transportation needs:     Medical: Not on file     Non-medical: Not on file   Tobacco Use   • Smoking status: Former Smoker   • Smokeless tobacco: Never Used   Substance and Sexual Activity   • Alcohol use: Not Currently   • Drug use: Not Currently     Types: Inhaled      Comment: THC   • Sexual activity: Not on file   Lifestyle   • Physical activity:     Days per week: Not on file     Minutes per session: Not on file   • Stress: Not on file   Relationships   • Social connections:     Talks on phone: Not on file     Gets together: Not on file     Attends Yazidi service: Not on file     Active member of club or organization: Not on file     Attends meetings of clubs or organizations: Not on file     Relationship status: Not on file   • Intimate partner violence:     Fear of current or ex partner: Not on file     Emotionally abused: Not on file     Physically abused: Not on file     Forced sexual activity: Not on file   Other Topics Concern   • Not on file   Social History Narrative   • Not on file       SURGICAL HISTORY  Past Surgical History:   Procedure Laterality Date   • FEMORAL FEMORAL BYPASS Left 8/20/2019    Procedure: CREATION, BYPASS, ARTERIAL, FEMORAL TO FEMORAL, USING GRAFT- REMOVAL AND REDO, CRYOVEIN;  Surgeon: Ana Grande M.D.;  Location: [facility];  Service: General   • FEMORAL POPLITEAL BYPASS Left 8/20/2019    Procedure: CREATION, BYPASS, ARTERIAL, FEMORAL TO POPLITEAL, USING GRAFT;  Surgeon: Ana Grande M.D.;  Location: [facility];  Service: General   • ANGIOGRAM Left 8/20/2019    Procedure: ANGIOGRAM;  Surgeon: Ana Grande M.D.;  Location: [facility];  Service: General   • IRRIGATION & DEBRIDEMENT ORTHO Left 8/20/2019    Procedure: IRRIGATION AND DEBRIDEMENT, WOUND-KNEE;  Surgeon: Iván Cifuentes M.D.;  Location: [facility];  Service: General   • FEMORAL FEMORAL BYPASS Bilateral 7/3/2019    Procedure: CREATION, BYPASS, ARTERIAL, FEMORAL TO FEMORAL, USING GRAFT;  Surgeon: Ana Grande M.D.;  Location: [facility];  Service: General   • FEMORAL POPLITEAL BYPASS Left 7/3/2019    Procedure: CREATION, BYPASS, ARTERIAL, FEMORAL TO POPLITEAL, USING GRAFT;  Surgeon: Ana Grande M.D.;   "Location: SURGERY [facility];  Service: General   • ANGIOGRAM Left 7/3/2019    Procedure: ANGIOGRAM;  Surgeon: Ana Grande M.D.;  Location: SURGERY [facility];  Service: General   • TOE AMPUTATION Left 10/23/2018    Procedure: TOE AMPUTATION 3RD  POSS 2ND  ;  Surgeon: Ana Grande M.D.;  Location: SURGERY [facility];  Service: General       CURRENT MEDICATIONS  Home Medications    **Home medications have not yet been reviewed for this encounter**         ALLERGIES  No Known Allergies    PHYSICAL EXAM  VITAL SIGNS: /72   Pulse (!) 0   Temp 37.8 °C (100 °F) (Oral)   Resp (!) 33   Ht 1.702 m (5' 7\")   Wt 68 kg (150 lb)   SpO2 97%   BMI 23.49 kg/m²   Constitutional: Awake, alert, in no acute distress, Non-toxic appearance.   HENT: Atraumatic. Bilateral external ears normal, mucous membranes moist, throat nonerythematous without exudates, nose is normal.  Eyes: PERRL, EOMI, conjunctiva moist, noninjected.  Neck: Nontender, Normal range of motion, No nuchal rigidity, No stridor.   Lymphatic: No lymphadenopathy noted.   Cardiovascular: Regular rate and rhythm, no murmurs, rubs, gallops.  Thorax & Lungs:  Good breath sounds bilaterally, no wheezes, rales, or retractions.  No chest tenderness.  Abdomen: Bowel sounds normal, Soft, nontender, nondistended, no rebound, guarding, masses.  Back: No CVA or spinal tenderness.  Extremities: There is a dressing to the left groin with presence of bright red blood.  There is good movement of the toes.  There is a palpable dorsalis pedis pulse.  There is good capillary refill to the toes.  There is a healing wound with sutures in place over the left knee with no significant erythema or induration.  Skin: Warm, Dry, No rashes.   Musculoskeletal: There is mild tenderness noted to the left groin, left hip, left knee.  There is a healing incision over the left knee, slight swelling, sutures in place, with no signs of erythema, induration, or drainage.  No " other joint swelling or tenderness.  Neurologic: Alert & oriented x 3, sensory decreased sensation to the left leg below the knee, and motor function normal shows good movement of the upper and lower extremities.  Psychiatric: Affect normal, Judgment normal, Mood normal.       COURSE & MEDICAL DECISION MAKING  Pertinent Labs & Imaging studies reviewed. (See chart for details)  Patient presents with bleeding from his surgical site.  Dr. Grande of vascular surgery was aware of the patient.  He was in to see the patient shortly afterwards.  He is a suture to the bleeding vessel which resolved the bleeding.  The wound was dressed by nursing.  Did receive a dose of morphine Zofran for pain prior to the procedure.  The patient will be transferred back to [facility].    FINAL IMPRESSION  1.  Postoperative bleeding  2.   3.         Electronically signed by: Adi Barry, 8/27/2019 12:06 PM

## 2019-08-28 LAB
BACTERIA BLD CULT: NORMAL
BACTERIA BLD CULT: NORMAL
GLUCOSE BLD-MCNC: 104 MG/DL (ref 65–99)
GLUCOSE BLD-MCNC: 120 MG/DL (ref 65–99)
GLUCOSE BLD-MCNC: 122 MG/DL (ref 65–99)
GLUCOSE BLD-MCNC: 138 MG/DL (ref 65–99)
SIGNIFICANT IND 70042: NORMAL
SIGNIFICANT IND 70042: NORMAL
SITE SITE: NORMAL
SITE SITE: NORMAL
SOURCE SOURCE: NORMAL
SOURCE SOURCE: NORMAL

## 2019-08-28 PROCEDURE — 82962 GLUCOSE BLOOD TEST: CPT | Mod: 91

## 2019-08-29 LAB
ABO GROUP BLD: NORMAL
ANION GAP SERPL CALC-SCNC: 7 MMOL/L (ref 0–11.9)
BARCODED ABORH UBTYP: 5100
BARCODED ABORH UBTYP: 5100
BARCODED PRD CODE UBPRD: NORMAL
BARCODED PRD CODE UBPRD: NORMAL
BARCODED UNIT NUM UBUNT: NORMAL
BARCODED UNIT NUM UBUNT: NORMAL
BLD GP AB SCN SERPL QL: NORMAL
BUN SERPL-MCNC: 20 MG/DL (ref 8–22)
CALCIUM SERPL-MCNC: 7.9 MG/DL (ref 8.4–10.2)
CHLORIDE SERPL-SCNC: 104 MMOL/L (ref 96–112)
CO2 SERPL-SCNC: 29 MMOL/L (ref 20–33)
COMPONENT R 8504R: NORMAL
COMPONENT R 8504R: NORMAL
CREAT SERPL-MCNC: 1.02 MG/DL (ref 0.5–1.4)
ERYTHROCYTE [DISTWIDTH] IN BLOOD BY AUTOMATED COUNT: 56.2 FL (ref 35.9–50)
GLUCOSE BLD-MCNC: 148 MG/DL (ref 65–99)
GLUCOSE BLD-MCNC: 149 MG/DL (ref 65–99)
GLUCOSE BLD-MCNC: 154 MG/DL (ref 65–99)
GLUCOSE BLD-MCNC: 161 MG/DL (ref 65–99)
GLUCOSE BLD-MCNC: 215 MG/DL (ref 65–99)
GLUCOSE SERPL-MCNC: 126 MG/DL (ref 65–99)
HCT VFR BLD AUTO: 18.9 % (ref 42–52)
HGB BLD-MCNC: 5.8 G/DL (ref 14–18)
MAGNESIUM SERPL-MCNC: 1.9 MG/DL (ref 1.5–2.5)
MCH RBC QN AUTO: 28.9 PG (ref 27–33)
MCHC RBC AUTO-ENTMCNC: 30.7 G/DL (ref 33.7–35.3)
MCV RBC AUTO: 94 FL (ref 81.4–97.8)
PLATELET # BLD AUTO: 543 K/UL (ref 164–446)
PMV BLD AUTO: 9.3 FL (ref 9–12.9)
POTASSIUM SERPL-SCNC: 4.1 MMOL/L (ref 3.6–5.5)
PRODUCT TYPE UPROD: NORMAL
PRODUCT TYPE UPROD: NORMAL
RBC # BLD AUTO: 2.01 M/UL (ref 4.7–6.1)
RH BLD: NORMAL
SODIUM SERPL-SCNC: 140 MMOL/L (ref 135–145)
UNIT STATUS USTAT: NORMAL
UNIT STATUS USTAT: NORMAL
WBC # BLD AUTO: 12.8 K/UL (ref 4.8–10.8)

## 2019-08-29 PROCEDURE — 86850 RBC ANTIBODY SCREEN: CPT

## 2019-08-29 PROCEDURE — 85027 COMPLETE CBC AUTOMATED: CPT

## 2019-08-29 PROCEDURE — 83735 ASSAY OF MAGNESIUM: CPT

## 2019-08-29 PROCEDURE — 86900 BLOOD TYPING SEROLOGIC ABO: CPT

## 2019-08-29 PROCEDURE — P9016 RBC LEUKOCYTES REDUCED: HCPCS

## 2019-08-29 PROCEDURE — 82962 GLUCOSE BLOOD TEST: CPT

## 2019-08-29 PROCEDURE — 86901 BLOOD TYPING SEROLOGIC RH(D): CPT

## 2019-08-29 PROCEDURE — 86923 COMPATIBILITY TEST ELECTRIC: CPT | Mod: 91

## 2019-08-29 PROCEDURE — 80048 BASIC METABOLIC PNL TOTAL CA: CPT

## 2019-08-29 PROCEDURE — 82962 GLUCOSE BLOOD TEST: CPT | Mod: 91

## 2019-08-29 ASSESSMENT — ENCOUNTER SYMPTOMS
DIARRHEA: 0
MYALGIAS: 1
CHILLS: 0
NAUSEA: 0
VOMITING: 0
FEVER: 0
ABDOMINAL PAIN: 0

## 2019-08-29 NOTE — PROGRESS NOTES
Infectious Disease Progress Note    Author: Harvinder Head M.D. Date & Time of service: 8/29/2019  11:00 AM    Chief Complaint:  Follow-up for MSSA bacteremia    Interval History:  8/27 afebrile, white count down to 14.4.  Patient transferred to [facility].  Tolerating antibiotics.  Saw patient during wound care eval.  Patient reports pain at the site.  Noted to have oozing from the left groin.  Primary team discussed with vascular surgery, plan is to have him transferred to the ER for possible sutures.  8/29 afebrile, white count trending down, 12.8 today. Pt reports feeling better. No further bleeding from L groin. Tolerating antibiotics. No new issues.    Labs Reviewed and Medications Reviewed.    Review of Systems:  Review of Systems   Constitutional: Negative for chills and fever.   Cardiovascular: Negative for chest pain.   Gastrointestinal: Negative for abdominal pain, diarrhea, nausea and vomiting.   Musculoskeletal: Positive for myalgias.   Skin: Negative for itching and rash.   All other systems reviewed and are negative.    Hemodynamics:  T 99.2 /78 HR 93 o2 92% RR 20  Physical Exam:  Physical Exam   Constitutional: He is oriented to person, place, and time. He appears well-developed. No distress.   Thin, comfortable now   HENT:   Mouth/Throat: Oropharynx is clear and moist.   Eyes: Pupils are equal, round, and reactive to light. Conjunctivae are normal. No scleral icterus.   Neck: No JVD present.   Cardiovascular: Normal rate, regular rhythm and normal heart sounds.   No murmur heard.  Pulmonary/Chest: Effort normal and breath sounds normal. No respiratory distress. He has no wheezes.   Abdominal: Soft. He exhibits no distension. There is no tenderness.   Left groin dressing in place, no bleeding noted over dressing, right groin wound VAC with no surrounding erythema or drainage noted   Musculoskeletal: He exhibits no edema.   Left knee midline incision well approximated, no erythema or active  drainage  Left anterior medial proximal leg incision with wound VAC in place, no surrounding redness or active drainage   Neurological: He is alert and oriented to person, place, and time.   No gross FND   Skin: Skin is warm and dry. No rash noted. He is not diaphoretic. No erythema.   Previous multiple scabs bilateral forearms improved   Psychiatric:   Emotionally labile   Nursing note and vitals reviewed.      Meds:  No current facility-administered medications for this encounter.     Labs:  Recent Labs     08/27/19 0340 08/29/19  0230   WBC 14.4* 12.8*   RBC 2.61* 2.01*   HEMOGLOBIN 7.6* 5.8*   HEMATOCRIT 24.1* 18.9*   MCV 92.3 94.0   MCH 29.1 28.9   RDW 54.3* 56.2*   PLATELETCT 536* 543*   MPV 9.1 9.3     Recent Labs     08/27/19 0340 08/29/19  0230   SODIUM 139 140   POTASSIUM 4.2 4.1   CHLORIDE 100 104   CO2 31 29   GLUCOSE 143* 126*   BUN 12 20     Recent Labs     08/27/19 0340 08/29/19  0230   ALBUMIN 2.1*  --    TBILIRUBIN 0.4  --    ALKPHOSPHAT 130*  --    TOTPROTEIN 5.4*  --    ALTSGPT <5  --    ASTSGOT 19  --    CREATININE 0.99 1.02       Imaging:  Ct-cta Lower Ext With & W/o-post Process Left    Result Date: 8/18/2019 8/18/2019 2:55 PM HISTORY/REASON FOR EXAM:  Acute limb ischemia, leg worsening pain and decreased pulse to left leg possible DVT vs arterial clot. He had a fem pop bypass in last 6 weeks. Now has +leg swelling, redness to lower abdomen, pain in back of knee TECHNIQUE/EXAM DESCRIPTION:  CT angiogram of the left lower extremity with contrast, with reconstructions. 100 mL of Omnipaque 350 nonionic contrast was administered at 5.0 mL/sec and helical scanning obtained from the distal femur through the ankle. Thin- and thick-section multiplanar volume reformats were generated from the axial data set in the sagittal and coronal planes. 3D angiographic images were reviewed on PACS. Maximum intensity projection (MIP) images were generated and reviewed. Low dose optimization technique was  utilized for this CT exam including automated exposure control and adjustment of the mA and/or kV according to patient size. COMPARISON: Ultrasound 8/18/2019. FINDINGS: Partially visualized femoral-femoral bypass that appears patent. There is small amount of clot in the junction between the femorofemoral bypass graft and the common femoral artery. The left femoral popliteal graft is completely occluded. There is gas within the lumen. There is small amount of fluid surrounding the entire portion of the graft. The native common femoral and external femoral artery demonstrate multiple focal narrowing there is complete occlusion of the superficial femoral artery from the mid to distal portion at the area of prior stent extending to the popliteal artery. There is partial reconstitution at the trifurcation but flow is very sluggish. No flow appreciated in the anterior tibial artery, posterior tibial artery and peroneal artery at 10 cm from the ankle. 3D angiographic/MIP images of the vasculature confirm the vascular findings as described above.     1. Completely occluded left femoral-popliteal bypass graft with gas within the lumen and fluid surrounding the graft. The finding is concerning for infected occluded graft. 2. Partially visualized femoral-femoral bypass that appears patent. There is small amount of clot in the junction between the femorofemoral bypass graft and the common femoral artery. 3. Complete occlusion of the native superficial femoral artery from the mid to distal thigh at the area of prior stenting. Complete occlusion of the left popliteal artery. Partial flow reconstitution at the trifurcation but flow is very sluggish. No distal flow flow appreciated in the anterior tibial artery, posterior tibial artery and peroneal artery at 10 cm from the ankle. CRITICAL RESULT READ BACK: Preliminary findings discussed with and critical read back performed by Dr. BERNA PARR in the Emergency Department via  telephone on 2019 3:51 PM    Dx-chest-portable (1 View)    Result Date: 2019 3:25 PM HISTORY/REASON FOR EXAM: Right-sided PICC line placement. TECHNIQUE/EXAM DESCRIPTION AND NUMBER OF VIEWS: Single portable view of the chest. COMPARISON: 10/13/2018 FINDINGS: There is a right sided PICC line. The catheter tip overlies cavoatrial junction. There is tortuosity and ectasia of the aorta. There is no evidence of focal consolidation or evidence of pulmonary edema. There is no pleural effusion. The heart is normal in size.     Right PICC line tip overlies the cavoatrial junction.    Us-extremity Artery Lower Unilat Left    Result Date: 2019  Lower Extremity  Arterial Duplex Report  Vascular Laboratory  CONCLUSIONS  1.  The LEFT femoral popliteal bypass graft is occluded.  2.  THere is no flow seen in the mid/distal SFA through the distal  popliteal artery consistent with thrombosis.  3.  The bifemoral bypass graft is patent.  4.  There is minimal 3 vessel runoff tothe Left foot.  4.  THere is fluid surrounding both grafts.  Findings called to Dr. Ambrosio at 2:48 pm on 19.  KATHRYN DIAS  Exam Date:     2019 12:44  Room #:     Inpatient  Priority:     Stat  Ht (in):             Wt (lb):  Ordering Physician:        BERNA AMBROSIO  Referring Physician:       BERNA AMBROSIO  Sonographer:               Marcelino Miranda RVT  Study Type:                Complete Unilateral  Technical Quality:         Adequate  Age:    74    Gender:     M  MRN:    [id number]  :    1945      BSA:  Indications:     Pain in Limb  CPT Codes:       13298  ICD Codes:       729.5  History:         Left lower extremity pain. History of right-to-left fem-fem                   bypass graft and left fem-pop byppass graft  Limitations:                RIGHT  Waveform        Peak Systolic Velocity (cm/s)                  Prox    Prox-Mid  Mid    Mid-Dist  Distal                                                              CFA                                                             PFA                                                             SFA                                                             POP                                                             AT                                                             PT                                                             AL                LEFT  Waveform        Peak Systolic Velocity (cm/s)                  Prox    Prox-Mid  Mid    Mid-Dist  Distal  Bi, non-                          173                      CFA  reversed  Triphasic       108                                        PFA  Absent          59                36               0       SFA  Absent                            0                        POP  Monophasic      6                                  6       AT  Monophasic      7                                  7       PT  Monophasic      5                                  6       AL  FINDINGS  Left.  With the exception of the very proximal fem-pop bypass graft, no flow can  be demonstrated throughout the graft's length. Echolucent material  consistent with arterial thrombus is visualized at the proximal thigh.  Retrograde flow seen in the very proximal segment. Throughout the graft's  length, there appears to be fluid collecting around the graft  No flow can be demonstrated from the mid-distal superficial femoral artery  through the distal popliteal artery. Echolucent material consistent with  arterial thrombus is visualized throughout this segment.  3-Vessel, severely diminished and monophasic runoff to the foot.  The fem-fem bypass graft is widely patent throughout its length. There  appears to be a fluid collection surrounding this graft.  Tayler Huynh  (Electronically Signed)  Final Date:      18 August 2019                   14:38  Amended:         18 August 2019 14:49    Us-extremity Venous Lower Bilat    Result Date: 8/25/2019    Vascular Laboratory  CONCLUSIONS  No deep venuos thrombosis bilaterally.  KATHRYN DIAS  Exam Date:     2019 13:56  Room #:     Inpatient  Priority:     Routine  Ht (in):             Wt (lb):  Ordering Physician:        KURT ADRIAN  Referring Physician:       [id number] Collazo  Sonographer:               Lacey Hayden RVT  Study Type:                Complete Bilateral  Technical Quality:         Adequate  Age:    74    Gender:     M  MRN:    [id number]  :    1945      BSA:  Indications:     Edema, Localized swelling, mass and lump, lower limb,                   bilateral  CPT Codes:       06777  ICD Codes:       782.3  R22.43  History:         Bilateral lower extremity edema/swelling s/p infected                   arterial graft removal; Wound vac at Left groin and Left                   proximal calf. Previous duplex 19.  Limitations:  PROCEDURES:  Bilateral lower extremity venous duplex imaging.  The following venous structures were evaluated: common femoral, profunda  femoral, greater saphenous, femoral, popliteal , peroneal and posterior  tibial veins.  Serial compression, augmentation maneuvers,  color and spectral Doppler  flow evaluations were performed.  FINDINGS:  Bilateral lower extremities -  Complete color filling and compressibility with normal venous flow dynamics  including spontaneous flow, response to augmentation maneuvers, and  respiratory phasicity.  The peroneal and posterior tibial veins are difficult to assess for  compressibility, but flow response to augmentation is demonstrated.  Anson Jacinto  (Electronically Signed)  Final Date:      2019                   15:17    Us-extremity Venous Lower Unilat Left    Result Date: 2019   Vascular Laboratory  CONCLUSIONS  1.  No evidence of LEFT lower extremity DVT.  KATHRYN DIAS  Exam Date:     2019 12:31  Room #:     Inpatient  Priority:     Stat  Ht (in):             Wt (lb):  Ordering Physician:         BERNA PARR  Referring Physician:       [id number]  Sonographer:               Marcelino Miranda RVT  Study Type:                Complete Unilateral  Technical Quality:         Adequate  Age:    74    Gender:     M  MRN:    [id number]  :    1945      BSA:  Indications:     Swelling of Limb  CPT Codes:       12680  ICD Codes:       729.81  History:         Left lower extremity edema  Limitations:     Pain in groin and popliteal fossa. Long axis images obtained                    instead of compressions.  PROCEDURES:  Left lower extremity venous duplex imaging.  The following venous structures were evaluated: common femoral, profunda  femoral, greater saphenous, femoral, popliteal , peroneal and posterior  tibial veins.  Serial compression, augmentation maneuvers,  color and spectral Doppler  flow evaluations were performed.  FINDINGS:  Left lower extremity -.  Complete color filling and compressibility with normal venous flow dynamics  including spontaneous flow, response to augmentation maneuvers, and  respiratory phasicity.  No evidence of superficial or deep venous thrombosis.  Flow was evaluated in the contralateral common femoral vein and normal  venous flow dynamics including spontaneous flow, respiratory phasic  variation and augmentation were demonstrated.  Tayler Huynh  (Electronically Signed)  Final Date:      2019                   14:39    Dx-portable Fluoro > 1 Hour    Result Date: 2019 5:00 PM Digitized Intraoperative Radiograph TECHNIQUE: 104 DSA images of the left lower extremity CLINICAL INFORMATION: Infected femoral-popliteal arterial bypass graft. COMPARISON: CTA 2019 FINDINGS: Faint contrast seen in the vessel Fluoroscopic time: 18 seconds .36 mGy    Digitized intraoperative radiograph is submitted for review.  This examination is not for diagnostic purpose but for guidance during a surgical procedure.     Ec-echocardiogram Complete W/o  Cont    Result Date: 2019  Transthoracic Echo Report Echocardiography Laboratory CONCLUSIONS No prior study is available for comparison. Normal left ventricular chamber size. Left ventricular ejection fraction is visually estimated to be 65%. Moderate concentric left ventricular hypertrophy. Normal diastolic function. No significant valve abnormalities. ARUNKATHRYN Exam Date:         2019                    09:53 Exam Location:     Inpatient Priority:          Routine Ordering Physician:        STEPHANY AVILA Referring Physician: Sonographer:               Yina Aldridge RDCS Age:    74     Gender:    M MRN:    [id number] :    1945 BSA:    1.79   Ht (in):    67     Wt (lb):    151 Exam Type:     Complete Indications:     Endocarditis ICD Codes:       421 CPT Codes:       11899 BP:   113    /   76     HR:   66 Technical Quality:       Fair MEASUREMENTS  (Male / Female) Normal Values 2D ECHO LV Diastolic Diameter PLAX        4.7 cm                4.2 - 5.9 / 3.9 - 5.3 cm LV Systolic Diameter PLAX         2.6 cm                2.1 - 4.0 cm IVS Diastolic Thickness           1.4 cm                LVPW Diastolic Thickness          1.4 cm                LVOT Diameter                     2.5 cm                Estimated LV Ejection Fraction    65 %                  LV Ejection Fraction MOD BP       50.1 %                >= 55  % LV Ejection Fraction MOD 4C       56.2 %                LV Ejection Fraction MOD 2C       39.6 %                M-MODE Aortic Root Diameter MM           4.4 cm                DOPPLER AV Peak Velocity                  2.1 m/s               AV Peak Gradient                  18.1 mmHg             AV Mean Gradient                  9.8 mmHg              LVOT Peak Velocity                0.97 m/s              AV Area Cont Eq vti               2.1 cm²               Mitral E Point Velocity           0.53 m/s              Mitral E to A Ratio               0.63                  MV  Pressure Half Time             64.3 ms               MV Area PHT                       3.4 cm²               MV Deceleration Time              222 ms                TR Peak Velocity                  272 cm/s              PV Peak Velocity                  0.96 m/s              PV Peak Gradient                  3.7 mmHg              RVOT Peak Velocity                0.96 m/s              * Indicates values subject to auto-interpretation LV EF:  65    % FINDINGS Left Ventricle Normal left ventricular chamber size. Moderate concentric left ventricular hypertrophy. Normal left ventricular systolic function. Left ventricular ejection fraction is visually estimated to be 65%. Normal regional wall motion. Normal diastolic function. Right Ventricle The right ventricle was normal in size and function. Right Atrium The right atrium is normal in size. Vena cava is not well visualized. Left Atrium The left atrium is normal in size.  Left atrial volume index is 29 mL/sq m. Mitral Valve Structurally normal mitral valve without significant stenosis or regurgitation. Aortic Valve Structurally normal aortic valve without significant stenosis or regurgitation. Tricuspid Valve Structurally normal tricuspid valve without significant stenosis. Trace tricuspid regurgitation. Right ventricular systolic pressure is estimated to be 20 mmHg + estimated RAP. Pulmonic Valve Structurally normal pulmonic valve without significant stenosis or regurgitation. Pericardium Normal pericardium without effusion. Aorta The aortic root is normal. Brandon Juárez MD (Electronically Signed) Final Date:     21 August 2019                 11:16    Us-vein Mapping Lower Extremity Bilat    Result Date: 8/20/2019   Lower Extremity  Vein Map  Vascular Laboratory  CONCLUSIONS  1) Patent but small right long saphenous vein with representative diameter  measurements as listed.  2) Patent left long saphenous vein with representative diameter  measurements as listed.   This vein is small below the knee.  KATHRYN DIAS  Exam Date:     2019 13:20  Room #:     Inpatient  Priority:     Routine  Ht (in):             Wt (lb):  Ordering Physician:        MARIANN GOOD  Referring Physician:       [id number] Mccray  Sonographer:               Miki Green RDCS,                             MIKET  Study Type:                Complete Bilateral  Technical Quality:         Adequate  Age:    74    Gender:     M  MRN:    [id number]  :    1945      BSA:  Indications:     Peripheral vascular disease, unspecified  CPT Codes:       40594  ICD Codes:       I73.9  History:         Evaluate for arterial bypass conduit.  Limitations:            RIGHT                  Diameter                  (cm)  Characteristics  Normal             0.25      SFJ  Normal             0.21      HT  Normal             0.25      MT  Normal             0.26      LT  Normal             0.25      Knee  Normal             0.18      HC  Normal             0.23      LC  Normal             0.18      Ankle                LEFT       Diameter       (cm)                  Characteristics  SFJ    0.47     Normal  HT     0.46     Normal  MT     0.47     Normal  LT     0.41     Normal  Knee   0.37     Normal  HC     0.21     Normal  LC     0.22     Normal  Ankle  0.20     Normal                  Diameter                  (cm)  Characteristics  Normal             0.33      PF  Normal             0.21      MC                               Ankle         Diameter         (cm)                    Characteristics    PF     0.30     Normal    MC     0.21     Normal    Ankle  FINDINGS  Right. The greater saphenous vein is small in caliber. The small saphenous  vein is small in caliber.  Left. The greater saphenous vein is patent to the knee and essentially  normal in caliber. The greater saphenous is small in calibre in the high  calf and to the ankle.  The small saphenous vein is small in caliber.  Mariann Good M.D.   (Electronically Signed)  Final Date:      20 August 2019                   08:59    Ir-picc Line Placement W/ Guidance > Age 5    Result Date: 8/26/2019  HISTORY/REASON FOR EXAM:   PICC placement. TECHNIQUE/EXAM DESCRIPTION AND NUMBER OF VIEWS:   PICC line insertion with ultrasound guidance.  The procedure was performed using maximal sterile barrier technique including sterile gown, mask, cap, and donning of sterile gloves following appropriate hand hygiene and/or sterile scrub. Patient skin site was prepped with 2% Chlorhexidine solution. FINDINGS:  PICC line insertion with Ultrasound Guidance was performed by qualified nursing staff without the assistance of a Radiologist. PICC positioning appropriateness confirmed by 3CG technology; chest xray only needed in the instance 3CG unable to confirm placement.              Ultrasound-guided PICC placement performed by qualified nursing staff as above.       Micro:  Results     ** No results found for the last 168 hours. **          Assessment:  Dc Patel is a 74 y.o. male with a history of peripheral artery disease on Coumadin, CKD stage III, history of multiple revascularization procedures, admitted with infected left femoral-femoral and femoral-popliteal bypass grafts, MSSA bacteremia, left knee septic arthritis.  Source of Staph aureus likely is his multiple upper extremity excoriations.     Pertinent Diagnoses:  Sepsis  MSSA bacteremia  Infected vascular grafts  Left knee septic arthritis  Bleeding from left groin surgical site  History of blood clots  Chronic anticoagulant use     Plan:  Sepsis, MSSA bacteremia, infected vascular grafts, on treatment  -Continue IV Ancef 2 g every 8 hours  -Continue p.o. rifampin 300 mg 3 times daily 8/19.  Note interaction with Coumadin.  Will need close monitoring of INR.  Currently on Lovenox  -TTE with no evidence of infective endocarditis  -Follow repeat blood cultures (1/2 sets) from 8/21 - MSSA  Repeat blood culture on  8/23-negative to date  -On 8/20, patient underwent left knee arthrotomy with I&D, as well as removal of infected fem-fem and left fem-pop bypass grafts, left popliteal thrombectomy, redo fem-fem bypass and left fem-pop bypass using Cryoveins.  Knee aspirate + MSSA  -Given his multiple grafts in place including an aortic stent graft which cannot be removed per vascular surgery, patient will likely need chronic p.o. suppression following 6 weeks of IV antibiotics  Stop date of IV antibiotics 10/04/2019 followed by p.o. doxy for lifelong suppression  At least weekly CBC with differential, CMP while on IV antibiotics     Left knee septic arthritis, on treatment  -Orthopedics on board.  Left knee arthrotomy with I&D performed 8/20  -Knee aspirate cultures +MSSA  On antibiotics above     Bleeding from left groin surgical site  Went to the ER 8/27 and had sutures placed  No further bleeding    Leukocytosis, persistent   Multifactorial  On antibiotics above  Improving slowly  Monitor     EVERT, resolved  -Initial report of CKD stage III, but creatinine is now down to normal  -Monitor, renally dose antibiotics     Discussed with pharmacy and Dr. Ro.  ID will follow.  Please call with questions.

## 2019-08-30 LAB
ALBUMIN SERPL BCP-MCNC: 2.3 G/DL (ref 3.2–4.9)
ALBUMIN/GLOB SERPL: 0.6 G/DL
ALP SERPL-CCNC: 108 U/L (ref 30–99)
ALT SERPL-CCNC: <5 U/L (ref 2–50)
ANION GAP SERPL CALC-SCNC: 11 MMOL/L (ref 0–11.9)
AST SERPL-CCNC: 17 U/L (ref 12–45)
BASOPHILS # BLD AUTO: 0.3 % (ref 0–1.8)
BASOPHILS # BLD: 0.04 K/UL (ref 0–0.12)
BILIRUB SERPL-MCNC: 0.4 MG/DL (ref 0.1–1.5)
BUN SERPL-MCNC: 21 MG/DL (ref 8–22)
CALCIUM SERPL-MCNC: 8 MG/DL (ref 8.4–10.2)
CHLORIDE SERPL-SCNC: 101 MMOL/L (ref 96–112)
CO2 SERPL-SCNC: 27 MMOL/L (ref 20–33)
CREAT SERPL-MCNC: 1.1 MG/DL (ref 0.5–1.4)
EOSINOPHIL # BLD AUTO: 0.09 K/UL (ref 0–0.51)
EOSINOPHIL NFR BLD: 0.7 % (ref 0–6.9)
ERYTHROCYTE [DISTWIDTH] IN BLOOD BY AUTOMATED COUNT: 57 FL (ref 35.9–50)
GLOBULIN SER CALC-MCNC: 3.6 G/DL (ref 1.9–3.5)
GLUCOSE BLD-MCNC: 123 MG/DL (ref 65–99)
GLUCOSE BLD-MCNC: 137 MG/DL (ref 65–99)
GLUCOSE BLD-MCNC: 158 MG/DL (ref 65–99)
GLUCOSE BLD-MCNC: 261 MG/DL (ref 65–99)
GLUCOSE SERPL-MCNC: 108 MG/DL (ref 65–99)
HCT VFR BLD AUTO: 23.5 % (ref 42–52)
HGB BLD-MCNC: 7.4 G/DL (ref 14–18)
IMM GRANULOCYTES # BLD AUTO: 0.13 K/UL (ref 0–0.11)
IMM GRANULOCYTES NFR BLD AUTO: 1 % (ref 0–0.9)
LYMPHOCYTES # BLD AUTO: 1.58 K/UL (ref 1–4.8)
LYMPHOCYTES NFR BLD: 11.9 % (ref 22–41)
MCH RBC QN AUTO: 28.2 PG (ref 27–33)
MCHC RBC AUTO-ENTMCNC: 31.5 G/DL (ref 33.7–35.3)
MCV RBC AUTO: 89.7 FL (ref 81.4–97.8)
MONOCYTES # BLD AUTO: 0.86 K/UL (ref 0–0.85)
MONOCYTES NFR BLD AUTO: 6.5 % (ref 0–13.4)
NEUTROPHILS # BLD AUTO: 10.53 K/UL (ref 1.82–7.42)
NEUTROPHILS NFR BLD: 79.6 % (ref 44–72)
NRBC # BLD AUTO: 0 K/UL
NRBC BLD-RTO: 0 /100 WBC
PLATELET # BLD AUTO: 486 K/UL (ref 164–446)
PMV BLD AUTO: 9.2 FL (ref 9–12.9)
POTASSIUM SERPL-SCNC: 4.3 MMOL/L (ref 3.6–5.5)
PROT SERPL-MCNC: 5.9 G/DL (ref 6–8.2)
RBC # BLD AUTO: 2.62 M/UL (ref 4.7–6.1)
SODIUM SERPL-SCNC: 139 MMOL/L (ref 135–145)
WBC # BLD AUTO: 13.2 K/UL (ref 4.8–10.8)

## 2019-08-30 PROCEDURE — 82962 GLUCOSE BLOOD TEST: CPT | Mod: 91

## 2019-08-30 PROCEDURE — 82962 GLUCOSE BLOOD TEST: CPT

## 2019-08-30 PROCEDURE — 80053 COMPREHEN METABOLIC PANEL: CPT

## 2019-08-30 PROCEDURE — 85025 COMPLETE CBC W/AUTO DIFF WBC: CPT

## 2019-08-31 LAB
GLUCOSE BLD-MCNC: 134 MG/DL (ref 65–99)
GLUCOSE BLD-MCNC: 144 MG/DL (ref 65–99)
GLUCOSE BLD-MCNC: 385 MG/DL (ref 65–99)

## 2019-08-31 PROCEDURE — 82962 GLUCOSE BLOOD TEST: CPT

## 2019-09-01 ENCOUNTER — APPOINTMENT (OUTPATIENT)
Dept: RADIOLOGY | Facility: MEDICAL CENTER | Age: 74
End: 2019-09-01
Attending: FAMILY MEDICINE
Payer: MEDICARE

## 2019-09-01 LAB
APPEARANCE UR: CLEAR
BILIRUB UR QL STRIP.AUTO: NEGATIVE
COLOR UR: YELLOW
ERYTHROCYTE [DISTWIDTH] IN BLOOD BY AUTOMATED COUNT: 55.3 FL (ref 35.9–50)
GLUCOSE BLD-MCNC: 123 MG/DL (ref 65–99)
GLUCOSE BLD-MCNC: 157 MG/DL (ref 65–99)
GLUCOSE UR STRIP.AUTO-MCNC: NEGATIVE MG/DL
HCT VFR BLD AUTO: 23.8 % (ref 42–52)
HGB BLD-MCNC: 7.3 G/DL (ref 14–18)
KETONES UR STRIP.AUTO-MCNC: NEGATIVE MG/DL
LEUKOCYTE ESTERASE UR QL STRIP.AUTO: NEGATIVE
MCH RBC QN AUTO: 28.1 PG (ref 27–33)
MCHC RBC AUTO-ENTMCNC: 30.7 G/DL (ref 33.7–35.3)
MCV RBC AUTO: 91.5 FL (ref 81.4–97.8)
MICRO URNS: NORMAL
NITRITE UR QL STRIP.AUTO: NEGATIVE
PH UR STRIP.AUTO: 5.5 [PH] (ref 5–8)
PLATELET # BLD AUTO: 501 K/UL (ref 164–446)
PMV BLD AUTO: 9.2 FL (ref 9–12.9)
PROT UR QL STRIP: NEGATIVE MG/DL
RBC # BLD AUTO: 2.6 M/UL (ref 4.7–6.1)
RBC UR QL AUTO: NEGATIVE
SP GR UR STRIP.AUTO: 1.02
WBC # BLD AUTO: 10.1 K/UL (ref 4.8–10.8)

## 2019-09-01 PROCEDURE — 85027 COMPLETE CBC AUTOMATED: CPT

## 2019-09-01 PROCEDURE — 87040 BLOOD CULTURE FOR BACTERIA: CPT

## 2019-09-01 PROCEDURE — 82962 GLUCOSE BLOOD TEST: CPT

## 2019-09-01 PROCEDURE — 82962 GLUCOSE BLOOD TEST: CPT | Mod: 91

## 2019-09-01 PROCEDURE — 81003 URINALYSIS AUTO W/O SCOPE: CPT

## 2019-09-01 PROCEDURE — 71045 X-RAY EXAM CHEST 1 VIEW: CPT

## 2019-09-02 LAB
ALBUMIN SERPL BCP-MCNC: 2.4 G/DL (ref 3.2–4.9)
ALBUMIN/GLOB SERPL: 0.6 G/DL
ALP SERPL-CCNC: 119 U/L (ref 30–99)
ALT SERPL-CCNC: <5 U/L (ref 2–50)
ANION GAP SERPL CALC-SCNC: 10 MMOL/L (ref 0–11.9)
AST SERPL-CCNC: 18 U/L (ref 12–45)
BILIRUB SERPL-MCNC: 0.2 MG/DL (ref 0.1–1.5)
BUN SERPL-MCNC: 21 MG/DL (ref 8–22)
CALCIUM SERPL-MCNC: 8.4 MG/DL (ref 8.4–10.2)
CHLORIDE SERPL-SCNC: 101 MMOL/L (ref 96–112)
CO2 SERPL-SCNC: 28 MMOL/L (ref 20–33)
CREAT SERPL-MCNC: 0.97 MG/DL (ref 0.5–1.4)
CRP SERPL HS-MCNC: 16.65 MG/DL (ref 0–0.75)
ERYTHROCYTE [DISTWIDTH] IN BLOOD BY AUTOMATED COUNT: 56.2 FL (ref 35.9–50)
ERYTHROCYTE [SEDIMENTATION RATE] IN BLOOD BY WESTERGREN METHOD: >120 MM/HOUR (ref 0–20)
GLOBULIN SER CALC-MCNC: 4.2 G/DL (ref 1.9–3.5)
GLUCOSE BLD-MCNC: 109 MG/DL (ref 65–99)
GLUCOSE BLD-MCNC: 118 MG/DL (ref 65–99)
GLUCOSE BLD-MCNC: 142 MG/DL (ref 65–99)
GLUCOSE BLD-MCNC: 142 MG/DL (ref 65–99)
GLUCOSE BLD-MCNC: 159 MG/DL (ref 65–99)
GLUCOSE BLD-MCNC: 164 MG/DL (ref 65–99)
GLUCOSE SERPL-MCNC: 125 MG/DL (ref 65–99)
HCT VFR BLD AUTO: 25.1 % (ref 42–52)
HGB BLD-MCNC: 7.6 G/DL (ref 14–18)
MCH RBC QN AUTO: 28.4 PG (ref 27–33)
MCHC RBC AUTO-ENTMCNC: 30.3 G/DL (ref 33.7–35.3)
MCV RBC AUTO: 93.7 FL (ref 81.4–97.8)
PLATELET # BLD AUTO: 590 K/UL (ref 164–446)
PMV BLD AUTO: 9.5 FL (ref 9–12.9)
POTASSIUM SERPL-SCNC: 4.6 MMOL/L (ref 3.6–5.5)
PROT SERPL-MCNC: 6.6 G/DL (ref 6–8.2)
RBC # BLD AUTO: 2.68 M/UL (ref 4.7–6.1)
SODIUM SERPL-SCNC: 139 MMOL/L (ref 135–145)
WBC # BLD AUTO: 8.8 K/UL (ref 4.8–10.8)

## 2019-09-02 PROCEDURE — 85652 RBC SED RATE AUTOMATED: CPT

## 2019-09-02 PROCEDURE — 82962 GLUCOSE BLOOD TEST: CPT

## 2019-09-02 PROCEDURE — 80053 COMPREHEN METABOLIC PANEL: CPT

## 2019-09-02 PROCEDURE — 85027 COMPLETE CBC AUTOMATED: CPT

## 2019-09-02 PROCEDURE — 86140 C-REACTIVE PROTEIN: CPT

## 2019-09-02 PROCEDURE — 82962 GLUCOSE BLOOD TEST: CPT | Mod: 91

## 2019-09-02 ASSESSMENT — ENCOUNTER SYMPTOMS
MYALGIAS: 1
NAUSEA: 0
ABDOMINAL PAIN: 0
CHILLS: 0
VOMITING: 0
FEVER: 0
DIARRHEA: 0

## 2019-09-02 NOTE — PROGRESS NOTES
Infectious Disease Progress Note    Author: Harvinder Head M.D. Date & Time of service: 9/2/2019  9:09 AM    Chief Complaint:  Follow-up for MSSA bacteremia    Interval History:  8/27 afebrile, white count down to 14.4.  Patient transferred to [facility].  Tolerating antibiotics.  Saw patient during wound care eval.  Patient reports pain at the site.  Noted to have oozing from the left groin.  Primary team discussed with vascular surgery, plan is to have him transferred to the ER for possible sutures.  8/29 afebrile, white count trending down, 12.8 today. Pt reports feeling better. No further bleeding from L groin. Tolerating antibiotics. No new issues.  9/2 afebrile, leukocytosis resolved, down to 8.8.  Reports no new issues, tolerating antibiotics, happy with his progress.    Labs Reviewed and Medications Reviewed.    Review of Systems:  Review of Systems   Constitutional: Negative for chills and fever.   Cardiovascular: Negative for chest pain.   Gastrointestinal: Negative for abdominal pain, diarrhea, nausea and vomiting.   Musculoskeletal: Positive for myalgias.   Skin: Negative for itching and rash.   All other systems reviewed and are negative.    Hemodynamics:  T 98.5 /75 HR 92 RR 20 O2 92%  Physical Exam:  Physical Exam   Constitutional: He is oriented to person, place, and time. He appears well-developed. No distress.   Thin, comfortable now   HENT:   Mouth/Throat: Oropharynx is clear and moist.   Eyes: Pupils are equal, round, and reactive to light. Conjunctivae are normal. No scleral icterus.   Neck: No JVD present.   Cardiovascular: Normal rate, regular rhythm and normal heart sounds.   No murmur heard.  Pulmonary/Chest: Effort normal and breath sounds normal. No respiratory distress. He has no wheezes.   Abdominal: Soft. He exhibits no distension. There is no tenderness.   Left groin dressing in place, no bleeding noted over dressing, right groin wound VAC with no surrounding erythema or  drainage noted   Musculoskeletal: He exhibits no edema.   Left knee midline incision well approximated, no erythema or active drainage  Left anterior medial proximal leg incision with wound VAC in place, no surrounding redness or active drainage   Neurological: He is alert and oriented to person, place, and time.   No gross FND   Skin: Skin is warm and dry. No rash noted. He is not diaphoretic. No erythema.   Previous multiple scabs bilateral forearms improved   Psychiatric: He has a normal mood and affect. His behavior is normal.   Pleasant   Nursing note and vitals reviewed.      Meds:  No current facility-administered medications for this encounter.     Labs:  Recent Labs     09/01/19  0400 09/02/19  0300   WBC 10.1 8.8   RBC 2.60* 2.68*   HEMOGLOBIN 7.3* 7.6*   HEMATOCRIT 23.8* 25.1*   MCV 91.5 93.7   MCH 28.1 28.4   RDW 55.3* 56.2*   PLATELETCT 501* 590*   MPV 9.2 9.5     Recent Labs     09/02/19  0300   SODIUM 139   POTASSIUM 4.6   CHLORIDE 101   CO2 28   GLUCOSE 125*   BUN 21     Recent Labs     09/02/19  0300   ALBUMIN 2.4*   TBILIRUBIN 0.2   ALKPHOSPHAT 119*   TOTPROTEIN 6.6   ALTSGPT <5   ASTSGOT 18   CREATININE 0.97       Imaging:  Ct-cta Lower Ext With & W/o-post Process Left    Result Date: 8/18/2019 8/18/2019 2:55 PM HISTORY/REASON FOR EXAM:  Acute limb ischemia, leg worsening pain and decreased pulse to left leg possible DVT vs arterial clot. He had a fem pop bypass in last 6 weeks. Now has +leg swelling, redness to lower abdomen, pain in back of knee TECHNIQUE/EXAM DESCRIPTION:  CT angiogram of the left lower extremity with contrast, with reconstructions. 100 mL of Omnipaque 350 nonionic contrast was administered at 5.0 mL/sec and helical scanning obtained from the distal femur through the ankle. Thin- and thick-section multiplanar volume reformats were generated from the axial data set in the sagittal and coronal planes. 3D angiographic images were reviewed on PACS. Maximum intensity projection  (MIP) images were generated and reviewed. Low dose optimization technique was utilized for this CT exam including automated exposure control and adjustment of the mA and/or kV according to patient size. COMPARISON: Ultrasound 8/18/2019. FINDINGS: Partially visualized femoral-femoral bypass that appears patent. There is small amount of clot in the junction between the femorofemoral bypass graft and the common femoral artery. The left femoral popliteal graft is completely occluded. There is gas within the lumen. There is small amount of fluid surrounding the entire portion of the graft. The native common femoral and external femoral artery demonstrate multiple focal narrowing there is complete occlusion of the superficial femoral artery from the mid to distal portion at the area of prior stent extending to the popliteal artery. There is partial reconstitution at the trifurcation but flow is very sluggish. No flow appreciated in the anterior tibial artery, posterior tibial artery and peroneal artery at 10 cm from the ankle. 3D angiographic/MIP images of the vasculature confirm the vascular findings as described above.     1. Completely occluded left femoral-popliteal bypass graft with gas within the lumen and fluid surrounding the graft. The finding is concerning for infected occluded graft. 2. Partially visualized femoral-femoral bypass that appears patent. There is small amount of clot in the junction between the femorofemoral bypass graft and the common femoral artery. 3. Complete occlusion of the native superficial femoral artery from the mid to distal thigh at the area of prior stenting. Complete occlusion of the left popliteal artery. Partial flow reconstitution at the trifurcation but flow is very sluggish. No distal flow flow appreciated in the anterior tibial artery, posterior tibial artery and peroneal artery at 10 cm from the ankle. CRITICAL RESULT READ BACK: Preliminary findings discussed with and critical  read back performed by Dr. BERNA AMBROSIO in the Emergency Department via telephone on 2019 3:51 PM    Dx-chest-portable (1 View)    Result Date: 2019 3:25 PM HISTORY/REASON FOR EXAM: Right-sided PICC line placement. TECHNIQUE/EXAM DESCRIPTION AND NUMBER OF VIEWS: Single portable view of the chest. COMPARISON: 10/13/2018 FINDINGS: There is a right sided PICC line. The catheter tip overlies cavoatrial junction. There is tortuosity and ectasia of the aorta. There is no evidence of focal consolidation or evidence of pulmonary edema. There is no pleural effusion. The heart is normal in size.     Right PICC line tip overlies the cavoatrial junction.    Us-extremity Artery Lower Unilat Left    Result Date: 2019  Lower Extremity  Arterial Duplex Report  Vascular Laboratory  CONCLUSIONS  1.  The LEFT femoral popliteal bypass graft is occluded.  2.  THere is no flow seen in the mid/distal SFA through the distal  popliteal artery consistent with thrombosis.  3.  The bifemoral bypass graft is patent.  4.  There is minimal 3 vessel runoff tothe Left foot.  4.  THere is fluid surrounding both grafts.  Findings called to Dr. Ambrosio at 2:48 pm on 19.  KATHRYN DIAS  Exam Date:     2019 12:44  Room #:     Inpatient  Priority:     Stat  Ht (in):             Wt (lb):  Ordering Physician:        BERNA AMBROSIO  Referring Physician:       BERNA AMBROSIO  Sonographer:               Marcelino Miranda RVT  Study Type:                Complete Unilateral  Technical Quality:         Adequate  Age:    74    Gender:     M  MRN:    [id number]  :    1945      BSA:  Indications:     Pain in Limb  CPT Codes:       41917  ICD Codes:       729.5  History:         Left lower extremity pain. History of right-to-left fem-fem                   bypass graft and left fem-pop byppass graft  Limitations:                RIGHT  Waveform        Peak Systolic Velocity (cm/s)                  Prox    Prox-Mid  Mid     Mid-Dist  Distal                                                             CFA                                                             PFA                                                             SFA                                                             POP                                                             AT                                                             PT                                                             AL                LEFT  Waveform        Peak Systolic Velocity (cm/s)                  Prox    Prox-Mid  Mid    Mid-Dist  Distal  Bi, non-                          173                      CFA  reversed  Triphasic       108                                        PFA  Absent          59                36               0       SFA  Absent                            0                        POP  Monophasic      6                                  6       AT  Monophasic      7                                  7       PT  Monophasic      5                                  6       AL  FINDINGS  Left.  With the exception of the very proximal fem-pop bypass graft, no flow can  be demonstrated throughout the graft's length. Echolucent material  consistent with arterial thrombus is visualized at the proximal thigh.  Retrograde flow seen in the very proximal segment. Throughout the graft's  length, there appears to be fluid collecting around the graft  No flow can be demonstrated from the mid-distal superficial femoral artery  through the distal popliteal artery. Echolucent material consistent with  arterial thrombus is visualized throughout this segment.  3-Vessel, severely diminished and monophasic runoff to the foot.  The fem-fem bypass graft is widely patent throughout its length. There  appears to be a fluid collection surrounding this graft.  Tayler Huynh  (Electronically Signed)  Final Date:      18 August 2019                   14:38  Amended:         18 August   14:49    Us-extremity Venous Lower Bilat    Result Date: 2019   Vascular Laboratory  CONCLUSIONS  No deep venuos thrombosis bilaterally.  KATHRYN DIAS  Exam Date:     2019 13:56  Room #:     Inpatient  Priority:     Routine  Ht (in):             Wt (lb):  Ordering Physician:        KURT ADRIAN  Referring Physician:       [id number] Collazo  Sonographer:               Lacey Hayden RVT  Study Type:                Complete Bilateral  Technical Quality:         Adequate  Age:    74    Gender:     M  MRN:    [id number]  :    1945      BSA:  Indications:     Edema, Localized swelling, mass and lump, lower limb,                   bilateral  CPT Codes:       19326  ICD Codes:       782.3  R22.43  History:         Bilateral lower extremity edema/swelling s/p infected                   arterial graft removal; Wound vac at Left groin and Left                   proximal calf. Previous duplex 19.  Limitations:  PROCEDURES:  Bilateral lower extremity venous duplex imaging.  The following venous structures were evaluated: common femoral, profunda  femoral, greater saphenous, femoral, popliteal , peroneal and posterior  tibial veins.  Serial compression, augmentation maneuvers,  color and spectral Doppler  flow evaluations were performed.  FINDINGS:  Bilateral lower extremities -  Complete color filling and compressibility with normal venous flow dynamics  including spontaneous flow, response to augmentation maneuvers, and  respiratory phasicity.  The peroneal and posterior tibial veins are difficult to assess for  compressibility, but flow response to augmentation is demonstrated.  Anson Jacinto  (Electronically Signed)  Final Date:      2019                   15:17    Us-extremity Venous Lower Unilat Left    Result Date: 2019   Vascular Laboratory  CONCLUSIONS  1.  No evidence of LEFT lower extremity DVT.  KATHRYN DIAS  Exam Date:     2019 12:31  Room #:     Inpatient   Priority:     Stat  Ht (in):             Wt (lb):  Ordering Physician:        BERNA PARR  Referring Physician:       [id number] Maldonado  Sonographer:               Marcelino Miranda RVT  Study Type:                Complete Unilateral  Technical Quality:         Adequate  Age:    74    Gender:     M  MRN:    [id number]  :    1945      BSA:  Indications:     Swelling of Limb  CPT Codes:       92396  ICD Codes:       729.81  History:         Left lower extremity edema  Limitations:     Pain in groin and popliteal fossa. Long axis images obtained                    instead of compressions.  PROCEDURES:  Left lower extremity venous duplex imaging.  The following venous structures were evaluated: common femoral, profunda  femoral, greater saphenous, femoral, popliteal , peroneal and posterior  tibial veins.  Serial compression, augmentation maneuvers,  color and spectral Doppler  flow evaluations were performed.  FINDINGS:  Left lower extremity -.  Complete color filling and compressibility with normal venous flow dynamics  including spontaneous flow, response to augmentation maneuvers, and  respiratory phasicity.  No evidence of superficial or deep venous thrombosis.  Flow was evaluated in the contralateral common femoral vein and normal  venous flow dynamics including spontaneous flow, respiratory phasic  variation and augmentation were demonstrated.  Tayler Huynh  (Electronically Signed)  Final Date:      2019                   14:39    Dx-portable Fluoro > 1 Hour    Result Date: 2019 5:00 PM Digitized Intraoperative Radiograph TECHNIQUE: 104 DSA images of the left lower extremity CLINICAL INFORMATION: Infected femoral-popliteal arterial bypass graft. COMPARISON: CTA 2019 FINDINGS: Faint contrast seen in the vessel Fluoroscopic time: 18 seconds .36 mGy    Digitized intraoperative radiograph is submitted for review.  This examination is not for diagnostic purpose but for guidance  during a surgical procedure.     Ec-echocardiogram Complete W/o Cont    Result Date: 2019  Transthoracic Echo Report Echocardiography Laboratory CONCLUSIONS No prior study is available for comparison. Normal left ventricular chamber size. Left ventricular ejection fraction is visually estimated to be 65%. Moderate concentric left ventricular hypertrophy. Normal diastolic function. No significant valve abnormalities. KATHRYN DIAS Exam Date:         2019                    09:53 Exam Location:     Inpatient Priority:          Routine Ordering Physician:        STEPHANY AVILA Referring Physician: Sonographer:               Yina Aldridge RDCS Age:    74     Gender:    M MRN:    [id number] :    1945 BSA:    1.79   Ht (in):    67     Wt (lb):    151 Exam Type:     Complete Indications:     Endocarditis ICD Codes:       421 CPT Codes:       88364 BP:   113    /   76     HR:   66 Technical Quality:       Fair MEASUREMENTS  (Male / Female) Normal Values 2D ECHO LV Diastolic Diameter PLAX        4.7 cm                4.2 - 5.9 / 3.9 - 5.3 cm LV Systolic Diameter PLAX         2.6 cm                2.1 - 4.0 cm IVS Diastolic Thickness           1.4 cm                LVPW Diastolic Thickness          1.4 cm                LVOT Diameter                     2.5 cm                Estimated LV Ejection Fraction    65 %                  LV Ejection Fraction MOD BP       50.1 %                >= 55  % LV Ejection Fraction MOD 4C       56.2 %                LV Ejection Fraction MOD 2C       39.6 %                M-MODE Aortic Root Diameter MM           4.4 cm                DOPPLER AV Peak Velocity                  2.1 m/s               AV Peak Gradient                  18.1 mmHg             AV Mean Gradient                  9.8 mmHg              LVOT Peak Velocity                0.97 m/s              AV Area Cont Eq vti               2.1 cm²               Mitral E Point Velocity           0.53 m/s               Mitral E to A Ratio               0.63                  MV Pressure Half Time             64.3 ms               MV Area PHT                       3.4 cm²               MV Deceleration Time              222 ms                TR Peak Velocity                  272 cm/s              PV Peak Velocity                  0.96 m/s              PV Peak Gradient                  3.7 mmHg              RVOT Peak Velocity                0.96 m/s              * Indicates values subject to auto-interpretation LV EF:  65    % FINDINGS Left Ventricle Normal left ventricular chamber size. Moderate concentric left ventricular hypertrophy. Normal left ventricular systolic function. Left ventricular ejection fraction is visually estimated to be 65%. Normal regional wall motion. Normal diastolic function. Right Ventricle The right ventricle was normal in size and function. Right Atrium The right atrium is normal in size. Vena cava is not well visualized. Left Atrium The left atrium is normal in size.  Left atrial volume index is 29 mL/sq m. Mitral Valve Structurally normal mitral valve without significant stenosis or regurgitation. Aortic Valve Structurally normal aortic valve without significant stenosis or regurgitation. Tricuspid Valve Structurally normal tricuspid valve without significant stenosis. Trace tricuspid regurgitation. Right ventricular systolic pressure is estimated to be 20 mmHg + estimated RAP. Pulmonic Valve Structurally normal pulmonic valve without significant stenosis or regurgitation. Pericardium Normal pericardium without effusion. Aorta The aortic root is normal. Brandon Juárez MD (Electronically Signed) Final Date:     21 August 2019                 11:16    Us-vein Mapping Lower Extremity Bilat    Result Date: 8/20/2019   Lower Extremity  Vein Map  Vascular Laboratory  CONCLUSIONS  1) Patent but small right long saphenous vein with representative diameter  measurements as listed.  2) Patent left long saphenous  vein with representative diameter  measurements as listed.  This vein is small below the knee.  KATHRYN DIAS  Exam Date:     2019 13:20  Room #:     Inpatient  Priority:     Routine  Ht (in):             Wt (lb):  Ordering Physician:        MARIANN GOOD  Referring Physician:       FLORENTINO Wilson  Sonographer:               Miki Green RDCS,                             ISRA  Study Type:                Complete Bilateral  Technical Quality:         Adequate  Age:    74    Gender:     M  MRN:    [id number]  :    1945      BSA:  Indications:     Peripheral vascular disease, unspecified  CPT Codes:       92928  ICD Codes:       I73.9  History:         Evaluate for arterial bypass conduit.  Limitations:            RIGHT                  Diameter                  (cm)  Characteristics  Normal             0.25      SFJ  Normal             0.21      HT  Normal             0.25      MT  Normal             0.26      LT  Normal             0.25      Knee  Normal             0.18      HC  Normal             0.23      LC  Normal             0.18      Ankle                LEFT       Diameter       (cm)                  Characteristics  SFJ    0.47     Normal  HT     0.46     Normal  MT     0.47     Normal  LT     0.41     Normal  Knee   0.37     Normal  HC     0.21     Normal  LC     0.22     Normal  Ankle  0.20     Normal                  Diameter                  (cm)  Characteristics  Normal             0.33      PF  Normal             0.21      MC                               Ankle         Diameter         (cm)                    Characteristics    PF     0.30     Normal    MC     0.21     Normal    Ankle  FINDINGS  Right. The greater saphenous vein is small in caliber. The small saphenous  vein is small in caliber.  Left. The greater saphenous vein is patent to the knee and essentially  normal in caliber. The greater saphenous is small in calibre in the high  calf and to the ankle.  The small saphenous  "vein is small in caliber.  Ana Grande M.D.  (Electronically Signed)  Final Date:      20 August 2019                   08:59    Ir-picc Line Placement W/ Guidance > Age 5    Result Date: 8/26/2019  HISTORY/REASON FOR EXAM:   PICC placement. TECHNIQUE/EXAM DESCRIPTION AND NUMBER OF VIEWS:   PICC line insertion with ultrasound guidance.  The procedure was performed using maximal sterile barrier technique including sterile gown, mask, cap, and donning of sterile gloves following appropriate hand hygiene and/or sterile scrub. Patient skin site was prepped with 2% Chlorhexidine solution. FINDINGS:  PICC line insertion with Ultrasound Guidance was performed by qualified nursing staff without the assistance of a Radiologist. PICC positioning appropriateness confirmed by 3CG technology; chest xray only needed in the instance 3CG unable to confirm placement.              Ultrasound-guided PICC placement performed by qualified nursing staff as above.       Micro:  Results     Procedure Component Value Units Date/Time    BLOOD CULTURE [[id number]] Collected:  09/01/19 0515    Order Status:  Completed Specimen:  Blood from Peripheral Updated:  09/02/19 0732     Significant Indicator NEG     Source BLD     Site Peripheral     Culture Result No Growth  Note: Blood cultures are incubated for 5 days and  are monitored continuously.Positive blood cultures  are called to the RN and reported as soon as  they are identified.  Blood culture testing and Gram stain, if indicated, are  performed at [facility], [address] [place] [address].  Positive blood cultures are  sent to [facility], [address] [place] [address], for organism identification and  susceptibility testing.      Narrative:       Per Hospital Policy: Only change Specimen Src: to \"Line\" if  specified by physician order.  Left AC    BLOOD CULTURE [[id number]] Collected:  09/01/19 0510    Order Status:  Completed " "Specimen:  Blood from Peripheral Updated:  09/02/19 0732     Significant Indicator NEG     Source BLD     Site Peripheral     Culture Result No Growth  Note: Blood cultures are incubated for 5 days and  are monitored continuously.Positive blood cultures  are called to the RN and reported as soon as  they are identified.  Blood culture testing and Gram stain, if indicated, are  performed at [facility], [address] [place] [address].  Positive blood cultures are  sent to [facility], [address] [place] [address], for organism identification and  susceptibility testing.      Narrative:       Per Hospital Policy: Only change Specimen Src: to \"Line\" if  specified by physician order.  Right AC    URINALYSIS [[id number]] Collected:  09/01/19 0425    Order Status:  Completed Specimen:  Urine, Clean Catch Updated:  09/01/19 0531     Color Yellow     Character Clear     Specific Gravity 1.020     Ph 5.5     Glucose Negative mg/dL      Ketones Negative mg/dL      Protein Negative mg/dL      Bilirubin Negative     Nitrite Negative     Leukocyte Esterase Negative     Occult Blood Negative     Micro Urine Req see below     Comment: Microscopic examination not performed when specimen is clear  and chemically negative for protein, blood, leukocyte esterase  and nitrite.         Narrative:       [facility]    CULTURE RESPIRATORY W/ [facility] [[id number]] Collected:  09/01/19 0425    Order Status:  Canceled Specimen:  Sputum     CULTURE RESPIRATORY W/ [facility] [[id number]]     Order Status:  No result           Assessment:  Dc Patel is a 74 y.o. male with a history of peripheral artery disease on Coumadin, CKD stage III, history of multiple revascularization procedures, admitted with infected left femoral-femoral and femoral-popliteal bypass grafts, MSSA bacteremia, left knee septic arthritis.  Source of Staph aureus likely is his multiple upper " extremity excoriations.     Pertinent Diagnoses:  Sepsis  MSSA bacteremia  Infected vascular grafts  Left knee septic arthritis  Bleeding from left groin surgical site  History of blood clots  Chronic anticoagulant use     Plan:  Sepsis, MSSA bacteremia, infected vascular grafts, on treatment  -Continue IV Ancef 2 g every 8 hours  -Continue p.o. rifampin 300 mg 3 times daily 8/19.  Note interaction with Coumadin.  Will need close monitoring of INR.  Currently on Lovenox per primary team  -TTE with no evidence of infective endocarditis  -Follow repeat blood cultures (1/2 sets) from 8/21 - MSSA  Repeat blood culture on 8/23-negative  -On 8/20, patient underwent left knee arthrotomy with I&D, as well as removal of infected fem-fem and left fem-pop bypass grafts, left popliteal thrombectomy, redo fem-fem bypass and left fem-pop bypass using Cryoveins.  Knee aspirate + MSSA  -Given his multiple grafts in place including an aortic stent graft which cannot be removed per vascular surgery, patient will likely need chronic p.o. suppression following 6 weeks of IV antibiotics  Stop date of IV antibiotics 10/04/2019 followed by p.o. doxy for lifelong suppression  At least weekly CBC with differential, CMP while on IV antibiotics     Left knee septic arthritis, on treatment  -Orthopedics on board.  Left knee arthrotomy with I&D performed 8/20  -Knee aspirate cultures +MSSA  On antibiotics above     Bleeding from left groin surgical site  Went to the ER 8/27 and had sutures placed  No further bleeding  Hemoglobin stable    EVERT, resolved  -Initial report of CKD stage III, but creatinine is now down to normal  -Monitor, renally dose antibiotics     Discussed with primary Henry CHAU.  ID will follow.  Please call with questions.

## 2019-09-03 LAB
GLUCOSE BLD-MCNC: 118 MG/DL (ref 65–99)
GLUCOSE BLD-MCNC: 166 MG/DL (ref 65–99)
GLUCOSE BLD-MCNC: 178 MG/DL (ref 65–99)
GLUCOSE BLD-MCNC: 192 MG/DL (ref 65–99)

## 2019-09-03 PROCEDURE — 82962 GLUCOSE BLOOD TEST: CPT

## 2019-09-03 PROCEDURE — 82962 GLUCOSE BLOOD TEST: CPT | Mod: 91

## 2019-09-04 LAB
GLUCOSE BLD-MCNC: 144 MG/DL (ref 65–99)
GLUCOSE BLD-MCNC: 185 MG/DL (ref 65–99)
GLUCOSE BLD-MCNC: 185 MG/DL (ref 65–99)

## 2019-09-04 PROCEDURE — 82962 GLUCOSE BLOOD TEST: CPT

## 2019-09-05 LAB
ALBUMIN SERPL BCP-MCNC: 2.5 G/DL (ref 3.2–4.9)
ALBUMIN/GLOB SERPL: 0.6 G/DL
ALP SERPL-CCNC: 124 U/L (ref 30–99)
ALT SERPL-CCNC: <5 U/L (ref 2–50)
ANION GAP SERPL CALC-SCNC: 8 MMOL/L (ref 0–11.9)
AST SERPL-CCNC: 15 U/L (ref 12–45)
BILIRUB SERPL-MCNC: 0.2 MG/DL (ref 0.1–1.5)
BUN SERPL-MCNC: 24 MG/DL (ref 8–22)
CALCIUM SERPL-MCNC: 8.3 MG/DL (ref 8.4–10.2)
CHLORIDE SERPL-SCNC: 99 MMOL/L (ref 96–112)
CO2 SERPL-SCNC: 29 MMOL/L (ref 20–33)
CREAT SERPL-MCNC: 0.97 MG/DL (ref 0.5–1.4)
ERYTHROCYTE [DISTWIDTH] IN BLOOD BY AUTOMATED COUNT: 51.8 FL (ref 35.9–50)
GLOBULIN SER CALC-MCNC: 4.1 G/DL (ref 1.9–3.5)
GLUCOSE BLD-MCNC: 121 MG/DL (ref 65–99)
GLUCOSE BLD-MCNC: 155 MG/DL (ref 65–99)
GLUCOSE BLD-MCNC: 177 MG/DL (ref 65–99)
GLUCOSE BLD-MCNC: 96 MG/DL (ref 65–99)
GLUCOSE SERPL-MCNC: 106 MG/DL (ref 65–99)
HCT VFR BLD AUTO: 24.4 % (ref 42–52)
HGB BLD-MCNC: 7.4 G/DL (ref 14–18)
INR PPP: 1.22 (ref 0.87–1.13)
MCH RBC QN AUTO: 27.2 PG (ref 27–33)
MCHC RBC AUTO-ENTMCNC: 30.3 G/DL (ref 33.7–35.3)
MCV RBC AUTO: 89.7 FL (ref 81.4–97.8)
PLATELET # BLD AUTO: 571 K/UL (ref 164–446)
PMV BLD AUTO: 9.1 FL (ref 9–12.9)
POTASSIUM SERPL-SCNC: 5 MMOL/L (ref 3.6–5.5)
PROT SERPL-MCNC: 6.6 G/DL (ref 6–8.2)
PROTHROMBIN TIME: 15.6 SEC (ref 12–14.6)
RBC # BLD AUTO: 2.72 M/UL (ref 4.7–6.1)
SODIUM SERPL-SCNC: 136 MMOL/L (ref 135–145)
WBC # BLD AUTO: 7.5 K/UL (ref 4.8–10.8)

## 2019-09-05 PROCEDURE — 85610 PROTHROMBIN TIME: CPT

## 2019-09-05 PROCEDURE — 82962 GLUCOSE BLOOD TEST: CPT

## 2019-09-05 PROCEDURE — 85027 COMPLETE CBC AUTOMATED: CPT

## 2019-09-05 PROCEDURE — 80053 COMPREHEN METABOLIC PANEL: CPT

## 2019-09-05 PROCEDURE — 82962 GLUCOSE BLOOD TEST: CPT | Mod: 91

## 2019-09-06 LAB
BACTERIA BLD CULT: NORMAL
BACTERIA BLD CULT: NORMAL
GLUCOSE BLD-MCNC: 115 MG/DL (ref 65–99)
GLUCOSE BLD-MCNC: 118 MG/DL (ref 65–99)
GLUCOSE BLD-MCNC: 139 MG/DL (ref 65–99)
SIGNIFICANT IND 70042: NORMAL
SIGNIFICANT IND 70042: NORMAL
SITE SITE: NORMAL
SITE SITE: NORMAL
SOURCE SOURCE: NORMAL
SOURCE SOURCE: NORMAL

## 2019-09-06 PROCEDURE — 82962 GLUCOSE BLOOD TEST: CPT | Mod: 91

## 2019-09-06 PROCEDURE — 82962 GLUCOSE BLOOD TEST: CPT

## 2019-09-07 LAB
GLUCOSE BLD-MCNC: 126 MG/DL (ref 65–99)
GLUCOSE BLD-MCNC: 146 MG/DL (ref 65–99)

## 2019-09-07 PROCEDURE — 82962 GLUCOSE BLOOD TEST: CPT

## 2019-09-08 LAB
GLUCOSE BLD-MCNC: 136 MG/DL (ref 65–99)
GLUCOSE BLD-MCNC: 138 MG/DL (ref 65–99)
GLUCOSE BLD-MCNC: 141 MG/DL (ref 65–99)
GLUCOSE BLD-MCNC: 160 MG/DL (ref 65–99)
GLUCOSE BLD-MCNC: 176 MG/DL (ref 65–99)

## 2019-09-08 PROCEDURE — 82962 GLUCOSE BLOOD TEST: CPT

## 2019-09-09 LAB — GLUCOSE BLD-MCNC: 201 MG/DL (ref 65–99)

## 2019-09-09 PROCEDURE — 82962 GLUCOSE BLOOD TEST: CPT | Mod: 91

## 2019-09-10 LAB
ALBUMIN SERPL BCP-MCNC: 2.5 G/DL (ref 3.2–4.9)
ALBUMIN/GLOB SERPL: 0.6 G/DL
ALP SERPL-CCNC: 111 U/L (ref 30–99)
ALT SERPL-CCNC: <5 U/L (ref 2–50)
ANION GAP SERPL CALC-SCNC: 9 MMOL/L (ref 0–11.9)
AST SERPL-CCNC: 14 U/L (ref 12–45)
BILIRUB SERPL-MCNC: 0.2 MG/DL (ref 0.1–1.5)
BUN SERPL-MCNC: 21 MG/DL (ref 8–22)
CALCIUM SERPL-MCNC: 8.5 MG/DL (ref 8.4–10.2)
CHLORIDE SERPL-SCNC: 96 MMOL/L (ref 96–112)
CO2 SERPL-SCNC: 30 MMOL/L (ref 20–33)
CREAT SERPL-MCNC: 0.96 MG/DL (ref 0.5–1.4)
CRP SERPL HS-MCNC: 11.38 MG/DL (ref 0–0.75)
ERYTHROCYTE [DISTWIDTH] IN BLOOD BY AUTOMATED COUNT: 53.3 FL (ref 35.9–50)
ERYTHROCYTE [SEDIMENTATION RATE] IN BLOOD BY WESTERGREN METHOD: >120 MM/HOUR (ref 0–20)
GLOBULIN SER CALC-MCNC: 4.1 G/DL (ref 1.9–3.5)
GLUCOSE BLD-MCNC: 140 MG/DL (ref 65–99)
GLUCOSE BLD-MCNC: 144 MG/DL (ref 65–99)
GLUCOSE BLD-MCNC: 151 MG/DL (ref 65–99)
GLUCOSE BLD-MCNC: 154 MG/DL (ref 65–99)
GLUCOSE BLD-MCNC: 170 MG/DL (ref 65–99)
GLUCOSE BLD-MCNC: 202 MG/DL (ref 65–99)
GLUCOSE BLD-MCNC: 95 MG/DL (ref 65–99)
GLUCOSE SERPL-MCNC: 95 MG/DL (ref 65–99)
HCT VFR BLD AUTO: 25.1 % (ref 42–52)
HGB BLD-MCNC: 7.6 G/DL (ref 14–18)
MCH RBC QN AUTO: 27.4 PG (ref 27–33)
MCHC RBC AUTO-ENTMCNC: 30.3 G/DL (ref 33.7–35.3)
MCV RBC AUTO: 90.6 FL (ref 81.4–97.8)
PLATELET # BLD AUTO: 662 K/UL (ref 164–446)
PMV BLD AUTO: 8.9 FL (ref 9–12.9)
POTASSIUM SERPL-SCNC: 4.9 MMOL/L (ref 3.6–5.5)
PROT SERPL-MCNC: 6.6 G/DL (ref 6–8.2)
RBC # BLD AUTO: 2.77 M/UL (ref 4.7–6.1)
SODIUM SERPL-SCNC: 135 MMOL/L (ref 135–145)
WBC # BLD AUTO: 8.1 K/UL (ref 4.8–10.8)

## 2019-09-10 PROCEDURE — 86140 C-REACTIVE PROTEIN: CPT

## 2019-09-10 PROCEDURE — 85652 RBC SED RATE AUTOMATED: CPT

## 2019-09-10 PROCEDURE — 85027 COMPLETE CBC AUTOMATED: CPT

## 2019-09-10 PROCEDURE — 80053 COMPREHEN METABOLIC PANEL: CPT

## 2019-09-10 PROCEDURE — 82962 GLUCOSE BLOOD TEST: CPT | Mod: 91

## 2019-09-10 ASSESSMENT — ENCOUNTER SYMPTOMS
VOMITING: 0
FEVER: 0
CHILLS: 0
MYALGIAS: 1
ABDOMINAL PAIN: 0
NAUSEA: 0
DIARRHEA: 0

## 2019-09-10 NOTE — PROGRESS NOTES
Infectious Disease Progress Note    Author: Harvinder Head M.D. Date & Time of service: 9/10/2019  8:17 AM    Chief Complaint:  Follow-up for MSSA bacteremia    Interval History:  8/27 afebrile, white count down to 14.4.  Patient transferred to [facility].  Tolerating antibiotics.  Saw patient during wound care eval.  Patient reports pain at the site.  Noted to have oozing from the left groin.  Primary team discussed with vascular surgery, plan is to have him transferred to the ER for possible sutures.  8/29 afebrile, white count trending down, 12.8 today. Pt reports feeling better. No further bleeding from L groin. Tolerating antibiotics. No new issues.  9/2 afebrile, leukocytosis resolved, down to 8.8.  Reports no new issues, tolerating antibiotics, happy with his progress.  9/10 afebrile, white count 8.1.  Tolerating antibiotics, no new issues.    Labs Reviewed and Medications Reviewed.    Review of Systems:  Review of Systems   Constitutional: Negative for chills and fever.   Cardiovascular: Negative for chest pain.   Gastrointestinal: Negative for abdominal pain, diarrhea, nausea and vomiting.   Musculoskeletal: Positive for myalgias.   Skin: Negative for itching and rash.   All other systems reviewed and are negative.  Controlled    Hemodynamics:  T 99.6 /72 HR 65 RR 18 O2 97%  Physical Exam:  Physical Exam   Constitutional: He is oriented to person, place, and time. He appears well-developed. No distress.   Thin, resting comfortably   HENT:   Mouth/Throat: Oropharynx is clear and moist.   Eyes: Pupils are equal, round, and reactive to light. Conjunctivae are normal. No scleral icterus.   Neck: No JVD present.   Cardiovascular: Normal rate, regular rhythm and normal heart sounds.   No murmur heard.  Pulmonary/Chest: Effort normal and breath sounds normal. No respiratory distress. He has no wheezes.   Abdominal: Soft. He exhibits no distension. There is no tenderness.   Bilateral groins with wound vacs  in place, no surrounding erythema or active drainage noted   Musculoskeletal: He exhibits no edema.   Left knee midline incision well approximated, no erythema or active drainage  Left anterior medial proximal leg incision with wound VAC in place, no surrounding redness or active drainage   Neurological: He is alert and oriented to person, place, and time.   No gross FND   Skin: Skin is warm and dry. No rash noted. He is not diaphoretic. No erythema.   Previous multiple scabs bilateral forearms nearly resolved   Psychiatric: He has a normal mood and affect. His behavior is normal.   Pleasant   Nursing note and vitals reviewed.      Meds:  No current facility-administered medications for this encounter.     Labs:  Recent Labs     09/10/19  0300   WBC 8.1   RBC 2.77*   HEMOGLOBIN 7.6*   HEMATOCRIT 25.1*   MCV 90.6   MCH 27.4   RDW 53.3*   PLATELETCT 662*   MPV 8.9*     Recent Labs     09/10/19  0300   SODIUM 135   POTASSIUM 4.9   CHLORIDE 96   CO2 30   GLUCOSE 95   BUN 21     Recent Labs     09/10/19  0300   ALBUMIN 2.5*   TBILIRUBIN 0.2   ALKPHOSPHAT 111*   TOTPROTEIN 6.6   ALTSGPT <5   ASTSGOT 14   CREATININE 0.96       Imaging:  Ct-cta Lower Ext With & W/o-post Process Left    Result Date: 8/18/2019 8/18/2019 2:55 PM HISTORY/REASON FOR EXAM:  Acute limb ischemia, leg worsening pain and decreased pulse to left leg possible DVT vs arterial clot. He had a fem pop bypass in last 6 weeks. Now has +leg swelling, redness to lower abdomen, pain in back of knee TECHNIQUE/EXAM DESCRIPTION:  CT angiogram of the left lower extremity with contrast, with reconstructions. 100 mL of Omnipaque 350 nonionic contrast was administered at 5.0 mL/sec and helical scanning obtained from the distal femur through the ankle. Thin- and thick-section multiplanar volume reformats were generated from the axial data set in the sagittal and coronal planes. 3D angiographic images were reviewed on PACS. Maximum intensity projection (MIP) images  were generated and reviewed. Low dose optimization technique was utilized for this CT exam including automated exposure control and adjustment of the mA and/or kV according to patient size. COMPARISON: Ultrasound 8/18/2019. FINDINGS: Partially visualized femoral-femoral bypass that appears patent. There is small amount of clot in the junction between the femorofemoral bypass graft and the common femoral artery. The left femoral popliteal graft is completely occluded. There is gas within the lumen. There is small amount of fluid surrounding the entire portion of the graft. The native common femoral and external femoral artery demonstrate multiple focal narrowing there is complete occlusion of the superficial femoral artery from the mid to distal portion at the area of prior stent extending to the popliteal artery. There is partial reconstitution at the trifurcation but flow is very sluggish. No flow appreciated in the anterior tibial artery, posterior tibial artery and peroneal artery at 10 cm from the ankle. 3D angiographic/MIP images of the vasculature confirm the vascular findings as described above.     1. Completely occluded left femoral-popliteal bypass graft with gas within the lumen and fluid surrounding the graft. The finding is concerning for infected occluded graft. 2. Partially visualized femoral-femoral bypass that appears patent. There is small amount of clot in the junction between the femorofemoral bypass graft and the common femoral artery. 3. Complete occlusion of the native superficial femoral artery from the mid to distal thigh at the area of prior stenting. Complete occlusion of the left popliteal artery. Partial flow reconstitution at the trifurcation but flow is very sluggish. No distal flow flow appreciated in the anterior tibial artery, posterior tibial artery and peroneal artery at 10 cm from the ankle. CRITICAL RESULT READ BACK: Preliminary findings discussed with and critical read back  performed by Dr. BERNA AMBROSIO in the Emergency Department via telephone on 2019 3:51 PM    Dx-chest-portable (1 View)    Result Date: 2019 3:25 PM HISTORY/REASON FOR EXAM: Right-sided PICC line placement. TECHNIQUE/EXAM DESCRIPTION AND NUMBER OF VIEWS: Single portable view of the chest. COMPARISON: 10/13/2018 FINDINGS: There is a right sided PICC line. The catheter tip overlies cavoatrial junction. There is tortuosity and ectasia of the aorta. There is no evidence of focal consolidation or evidence of pulmonary edema. There is no pleural effusion. The heart is normal in size.     Right PICC line tip overlies the cavoatrial junction.    Us-extremity Artery Lower Unilat Left    Result Date: 2019  Lower Extremity  Arterial Duplex Report  Vascular Laboratory  CONCLUSIONS  1.  The LEFT femoral popliteal bypass graft is occluded.  2.  THere is no flow seen in the mid/distal SFA through the distal  popliteal artery consistent with thrombosis.  3.  The bifemoral bypass graft is patent.  4.  There is minimal 3 vessel runoff tothe Left foot.  4.  THere is fluid surrounding both grafts.  Findings called to Dr. Ambrosio at 2:48 pm on 19.  KATHRYN DIAS  Exam Date:     2019 12:44  Room #:     Inpatient  Priority:     Stat  Ht (in):             Wt (lb):  Ordering Physician:        BERNA AMBROSIO  Referring Physician:       BERNA AMBROSIO  Sonographer:               Marcelino Miranda RVT  Study Type:                Complete Unilateral  Technical Quality:         Adequate  Age:    74    Gender:     M  MRN:    [id number]  :    1945      BSA:  Indications:     Pain in Limb  CPT Codes:       11350  ICD Codes:       729.5  History:         Left lower extremity pain. History of right-to-left fem-fem                   bypass graft and left fem-pop byppass graft  Limitations:                RIGHT  Waveform        Peak Systolic Velocity (cm/s)                  Prox    Prox-Mid  Mid    Mid-Dist   Distal                                                             CFA                                                             PFA                                                             SFA                                                             POP                                                             AT                                                             PT                                                             AL                LEFT  Waveform        Peak Systolic Velocity (cm/s)                  Prox    Prox-Mid  Mid    Mid-Dist  Distal  Bi, non-                          173                      CFA  reversed  Triphasic       108                                        PFA  Absent          59                36               0       SFA  Absent                            0                        POP  Monophasic      6                                  6       AT  Monophasic      7                                  7       PT  Monophasic      5                                  6       AL  FINDINGS  Left.  With the exception of the very proximal fem-pop bypass graft, no flow can  be demonstrated throughout the graft's length. Echolucent material  consistent with arterial thrombus is visualized at the proximal thigh.  Retrograde flow seen in the very proximal segment. Throughout the graft's  length, there appears to be fluid collecting around the graft  No flow can be demonstrated from the mid-distal superficial femoral artery  through the distal popliteal artery. Echolucent material consistent with  arterial thrombus is visualized throughout this segment.  3-Vessel, severely diminished and monophasic runoff to the foot.  The fem-fem bypass graft is widely patent throughout its length. There  appears to be a fluid collection surrounding this graft.  Tayler Huynh  (Electronically Signed)  Final Date:      18 August 2019                   14:38  Amended:         18 August 2019  14:49    Us-extremity Venous Lower Bilat    Result Date: 2019   Vascular Laboratory  CONCLUSIONS  No deep venuos thrombosis bilaterally.  KATHRYN DIAS  Exam Date:     2019 13:56  Room #:     Inpatient  Priority:     Routine  Ht (in):             Wt (lb):  Ordering Physician:        KURT ADRIAN  Referring Physician:       [id number] Collazo  Sonographer:               Lacey Hayden RVT  Study Type:                Complete Bilateral  Technical Quality:         Adequate  Age:    74    Gender:     M  MRN:    [id number]  :    1945      BSA:  Indications:     Edema, Localized swelling, mass and lump, lower limb,                   bilateral  CPT Codes:       12923  ICD Codes:       782.3  R22.43  History:         Bilateral lower extremity edema/swelling s/p infected                   arterial graft removal; Wound vac at Left groin and Left                   proximal calf. Previous duplex 19.  Limitations:  PROCEDURES:  Bilateral lower extremity venous duplex imaging.  The following venous structures were evaluated: common femoral, profunda  femoral, greater saphenous, femoral, popliteal , peroneal and posterior  tibial veins.  Serial compression, augmentation maneuvers,  color and spectral Doppler  flow evaluations were performed.  FINDINGS:  Bilateral lower extremities -  Complete color filling and compressibility with normal venous flow dynamics  including spontaneous flow, response to augmentation maneuvers, and  respiratory phasicity.  The peroneal and posterior tibial veins are difficult to assess for  compressibility, but flow response to augmentation is demonstrated.  Anson Jacinto  (Electronically Signed)  Final Date:      2019                   15:17    Us-extremity Venous Lower Unilat Left    Result Date: 2019   Vascular Laboratory  CONCLUSIONS  1.  No evidence of LEFT lower extremity DVT.  KATHRYN DIAS  Exam Date:     2019 12:31  Room #:     Inpatient   Priority:     Stat  Ht (in):             Wt (lb):  Ordering Physician:        BERNA PARR  Referring Physician:       [id number] Maldonado  Sonographer:               Marcelino Miranda RVT  Study Type:                Complete Unilateral  Technical Quality:         Adequate  Age:    74    Gender:     M  MRN:    [id number]  :    1945      BSA:  Indications:     Swelling of Limb  CPT Codes:       59198  ICD Codes:       729.81  History:         Left lower extremity edema  Limitations:     Pain in groin and popliteal fossa. Long axis images obtained                    instead of compressions.  PROCEDURES:  Left lower extremity venous duplex imaging.  The following venous structures were evaluated: common femoral, profunda  femoral, greater saphenous, femoral, popliteal , peroneal and posterior  tibial veins.  Serial compression, augmentation maneuvers,  color and spectral Doppler  flow evaluations were performed.  FINDINGS:  Left lower extremity -.  Complete color filling and compressibility with normal venous flow dynamics  including spontaneous flow, response to augmentation maneuvers, and  respiratory phasicity.  No evidence of superficial or deep venous thrombosis.  Flow was evaluated in the contralateral common femoral vein and normal  venous flow dynamics including spontaneous flow, respiratory phasic  variation and augmentation were demonstrated.  Tayler Huynh  (Electronically Signed)  Final Date:      2019                   14:39    Dx-portable Fluoro > 1 Hour    Result Date: 2019 5:00 PM Digitized Intraoperative Radiograph TECHNIQUE: 104 DSA images of the left lower extremity CLINICAL INFORMATION: Infected femoral-popliteal arterial bypass graft. COMPARISON: CTA 2019 FINDINGS: Faint contrast seen in the vessel Fluoroscopic time: 18 seconds .36 mGy    Digitized intraoperative radiograph is submitted for review.  This examination is not for diagnostic purpose but for guidance  during a surgical procedure.     Ec-echocardiogram Complete W/o Cont    Result Date: 2019  Transthoracic Echo Report Echocardiography Laboratory CONCLUSIONS No prior study is available for comparison. Normal left ventricular chamber size. Left ventricular ejection fraction is visually estimated to be 65%. Moderate concentric left ventricular hypertrophy. Normal diastolic function. No significant valve abnormalities. KATHRYN DIAS Exam Date:         2019                    09:53 Exam Location:     Inpatient Priority:          Routine Ordering Physician:        STEPHANY AVILA Referring Physician: Sonographer:               Yina Aldridge RDCS Age:    74     Gender:    M MRN:    [id number] :    1945 BSA:    1.79   Ht (in):    67     Wt (lb):    151 Exam Type:     Complete Indications:     Endocarditis ICD Codes:       421 CPT Codes:       20136 BP:   113    /   76     HR:   66 Technical Quality:       Fair MEASUREMENTS  (Male / Female) Normal Values 2D ECHO LV Diastolic Diameter PLAX        4.7 cm                4.2 - 5.9 / 3.9 - 5.3 cm LV Systolic Diameter PLAX         2.6 cm                2.1 - 4.0 cm IVS Diastolic Thickness           1.4 cm                LVPW Diastolic Thickness          1.4 cm                LVOT Diameter                     2.5 cm                Estimated LV Ejection Fraction    65 %                  LV Ejection Fraction MOD BP       50.1 %                >= 55  % LV Ejection Fraction MOD 4C       56.2 %                LV Ejection Fraction MOD 2C       39.6 %                M-MODE Aortic Root Diameter MM           4.4 cm                DOPPLER AV Peak Velocity                  2.1 m/s               AV Peak Gradient                  18.1 mmHg             AV Mean Gradient                  9.8 mmHg              LVOT Peak Velocity                0.97 m/s              AV Area Cont Eq vti               2.1 cm²               Mitral E Point Velocity           0.53 m/s               Mitral E to A Ratio               0.63                  MV Pressure Half Time             64.3 ms               MV Area PHT                       3.4 cm²               MV Deceleration Time              222 ms                TR Peak Velocity                  272 cm/s              PV Peak Velocity                  0.96 m/s              PV Peak Gradient                  3.7 mmHg              RVOT Peak Velocity                0.96 m/s              * Indicates values subject to auto-interpretation LV EF:  65    % FINDINGS Left Ventricle Normal left ventricular chamber size. Moderate concentric left ventricular hypertrophy. Normal left ventricular systolic function. Left ventricular ejection fraction is visually estimated to be 65%. Normal regional wall motion. Normal diastolic function. Right Ventricle The right ventricle was normal in size and function. Right Atrium The right atrium is normal in size. Vena cava is not well visualized. Left Atrium The left atrium is normal in size.  Left atrial volume index is 29 mL/sq m. Mitral Valve Structurally normal mitral valve without significant stenosis or regurgitation. Aortic Valve Structurally normal aortic valve without significant stenosis or regurgitation. Tricuspid Valve Structurally normal tricuspid valve without significant stenosis. Trace tricuspid regurgitation. Right ventricular systolic pressure is estimated to be 20 mmHg + estimated RAP. Pulmonic Valve Structurally normal pulmonic valve without significant stenosis or regurgitation. Pericardium Normal pericardium without effusion. Aorta The aortic root is normal. Brandon Juárez MD (Electronically Signed) Final Date:     21 August 2019                 11:16    Us-vein Mapping Lower Extremity Bilat    Result Date: 8/20/2019   Lower Extremity  Vein Map  Vascular Laboratory  CONCLUSIONS  1) Patent but small right long saphenous vein with representative diameter  measurements as listed.  2) Patent left long saphenous  vein with representative diameter  measurements as listed.  This vein is small below the knee.  KATHRYN DIAS  Exam Date:     2019 13:20  Room #:     Inpatient  Priority:     Routine  Ht (in):             Wt (lb):  Ordering Physician:        MARIANN GOOD  Referring Physician:       FLORENTINO Wilson  Sonographer:               Miki Green RDCS,                             ISRA  Study Type:                Complete Bilateral  Technical Quality:         Adequate  Age:    74    Gender:     M  MRN:    [id number]  :    1945      BSA:  Indications:     Peripheral vascular disease, unspecified  CPT Codes:       44731  ICD Codes:       I73.9  History:         Evaluate for arterial bypass conduit.  Limitations:            RIGHT                  Diameter                  (cm)  Characteristics  Normal             0.25      SFJ  Normal             0.21      HT  Normal             0.25      MT  Normal             0.26      LT  Normal             0.25      Knee  Normal             0.18      HC  Normal             0.23      LC  Normal             0.18      Ankle                LEFT       Diameter       (cm)                  Characteristics  SFJ    0.47     Normal  HT     0.46     Normal  MT     0.47     Normal  LT     0.41     Normal  Knee   0.37     Normal  HC     0.21     Normal  LC     0.22     Normal  Ankle  0.20     Normal                  Diameter                  (cm)  Characteristics  Normal             0.33      PF  Normal             0.21      MC                               Ankle         Diameter         (cm)                    Characteristics    PF     0.30     Normal    MC     0.21     Normal    Ankle  FINDINGS  Right. The greater saphenous vein is small in caliber. The small saphenous  vein is small in caliber.  Left. The greater saphenous vein is patent to the knee and essentially  normal in caliber. The greater saphenous is small in calibre in the high  calf and to the ankle.  The small saphenous  "vein is small in caliber.  Ana Grande M.D.  (Electronically Signed)  Final Date:      20 August 2019                   08:59    Ir-picc Line Placement W/ Guidance > Age 5    Result Date: 8/26/2019  HISTORY/REASON FOR EXAM:   PICC placement. TECHNIQUE/EXAM DESCRIPTION AND NUMBER OF VIEWS:   PICC line insertion with ultrasound guidance.  The procedure was performed using maximal sterile barrier technique including sterile gown, mask, cap, and donning of sterile gloves following appropriate hand hygiene and/or sterile scrub. Patient skin site was prepped with 2% Chlorhexidine solution. FINDINGS:  PICC line insertion with Ultrasound Guidance was performed by qualified nursing staff without the assistance of a Radiologist. PICC positioning appropriateness confirmed by 3CG technology; chest xray only needed in the instance 3CG unable to confirm placement.              Ultrasound-guided PICC placement performed by qualified nursing staff as above.       Micro:  Results     Procedure Component Value Units Date/Time    BLOOD CULTURE [[id number]] Collected:  09/01/19 0510    Order Status:  Completed Specimen:  Blood from Peripheral Updated:  09/06/19 0617     Significant Indicator NEG     Source BLD     Site Peripheral     Culture Result No growth after 5 days of incubation.  Blood culture testing and Gram stain, if indicated, are  performed at [facility], [address] [place] [address].  Positive blood cultures are  sent to [facility], [address] [place] [address], for organism identification and  susceptibility testing.      Narrative:       Per Hospital Policy: Only change Specimen Src: to \"Line\" if  specified by physician order.  Right AC    BLOOD CULTURE [[id number]] Collected:  09/01/19 0515    Order Status:  Completed Specimen:  Blood from Peripheral Updated:  09/06/19 0617     Significant Indicator NEG     Source BLD     Site Peripheral     Culture Result No " "growth after 5 days of incubation.  Blood culture testing and Gram stain, if indicated, are  performed at [facility], [address] [place] [address].  Positive blood cultures are  sent to [facility], [address] [place] [address], for organism identification and  susceptibility testing.      Narrative:       Per Hospital Policy: Only change Specimen Src: to \"Line\" if  specified by physician order.  Left AC          Assessment:  Dc Patel is a 74 y.o. male with a history of peripheral artery disease on Coumadin, CKD stage III, history of multiple revascularization procedures, admitted with infected left femoral-femoral and femoral-popliteal bypass grafts, MSSA bacteremia, left knee septic arthritis.  Source of Staph aureus likely is his multiple upper extremity excoriations.     Pertinent Diagnoses:  MSSA bacteremia  Infected vascular grafts  Left knee septic arthritis  History of blood clots  Chronic anticoagulant use     Plan:  MSSA bacteremia, infected vascular grafts, on treatment  -Continue IV Ancef 2 g every 8 hours  -Continue p.o. rifampin 300 mg 3 times daily 8/19.  Note interaction with Coumadin.  Will need close monitoring of INR.  Currently on Lovenox per primary team  -TTE with no evidence of infective endocarditis  -Follow repeat blood cultures (1/2 sets) from 8/21 - MSSA  Repeat blood culture on 8/23-negative  -On 8/20, patient underwent left knee arthrotomy with I&D, as well as removal of infected fem-fem and left fem-pop bypass grafts, left popliteal thrombectomy, redo fem-fem bypass and left fem-pop bypass using Cryoveins.  Knee aspirate + MSSA  -Given his multiple grafts in place including an aortic stent graft which cannot be removed per vascular surgery, patient will likely need chronic p.o. suppression following 6 weeks of IV antibiotics  Stop date of IV antibiotics 10/04/2019 followed by p.o. doxy for lifelong suppression  At least " weekly CBC with differential, CMP while on IV antibiotics  Labs reviewed and no antibiotic concerns at this time    Left knee septic arthritis, on treatment  -Orthopedics on board.  Left knee arthrotomy with I&D performed 8/20  -Knee aspirate cultures +MSSA  On antibiotics above     Bleeding from left groin surgical site  Went to the ER 8/27 and had sutures placed  No further bleeding  Hemoglobin stable    EVERT, resolved  -Initial report of CKD stage III, but creatinine is now down to normal  -Monitor, renally dose antibiotics     Discussed with primary Henry CHAU.  ID will follow.  Please call with questions.

## 2019-09-11 LAB
GLUCOSE BLD-MCNC: 118 MG/DL (ref 65–99)
GLUCOSE BLD-MCNC: 277 MG/DL (ref 65–99)

## 2019-09-11 PROCEDURE — 82962 GLUCOSE BLOOD TEST: CPT | Mod: 91

## 2019-09-11 PROCEDURE — 82962 GLUCOSE BLOOD TEST: CPT

## 2019-09-12 LAB
GLUCOSE BLD-MCNC: 103 MG/DL (ref 65–99)
GLUCOSE BLD-MCNC: 108 MG/DL (ref 65–99)
GLUCOSE BLD-MCNC: 122 MG/DL (ref 65–99)
GLUCOSE BLD-MCNC: 127 MG/DL (ref 65–99)
GLUCOSE BLD-MCNC: 133 MG/DL (ref 65–99)
GLUCOSE BLD-MCNC: 163 MG/DL (ref 65–99)
GLUCOSE BLD-MCNC: 217 MG/DL (ref 65–99)

## 2019-09-12 PROCEDURE — 82962 GLUCOSE BLOOD TEST: CPT

## 2019-09-12 PROCEDURE — 82962 GLUCOSE BLOOD TEST: CPT | Mod: 91

## 2019-09-13 PROCEDURE — 82962 GLUCOSE BLOOD TEST: CPT

## 2019-09-14 LAB
GLUCOSE BLD-MCNC: 101 MG/DL (ref 65–99)
GLUCOSE BLD-MCNC: 105 MG/DL (ref 65–99)
GLUCOSE BLD-MCNC: 138 MG/DL (ref 65–99)
GLUCOSE BLD-MCNC: 147 MG/DL (ref 65–99)
GLUCOSE BLD-MCNC: 148 MG/DL (ref 65–99)
GLUCOSE BLD-MCNC: 158 MG/DL (ref 65–99)
GLUCOSE BLD-MCNC: 78 MG/DL (ref 65–99)

## 2019-09-14 PROCEDURE — 82962 GLUCOSE BLOOD TEST: CPT | Mod: 91

## 2019-09-15 LAB
GLUCOSE BLD-MCNC: 139 MG/DL (ref 65–99)
GLUCOSE BLD-MCNC: 154 MG/DL (ref 65–99)
GLUCOSE BLD-MCNC: 156 MG/DL (ref 65–99)
GLUCOSE BLD-MCNC: 161 MG/DL (ref 65–99)
GLUCOSE BLD-MCNC: 166 MG/DL (ref 65–99)

## 2019-09-15 PROCEDURE — 82962 GLUCOSE BLOOD TEST: CPT | Mod: 91

## 2019-09-16 LAB
ALBUMIN SERPL BCP-MCNC: 2.8 G/DL (ref 3.2–4.9)
ALBUMIN/GLOB SERPL: 0.7 G/DL
ALP SERPL-CCNC: 109 U/L (ref 30–99)
ALT SERPL-CCNC: <5 U/L (ref 2–50)
ANION GAP SERPL CALC-SCNC: 8 MMOL/L (ref 0–11.9)
AST SERPL-CCNC: 13 U/L (ref 12–45)
BILIRUB SERPL-MCNC: 0.2 MG/DL (ref 0.1–1.5)
BUN SERPL-MCNC: 22 MG/DL (ref 8–22)
CALCIUM SERPL-MCNC: 8.7 MG/DL (ref 8.4–10.2)
CHLORIDE SERPL-SCNC: 98 MMOL/L (ref 96–112)
CO2 SERPL-SCNC: 30 MMOL/L (ref 20–33)
CREAT SERPL-MCNC: 0.97 MG/DL (ref 0.5–1.4)
CRP SERPL HS-MCNC: 5.57 MG/DL (ref 0–0.75)
ERYTHROCYTE [DISTWIDTH] IN BLOOD BY AUTOMATED COUNT: 54.5 FL (ref 35.9–50)
ERYTHROCYTE [SEDIMENTATION RATE] IN BLOOD BY WESTERGREN METHOD: >120 MM/HOUR (ref 0–20)
GLOBULIN SER CALC-MCNC: 4.1 G/DL (ref 1.9–3.5)
GLUCOSE BLD-MCNC: 105 MG/DL (ref 65–99)
GLUCOSE BLD-MCNC: 142 MG/DL (ref 65–99)
GLUCOSE BLD-MCNC: 147 MG/DL (ref 65–99)
GLUCOSE BLD-MCNC: 153 MG/DL (ref 65–99)
GLUCOSE SERPL-MCNC: 102 MG/DL (ref 65–99)
HCT VFR BLD AUTO: 27.6 % (ref 42–52)
HGB BLD-MCNC: 8.3 G/DL (ref 14–18)
MCH RBC QN AUTO: 27.5 PG (ref 27–33)
MCHC RBC AUTO-ENTMCNC: 30.1 G/DL (ref 33.7–35.3)
MCV RBC AUTO: 91.4 FL (ref 81.4–97.8)
PLATELET # BLD AUTO: 503 K/UL (ref 164–446)
PMV BLD AUTO: 8.8 FL (ref 9–12.9)
POTASSIUM SERPL-SCNC: 4.5 MMOL/L (ref 3.6–5.5)
PROT SERPL-MCNC: 6.9 G/DL (ref 6–8.2)
RBC # BLD AUTO: 3.02 M/UL (ref 4.7–6.1)
SODIUM SERPL-SCNC: 136 MMOL/L (ref 135–145)
WBC # BLD AUTO: 7.7 K/UL (ref 4.8–10.8)

## 2019-09-16 PROCEDURE — 80053 COMPREHEN METABOLIC PANEL: CPT

## 2019-09-16 PROCEDURE — 82962 GLUCOSE BLOOD TEST: CPT

## 2019-09-16 PROCEDURE — 86140 C-REACTIVE PROTEIN: CPT

## 2019-09-16 PROCEDURE — 85027 COMPLETE CBC AUTOMATED: CPT

## 2019-09-16 PROCEDURE — 85652 RBC SED RATE AUTOMATED: CPT

## 2019-09-16 ASSESSMENT — ENCOUNTER SYMPTOMS
NAUSEA: 0
FEVER: 0
VOMITING: 0
DIARRHEA: 0
CHILLS: 0
MYALGIAS: 1
ABDOMINAL PAIN: 0

## 2019-09-16 NOTE — PROGRESS NOTES
Infectious Disease Progress Note    Author: Harvinder Head M.D. Date & Time of service: 9/16/2019  9:34 AM    Chief Complaint:  Follow-up for MSSA bacteremia    Interval History:  8/27 afebrile, white count down to 14.4.  Patient transferred to [facility].  Tolerating antibiotics.  Saw patient during wound care eval.  Patient reports pain at the site.  Noted to have oozing from the left groin.  Primary team discussed with vascular surgery, plan is to have him transferred to the ER for possible sutures.  8/29 afebrile, white count trending down, 12.8 today. Pt reports feeling better. No further bleeding from L groin. Tolerating antibiotics. No new issues.  9/2 afebrile, leukocytosis resolved, down to 8.8.  Reports no new issues, tolerating antibiotics, happy with his progress.  9/10 afebrile, white count 8.1.  Tolerating antibiotics, no new issues.  9/16 afebrile, white count 7.7.  Patient reports tolerating antibiotics, feeling well, happy with improvement in his wounds.  Patient will be transferred from [facility] to a [facility]    Labs Reviewed and Medications Reviewed.    Review of Systems:  Review of Systems   Constitutional: Negative for chills and fever.   Cardiovascular: Negative for chest pain.   Gastrointestinal: Negative for abdominal pain, diarrhea, nausea and vomiting.   Musculoskeletal: Positive for myalgias.   Skin: Negative for itching and rash.   All other systems reviewed and are negative.  Controlled    Hemodynamics:  T 98.8 /74 HR 83 RR 17 O2 91%  Physical Exam:  Physical Exam   Constitutional: He is oriented to person, place, and time. He appears well-developed. No distress.   Thin, resting comfortably   HENT:   Mouth/Throat: Oropharynx is clear and moist.   Eyes: Pupils are equal, round, and reactive to light. Conjunctivae are normal. No scleral icterus.   Neck: No JVD present.   Cardiovascular: Normal rate, regular rhythm and normal heart sounds.   No murmur heard.  Pulmonary/Chest: Effort  normal and breath sounds normal. No respiratory distress. He has no wheezes.   Abdominal: Soft. He exhibits no distension. There is no tenderness.   R groin wound healing, dressing in place, left groin with wound VAC in place, no surrounding erythema or active drainage noted   Musculoskeletal: He exhibits no edema.   Left knee midline incision well approximated, no erythema or active drainage  Left anterior medial proximal leg incision with wound VAC in place, no surrounding redness or active drainage   Neurological: He is alert and oriented to person, place, and time.   No gross FND   Skin: Skin is warm and dry. No rash noted. He is not diaphoretic. No erythema.   Previous multiple scabs bilateral forearms resolved   Psychiatric: He has a normal mood and affect. His behavior is normal.   Pleasant   Nursing note and vitals reviewed.      Meds:  No current facility-administered medications for this encounter.     Labs:  Recent Labs     09/16/19  0530   WBC 7.7   RBC 3.02*   HEMOGLOBIN 8.3*   HEMATOCRIT 27.6*   MCV 91.4   MCH 27.5   RDW 54.5*   PLATELETCT 503*   MPV 8.8*     Recent Labs     09/16/19  0530   SODIUM 136   POTASSIUM 4.5   CHLORIDE 98   CO2 30   GLUCOSE 102*   BUN 22     Recent Labs     09/16/19  0530   ALBUMIN 2.8*   TBILIRUBIN 0.2   ALKPHOSPHAT 109*   TOTPROTEIN 6.9   ALTSGPT <5   ASTSGOT 13   CREATININE 0.97       Imaging:  Ct-cta Lower Ext With & W/o-post Process Left    Result Date: 8/18/2019 8/18/2019 2:55 PM HISTORY/REASON FOR EXAM:  Acute limb ischemia, leg worsening pain and decreased pulse to left leg possible DVT vs arterial clot. He had a fem pop bypass in last 6 weeks. Now has +leg swelling, redness to lower abdomen, pain in back of knee TECHNIQUE/EXAM DESCRIPTION:  CT angiogram of the left lower extremity with contrast, with reconstructions. 100 mL of Omnipaque 350 nonionic contrast was administered at 5.0 mL/sec and helical scanning obtained from the distal femur through the ankle. Thin-  and thick-section multiplanar volume reformats were generated from the axial data set in the sagittal and coronal planes. 3D angiographic images were reviewed on PACS. Maximum intensity projection (MIP) images were generated and reviewed. Low dose optimization technique was utilized for this CT exam including automated exposure control and adjustment of the mA and/or kV according to patient size. COMPARISON: Ultrasound 8/18/2019. FINDINGS: Partially visualized femoral-femoral bypass that appears patent. There is small amount of clot in the junction between the femorofemoral bypass graft and the common femoral artery. The left femoral popliteal graft is completely occluded. There is gas within the lumen. There is small amount of fluid surrounding the entire portion of the graft. The native common femoral and external femoral artery demonstrate multiple focal narrowing there is complete occlusion of the superficial femoral artery from the mid to distal portion at the area of prior stent extending to the popliteal artery. There is partial reconstitution at the trifurcation but flow is very sluggish. No flow appreciated in the anterior tibial artery, posterior tibial artery and peroneal artery at 10 cm from the ankle. 3D angiographic/MIP images of the vasculature confirm the vascular findings as described above.     1. Completely occluded left femoral-popliteal bypass graft with gas within the lumen and fluid surrounding the graft. The finding is concerning for infected occluded graft. 2. Partially visualized femoral-femoral bypass that appears patent. There is small amount of clot in the junction between the femorofemoral bypass graft and the common femoral artery. 3. Complete occlusion of the native superficial femoral artery from the mid to distal thigh at the area of prior stenting. Complete occlusion of the left popliteal artery. Partial flow reconstitution at the trifurcation but flow is very sluggish. No distal  flow flow appreciated in the anterior tibial artery, posterior tibial artery and peroneal artery at 10 cm from the ankle. CRITICAL RESULT READ BACK: Preliminary findings discussed with and critical read back performed by Dr. BERNA AMBROSIO in the Emergency Department via telephone on 2019 3:51 PM    Dx-chest-portable (1 View)    Result Date: 2019 3:25 PM HISTORY/REASON FOR EXAM: Right-sided PICC line placement. TECHNIQUE/EXAM DESCRIPTION AND NUMBER OF VIEWS: Single portable view of the chest. COMPARISON: 10/13/2018 FINDINGS: There is a right sided PICC line. The catheter tip overlies cavoatrial junction. There is tortuosity and ectasia of the aorta. There is no evidence of focal consolidation or evidence of pulmonary edema. There is no pleural effusion. The heart is normal in size.     Right PICC line tip overlies the cavoatrial junction.    Us-extremity Artery Lower Unilat Left    Result Date: 2019  Lower Extremity  Arterial Duplex Report  Vascular Laboratory  CONCLUSIONS  1.  The LEFT femoral popliteal bypass graft is occluded.  2.  THere is no flow seen in the mid/distal SFA through the distal  popliteal artery consistent with thrombosis.  3.  The bifemoral bypass graft is patent.  4.  There is minimal 3 vessel runoff tothe Left foot.  4.  THere is fluid surrounding both grafts.  Findings called to Dr. Ambrosio at 2:48 pm on 19.  KATHRYN DIAS  Exam Date:     2019 12:44  Room #:     Inpatient  Priority:     Stat  Ht (in):             Wt (lb):  Ordering Physician:        BERNA AMBROSIO  Referring Physician:       BERNA AMBROSIO  Sonographer:               Marcelino Miranda RVT  Study Type:                Complete Unilateral  Technical Quality:         Adequate  Age:    74    Gender:     M  MRN:    [id number]  :    1945      BSA:  Indications:     Pain in Limb  CPT Codes:       34170  ICD Codes:       729.5  History:         Left lower extremity pain. History of  right-to-left fem-fem                   bypass graft and left fem-pop byppass graft  Limitations:                RIGHT  Waveform        Peak Systolic Velocity (cm/s)                  Prox    Prox-Mid  Mid    Mid-Dist  Distal                                                             CFA                                                             PFA                                                             SFA                                                             POP                                                             AT                                                             PT                                                             AL                LEFT  Waveform        Peak Systolic Velocity (cm/s)                  Prox    Prox-Mid  Mid    Mid-Dist  Distal  Bi, non-                          173                      CFA  reversed  Triphasic       108                                        PFA  Absent          59                36               0       SFA  Absent                            0                        POP  Monophasic      6                                  6       AT  Monophasic      7                                  7       PT  Monophasic      5                                  6       AL  FINDINGS  Left.  With the exception of the very proximal fem-pop bypass graft, no flow can  be demonstrated throughout the graft's length. Echolucent material  consistent with arterial thrombus is visualized at the proximal thigh.  Retrograde flow seen in the very proximal segment. Throughout the graft's  length, there appears to be fluid collecting around the graft  No flow can be demonstrated from the mid-distal superficial femoral artery  through the distal popliteal artery. Echolucent material consistent with  arterial thrombus is visualized throughout this segment.  3-Vessel, severely diminished and monophasic runoff to the foot.  The fem-fem bypass graft is widely patent  throughout its length. There  appears to be a fluid collection surrounding this graft.  Tayler FARMER Lino  (Electronically Signed)  Final Date:      2019                   14:38  Amended:         2019 14:49    Us-extremity Venous Lower Bilat    Result Date: 2019   Vascular Laboratory  CONCLUSIONS  No deep venuos thrombosis bilaterally.  KATHRYN DIAS  Exam Date:     2019 13:56  Room #:     Inpatient  Priority:     Routine  Ht (in):             Wt (lb):  Ordering Physician:        KURT ADRIAN  Referring Physician:       NGUYỄN Box  Sonographer:               Lacey Hayden RVT  Study Type:                Complete Bilateral  Technical Quality:         Adequate  Age:    74    Gender:     M  MRN:    [id number]  :    1945      BSA:  Indications:     Edema, Localized swelling, mass and lump, lower limb,                   bilateral  CPT Codes:       36172  ICD Codes:       782.3  R22.43  History:         Bilateral lower extremity edema/swelling s/p infected                   arterial graft removal; Wound vac at Left groin and Left                   proximal calf. Previous duplex 19.  Limitations:  PROCEDURES:  Bilateral lower extremity venous duplex imaging.  The following venous structures were evaluated: common femoral, profunda  femoral, greater saphenous, femoral, popliteal , peroneal and posterior  tibial veins.  Serial compression, augmentation maneuvers,  color and spectral Doppler  flow evaluations were performed.  FINDINGS:  Bilateral lower extremities -  Complete color filling and compressibility with normal venous flow dynamics  including spontaneous flow, response to augmentation maneuvers, and  respiratory phasicity.  The peroneal and posterior tibial veins are difficult to assess for  compressibility, but flow response to augmentation is demonstrated.  Anson Jacinto  (Electronically Signed)  Final Date:      2019                    15:17    Us-extremity Venous Lower Unilat Left    Result Date: 2019   Vascular Laboratory  CONCLUSIONS  1.  No evidence of LEFT lower extremity DVT.  KATHRYN DIAS  Exam Date:     2019 12:31  Room #:     Inpatient  Priority:     Stat  Ht (in):             Wt (lb):  Ordering Physician:        BERNA PARR  Referring Physician:       [id number] Maldonado  Sonographer:               Marcelino Miranda RVT  Study Type:                Complete Unilateral  Technical Quality:         Adequate  Age:    74    Gender:     M  MRN:    [id number]  :    1945      BSA:  Indications:     Swelling of Limb  CPT Codes:       81821  ICD Codes:       729.81  History:         Left lower extremity edema  Limitations:     Pain in groin and popliteal fossa. Long axis images obtained                    instead of compressions.  PROCEDURES:  Left lower extremity venous duplex imaging.  The following venous structures were evaluated: common femoral, profunda  femoral, greater saphenous, femoral, popliteal , peroneal and posterior  tibial veins.  Serial compression, augmentation maneuvers,  color and spectral Doppler  flow evaluations were performed.  FINDINGS:  Left lower extremity -.  Complete color filling and compressibility with normal venous flow dynamics  including spontaneous flow, response to augmentation maneuvers, and  respiratory phasicity.  No evidence of superficial or deep venous thrombosis.  Flow was evaluated in the contralateral common femoral vein and normal  venous flow dynamics including spontaneous flow, respiratory phasic  variation and augmentation were demonstrated.  Tayler Huynh  (Electronically Signed)  Final Date:      2019                   14:39    Dx-portable Fluoro > 1 Hour    Result Date: 2019 5:00 PM Digitized Intraoperative Radiograph TECHNIQUE: 104 DSA images of the left lower extremity CLINICAL INFORMATION: Infected femoral-popliteal arterial bypass graft.  COMPARISON: CTA 2019 FINDINGS: Faint contrast seen in the vessel Fluoroscopic time: 18 seconds .36 mGy    Digitized intraoperative radiograph is submitted for review.  This examination is not for diagnostic purpose but for guidance during a surgical procedure.     Ec-echocardiogram Complete W/o Cont    Result Date: 2019  Transthoracic Echo Report Echocardiography Laboratory CONCLUSIONS No prior study is available for comparison. Normal left ventricular chamber size. Left ventricular ejection fraction is visually estimated to be 65%. Moderate concentric left ventricular hypertrophy. Normal diastolic function. No significant valve abnormalities. KATHRYN DIAS Exam Date:         2019                    09:53 Exam Location:     Inpatient Priority:          Routine Ordering Physician:        STEPHANY AVILA Referring Physician: Sonographer:               Yina Aldridge RDCS Age:    74     Gender:    M MRN:    [id number] :    1945 BSA:    1.79   Ht (in):    67     Wt (lb):    151 Exam Type:     Complete Indications:     Endocarditis ICD Codes:       421 CPT Codes:       12409 BP:   113    /   76     HR:   66 Technical Quality:       Fair MEASUREMENTS  (Male / Female) Normal Values 2D ECHO LV Diastolic Diameter PLAX        4.7 cm                4.2 - 5.9 / 3.9 - 5.3 cm LV Systolic Diameter PLAX         2.6 cm                2.1 - 4.0 cm IVS Diastolic Thickness           1.4 cm                LVPW Diastolic Thickness          1.4 cm                LVOT Diameter                     2.5 cm                Estimated LV Ejection Fraction    65 %                  LV Ejection Fraction MOD BP       50.1 %                >= 55  % LV Ejection Fraction MOD 4C       56.2 %                LV Ejection Fraction MOD 2C       39.6 %                M-MODE Aortic Root Diameter MM           4.4 cm                DOPPLER AV Peak Velocity                  2.1 m/s               AV Peak Gradient                  18.1 mmHg              AV Mean Gradient                  9.8 mmHg              LVOT Peak Velocity                0.97 m/s              AV Area Cont Eq vti               2.1 cm²               Mitral E Point Velocity           0.53 m/s              Mitral E to A Ratio               0.63                  MV Pressure Half Time             64.3 ms               MV Area PHT                       3.4 cm²               MV Deceleration Time              222 ms                TR Peak Velocity                  272 cm/s              PV Peak Velocity                  0.96 m/s              PV Peak Gradient                  3.7 mmHg              RVOT Peak Velocity                0.96 m/s              * Indicates values subject to auto-interpretation LV EF:  65    % FINDINGS Left Ventricle Normal left ventricular chamber size. Moderate concentric left ventricular hypertrophy. Normal left ventricular systolic function. Left ventricular ejection fraction is visually estimated to be 65%. Normal regional wall motion. Normal diastolic function. Right Ventricle The right ventricle was normal in size and function. Right Atrium The right atrium is normal in size. Vena cava is not well visualized. Left Atrium The left atrium is normal in size.  Left atrial volume index is 29 mL/sq m. Mitral Valve Structurally normal mitral valve without significant stenosis or regurgitation. Aortic Valve Structurally normal aortic valve without significant stenosis or regurgitation. Tricuspid Valve Structurally normal tricuspid valve without significant stenosis. Trace tricuspid regurgitation. Right ventricular systolic pressure is estimated to be 20 mmHg + estimated RAP. Pulmonic Valve Structurally normal pulmonic valve without significant stenosis or regurgitation. Pericardium Normal pericardium without effusion. Aorta The aortic root is normal. Brandon Juárez MD (Electronically Signed) Final Date:     21 August 2019                 11:16    Us-vein Mapping Lower  Extremity Bilat    Result Date: 2019   Lower Extremity  Vein Map  Vascular Laboratory  CONCLUSIONS  1) Patent but small right long saphenous vein with representative diameter  measurements as listed.  2) Patent left long saphenous vein with representative diameter  measurements as listed.  This vein is small below the knee.  KATHRYN DIAS  Exam Date:     2019 13:20  Room #:     Inpatient  Priority:     Routine  Ht (in):             Wt (lb):  Ordering Physician:        MARIANN GOOD  Referring Physician:       FLORENTINO Wilson  Sonographer:               Miki Green RDCS, RVT  Study Type:                Complete Bilateral  Technical Quality:         Adequate  Age:    74    Gender:     M  MRN:    [id number]  :    1945      BSA:  Indications:     Peripheral vascular disease, unspecified  CPT Codes:       29043  ICD Codes:       I73.9  History:         Evaluate for arterial bypass conduit.  Limitations:            RIGHT                  Diameter                  (cm)  Characteristics  Normal             0.25      SFJ  Normal             0.21      HT  Normal             0.25      MT  Normal             0.26      LT  Normal             0.25      Knee  Normal             0.18      HC  Normal             0.23      LC  Normal             0.18      Ankle                LEFT       Diameter       (cm)                  Characteristics  SFJ    0.47     Normal  HT     0.46     Normal  MT     0.47     Normal  LT     0.41     Normal  Knee   0.37     Normal  HC     0.21     Normal  LC     0.22     Normal  Ankle  0.20     Normal                  Diameter                  (cm)  Characteristics  Normal             0.33      PF  Normal             0.21      MC                               Ankle         Diameter         (cm)                    Characteristics    PF     0.30     Normal    MC     0.21     Normal    Ankle  FINDINGS  Right. The greater saphenous vein is small in caliber. The  small saphenous  vein is small in caliber.  Left. The greater saphenous vein is patent to the knee and essentially  normal in caliber. The greater saphenous is small in calibre in the high  calf and to the ankle.  The small saphenous vein is small in caliber.  Ana Grande M.D.  (Electronically Signed)  Final Date:      20 August 2019                   08:59    Ir-picc Line Placement W/ Guidance > Age 5    Result Date: 8/26/2019  HISTORY/REASON FOR EXAM:   PICC placement. TECHNIQUE/EXAM DESCRIPTION AND NUMBER OF VIEWS:   PICC line insertion with ultrasound guidance.  The procedure was performed using maximal sterile barrier technique including sterile gown, mask, cap, and donning of sterile gloves following appropriate hand hygiene and/or sterile scrub. Patient skin site was prepped with 2% Chlorhexidine solution. FINDINGS:  PICC line insertion with Ultrasound Guidance was performed by qualified nursing staff without the assistance of a Radiologist. PICC positioning appropriateness confirmed by 3CG technology; chest xray only needed in the instance 3CG unable to confirm placement.              Ultrasound-guided PICC placement performed by qualified nursing staff as above.       Micro:  Results     ** No results found for the last 168 hours. **          Assessment:  Dc Patel is a 74 y.o. male with a history of peripheral artery disease on Coumadin, CKD stage III, history of multiple revascularization procedures, admitted with infected left femoral-femoral and femoral-popliteal bypass grafts, MSSA bacteremia, left knee septic arthritis.  Source of Staph aureus likely is his multiple upper extremity excoriations.     Pertinent Diagnoses:  MSSA bacteremia  Infected vascular grafts  Left knee septic arthritis  History of blood clots  Chronic anticoagulant use     Plan:  MSSA bacteremia, infected vascular grafts, on treatment  -Continue IV Ancef 2 g every 8 hours  -Continue p.o. rifampin 300 mg 3 times  daily.  Note interaction with Coumadin.  Will need close monitoring of INR.  Currently on Lovenox per primary team  -TTE with no evidence of infective endocarditis  -Follow repeat blood cultures (1/2 sets) from 8/21 - MSSA  Repeat blood culture on 8/23-negative  -On 8/20, patient underwent left knee arthrotomy with I&D, as well as removal of infected fem-fem and left fem-pop bypass grafts, left popliteal thrombectomy, redo fem-fem bypass and left fem-pop bypass using Cryoveins.  Knee aspirate + MSSA  -Given his multiple grafts in place including an aortic stent graft which cannot be removed per vascular surgery, patient will likely need chronic p.o. suppression following 6 weeks of IV antibiotics  Stop date of above antibiotics 10/04/2019 followed by p.o. Augmentin 500 mg BID for lifelong suppression  At least weekly CBC with differential, CMP while on IV antibiotics  Labs from 9/16 reviewed and no antibiotic concerns at this time    Left knee septic arthritis, on treatment  -Orthopedics on board.  Left knee arthrotomy with I&D performed 8/20  -Knee aspirate cultures +MSSA  On antibiotics above     Bleeding from left groin surgical site  Went to the ER 8/27 and had sutures placed  No further bleeding  Hemoglobin stable    EVERT, resolved  -Initial report of CKD stage III, but creatinine is now down to normal  -Monitor, renally dose antibiotics     Discussed with primary Henry CHAU.    Patient is being transferred to a SNF.  ID will sign off.  Please call with questions.    ID clinic follow-up in 2 to 3 weeks

## 2019-09-17 LAB
GLUCOSE BLD-MCNC: 136 MG/DL (ref 65–99)
GLUCOSE BLD-MCNC: 145 MG/DL (ref 65–99)

## 2019-09-17 PROCEDURE — 82962 GLUCOSE BLOOD TEST: CPT

## 2019-09-18 PROCEDURE — 302244 HCHG LTACH STAT

## 2019-10-02 ENCOUNTER — OFFICE VISIT (OUTPATIENT)
Dept: INFECTIOUS DISEASES | Facility: MEDICAL CENTER | Age: 74
End: 2019-10-02
Payer: MEDICARE

## 2019-10-02 VITALS
BODY MASS INDEX: 25.27 KG/M2 | HEART RATE: 78 BPM | OXYGEN SATURATION: 97 % | SYSTOLIC BLOOD PRESSURE: 118 MMHG | DIASTOLIC BLOOD PRESSURE: 72 MMHG | TEMPERATURE: 98.4 F | WEIGHT: 161 LBS | HEIGHT: 67 IN

## 2019-10-02 DIAGNOSIS — T82.7XXA INFECTION OF PROSTHETIC VASCULAR GRAFT, INITIAL ENCOUNTER (HCC): ICD-10-CM

## 2019-10-02 DIAGNOSIS — I73.9 PAD (PERIPHERAL ARTERY DISEASE) (HCC): ICD-10-CM

## 2019-10-02 DIAGNOSIS — I70.202 FEMORAL ARTERY OCCLUSION, LEFT (HCC): ICD-10-CM

## 2019-10-02 DIAGNOSIS — M00.062 STAPHYLOCOCCAL ARTHRITIS OF LEFT KNEE (HCC): ICD-10-CM

## 2019-10-02 PROCEDURE — 99215 OFFICE O/P EST HI 40 MIN: CPT | Performed by: INTERNAL MEDICINE

## 2019-10-02 RX ORDER — ESCITALOPRAM OXALATE 10 MG/1
10 TABLET ORAL DAILY
COMMUNITY
End: 2020-01-01

## 2019-10-02 RX ORDER — CALCIUM CARBONATE 500 MG/1
500 TABLET, CHEWABLE ORAL DAILY
COMMUNITY
End: 2020-01-01

## 2019-10-02 RX ORDER — AMOXICILLIN AND CLAVULANATE POTASSIUM 875; 125 MG/1; MG/1
1 TABLET, FILM COATED ORAL 2 TIMES DAILY
Qty: 60 TAB | Refills: 11 | Status: SHIPPED | OUTPATIENT
Start: 2019-10-02 | End: 2019-01-01

## 2019-10-02 RX ORDER — PREGABALIN 150 MG/1
150 CAPSULE ORAL DAILY
COMMUNITY
End: 2020-01-01

## 2019-10-02 ASSESSMENT — ENCOUNTER SYMPTOMS
FEVER: 0
ABDOMINAL PAIN: 0
BACK PAIN: 0
ORTHOPNEA: 0
CHILLS: 0
WEAKNESS: 0
SPUTUM PRODUCTION: 0
CONSTIPATION: 0
NERVOUS/ANXIOUS: 0
HEADACHES: 0
SHORTNESS OF BREATH: 0
COUGH: 0
PALPITATIONS: 0
DIZZINESS: 0
NAUSEA: 0
VOMITING: 0
DIARRHEA: 0

## 2019-10-02 NOTE — PROGRESS NOTES
[facility] INFECTIOUS DISEASES CLINIC NOTE     Date of Service: 10/2/2019    Referring Physician: Jasbir Smallwood MD    Chief Complaint: Follow up for MSSA bacteremia    History of Present Illness:     Dc Patel is a 74 y.o. male is here for a hospital follow up.     Last seen by Dr. Head on 9/16/2019 at [facility]    Dc Patel is a 74 y.o. male has a past medical history of peripheral arterial disease s/p amputation of the left second and third toes in the past, s/p fem-fem bypass graft and  left femoral to below-knee popliteal bypass graft placement in 7/3/2019 by Dr. Grande on Coumadin, multiple LE revascularization procedures.  Per vascular surgery report, patient has at least one iliac stent on the left side apparently for an aneurysm.  Patient also reportedly has aortic stent grafts in place, which cannot be removed.      He has recent hospitalization from 8/18 to 8/26/2019 for left leg pain, knee pain. CTA on 8/18 showed complete occlusion of the left femoral-popliteal bypass grafts with gas within the lumen fluid surrounding the graft. Hospital course included removal of infected fem-fem and left fem-pop bypass grafts on 8/20 Left popliteal thrombectomy. Redo fem-fem bypass and left fem-pop bypass using Cryoveins. Left leg angiogram. Bilateral sartorius flap.     Pt also had left knee arthrotomy with I&D 8/20. Cx+ MSSA    Pt was found to have MSSA bacteremia, being treated with cefazolin IV. TTE negative for IE. Last negative BCx was on 8/23.     Pt was transferred to [facility] on 8/26. Transferred to [facility]    Other PMH include osteoarthritis, CKD stage III    Today, 10/2/2019: doing well overall. He feels very goodTolerating PT/OT, able to weight bear.   Left leg feels remains swollen - not improving since hospital. Not painful, no erythema.   Wound in lower extremities are good.   Pt no longer takes coumadin and currently on aspirin and plavix.   Pt is almost finished with cefazolin IV, end date 10/4. He's  also on rifampin 300mg TID.      Review of Systems:  Review of Systems   Constitutional: Negative for chills, fever and malaise/fatigue.   HENT: Negative for congestion.    Respiratory: Negative for cough, sputum production and shortness of breath.    Cardiovascular: Negative for chest pain, palpitations, orthopnea and leg swelling.   Gastrointestinal: Negative for abdominal pain, constipation, diarrhea, nausea and vomiting.   Genitourinary: Negative for dysuria and urgency.   Musculoskeletal: Negative for back pain and joint pain.   Skin: Negative for rash.   Neurological: Negative for dizziness, weakness and headaches.   Psychiatric/Behavioral: The patient is not nervous/anxious.    All other systems reviewed and are negative.      Past Medical History:   Diagnosis Date   • PVD (peripheral vascular disease) (HCC)        Past Surgical History:   Procedure Laterality Date   • FEMORAL FEMORAL BYPASS Left 8/20/2019    Procedure: CREATION, BYPASS, ARTERIAL, FEMORAL TO FEMORAL, USING GRAFT- REMOVAL AND REDO, CRYOVEIN;  Surgeon: Ana Grande M.D.;  Location: [facility];  Service: General   • FEMORAL POPLITEAL BYPASS Left 8/20/2019    Procedure: CREATION, BYPASS, ARTERIAL, FEMORAL TO POPLITEAL, USING GRAFT;  Surgeon: Ana Grande M.D.;  Location: [facility];  Service: General   • ANGIOGRAM Left 8/20/2019    Procedure: ANGIOGRAM;  Surgeon: Ana Grande M.D.;  Location: [facility];  Service: General   • IRRIGATION & DEBRIDEMENT ORTHO Left 8/20/2019    Procedure: IRRIGATION AND DEBRIDEMENT, WOUND-KNEE;  Surgeon: Iván Cifuentes M.D.;  Location: [facility];  Service: General   • FEMORAL FEMORAL BYPASS Bilateral 7/3/2019    Procedure: CREATION, BYPASS, ARTERIAL, FEMORAL TO FEMORAL, USING GRAFT;  Surgeon: Ana Grande M.D.;  Location: [facility];  Service: General   • FEMORAL POPLITEAL BYPASS Left 7/3/2019    Procedure: CREATION, BYPASS, ARTERIAL,  FEMORAL TO POPLITEAL, USING GRAFT;  Surgeon: Ana Grande M.D.;  Location: [facility];  Service: General   • ANGIOGRAM Left 7/3/2019    Procedure: ANGIOGRAM;  Surgeon: Ana Grande M.D.;  Location: [facility];  Service: General   • TOE AMPUTATION Left 10/23/2018    Procedure: TOE AMPUTATION 3RD  POSS 2ND  ;  Surgeon: Ana Grande M.D.;  Location: [facility];  Service: General       No family history on file.    Social History     Socioeconomic History   • Marital status:      Spouse name: Not on file   • Number of children: Not on file   • Years of education: Not on file   • Highest education level: Not on file   Occupational History   • Not on file   Social Needs   • Financial resource strain: Not on file   • Food insecurity:     Worry: Not on file     Inability: Not on file   • Transportation needs:     Medical: Not on file     Non-medical: Not on file   Tobacco Use   • Smoking status: Former Smoker   • Smokeless tobacco: Never Used   Substance and Sexual Activity   • Alcohol use: Not Currently   • Drug use: Not Currently     Types: Inhaled     Comment: THC   • Sexual activity: Not on file   Lifestyle   • Physical activity:     Days per week: Not on file     Minutes per session: Not on file   • Stress: Not on file   Relationships   • Social connections:     Talks on phone: Not on file     Gets together: Not on file     Attends [employer] service: Not on file     Active member of club or organization: Not on file     Attends meetings of clubs or organizations: Not on file     Relationship status: Not on file   • Intimate partner violence:     Fear of current or ex partner: Not on file     Emotionally abused: Not on file     Physically abused: Not on file     Forced sexual activity: Not on file   Other Topics Concern   • Not on file   Social History Narrative   • Not on file       No Known Allergies    Medications:  Current Outpatient Medications on File Prior  to Visit   Medication Sig Dispense Refill   • atorvastatin (LIPITOR) 40 MG Tab Take 1 Tab by mouth every evening. 30 Tab 0   • ascorbic acid (VITAMIN C) 500 MG tablet Take 1 Tab by mouth 3 times a day, with meals. 30 Tab 3   • ceFAZolin in dextrose (ANCEF) 2 g/100mL IVPB 100 mL by Intravenous route every 8 hours. 3000 mL    • clopidogrel (PLAVIX) 75 MG Tab Take 1 Tab by mouth every day. 30 Tab    • enoxaparin (LOVENOX) 40 MG/0.4ML Solution inj Inject 40 mg as instructed every day.     • ferrous gluconate (FERGON) 324 (38 Fe) MG Tab Take 1 Tab by mouth 3 times a day, with meals. 30 Tab    • folic acid (FOLVITE) 1 MG Tab Take 1 Tab by mouth every day. 30 Tab    • hydrALAZINE (APRESOLINE) 20 MG/ML Solution 0.5 mL by Intravenous route every four hours as needed (Hypertension, if clonidine ineffective or not ordered.).  0   • insulin regular (HUMULIN R) 100 Unit/mL Solution Inject 1-6 Units as instructed 4 Times a Day,Before Meals and at Bedtime. 10 mL    • labetalol (NORMODYNE,TRANDATE) 5 MG/ML Solution 2 mL by Intravenous route every four hours as needed (Hypertension, if clonidine not ordered or ineffective).  0   • senna-docusate (PERICOLACE OR SENOKOT S) 8.6-50 MG Tab Take 2 Tabs by mouth 2 Times a Day. 30 Tab 0   • riFAMPin (RIFADINE) 300 MG Cap Take 1 Cap by mouth 3 times a day. 60 Cap 11   • polyethylene glycol/lytes (MIRALAX) Pack Take 1 Packet by mouth 2 times a day.     • brimonidine (ALPHAGAN) 0.2 % Solution Place 1 Drop in right eye 2 Times a Day. 1 Bottle    • timolol (TIMOPTIC) 0.5 % Solution Place 1 Drop in right eye 2 Times a Day. 1 Bottle 3   • aspirin (ASA) 81 MG Chew Tab chewable tablet Take 1 Tab by mouth every day. 100 Tab    • acetaminophen (TYLENOL) 325 MG Tab Take 2 Tabs by mouth every 6 hours as needed (Mild Pain; (Pain scale 1-3); Temp greater than 100.5 F). 30 Tab 0   • Multiple Vitamins-Minerals (CENTRUM SILVER) Tab Take 1 Tab by mouth every day.       No current facility-administered  "medications on file prior to visit.        Physical Exam:   Vital Signs: /72 (BP Location: Left arm, Patient Position: Sitting, BP Cuff Size: Adult)   Pulse 78   Temp 36.9 °C (98.4 °F) (Temporal)   Ht 1.702 m (5' 7\")   Wt 73 kg (161 lb)   SpO2 97%   BMI 25.22 kg/m²   Vital signs reviewed  Physical Exam   Constitutional: He is oriented to person, place, and time. No distress.   On wheelchair   HENT:   Head: Normocephalic and atraumatic.   Mouth/Throat: Oropharynx is clear and moist.   Eyes: Conjunctivae are normal. No scleral icterus.   Neck: Neck supple.   Cardiovascular: Normal rate, regular rhythm, normal heart sounds and intact distal pulses.   No murmur heard.  Pulmonary/Chest: Effort normal and breath sounds normal. No stridor. No respiratory distress. He has no wheezes. He has no rales.   Abdominal: Soft. Bowel sounds are normal. He exhibits no distension. There is no tenderness. There is no rebound and no guarding.   Musculoskeletal: Normal range of motion. He exhibits no edema or deformity.   Left leg swelling.   Wound on the inferior side of knee appears healing.   Left inguinal area with wound vac in place.    Neurological: He is alert and oriented to person, place, and time.   Skin: Skin is warm. No rash noted. He is not diaphoretic. No erythema.   Psychiatric: He has a normal mood and affect. His behavior is normal. Thought content normal.   Nursing note and vitals reviewed.      LABS:  WBC   Date/Time Value Ref Range Status   09/16/2019 05:30 AM 7.7 4.8 - 10.8 K/uL Final     RBC   Date/Time Value Ref Range Status   09/16/2019 05:30 AM 3.02 (L) 4.70 - 6.10 M/uL Final     Hemoglobin   Date/Time Value Ref Range Status   09/16/2019 05:30 AM 8.3 (L) 14.0 - 18.0 g/dL Final     Hematocrit   Date/Time Value Ref Range Status   09/16/2019 05:30 AM 27.6 (L) 42.0 - 52.0 % Final     MCV   Date/Time Value Ref Range Status   09/16/2019 05:30 AM 91.4 81.4 - 97.8 fL Final     MCH   Date/Time Value Ref Range " Status   09/16/2019 05:30 AM 27.5 27.0 - 33.0 pg Final     MCHC   Date/Time Value Ref Range Status   09/16/2019 05:30 AM 30.1 (L) 33.7 - 35.3 g/dL Final     MPV   Date/Time Value Ref Range Status   09/16/2019 05:30 AM 8.8 (L) 9.0 - 12.9 fL Final     Sodium   Date/Time Value Ref Range Status   09/16/2019 05:30  135 - 145 mmol/L Final     Potassium   Date/Time Value Ref Range Status   09/16/2019 05:30 AM 4.5 3.6 - 5.5 mmol/L Final     Chloride   Date/Time Value Ref Range Status   09/16/2019 05:30 AM 98 96 - 112 mmol/L Final     Co2   Date/Time Value Ref Range Status   09/16/2019 05:30 AM 30 20 - 33 mmol/L Final     Glucose   Date/Time Value Ref Range Status   09/16/2019 05:30  (H) 65 - 99 mg/dL Final     Bun   Date/Time Value Ref Range Status   09/16/2019 05:30 AM 22 8 - 22 mg/dL Final     Creatinine   Date/Time Value Ref Range Status   09/16/2019 05:30 AM 0.97 0.50 - 1.40 mg/dL Final     Alkaline Phosphatase   Date/Time Value Ref Range Status   09/16/2019 05:30  (H) 30 - 99 U/L Final     AST(SGOT)   Date/Time Value Ref Range Status   09/16/2019 05:30 AM 13 12 - 45 U/L Final     ALT(SGPT)   Date/Time Value Ref Range Status   09/16/2019 05:30 AM <5 2 - 50 U/L Final     Total Bilirubin   Date/Time Value Ref Range Status   09/16/2019 05:30 AM 0.2 0.1 - 1.5 mg/dL Final      No results found for: CPKTOTAL     MICRO:  No results found for: BLOODCULTU, BLDCULT, BCHOLD      Latest pertinent labs were reviewed    IMAGING STUDIES: no new imaging  Per my read    Assessment/Plan:   Dc Patel is a 74 y.o. male with  1. Peripheral arterial disease s/p amputation of the left second and third toes in the past, s/p fem-fem bypass graft and  left femoral to below-knee popliteal bypass graft placement in 7/3/2019 by Dr. Grande  - multiple LE revascularization procedures.    - Per vascular surgery report, patient has at least one iliac stent on the left side apparently for an aneurysm.   - Patient also reportedly  has aortic stent grafts in place, which cannot be removed.      2. Recent hospitalization from 8/18 to 8/26/2019 for left leg pain, knee pain.   - CTA on 8/18 showed complete occlusion of the left femoral-popliteal bypass grafts with gas within the lumen fluid surrounding the graft.   - S/p removal of infected fem-fem and left fem-pop bypass grafts on 8/20 Left popliteal thrombectomy. Redo fem-fem bypass and left fem-pop bypass using Cryoveins. Left leg angiogram. Bilateral sartorius flap.   - s/p left knee arthrotomy with I&D 8/20. Cx+ MSSA    3. MSSA bacteremia  - TTE negative for IE. Last negative BCx was on 8/23.     4. CKD stage III    Pt is overall improving and feeling much better. He's on cefazolin to complete on 10/4. Also no rifampin 300mg TID. Given his extensive vascular grafts, he will need lifelong oral suppression. Will transition to Augmentin. Continue rifampin.     Plan:   Discontinue IV cefazolin on 10/4, transitioned to chronic oral suppression  Start Augmentin 500mg PO BID starting on 10/5  Continue rifampin Q8H  Follow up in 2 weeks.       Brock Rae D.O.

## 2019-10-16 ENCOUNTER — OFFICE VISIT (OUTPATIENT)
Dept: INFECTIOUS DISEASES | Facility: MEDICAL CENTER | Age: 74
End: 2019-10-16
Payer: MEDICARE

## 2019-10-16 VITALS
DIASTOLIC BLOOD PRESSURE: 68 MMHG | HEART RATE: 87 BPM | SYSTOLIC BLOOD PRESSURE: 110 MMHG | HEIGHT: 67 IN | WEIGHT: 161 LBS | TEMPERATURE: 98.3 F | OXYGEN SATURATION: 93 % | BODY MASS INDEX: 25.27 KG/M2

## 2019-10-16 DIAGNOSIS — A49.01 MSSA (METHICILLIN SUSCEPTIBLE STAPHYLOCOCCUS AUREUS) INFECTION: ICD-10-CM

## 2019-10-16 DIAGNOSIS — M00.062 STAPHYLOCOCCAL ARTHRITIS OF LEFT KNEE (HCC): ICD-10-CM

## 2019-10-16 DIAGNOSIS — T82.7XXA INFECTION OF PROSTHETIC VASCULAR GRAFT, INITIAL ENCOUNTER (HCC): ICD-10-CM

## 2019-10-16 PROCEDURE — 99214 OFFICE O/P EST MOD 30 MIN: CPT | Performed by: NURSE PRACTITIONER

## 2019-10-16 ASSESSMENT — ENCOUNTER SYMPTOMS
SHORTNESS OF BREATH: 0
CONSTIPATION: 0
WEAKNESS: 1
VOMITING: 0
ABDOMINAL PAIN: 0
CHILLS: 0
SORE THROAT: 0
SENSORY CHANGE: 1
FEVER: 0
HEADACHES: 0
MYALGIAS: 0
DIARRHEA: 0
NAUSEA: 0
NERVOUS/ANXIOUS: 0

## 2019-10-16 NOTE — PROGRESS NOTES
Infectious Disease Clinic    Subjective:     Chief Complaint   Patient presents with   • Follow-Up     Infection of prosthetic vascular graft and MSSA arthritis of left knee     This is my first time meeting Mr. Patel.     Interval History: 34-year-old male with a PMH of peripheral artery disease on Coumadin, osteoarthritis and CKD stage III.  Patient had underwent a femorofemoral bypass graft placement and left femoral to below-knee popliteal bypass graft placement on 7/3/2019 with Dr. Grande.  Vascular op report noted at least one iliac stent on the left side due to aneurysm.  Additionally, patient reportedly has aortic stents in place which cannot be removed.  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  Hospitalized from 8/18-9/17/2019, admitted due to severe lower dull abdominal pain and left leg numbness.  On presentation WBC 19.4, febrile to 101.9.  Blood cultures on 8/18 and 8/21 +MSSA, repeat blood cultures on 8/23 and 9/1 were negative.  CTA on 8/18 showed complete occlusion of the left femoral-popliteal bypass graft with gas within the lumen fluid surrounding the graft.  Due to severe left knee pain, aspiration was done on 8/20, +MSSA.  Additionally, on 8/20, patient underwent removal of the infected femorofemoral bypass graft, removal of the infected left femoral below the knee popliteal bypass graft, redo right to left femorofemoral bypass graft, redo left femoral to below-knee popliteal bypass graft and bilateral sartorius flaps with Dr. Grande and Dr. Cifuentes.  OR culture of left femoral positive MSSA.  Discharged to SNF on IV Ancef 2 g every 8 hours and p.o. rifampin 300 mg 3 times daily through 10/4/2019.  To be followed with p.o. Augmentin 500 mg twice daily for lifelong suppression.    10/2/2019: Seen by Dr. Rae.  Left leg feels remains swollen - not improving since hospital. Not painful, no erythema.  Wound in lower extremities  are good.  Discontinue IV cefazolin on 10/4, transition to chronic oral suppression.  Start p.o. Augmentin 500 mg twice daily on 10/5.  Continue rifampin every 8 hours.    Records reviewed    Today, 10/16/2019: Currently residing at [facility].  Patient reports feeling well and has been tolerating the p.o. Augmentin and rifampin without adverse effect.  Denies feeling generally ill, fevers/chills, general malaise, headache, n/v/d, abdominal pain, chest pain or shortness of breath.  Pt stating that the wounds are healing well.  Wound VAC to his left groin was removed yesterday.  States that dressings to the bilateral groin sites and left knee are changed when needed, there has been minimal drainage from the left knee and right groin, mild drainage from the left groin, no odor, minimal redness.  States that he has some intermittent left knee pain that is a stretching sensation with chronic left upper calf pain that gets up to a 7/10, improved with rest and pain meds.  Notes that he is working with physical therapy, using a front wheel walker.  He is being discharged from the [facility] on Monday and has a follow-up with his PCP in [facility] on Tuesday.  He does not wish to return to [place] for follow-up appointments.    Review of Systems   Constitutional: Negative for chills, fever and malaise/fatigue.   HENT: Negative for sore throat.    Respiratory: Negative for shortness of breath.    Cardiovascular: Positive for leg swelling. Negative for chest pain.   Gastrointestinal: Negative for abdominal pain, constipation, diarrhea, nausea and vomiting.   Genitourinary: Negative for dysuria.   Musculoskeletal: Positive for joint pain. Negative for myalgias.   Skin: Negative for rash.   Neurological: Positive for sensory change and weakness. Negative for headaches.   Psychiatric/Behavioral: The patient is not nervous/anxious.        Past Medical History:   Diagnosis Date   • PVD (peripheral vascular disease) (HCC)   "      History reviewed. No pertinent family history.    Social History     Tobacco Use   • Smoking status: Former Smoker   • Smokeless tobacco: Never Used   Substance Use Topics   • Alcohol use: Not Currently   • Drug use: Not Currently     Types: Inhaled     Comment: THC       Allergies: Patient has no known allergies.    Pt's medication and problem list reviewed.     Objective:     PE:  /68 (BP Location: Right arm, Patient Position: Sitting, BP Cuff Size: Adult)   Pulse 87   Temp 36.8 °C (98.3 °F) (Temporal)   Ht 1.702 m (5' 7\")   Wt 73 kg (161 lb)   SpO2 93%   BMI 25.22 kg/m²     Physical Exam   Constitutional: He is oriented to person, place, and time and well-developed, well-nourished, and in no distress. No distress.   Elderly   HENT:   Head: Normocephalic and atraumatic.   Mouth/Throat: Oropharynx is clear and moist. No oropharyngeal exudate.   Poor dentition-edentulous   Eyes: Pupils are equal, round, and reactive to light. Conjunctivae and EOM are normal. No scleral icterus.   Neck: Normal range of motion. Neck supple. No JVD present. No tracheal deviation present.   Cardiovascular: Normal rate, regular rhythm and normal heart sounds.   No murmur heard.  Faint BLE distal pulses   Pulmonary/Chest: Effort normal. No respiratory distress. He has no wheezes.   Slightly coarse crackles to BUL.   Abdominal: Soft. Bowel sounds are normal. He exhibits no distension. There is no tenderness. There is no guarding.   Musculoskeletal: He exhibits edema (Trace LLE). He exhibits no tenderness.   Right groin-well approximated and almost completely healed, no active drainage, trace erythema.    Left groin-pinky size wound, wound bed pink/yellow, moderate serosanguineous drainage, no odor, mild erythema to surrounding tissue.    Left knee-eraser tip scab to mid incision, mild erythema along incision, warm not hot to touch, nontender to palpation.    Left upper medial calf-surgical incision with small dressing " over that top-unable to visualize wound bed, trace serosanguineous drainage, no odor, trace erythema along incision, nontender to palpation.   Neurological: He is alert and oriented to person, place, and time. No cranial nerve deficit.   Wheelchair   Skin: Skin is warm and dry. No rash noted. He is not diaphoretic. There is erythema.   Psychiatric: Mood, memory, affect and judgment normal.   Vitals reviewed.    Assessment and Plan:   The following treatment plan was discussed with patient at length:    1. Infection of prosthetic vascular graft, initial encounter ([facility])      -Continue p.o. Augmentin 500/125 mg twice daily for chronic lifelong suppression.  -Continue p.o. rifampin 300 mg every 8 hours through 11/15/2019 to complete a 12-week course.  -Follow-up with PCP to take over oral Augmentin as above.   2. Staphylococcal arthritis of left knee (HCC)      As above.   3. MSSA (methicillin susceptible Staphylococcus aureus) infection      As above.     Follow up: PRN as patient is returning to [place], [place].  Okay for patient's PCP to take over p.o. Augmentin prescription.  However, if PCP not comfortable taking over management of Augmentin, then patient will need to be seen by ID in roughly 12 months. RTC sooner if needed. FU with PCP for ongoing chronic medical conditions.     Usha Mcdonald, DARIAN.P.R.N.       Please note that this dictation was created using voice recognition software. I have  worked with technical experts from [employer] to optimize the interface.  I have made every reasonable attempt to correct obvious errors, but there may be errors of grammar and possibly content that I did not discover before finalizing the note.

## 2019-12-09 NOTE — TELEPHONE ENCOUNTER
Pt's PCP Emily DURAND is calling and stating that the pt has not been taking his antibiotics due to the cost of the medications Augmentin and Rifampin. Pt has NO part D coverage. She is calling and asking for a call back to discuss his case ans see if there is a alternative treatment that may be cheaper for the patient. Please advise. Thank you. FREDDY

## 2020-01-01 ENCOUNTER — APPOINTMENT (OUTPATIENT)
Dept: CARDIOLOGY | Facility: MEDICAL CENTER | Age: 75
DRG: 907 | End: 2020-01-01
Attending: STUDENT IN AN ORGANIZED HEALTH CARE EDUCATION/TRAINING PROGRAM
Payer: MEDICARE

## 2020-01-01 ENCOUNTER — APPOINTMENT (OUTPATIENT)
Dept: RADIOLOGY | Facility: MEDICAL CENTER | Age: 75
DRG: 907 | End: 2020-01-01
Attending: SURGERY
Payer: MEDICARE

## 2020-01-01 ENCOUNTER — ANESTHESIA EVENT (OUTPATIENT)
Dept: SURGERY | Facility: MEDICAL CENTER | Age: 75
DRG: 907 | End: 2020-01-01
Payer: MEDICARE

## 2020-01-01 ENCOUNTER — HOSPITAL ENCOUNTER (INPATIENT)
Facility: MEDICAL CENTER | Age: 75
LOS: 3 days | DRG: 907 | End: 2020-10-23
Attending: EMERGENCY MEDICINE | Admitting: INTERNAL MEDICINE
Payer: MEDICARE

## 2020-01-01 ENCOUNTER — APPOINTMENT (OUTPATIENT)
Dept: RADIOLOGY | Facility: MEDICAL CENTER | Age: 75
DRG: 907 | End: 2020-01-01
Attending: STUDENT IN AN ORGANIZED HEALTH CARE EDUCATION/TRAINING PROGRAM
Payer: MEDICARE

## 2020-01-01 ENCOUNTER — ANESTHESIA (OUTPATIENT)
Dept: SURGERY | Facility: MEDICAL CENTER | Age: 75
DRG: 907 | End: 2020-01-01
Payer: MEDICARE

## 2020-01-01 ENCOUNTER — APPOINTMENT (OUTPATIENT)
Dept: RADIOLOGY | Facility: MEDICAL CENTER | Age: 75
DRG: 907 | End: 2020-01-01
Attending: EMERGENCY MEDICINE
Payer: MEDICARE

## 2020-01-01 VITALS
WEIGHT: 174.16 LBS | RESPIRATION RATE: 12 BRPM | HEIGHT: 67 IN | OXYGEN SATURATION: 46 % | SYSTOLIC BLOOD PRESSURE: 139 MMHG | HEART RATE: 141 BPM | BODY MASS INDEX: 27.34 KG/M2 | TEMPERATURE: 97.2 F | DIASTOLIC BLOOD PRESSURE: 76 MMHG

## 2020-01-01 DIAGNOSIS — I99.8 ISCHEMIC LEG: ICD-10-CM

## 2020-01-01 DIAGNOSIS — I73.9 PVD (PERIPHERAL VASCULAR DISEASE) WITH CLAUDICATION (HCC): ICD-10-CM

## 2020-01-01 LAB
ABO GROUP BLD: NORMAL
ACTION RANGE TRIGGERED IACRT: NO
ACTION RANGE TRIGGERED IACRT: YES
ALBUMIN SERPL BCP-MCNC: 2.9 G/DL (ref 3.2–4.9)
ALBUMIN SERPL BCP-MCNC: 3 G/DL (ref 3.2–4.9)
ALBUMIN SERPL BCP-MCNC: 3.1 G/DL (ref 3.2–4.9)
ALBUMIN SERPL BCP-MCNC: 3.1 G/DL (ref 3.2–4.9)
ALBUMIN SERPL BCP-MCNC: 3.3 G/DL (ref 3.2–4.9)
ALBUMIN SERPL BCP-MCNC: 3.5 G/DL (ref 3.2–4.9)
ALBUMIN SERPL BCP-MCNC: 3.6 G/DL (ref 3.2–4.9)
ALBUMIN SERPL BCP-MCNC: 3.7 G/DL (ref 3.2–4.9)
ALBUMIN/GLOB SERPL: 1.1 G/DL
ALBUMIN/GLOB SERPL: 1.2 G/DL
ALBUMIN/GLOB SERPL: 1.2 G/DL
ALBUMIN/GLOB SERPL: 1.3 G/DL
ALBUMIN/GLOB SERPL: 1.5 G/DL
ALBUMIN/GLOB SERPL: 1.6 G/DL
ALBUMIN/GLOB SERPL: 1.8 G/DL
ALBUMIN/GLOB SERPL: 1.9 G/DL
ALP SERPL-CCNC: 453 U/L (ref 30–99)
ALP SERPL-CCNC: 459 U/L (ref 30–99)
ALP SERPL-CCNC: 518 U/L (ref 30–99)
ALP SERPL-CCNC: 522 U/L (ref 30–99)
ALP SERPL-CCNC: 526 U/L (ref 30–99)
ALP SERPL-CCNC: 554 U/L (ref 30–99)
ALP SERPL-CCNC: 556 U/L (ref 30–99)
ALP SERPL-CCNC: 68 U/L (ref 30–99)
ALP SERPL-CCNC: 76 U/L (ref 30–99)
ALP SERPL-CCNC: 85 U/L (ref 30–99)
ALT SERPL-CCNC: 1080 U/L (ref 2–50)
ALT SERPL-CCNC: 1417 U/L (ref 2–50)
ALT SERPL-CCNC: 149 U/L (ref 2–50)
ALT SERPL-CCNC: 1619 U/L (ref 2–50)
ALT SERPL-CCNC: 1991 U/L (ref 2–50)
ALT SERPL-CCNC: 2169 U/L (ref 2–50)
ALT SERPL-CCNC: 2182 U/L (ref 2–50)
ALT SERPL-CCNC: 24 U/L (ref 2–50)
ALT SERPL-CCNC: 2603 U/L (ref 2–50)
ALT SERPL-CCNC: 43 U/L (ref 2–50)
ANION GAP SERPL CALC-SCNC: 11 MMOL/L (ref 7–16)
ANION GAP SERPL CALC-SCNC: 11 MMOL/L (ref 7–16)
ANION GAP SERPL CALC-SCNC: 14 MMOL/L (ref 7–16)
ANION GAP SERPL CALC-SCNC: 17 MMOL/L (ref 7–16)
ANION GAP SERPL CALC-SCNC: 18 MMOL/L (ref 7–16)
ANION GAP SERPL CALC-SCNC: 20 MMOL/L (ref 7–16)
APTT PPP: 32 SEC (ref 24.7–36)
APTT PPP: 77.2 SEC (ref 24.7–36)
AST SERPL-CCNC: 1172 U/L (ref 12–45)
AST SERPL-CCNC: 1480 U/L (ref 12–45)
AST SERPL-CCNC: 1774 U/L (ref 12–45)
AST SERPL-CCNC: 180 U/L (ref 12–45)
AST SERPL-CCNC: 2313 U/L (ref 12–45)
AST SERPL-CCNC: 28 U/L (ref 12–45)
AST SERPL-CCNC: 3157 U/L (ref 12–45)
AST SERPL-CCNC: 3162 U/L (ref 12–45)
AST SERPL-CCNC: 3523 U/L (ref 12–45)
AST SERPL-CCNC: 50 U/L (ref 12–45)
BARCODED ABORH UBTYP: 5100
BARCODED PRD CODE UBPRD: NORMAL
BARCODED UNIT NUM UBUNT: NORMAL
BASE EXCESS BLDA CALC-SCNC: -14 MMOL/L (ref -4–3)
BASE EXCESS BLDA CALC-SCNC: -2 MMOL/L (ref -4–3)
BASE EXCESS BLDA CALC-SCNC: -5 MMOL/L (ref -4–3)
BASE EXCESS BLDA CALC-SCNC: -6 MMOL/L (ref -4–3)
BASE EXCESS BLDA CALC-SCNC: -6 MMOL/L (ref -4–3)
BASE EXCESS BLDA CALC-SCNC: -8 MMOL/L (ref -4–3)
BASE EXCESS BLDA CALC-SCNC: 0 MMOL/L (ref -4–3)
BASOPHILS # BLD AUTO: 0.2 % (ref 0–1.8)
BASOPHILS # BLD AUTO: 0.2 % (ref 0–1.8)
BASOPHILS # BLD AUTO: 0.3 % (ref 0–1.8)
BASOPHILS # BLD AUTO: 0.4 % (ref 0–1.8)
BASOPHILS # BLD: 0.03 K/UL (ref 0–0.12)
BASOPHILS # BLD: 0.03 K/UL (ref 0–0.12)
BASOPHILS # BLD: 0.06 K/UL (ref 0–0.12)
BASOPHILS # BLD: 0.07 K/UL (ref 0–0.12)
BILIRUB SERPL-MCNC: 0.3 MG/DL (ref 0.1–1.5)
BILIRUB SERPL-MCNC: 1.1 MG/DL (ref 0.1–1.5)
BILIRUB SERPL-MCNC: 1.2 MG/DL (ref 0.1–1.5)
BILIRUB SERPL-MCNC: 1.4 MG/DL (ref 0.1–1.5)
BILIRUB SERPL-MCNC: 1.4 MG/DL (ref 0.1–1.5)
BILIRUB SERPL-MCNC: 1.5 MG/DL (ref 0.1–1.5)
BILIRUB SERPL-MCNC: 1.6 MG/DL (ref 0.1–1.5)
BILIRUB SERPL-MCNC: 1.7 MG/DL (ref 0.1–1.5)
BLD GP AB SCN SERPL QL: NORMAL
BODY TEMPERATURE: ABNORMAL DEGREES
BUN SERPL-MCNC: 21 MG/DL (ref 8–22)
BUN SERPL-MCNC: 22 MG/DL (ref 8–22)
BUN SERPL-MCNC: 23 MG/DL (ref 8–22)
BUN SERPL-MCNC: 33 MG/DL (ref 8–22)
BUN SERPL-MCNC: 42 MG/DL (ref 8–22)
BUN SERPL-MCNC: 45 MG/DL (ref 8–22)
BUN SERPL-MCNC: 54 MG/DL (ref 8–22)
BUN SERPL-MCNC: 55 MG/DL (ref 8–22)
BUN SERPL-MCNC: 55 MG/DL (ref 8–22)
BUN SERPL-MCNC: 64 MG/DL (ref 8–22)
BUN SERPL-MCNC: 71 MG/DL (ref 8–22)
CA-I BLD ISE-SCNC: 1.13 MMOL/L (ref 1.1–1.3)
CALCIUM SERPL-MCNC: 7.4 MG/DL (ref 8.5–10.5)
CALCIUM SERPL-MCNC: 7.5 MG/DL (ref 8.5–10.5)
CALCIUM SERPL-MCNC: 7.5 MG/DL (ref 8.5–10.5)
CALCIUM SERPL-MCNC: 7.6 MG/DL (ref 8.5–10.5)
CALCIUM SERPL-MCNC: 7.8 MG/DL (ref 8.5–10.5)
CALCIUM SERPL-MCNC: 8.1 MG/DL (ref 8.5–10.5)
CALCIUM SERPL-MCNC: 8.2 MG/DL (ref 8.5–10.5)
CALCIUM SERPL-MCNC: 8.4 MG/DL (ref 8.5–10.5)
CALCIUM SERPL-MCNC: 8.6 MG/DL (ref 8.5–10.5)
CALCIUM SERPL-MCNC: 8.7 MG/DL (ref 8.5–10.5)
CALCIUM SERPL-MCNC: 9 MG/DL (ref 8.5–10.5)
CHLORIDE SERPL-SCNC: 100 MMOL/L (ref 96–112)
CHLORIDE SERPL-SCNC: 100 MMOL/L (ref 96–112)
CHLORIDE SERPL-SCNC: 101 MMOL/L (ref 96–112)
CHLORIDE SERPL-SCNC: 102 MMOL/L (ref 96–112)
CHLORIDE SERPL-SCNC: 103 MMOL/L (ref 96–112)
CHLORIDE SERPL-SCNC: 97 MMOL/L (ref 96–112)
CHLORIDE SERPL-SCNC: 99 MMOL/L (ref 96–112)
CHOLEST SERPL-MCNC: 134 MG/DL (ref 100–199)
CK SERPL-CCNC: 1184 U/L (ref 0–154)
CK SERPL-CCNC: 1314 U/L (ref 0–154)
CK SERPL-CCNC: 1974 U/L (ref 0–154)
CK SERPL-CCNC: 2808 U/L (ref 0–154)
CK SERPL-CCNC: 3611 U/L (ref 0–154)
CO2 BLDA-SCNC: 19 MMOL/L (ref 20–33)
CO2 BLDA-SCNC: 20 MMOL/L (ref 20–33)
CO2 BLDA-SCNC: 25 MMOL/L (ref 20–33)
CO2 BLDA-SCNC: 26 MMOL/L (ref 20–33)
CO2 SERPL-SCNC: 16 MMOL/L (ref 20–33)
CO2 SERPL-SCNC: 16 MMOL/L (ref 20–33)
CO2 SERPL-SCNC: 17 MMOL/L (ref 20–33)
CO2 SERPL-SCNC: 18 MMOL/L (ref 20–33)
CO2 SERPL-SCNC: 19 MMOL/L (ref 20–33)
CO2 SERPL-SCNC: 19 MMOL/L (ref 20–33)
CO2 SERPL-SCNC: 22 MMOL/L (ref 20–33)
CO2 SERPL-SCNC: 26 MMOL/L (ref 20–33)
COMPONENT R 8504R: NORMAL
CORTIS SERPL-MCNC: 68.6 UG/DL (ref 0–23)
COVID ORDER STATUS COVID19: NORMAL
CREAT SERPL-MCNC: 1.13 MG/DL (ref 0.5–1.4)
CREAT SERPL-MCNC: 1.24 MG/DL (ref 0.5–1.4)
CREAT SERPL-MCNC: 1.3 MG/DL (ref 0.5–1.4)
CREAT SERPL-MCNC: 2.04 MG/DL (ref 0.5–1.4)
CREAT SERPL-MCNC: 2.48 MG/DL (ref 0.5–1.4)
CREAT SERPL-MCNC: 2.69 MG/DL (ref 0.5–1.4)
CREAT SERPL-MCNC: 3.34 MG/DL (ref 0.5–1.4)
CREAT SERPL-MCNC: 3.8 MG/DL (ref 0.5–1.4)
CREAT SERPL-MCNC: 3.83 MG/DL (ref 0.5–1.4)
CREAT SERPL-MCNC: 4.18 MG/DL (ref 0.5–1.4)
CREAT SERPL-MCNC: 4.73 MG/DL (ref 0.5–1.4)
CRP SERPL HS-MCNC: 23.29 MG/DL (ref 0–0.75)
EOSINOPHIL # BLD AUTO: 0.02 K/UL (ref 0–0.51)
EOSINOPHIL # BLD AUTO: 0.08 K/UL (ref 0–0.51)
EOSINOPHIL # BLD AUTO: 0.11 K/UL (ref 0–0.51)
EOSINOPHIL # BLD AUTO: 0.14 K/UL (ref 0–0.51)
EOSINOPHIL NFR BLD: 0.1 % (ref 0–6.9)
EOSINOPHIL NFR BLD: 0.4 % (ref 0–6.9)
EOSINOPHIL NFR BLD: 0.6 % (ref 0–6.9)
EOSINOPHIL NFR BLD: 1.1 % (ref 0–6.9)
ERYTHROCYTE [DISTWIDTH] IN BLOOD BY AUTOMATED COUNT: 50.5 FL (ref 35.9–50)
ERYTHROCYTE [DISTWIDTH] IN BLOOD BY AUTOMATED COUNT: 59.8 FL (ref 35.9–50)
ERYTHROCYTE [DISTWIDTH] IN BLOOD BY AUTOMATED COUNT: 60.2 FL (ref 35.9–50)
ERYTHROCYTE [DISTWIDTH] IN BLOOD BY AUTOMATED COUNT: 60.4 FL (ref 35.9–50)
ERYTHROCYTE [DISTWIDTH] IN BLOOD BY AUTOMATED COUNT: 60.4 FL (ref 35.9–50)
ERYTHROCYTE [DISTWIDTH] IN BLOOD BY AUTOMATED COUNT: 61.6 FL (ref 35.9–50)
ERYTHROCYTE [DISTWIDTH] IN BLOOD BY AUTOMATED COUNT: 61.9 FL (ref 35.9–50)
ERYTHROCYTE [DISTWIDTH] IN BLOOD BY AUTOMATED COUNT: 62.9 FL (ref 35.9–50)
ERYTHROCYTE [DISTWIDTH] IN BLOOD BY AUTOMATED COUNT: 63 FL (ref 35.9–50)
ERYTHROCYTE [DISTWIDTH] IN BLOOD BY AUTOMATED COUNT: 65.2 FL (ref 35.9–50)
EST. AVERAGE GLUCOSE BLD GHB EST-MCNC: 137 MG/DL
FIBRINOGEN PPP-MCNC: 559 MG/DL (ref 215–460)
GLOBULIN SER CALC-MCNC: 1.8 G/DL (ref 1.9–3.5)
GLOBULIN SER CALC-MCNC: 1.9 G/DL (ref 1.9–3.5)
GLOBULIN SER CALC-MCNC: 1.9 G/DL (ref 1.9–3.5)
GLOBULIN SER CALC-MCNC: 2.1 G/DL (ref 1.9–3.5)
GLOBULIN SER CALC-MCNC: 2.2 G/DL (ref 1.9–3.5)
GLOBULIN SER CALC-MCNC: 2.3 G/DL (ref 1.9–3.5)
GLOBULIN SER CALC-MCNC: 2.4 G/DL (ref 1.9–3.5)
GLOBULIN SER CALC-MCNC: 2.5 G/DL (ref 1.9–3.5)
GLOBULIN SER CALC-MCNC: 2.6 G/DL (ref 1.9–3.5)
GLOBULIN SER CALC-MCNC: 3.4 G/DL (ref 1.9–3.5)
GLUCOSE BLD-MCNC: 107 MG/DL (ref 65–99)
GLUCOSE BLD-MCNC: 114 MG/DL (ref 65–99)
GLUCOSE BLD-MCNC: 139 MG/DL (ref 65–99)
GLUCOSE BLD-MCNC: 176 MG/DL (ref 65–99)
GLUCOSE BLD-MCNC: 179 MG/DL (ref 65–99)
GLUCOSE BLD-MCNC: 183 MG/DL (ref 65–99)
GLUCOSE BLD-MCNC: 184 MG/DL (ref 65–99)
GLUCOSE BLD-MCNC: 450 MG/DL (ref 65–99)
GLUCOSE BLD-MCNC: 92 MG/DL (ref 65–99)
GLUCOSE BLD-MCNC: 94 MG/DL (ref 65–99)
GLUCOSE SERPL-MCNC: 100 MG/DL (ref 65–99)
GLUCOSE SERPL-MCNC: 100 MG/DL (ref 65–99)
GLUCOSE SERPL-MCNC: 101 MG/DL (ref 65–99)
GLUCOSE SERPL-MCNC: 104 MG/DL (ref 65–99)
GLUCOSE SERPL-MCNC: 105 MG/DL (ref 65–99)
GLUCOSE SERPL-MCNC: 121 MG/DL (ref 65–99)
GLUCOSE SERPL-MCNC: 168 MG/DL (ref 65–99)
GLUCOSE SERPL-MCNC: 181 MG/DL (ref 65–99)
GLUCOSE SERPL-MCNC: 195 MG/DL (ref 65–99)
GLUCOSE SERPL-MCNC: 98 MG/DL (ref 65–99)
GLUCOSE SERPL-MCNC: 99 MG/DL (ref 65–99)
GRAM STN SPEC: NORMAL
GRAM STN SPEC: NORMAL
HBA1C MFR BLD: 6.4 % (ref 0–5.6)
HCO3 BLDA-SCNC: 17.6 MMOL/L (ref 17–25)
HCO3 BLDA-SCNC: 17.8 MMOL/L (ref 17–25)
HCO3 BLDA-SCNC: 18.8 MMOL/L (ref 17–25)
HCO3 BLDA-SCNC: 18.8 MMOL/L (ref 17–25)
HCO3 BLDA-SCNC: 19.4 MMOL/L (ref 17–25)
HCO3 BLDA-SCNC: 23.7 MMOL/L (ref 17–25)
HCO3 BLDA-SCNC: 25 MMOL/L (ref 17–25)
HCT VFR BLD AUTO: 29.7 % (ref 42–52)
HCT VFR BLD AUTO: 30.9 % (ref 42–52)
HCT VFR BLD AUTO: 33.6 % (ref 42–52)
HCT VFR BLD AUTO: 36.5 % (ref 42–52)
HCT VFR BLD AUTO: 37.2 % (ref 42–52)
HCT VFR BLD AUTO: 38.7 % (ref 42–52)
HCT VFR BLD AUTO: 40.4 % (ref 42–52)
HCT VFR BLD AUTO: 40.7 % (ref 42–52)
HCT VFR BLD AUTO: 41.4 % (ref 42–52)
HCT VFR BLD AUTO: 41.6 % (ref 42–52)
HCT VFR BLD CALC: 32 % (ref 42–52)
HDLC SERPL-MCNC: 38 MG/DL
HGB BLD-MCNC: 10 G/DL (ref 14–18)
HGB BLD-MCNC: 10.9 G/DL (ref 14–18)
HGB BLD-MCNC: 11 G/DL (ref 14–18)
HGB BLD-MCNC: 11.6 G/DL (ref 14–18)
HGB BLD-MCNC: 12.2 G/DL (ref 14–18)
HGB BLD-MCNC: 12.7 G/DL (ref 14–18)
HGB BLD-MCNC: 12.8 G/DL (ref 14–18)
HGB BLD-MCNC: 12.8 G/DL (ref 14–18)
HGB BLD-MCNC: 13.5 G/DL (ref 14–18)
HGB BLD-MCNC: 13.6 G/DL (ref 14–18)
HGB BLD-MCNC: 9.8 G/DL (ref 14–18)
HOROWITZ INDEX BLDA+IHG-RTO: 165 MM[HG]
HOROWITZ INDEX BLDA+IHG-RTO: 185 MM[HG]
HOROWITZ INDEX BLDA+IHG-RTO: 193 MM[HG]
HOROWITZ INDEX BLDA+IHG-RTO: 220 MM[HG]
HOROWITZ INDEX BLDA+IHG-RTO: 92 MM[HG]
HOROWITZ INDEX BLDA+IHG-RTO: 94 MM[HG]
HOROWITZ INDEX BLDA+IHG-RTO: ABNORMAL MM[HG]
IMM GRANULOCYTES # BLD AUTO: 0.06 K/UL (ref 0–0.11)
IMM GRANULOCYTES # BLD AUTO: 0.09 K/UL (ref 0–0.11)
IMM GRANULOCYTES # BLD AUTO: 0.13 K/UL (ref 0–0.11)
IMM GRANULOCYTES # BLD AUTO: 0.14 K/UL (ref 0–0.11)
IMM GRANULOCYTES NFR BLD AUTO: 0.5 % (ref 0–0.9)
IMM GRANULOCYTES NFR BLD AUTO: 0.5 % (ref 0–0.9)
IMM GRANULOCYTES NFR BLD AUTO: 0.7 % (ref 0–0.9)
IMM GRANULOCYTES NFR BLD AUTO: 0.7 % (ref 0–0.9)
INR PPP: 1.06 (ref 0.87–1.13)
INR PPP: 1.16 (ref 0.87–1.13)
INST. QUALIFIED PATIENT IIQPT: YES
LACTATE BLD-SCNC: 1.3 MMOL/L (ref 0.5–2)
LACTATE BLD-SCNC: 2.1 MMOL/L (ref 0.5–2)
LACTATE BLD-SCNC: 2.5 MMOL/L (ref 0.5–2)
LACTATE BLD-SCNC: 2.7 MMOL/L (ref 0.5–2)
LACTATE BLD-SCNC: 2.7 MMOL/L (ref 0.5–2)
LACTATE BLD-SCNC: 3.8 MMOL/L (ref 0.5–2)
LACTATE BLD-SCNC: 3.9 MMOL/L (ref 0.5–2)
LACTATE BLD-SCNC: 4.5 MMOL/L (ref 0.5–2)
LACTATE BLD-SCNC: 4.8 MMOL/L (ref 0.5–2)
LACTATE BLD-SCNC: 5 MMOL/L (ref 0.5–2)
LACTATE BLD-SCNC: 5.4 MMOL/L (ref 0.5–2)
LACTATE BLD-SCNC: 5.4 MMOL/L (ref 0.5–2)
LACTATE BLD-SCNC: 7.1 MMOL/L (ref 0.5–2)
LACTATE BLD-SCNC: 7.3 MMOL/L (ref 0.5–2)
LACTATE BLD-SCNC: 7.6 MMOL/L (ref 0.5–2)
LDLC SERPL CALC-MCNC: 77 MG/DL
LV EJECT FRACT  99904: 35
LV EJECT FRACT MOD 2C 99903: 30.91
LV EJECT FRACT MOD 4C 99902: 30.21
LV EJECT FRACT MOD BP 99901: 31.03
LYMPHOCYTES # BLD AUTO: 0.39 K/UL (ref 1–4.8)
LYMPHOCYTES # BLD AUTO: 0.7 K/UL (ref 1–4.8)
LYMPHOCYTES # BLD AUTO: 0.76 K/UL (ref 1–4.8)
LYMPHOCYTES # BLD AUTO: 1.65 K/UL (ref 1–4.8)
LYMPHOCYTES NFR BLD: 12.6 % (ref 22–41)
LYMPHOCYTES NFR BLD: 2 % (ref 22–41)
LYMPHOCYTES NFR BLD: 3.9 % (ref 22–41)
LYMPHOCYTES NFR BLD: 4 % (ref 22–41)
MAGNESIUM SERPL-MCNC: 1.9 MG/DL (ref 1.5–2.5)
MAGNESIUM SERPL-MCNC: 2 MG/DL (ref 1.5–2.5)
MCH RBC QN AUTO: 29.5 PG (ref 27–33)
MCH RBC QN AUTO: 29.8 PG (ref 27–33)
MCH RBC QN AUTO: 29.9 PG (ref 27–33)
MCH RBC QN AUTO: 30.1 PG (ref 27–33)
MCH RBC QN AUTO: 30.2 PG (ref 27–33)
MCH RBC QN AUTO: 30.3 PG (ref 27–33)
MCH RBC QN AUTO: 30.4 PG (ref 27–33)
MCH RBC QN AUTO: 30.5 PG (ref 27–33)
MCHC RBC AUTO-ENTMCNC: 31.2 G/DL (ref 33.7–35.3)
MCHC RBC AUTO-ENTMCNC: 31.7 G/DL (ref 33.7–35.3)
MCHC RBC AUTO-ENTMCNC: 31.8 G/DL (ref 33.7–35.3)
MCHC RBC AUTO-ENTMCNC: 31.9 G/DL (ref 33.7–35.3)
MCHC RBC AUTO-ENTMCNC: 32.4 G/DL (ref 33.7–35.3)
MCHC RBC AUTO-ENTMCNC: 32.5 G/DL (ref 33.7–35.3)
MCHC RBC AUTO-ENTMCNC: 32.7 G/DL (ref 33.7–35.3)
MCHC RBC AUTO-ENTMCNC: 32.9 G/DL (ref 33.7–35.3)
MCHC RBC AUTO-ENTMCNC: 33 G/DL (ref 33.7–35.3)
MCHC RBC AUTO-ENTMCNC: 33.1 G/DL (ref 33.7–35.3)
MCV RBC AUTO: 91.5 FL (ref 81.4–97.8)
MCV RBC AUTO: 91.8 FL (ref 81.4–97.8)
MCV RBC AUTO: 92 FL (ref 81.4–97.8)
MCV RBC AUTO: 92.2 FL (ref 81.4–97.8)
MCV RBC AUTO: 92.6 FL (ref 81.4–97.8)
MCV RBC AUTO: 92.9 FL (ref 81.4–97.8)
MCV RBC AUTO: 93.3 FL (ref 81.4–97.8)
MCV RBC AUTO: 93.8 FL (ref 81.4–97.8)
MCV RBC AUTO: 96.2 FL (ref 81.4–97.8)
MCV RBC AUTO: 96.4 FL (ref 81.4–97.8)
MONOCYTES # BLD AUTO: 1.09 K/UL (ref 0–0.85)
MONOCYTES # BLD AUTO: 1.27 K/UL (ref 0–0.85)
MONOCYTES # BLD AUTO: 1.43 K/UL (ref 0–0.85)
MONOCYTES # BLD AUTO: 1.87 K/UL (ref 0–0.85)
MONOCYTES NFR BLD AUTO: 7.1 % (ref 0–13.4)
MONOCYTES NFR BLD AUTO: 7.5 % (ref 0–13.4)
MONOCYTES NFR BLD AUTO: 8.3 % (ref 0–13.4)
MONOCYTES NFR BLD AUTO: 9.8 % (ref 0–13.4)
NEUTROPHILS # BLD AUTO: 10.11 K/UL (ref 1.82–7.42)
NEUTROPHILS # BLD AUTO: 15.63 K/UL (ref 1.82–7.42)
NEUTROPHILS # BLD AUTO: 16.51 K/UL (ref 1.82–7.42)
NEUTROPHILS # BLD AUTO: 16.73 K/UL (ref 1.82–7.42)
NEUTROPHILS NFR BLD: 77.3 % (ref 44–72)
NEUTROPHILS NFR BLD: 86.5 % (ref 44–72)
NEUTROPHILS NFR BLD: 87.4 % (ref 44–72)
NEUTROPHILS NFR BLD: 87.9 % (ref 44–72)
NRBC # BLD AUTO: 0 K/UL
NRBC BLD-RTO: 0 /100 WBC
O2/TOTAL GAS SETTING VFR VENT: 0.9 %
O2/TOTAL GAS SETTING VFR VENT: 100 %
O2/TOTAL GAS SETTING VFR VENT: 40 %
O2/TOTAL GAS SETTING VFR VENT: 40 %
O2/TOTAL GAS SETTING VFR VENT: 50 %
O2/TOTAL GAS SETTING VFR VENT: 60 %
O2/TOTAL GAS SETTING VFR VENT: 70 %
PATHOLOGY CONSULT NOTE: NORMAL
PCO2 BLDA: 33 MMHG (ref 26–37)
PCO2 BLDA: 34 MMHG (ref 26–37)
PCO2 BLDA: 35 MMHG (ref 26–37)
PCO2 BLDA: 35.3 MMHG (ref 26–37)
PCO2 BLDA: 41.4 MMHG (ref 26–37)
PCO2 BLDA: 41.9 MMHG (ref 26–37)
PCO2 BLDA: 67 MMHG (ref 26–37)
PCO2 TEMP ADJ BLDA: 33.2 MMHG (ref 26–37)
PCO2 TEMP ADJ BLDA: 33.6 MMHG (ref 26–37)
PCO2 TEMP ADJ BLDA: 33.9 MMHG (ref 26–37)
PCO2 TEMP ADJ BLDA: 34.2 MMHG (ref 26–37)
PCO2 TEMP ADJ BLDA: 39.6 MMHG (ref 26–37)
PCO2 TEMP ADJ BLDA: 40.2 MMHG (ref 26–37)
PCO2 TEMP ADJ BLDA: 65.9 MMHG (ref 26–37)
PH BLDA: 7.03 [PH] (ref 7.4–7.5)
PH BLDA: 7.31 [PH] (ref 7.4–7.5)
PH BLDA: 7.35 [PH] (ref 7.4–7.5)
PH BLDA: 7.35 [PH] (ref 7.4–7.5)
PH BLDA: 7.36 [PH] (ref 7.4–7.5)
PH BLDA: 7.36 [PH] (ref 7.4–7.5)
PH BLDA: 7.39 [PH] (ref 7.4–7.5)
PH TEMP ADJ BLDA: 7.04 [PH] (ref 7.4–7.5)
PH TEMP ADJ BLDA: 7.32 [PH] (ref 7.4–7.5)
PH TEMP ADJ BLDA: 7.36 [PH] (ref 7.4–7.5)
PH TEMP ADJ BLDA: 7.38 [PH] (ref 7.4–7.5)
PH TEMP ADJ BLDA: 7.4 [PH] (ref 7.4–7.5)
PHOSPHATE SERPL-MCNC: 4.6 MG/DL (ref 2.5–4.5)
PLATELET # BLD AUTO: 186 K/UL (ref 164–446)
PLATELET # BLD AUTO: 194 K/UL (ref 164–446)
PLATELET # BLD AUTO: 203 K/UL (ref 164–446)
PLATELET # BLD AUTO: 209 K/UL (ref 164–446)
PLATELET # BLD AUTO: 211 K/UL (ref 164–446)
PLATELET # BLD AUTO: 215 K/UL (ref 164–446)
PLATELET # BLD AUTO: 243 K/UL (ref 164–446)
PLATELET # BLD AUTO: 249 K/UL (ref 164–446)
PLATELET # BLD AUTO: 253 K/UL (ref 164–446)
PLATELET # BLD AUTO: 300 K/UL (ref 164–446)
PMV BLD AUTO: 10.1 FL (ref 9–12.9)
PMV BLD AUTO: 10.2 FL (ref 9–12.9)
PMV BLD AUTO: 10.3 FL (ref 9–12.9)
PMV BLD AUTO: 10.3 FL (ref 9–12.9)
PMV BLD AUTO: 10.6 FL (ref 9–12.9)
PMV BLD AUTO: 10.6 FL (ref 9–12.9)
PMV BLD AUTO: 9.4 FL (ref 9–12.9)
PMV BLD AUTO: 9.6 FL (ref 9–12.9)
PMV BLD AUTO: 9.7 FL (ref 9–12.9)
PMV BLD AUTO: 9.7 FL (ref 9–12.9)
PO2 BLDA: 110 MMHG (ref 64–87)
PO2 BLDA: 116 MMHG (ref 64–87)
PO2 BLDA: 264 MMHG (ref 64–87)
PO2 BLDA: 66 MMHG (ref 64–87)
PO2 BLDA: 66 MMHG (ref 64–87)
PO2 BLDA: 74 MMHG (ref 64–87)
PO2 BLDA: 92 MMHG (ref 64–87)
PO2 TEMP ADJ BLDA: 107 MMHG (ref 64–87)
PO2 TEMP ADJ BLDA: 110 MMHG (ref 64–87)
PO2 TEMP ADJ BLDA: 259 MMHG (ref 64–87)
PO2 TEMP ADJ BLDA: 63 MMHG (ref 64–87)
PO2 TEMP ADJ BLDA: 66 MMHG (ref 64–87)
PO2 TEMP ADJ BLDA: 72 MMHG (ref 64–87)
PO2 TEMP ADJ BLDA: 89 MMHG (ref 64–87)
POTASSIUM BLD-SCNC: 4.9 MMOL/L (ref 3.6–5.5)
POTASSIUM SERPL-SCNC: 4 MMOL/L (ref 3.6–5.5)
POTASSIUM SERPL-SCNC: 4.3 MMOL/L (ref 3.6–5.5)
POTASSIUM SERPL-SCNC: 4.3 MMOL/L (ref 3.6–5.5)
POTASSIUM SERPL-SCNC: 4.9 MMOL/L (ref 3.6–5.5)
POTASSIUM SERPL-SCNC: 5 MMOL/L (ref 3.6–5.5)
POTASSIUM SERPL-SCNC: 5 MMOL/L (ref 3.6–5.5)
POTASSIUM SERPL-SCNC: 5.1 MMOL/L (ref 3.6–5.5)
POTASSIUM SERPL-SCNC: 5.1 MMOL/L (ref 3.6–5.5)
POTASSIUM SERPL-SCNC: 5.3 MMOL/L (ref 3.6–5.5)
PREALB SERPL-MCNC: 9.8 MG/DL (ref 18–38)
PRODUCT TYPE UPROD: NORMAL
PROT SERPL-MCNC: 4.7 G/DL (ref 6–8.2)
PROT SERPL-MCNC: 5.1 G/DL (ref 6–8.2)
PROT SERPL-MCNC: 5.2 G/DL (ref 6–8.2)
PROT SERPL-MCNC: 5.3 G/DL (ref 6–8.2)
PROT SERPL-MCNC: 5.3 G/DL (ref 6–8.2)
PROT SERPL-MCNC: 5.4 G/DL (ref 6–8.2)
PROT SERPL-MCNC: 5.5 G/DL (ref 6–8.2)
PROT SERPL-MCNC: 5.7 G/DL (ref 6–8.2)
PROT SERPL-MCNC: 5.8 G/DL (ref 6–8.2)
PROT SERPL-MCNC: 7.1 G/DL (ref 6–8.2)
PROTHROMBIN TIME: 14.1 SEC (ref 12–14.6)
PROTHROMBIN TIME: 15.2 SEC (ref 12–14.6)
RBC # BLD AUTO: 3.22 M/UL (ref 4.7–6.1)
RBC # BLD AUTO: 3.36 M/UL (ref 4.7–6.1)
RBC # BLD AUTO: 3.63 M/UL (ref 4.7–6.1)
RBC # BLD AUTO: 3.93 M/UL (ref 4.7–6.1)
RBC # BLD AUTO: 4.01 M/UL (ref 4.7–6.1)
RBC # BLD AUTO: 4.2 M/UL (ref 4.7–6.1)
RBC # BLD AUTO: 4.22 M/UL (ref 4.7–6.1)
RBC # BLD AUTO: 4.23 M/UL (ref 4.7–6.1)
RBC # BLD AUTO: 4.46 M/UL (ref 4.7–6.1)
RBC # BLD AUTO: 4.51 M/UL (ref 4.7–6.1)
RH BLD: NORMAL
RHODAMINE-AURAMINE STN SPEC: NORMAL
RHODAMINE-AURAMINE STN SPEC: NORMAL
SAO2 % BLDA: 100 % (ref 93–99)
SAO2 % BLDA: 91 % (ref 93–99)
SAO2 % BLDA: 91 % (ref 93–99)
SAO2 % BLDA: 92 % (ref 93–99)
SAO2 % BLDA: 94 % (ref 93–99)
SAO2 % BLDA: 98 % (ref 93–99)
SAO2 % BLDA: 98 % (ref 93–99)
SARS-COV+SARS-COV-2 AG RESP QL IA.RAPID: NOTDETECTED
SIGNIFICANT IND 70042: NORMAL
SITE SITE: NORMAL
SODIUM BLD-SCNC: 134 MMOL/L (ref 135–145)
SODIUM SERPL-SCNC: 132 MMOL/L (ref 135–145)
SODIUM SERPL-SCNC: 133 MMOL/L (ref 135–145)
SODIUM SERPL-SCNC: 136 MMOL/L (ref 135–145)
SODIUM SERPL-SCNC: 136 MMOL/L (ref 135–145)
SODIUM SERPL-SCNC: 137 MMOL/L (ref 135–145)
SODIUM SERPL-SCNC: 138 MMOL/L (ref 135–145)
SODIUM SERPL-SCNC: 138 MMOL/L (ref 135–145)
SOURCE SOURCE: NORMAL
SPECIMEN DRAWN FROM PATIENT: ABNORMAL
SPECIMEN SOURCE: NORMAL
T4 FREE SERPL-MCNC: 1.34 NG/DL (ref 0.93–1.7)
TRIGL SERPL-MCNC: 236 MG/DL (ref 0–149)
TRIGL SERPL-MCNC: 93 MG/DL (ref 0–149)
TROPONIN T SERPL-MCNC: 18 NG/L (ref 6–19)
TSH SERPL DL<=0.005 MIU/L-ACNC: 2.26 UIU/ML (ref 0.38–5.33)
UFH PPP CHRO-ACNC: 0.38 IU/ML
UNIT STATUS USTAT: NORMAL
WBC # BLD AUTO: 1.5 K/UL (ref 4.8–10.8)
WBC # BLD AUTO: 13.1 K/UL (ref 4.8–10.8)
WBC # BLD AUTO: 14.7 K/UL (ref 4.8–10.8)
WBC # BLD AUTO: 16.9 K/UL (ref 4.8–10.8)
WBC # BLD AUTO: 17.8 K/UL (ref 4.8–10.8)
WBC # BLD AUTO: 18.3 K/UL (ref 4.8–10.8)
WBC # BLD AUTO: 18.5 K/UL (ref 4.8–10.8)
WBC # BLD AUTO: 19.1 K/UL (ref 4.8–10.8)
WBC # BLD AUTO: 19.1 K/UL (ref 4.8–10.8)
WBC # BLD AUTO: 2 K/UL (ref 4.8–10.8)

## 2020-01-01 PROCEDURE — 700105 HCHG RX REV CODE 258: Performed by: ANESTHESIOLOGY

## 2020-01-01 PROCEDURE — 85730 THROMBOPLASTIN TIME PARTIAL: CPT

## 2020-01-01 PROCEDURE — 75635 CT ANGIO ABDOMINAL ARTERIES: CPT

## 2020-01-01 PROCEDURE — 700117 HCHG RX CONTRAST REV CODE 255: Performed by: STUDENT IN AN ORGANIZED HEALTH CARE EDUCATION/TRAINING PROGRAM

## 2020-01-01 PROCEDURE — 700101 HCHG RX REV CODE 250: Performed by: SURGERY

## 2020-01-01 PROCEDURE — 770022 HCHG ROOM/CARE - ICU (200)

## 2020-01-01 PROCEDURE — 51798 US URINE CAPACITY MEASURE: CPT

## 2020-01-01 PROCEDURE — 82550 ASSAY OF CK (CPK): CPT

## 2020-01-01 PROCEDURE — C1757 CATH, THROMBECTOMY/EMBOLECT: HCPCS | Performed by: SURGERY

## 2020-01-01 PROCEDURE — 700111 HCHG RX REV CODE 636 W/ 250 OVERRIDE (IP): Performed by: SURGERY

## 2020-01-01 PROCEDURE — 700102 HCHG RX REV CODE 250 W/ 637 OVERRIDE(OP): Performed by: SURGERY

## 2020-01-01 PROCEDURE — 36600 WITHDRAWAL OF ARTERIAL BLOOD: CPT

## 2020-01-01 PROCEDURE — 31500 INSERT EMERGENCY AIRWAY: CPT | Performed by: SURGERY

## 2020-01-01 PROCEDURE — 94760 N-INVAS EAR/PLS OXIMETRY 1: CPT

## 2020-01-01 PROCEDURE — 94002 VENT MGMT INPAT INIT DAY: CPT

## 2020-01-01 PROCEDURE — 30233N1 TRANSFUSION OF NONAUTOLOGOUS RED BLOOD CELLS INTO PERIPHERAL VEIN, PERCUTANEOUS APPROACH: ICD-10-PCS | Performed by: SURGERY

## 2020-01-01 PROCEDURE — 96376 TX/PRO/DX INJ SAME DRUG ADON: CPT

## 2020-01-01 PROCEDURE — 99291 CRITICAL CARE FIRST HOUR: CPT | Mod: 25 | Performed by: SURGERY

## 2020-01-01 PROCEDURE — 5A1935Z RESPIRATORY VENTILATION, LESS THAN 24 CONSECUTIVE HOURS: ICD-10-PCS | Performed by: SURGERY

## 2020-01-01 PROCEDURE — 700105 HCHG RX REV CODE 258: Performed by: SURGERY

## 2020-01-01 PROCEDURE — 4A133J1 MONITORING OF ARTERIAL PULSE, PERIPHERAL, PERCUTANEOUS APPROACH: ICD-10-PCS | Performed by: ANESTHESIOLOGY

## 2020-01-01 PROCEDURE — 85610 PROTHROMBIN TIME: CPT

## 2020-01-01 PROCEDURE — 84439 ASSAY OF FREE THYROXINE: CPT

## 2020-01-01 PROCEDURE — 93306 TTE W/DOPPLER COMPLETE: CPT

## 2020-01-01 PROCEDURE — 700102 HCHG RX REV CODE 250 W/ 637 OVERRIDE(OP): Performed by: STUDENT IN AN ORGANIZED HEALTH CARE EDUCATION/TRAINING PROGRAM

## 2020-01-01 PROCEDURE — 99233 SBSQ HOSP IP/OBS HIGH 50: CPT | Performed by: INTERNAL MEDICINE

## 2020-01-01 PROCEDURE — 80053 COMPREHEN METABOLIC PANEL: CPT

## 2020-01-01 PROCEDURE — 87075 CULTR BACTERIA EXCEPT BLOOD: CPT

## 2020-01-01 PROCEDURE — 93925 LOWER EXTREMITY STUDY: CPT

## 2020-01-01 PROCEDURE — 4A133B1 MONITORING OF ARTERIAL PRESSURE, PERIPHERAL, PERCUTANEOUS APPROACH: ICD-10-PCS | Performed by: ANESTHESIOLOGY

## 2020-01-01 PROCEDURE — 99291 CRITICAL CARE FIRST HOUR: CPT | Performed by: SURGERY

## 2020-01-01 PROCEDURE — 94150 VITAL CAPACITY TEST: CPT

## 2020-01-01 PROCEDURE — P9045 ALBUMIN (HUMAN), 5%, 250 ML: HCPCS | Mod: JG | Performed by: SURGERY

## 2020-01-01 PROCEDURE — 85027 COMPLETE CBC AUTOMATED: CPT

## 2020-01-01 PROCEDURE — 700111 HCHG RX REV CODE 636 W/ 250 OVERRIDE (IP)

## 2020-01-01 PROCEDURE — 87040 BLOOD CULTURE FOR BACTERIA: CPT

## 2020-01-01 PROCEDURE — 83605 ASSAY OF LACTIC ACID: CPT | Mod: 91

## 2020-01-01 PROCEDURE — 110454 HCHG SHELL REV 250: Performed by: SURGERY

## 2020-01-01 PROCEDURE — 87205 SMEAR GRAM STAIN: CPT | Mod: 91

## 2020-01-01 PROCEDURE — 86900 BLOOD TYPING SEROLOGIC ABO: CPT

## 2020-01-01 PROCEDURE — 501809 HCHG PAD, TRAC W/ TUBING: Performed by: SURGERY

## 2020-01-01 PROCEDURE — 82330 ASSAY OF CALCIUM: CPT

## 2020-01-01 PROCEDURE — 700105 HCHG RX REV CODE 258: Performed by: EMERGENCY MEDICINE

## 2020-01-01 PROCEDURE — 84443 ASSAY THYROID STIM HORMONE: CPT

## 2020-01-01 PROCEDURE — 86850 RBC ANTIBODY SCREEN: CPT

## 2020-01-01 PROCEDURE — 04100ZK BYPASS ABDOMINAL AORTA TO BILATERAL FEMORAL ARTERIES, OPEN APPROACH: ICD-10-PCS | Performed by: SURGERY

## 2020-01-01 PROCEDURE — 87426 SARSCOV CORONAVIRUS AG IA: CPT

## 2020-01-01 PROCEDURE — 82803 BLOOD GASES ANY COMBINATION: CPT

## 2020-01-01 PROCEDURE — 160041 HCHG SURGERY MINUTES - EA ADDL 1 MIN LEVEL 4: Performed by: SURGERY

## 2020-01-01 PROCEDURE — 700101 HCHG RX REV CODE 250: Performed by: ANESTHESIOLOGY

## 2020-01-01 PROCEDURE — 82962 GLUCOSE BLOOD TEST: CPT

## 2020-01-01 PROCEDURE — 80053 COMPREHEN METABOLIC PANEL: CPT | Mod: 91

## 2020-01-01 PROCEDURE — 160029 HCHG SURGERY MINUTES - 1ST 30 MINS LEVEL 4: Performed by: SURGERY

## 2020-01-01 PROCEDURE — 86923 COMPATIBILITY TEST ELECTRIC: CPT | Mod: 91

## 2020-01-01 PROCEDURE — A9270 NON-COVERED ITEM OR SERVICE: HCPCS | Performed by: SURGERY

## 2020-01-01 PROCEDURE — 82947 ASSAY GLUCOSE BLOOD QUANT: CPT

## 2020-01-01 PROCEDURE — 700111 HCHG RX REV CODE 636 W/ 250 OVERRIDE (IP): Performed by: ANESTHESIOLOGY

## 2020-01-01 PROCEDURE — 700111 HCHG RX REV CODE 636 W/ 250 OVERRIDE (IP): Performed by: INTERNAL MEDICINE

## 2020-01-01 PROCEDURE — 36430 TRANSFUSION BLD/BLD COMPNT: CPT

## 2020-01-01 PROCEDURE — 85025 COMPLETE CBC W/AUTO DIFF WBC: CPT | Mod: 91

## 2020-01-01 PROCEDURE — 37799 UNLISTED PX VASCULAR SURGERY: CPT

## 2020-01-01 PROCEDURE — 84134 ASSAY OF PREALBUMIN: CPT

## 2020-01-01 PROCEDURE — 31500 INSERT EMERGENCY AIRWAY: CPT

## 2020-01-01 PROCEDURE — 80061 LIPID PANEL: CPT

## 2020-01-01 PROCEDURE — 87070 CULTURE OTHR SPECIMN AEROBIC: CPT

## 2020-01-01 PROCEDURE — 83036 HEMOGLOBIN GLYCOSYLATED A1C: CPT

## 2020-01-01 PROCEDURE — 99223 1ST HOSP IP/OBS HIGH 75: CPT | Mod: GC | Performed by: INTERNAL MEDICINE

## 2020-01-01 PROCEDURE — 84484 ASSAY OF TROPONIN QUANT: CPT

## 2020-01-01 PROCEDURE — C1725 CATH, TRANSLUMIN NON-LASER: HCPCS | Performed by: SURGERY

## 2020-01-01 PROCEDURE — 83605 ASSAY OF LACTIC ACID: CPT

## 2020-01-01 PROCEDURE — 31624 DX BRONCHOSCOPE/LAVAGE: CPT | Performed by: SURGERY

## 2020-01-01 PROCEDURE — 0BH18EZ INSERTION OF ENDOTRACHEAL AIRWAY INTO TRACHEA, VIA NATURAL OR ARTIFICIAL OPENING ENDOSCOPIC: ICD-10-PCS | Performed by: SURGERY

## 2020-01-01 PROCEDURE — 82962 GLUCOSE BLOOD TEST: CPT | Mod: 91

## 2020-01-01 PROCEDURE — 87015 SPECIMEN INFECT AGNT CONCNTJ: CPT | Mod: 91

## 2020-01-01 PROCEDURE — 82550 ASSAY OF CK (CPK): CPT | Mod: 91

## 2020-01-01 PROCEDURE — 0B988ZZ DRAINAGE OF LEFT UPPER LOBE BRONCHUS, VIA NATURAL OR ARTIFICIAL OPENING ENDOSCOPIC: ICD-10-PCS | Performed by: SURGERY

## 2020-01-01 PROCEDURE — 99291 CRITICAL CARE FIRST HOUR: CPT

## 2020-01-01 PROCEDURE — 96365 THER/PROPH/DIAG IV INF INIT: CPT

## 2020-01-01 PROCEDURE — 700111 HCHG RX REV CODE 636 W/ 250 OVERRIDE (IP): Performed by: EMERGENCY MEDICINE

## 2020-01-01 PROCEDURE — 96375 TX/PRO/DX INJ NEW DRUG ADDON: CPT

## 2020-01-01 PROCEDURE — 700101 HCHG RX REV CODE 250: Performed by: INTERNAL MEDICINE

## 2020-01-01 PROCEDURE — 83735 ASSAY OF MAGNESIUM: CPT | Mod: 91

## 2020-01-01 PROCEDURE — 85025 COMPLETE CBC W/AUTO DIFF WBC: CPT

## 2020-01-01 PROCEDURE — 160048 HCHG OR STATISTICAL LEVEL 1-5: Performed by: SURGERY

## 2020-01-01 PROCEDURE — 84295 ASSAY OF SERUM SODIUM: CPT

## 2020-01-01 PROCEDURE — 84478 ASSAY OF TRIGLYCERIDES: CPT

## 2020-01-01 PROCEDURE — 85520 HEPARIN ASSAY: CPT

## 2020-01-01 PROCEDURE — 80048 BASIC METABOLIC PNL TOTAL CA: CPT

## 2020-01-01 PROCEDURE — 502704 HCHG DEVICE, LIGASURE IMPACT: Performed by: SURGERY

## 2020-01-01 PROCEDURE — 700117 HCHG RX CONTRAST REV CODE 255: Performed by: SURGERY

## 2020-01-01 PROCEDURE — 96366 THER/PROPH/DIAG IV INF ADDON: CPT

## 2020-01-01 PROCEDURE — 86901 BLOOD TYPING SEROLOGIC RH(D): CPT

## 2020-01-01 PROCEDURE — P9016 RBC LEUKOCYTES REDUCED: HCPCS | Mod: 91

## 2020-01-01 PROCEDURE — 88304 TISSUE EXAM BY PATHOLOGIST: CPT

## 2020-01-01 PROCEDURE — 3E1F88Z IRRIGATION OF RESPIRATORY TRACT USING IRRIGATING SUBSTANCE, VIA NATURAL OR ARTIFICIAL OPENING ENDOSCOPIC: ICD-10-PCS | Performed by: SURGERY

## 2020-01-01 PROCEDURE — 92950 HEART/LUNG RESUSCITATION CPR: CPT | Performed by: INTERNAL MEDICINE

## 2020-01-01 PROCEDURE — 86140 C-REACTIVE PROTEIN: CPT

## 2020-01-01 PROCEDURE — 501838 HCHG SUTURE GENERAL: Performed by: SURGERY

## 2020-01-01 PROCEDURE — 302977 HCHG BRONCHOSCOPY PROC-THERAPEUTIC

## 2020-01-01 PROCEDURE — 93306 TTE W/DOPPLER COMPLETE: CPT | Mod: 26 | Performed by: INTERNAL MEDICINE

## 2020-01-01 PROCEDURE — 85014 HEMATOCRIT: CPT

## 2020-01-01 PROCEDURE — 94003 VENT MGMT INPAT SUBQ DAY: CPT

## 2020-01-01 PROCEDURE — 02HV33Z INSERTION OF INFUSION DEVICE INTO SUPERIOR VENA CAVA, PERCUTANEOUS APPROACH: ICD-10-PCS | Performed by: ANESTHESIOLOGY

## 2020-01-01 PROCEDURE — 94770 HCHG CO2 EXPIRED GAS DETERMINATION: CPT

## 2020-01-01 PROCEDURE — C1768 GRAFT, VASCULAR: HCPCS | Performed by: SURGERY

## 2020-01-01 PROCEDURE — 71045 X-RAY EXAM CHEST 1 VIEW: CPT

## 2020-01-01 PROCEDURE — 85384 FIBRINOGEN ACTIVITY: CPT

## 2020-01-01 PROCEDURE — 94640 AIRWAY INHALATION TREATMENT: CPT

## 2020-01-01 PROCEDURE — 74018 RADEX ABDOMEN 1 VIEW: CPT

## 2020-01-01 PROCEDURE — 84100 ASSAY OF PHOSPHORUS: CPT

## 2020-01-01 PROCEDURE — 82533 TOTAL CORTISOL: CPT

## 2020-01-01 PROCEDURE — 85027 COMPLETE CBC AUTOMATED: CPT | Mod: 91

## 2020-01-01 PROCEDURE — 87206 SMEAR FLUORESCENT/ACID STAI: CPT

## 2020-01-01 PROCEDURE — 88311 DECALCIFY TISSUE: CPT

## 2020-01-01 PROCEDURE — 99152 MOD SED SAME PHYS/QHP 5/>YRS: CPT

## 2020-01-01 PROCEDURE — 99223 1ST HOSP IP/OBS HIGH 75: CPT | Mod: AI,GC | Performed by: INTERNAL MEDICINE

## 2020-01-01 PROCEDURE — 302214 INTUBATION BOX: Performed by: SURGERY

## 2020-01-01 PROCEDURE — 87102 FUNGUS ISOLATION CULTURE: CPT | Mod: 91

## 2020-01-01 PROCEDURE — 5A12012 PERFORMANCE OF CARDIAC OUTPUT, SINGLE, MANUAL: ICD-10-PCS | Performed by: INTERNAL MEDICINE

## 2020-01-01 PROCEDURE — 84132 ASSAY OF SERUM POTASSIUM: CPT

## 2020-01-01 PROCEDURE — C1765 ADHESION BARRIER: HCPCS | Performed by: SURGERY

## 2020-01-01 PROCEDURE — C9803 HOPD COVID-19 SPEC COLLECT: HCPCS | Performed by: EMERGENCY MEDICINE

## 2020-01-01 PROCEDURE — 501837 HCHG SUTURE CV: Performed by: SURGERY

## 2020-01-01 PROCEDURE — 87116 MYCOBACTERIA CULTURE: CPT

## 2020-01-01 PROCEDURE — 700111 HCHG RX REV CODE 636 W/ 250 OVERRIDE (IP): Mod: JG | Performed by: SURGERY

## 2020-01-01 PROCEDURE — 160009 HCHG ANES TIME/MIN: Performed by: SURGERY

## 2020-01-01 PROCEDURE — C1894 INTRO/SHEATH, NON-LASER: HCPCS | Performed by: SURGERY

## 2020-01-01 DEVICE — GRAFT HEMA BIFUR MDV 14X7: Type: IMPLANTABLE DEVICE | Status: FUNCTIONAL

## 2020-01-01 RX ORDER — RIFAMPIN 300 MG/1
CAPSULE ORAL
Status: DISCONTINUED | OUTPATIENT
Start: 2020-01-01 | End: 2020-01-01 | Stop reason: HOSPADM

## 2020-01-01 RX ORDER — AMOXICILLIN 250 MG
2 CAPSULE ORAL 2 TIMES DAILY
Status: DISCONTINUED | OUTPATIENT
Start: 2020-01-01 | End: 2020-01-01 | Stop reason: HOSPADM

## 2020-01-01 RX ORDER — HYDROMORPHONE HYDROCHLORIDE 1 MG/ML
0.5 INJECTION, SOLUTION INTRAMUSCULAR; INTRAVENOUS; SUBCUTANEOUS ONCE
Status: COMPLETED | OUTPATIENT
Start: 2020-01-01 | End: 2020-01-01

## 2020-01-01 RX ORDER — CEFAZOLIN SODIUM 2 G/100ML
2 INJECTION, SOLUTION INTRAVENOUS EVERY 12 HOURS
Status: DISCONTINUED | OUTPATIENT
Start: 2020-01-01 | End: 2020-01-01 | Stop reason: HOSPADM

## 2020-01-01 RX ORDER — ROCURONIUM BROMIDE 10 MG/ML
INJECTION, SOLUTION INTRAVENOUS PRN
Status: DISCONTINUED | OUTPATIENT
Start: 2020-01-01 | End: 2020-01-01 | Stop reason: SURG

## 2020-01-01 RX ORDER — ATORVASTATIN CALCIUM 40 MG/1
40 TABLET, FILM COATED ORAL EVERY EVENING
Status: DISCONTINUED | OUTPATIENT
Start: 2020-01-01 | End: 2020-01-01 | Stop reason: HOSPADM

## 2020-01-01 RX ORDER — CALCIUM CHLORIDE 100 MG/ML
INJECTION INTRAVENOUS; INTRAVENTRICULAR
Status: COMPLETED | OUTPATIENT
Start: 2020-01-01 | End: 2020-01-01

## 2020-01-01 RX ORDER — CEFAZOLIN SODIUM 1 G/3ML
INJECTION, POWDER, FOR SOLUTION INTRAMUSCULAR; INTRAVENOUS PRN
Status: DISCONTINUED | OUTPATIENT
Start: 2020-01-01 | End: 2020-01-01 | Stop reason: SURG

## 2020-01-01 RX ORDER — PROPOFOL 10 MG/ML
0-200 INJECTION, EMULSION INTRAVENOUS ONCE
Status: COMPLETED | OUTPATIENT
Start: 2020-01-01 | End: 2020-01-01

## 2020-01-01 RX ORDER — ACETAMINOPHEN 325 MG/1
650 TABLET ORAL EVERY 6 HOURS PRN
Status: DISCONTINUED | OUTPATIENT
Start: 2020-01-01 | End: 2020-01-01 | Stop reason: HOSPADM

## 2020-01-01 RX ORDER — PROTAMINE SULFATE 10 MG/ML
INJECTION, SOLUTION INTRAVENOUS PRN
Status: DISCONTINUED | OUTPATIENT
Start: 2020-01-01 | End: 2020-01-01 | Stop reason: SURG

## 2020-01-01 RX ORDER — ACETAMINOPHEN 325 MG/1
650 TABLET ORAL EVERY 6 HOURS PRN
Status: DISCONTINUED | OUTPATIENT
Start: 2020-01-01 | End: 2020-01-01

## 2020-01-01 RX ORDER — HEPARIN SODIUM 5000 [USP'U]/100ML
0-30 INJECTION, SOLUTION INTRAVENOUS CONTINUOUS
Status: DISCONTINUED | OUTPATIENT
Start: 2020-01-01 | End: 2020-01-01

## 2020-01-01 RX ORDER — SODIUM CHLORIDE, SODIUM LACTATE, POTASSIUM CHLORIDE, CALCIUM CHLORIDE 600; 310; 30; 20 MG/100ML; MG/100ML; MG/100ML; MG/100ML
INJECTION, SOLUTION INTRAVENOUS CONTINUOUS
Status: DISCONTINUED | OUTPATIENT
Start: 2020-01-01 | End: 2020-01-01

## 2020-01-01 RX ORDER — SODIUM CHLORIDE, SODIUM LACTATE, POTASSIUM CHLORIDE, CALCIUM CHLORIDE 600; 310; 30; 20 MG/100ML; MG/100ML; MG/100ML; MG/100ML
INJECTION, SOLUTION INTRAVENOUS CONTINUOUS
Status: DISCONTINUED | OUTPATIENT
Start: 2020-01-01 | End: 2020-01-01 | Stop reason: HOSPADM

## 2020-01-01 RX ORDER — BISACODYL 10 MG
10 SUPPOSITORY, RECTAL RECTAL
Status: DISCONTINUED | OUTPATIENT
Start: 2020-01-01 | End: 2020-01-01 | Stop reason: HOSPADM

## 2020-01-01 RX ORDER — OXYCODONE HYDROCHLORIDE 5 MG/1
5-10 TABLET ORAL
Status: DISCONTINUED | OUTPATIENT
Start: 2020-01-01 | End: 2020-01-01

## 2020-01-01 RX ORDER — LINEZOLID 2 MG/ML
600 INJECTION, SOLUTION INTRAVENOUS EVERY 12 HOURS
Status: DISCONTINUED | OUTPATIENT
Start: 2020-01-01 | End: 2020-01-01

## 2020-01-01 RX ORDER — MORPHINE SULFATE 4 MG/ML
1-4 INJECTION, SOLUTION INTRAMUSCULAR; INTRAVENOUS
Status: DISCONTINUED | OUTPATIENT
Start: 2020-01-01 | End: 2020-01-01

## 2020-01-01 RX ORDER — AMOXICILLIN 250 MG
2 CAPSULE ORAL 2 TIMES DAILY
Status: DISCONTINUED | OUTPATIENT
Start: 2020-01-01 | End: 2020-01-01

## 2020-01-01 RX ORDER — AMOXICILLIN AND CLAVULANATE POTASSIUM 875; 125 MG/1; MG/1
1 TABLET, FILM COATED ORAL 2 TIMES DAILY
Status: DISCONTINUED | OUTPATIENT
Start: 2020-01-01 | End: 2020-01-01

## 2020-01-01 RX ORDER — ALBUMIN, HUMAN INJ 5% 5 %
50 SOLUTION INTRAVENOUS ONCE
Status: COMPLETED | OUTPATIENT
Start: 2020-01-01 | End: 2020-01-01

## 2020-01-01 RX ORDER — RIFAMPIN 300 MG/1
300 CAPSULE ORAL 3 TIMES DAILY
Status: DISCONTINUED | OUTPATIENT
Start: 2020-01-01 | End: 2020-01-01

## 2020-01-01 RX ORDER — MORPHINE SULFATE 4 MG/ML
1-4 INJECTION, SOLUTION INTRAMUSCULAR; INTRAVENOUS
Status: DISCONTINUED | OUTPATIENT
Start: 2020-01-01 | End: 2020-01-01 | Stop reason: HOSPADM

## 2020-01-01 RX ORDER — RIFAMPIN 600 MG/10ML
600 INJECTION, POWDER, LYOPHILIZED, FOR SOLUTION INTRAVENOUS ONCE
Status: DISCONTINUED | OUTPATIENT
Start: 2020-01-01 | End: 2020-01-01

## 2020-01-01 RX ORDER — SODIUM CHLORIDE, SODIUM LACTATE, POTASSIUM CHLORIDE, CALCIUM CHLORIDE 600; 310; 30; 20 MG/100ML; MG/100ML; MG/100ML; MG/100ML
INJECTION, SOLUTION INTRAVENOUS
Status: DISCONTINUED | OUTPATIENT
Start: 2020-01-01 | End: 2020-01-01 | Stop reason: SURG

## 2020-01-01 RX ORDER — LIDOCAINE HYDROCHLORIDE 20 MG/ML
INJECTION, SOLUTION EPIDURAL; INFILTRATION; INTRACAUDAL; PERINEURAL PRN
Status: DISCONTINUED | OUTPATIENT
Start: 2020-01-01 | End: 2020-01-01 | Stop reason: SURG

## 2020-01-01 RX ORDER — EPINEPHRINE IN SOD CHLOR,ISO 1 MG/10 ML
SYRINGE (ML) INTRAVENOUS
Status: COMPLETED | OUTPATIENT
Start: 2020-01-01 | End: 2020-01-01

## 2020-01-01 RX ORDER — SODIUM CHLORIDE, SODIUM LACTATE, POTASSIUM CHLORIDE, AND CALCIUM CHLORIDE .6; .31; .03; .02 G/100ML; G/100ML; G/100ML; G/100ML
1000 INJECTION, SOLUTION INTRAVENOUS ONCE
Status: COMPLETED | OUTPATIENT
Start: 2020-01-01 | End: 2020-01-01

## 2020-01-01 RX ORDER — MORPHINE SULFATE 4 MG/ML
4 INJECTION, SOLUTION INTRAMUSCULAR; INTRAVENOUS ONCE
Status: COMPLETED | OUTPATIENT
Start: 2020-01-01 | End: 2020-01-01

## 2020-01-01 RX ORDER — ROCURONIUM BROMIDE 10 MG/ML
50 INJECTION, SOLUTION INTRAVENOUS ONCE
Status: COMPLETED | OUTPATIENT
Start: 2020-01-01 | End: 2020-01-01

## 2020-01-01 RX ORDER — OXYCODONE HYDROCHLORIDE 5 MG/1
5-10 TABLET ORAL
Status: DISCONTINUED | OUTPATIENT
Start: 2020-01-01 | End: 2020-01-01 | Stop reason: HOSPADM

## 2020-01-01 RX ORDER — DEXTROSE MONOHYDRATE 25 G/50ML
50 INJECTION, SOLUTION INTRAVENOUS
Status: DISCONTINUED | OUTPATIENT
Start: 2020-01-01 | End: 2020-01-01 | Stop reason: HOSPADM

## 2020-01-01 RX ORDER — BUPIVACAINE HYDROCHLORIDE 5 MG/ML
INJECTION, SOLUTION EPIDURAL; INTRACAUDAL
Status: DISCONTINUED | OUTPATIENT
Start: 2020-01-01 | End: 2020-01-01 | Stop reason: HOSPADM

## 2020-01-01 RX ORDER — NOREPINEPHRINE BITARTRATE 0.03 MG/ML
0-30 INJECTION, SOLUTION INTRAVENOUS CONTINUOUS
Status: DISCONTINUED | OUTPATIENT
Start: 2020-01-01 | End: 2020-01-01 | Stop reason: HOSPADM

## 2020-01-01 RX ORDER — HEPARIN SODIUM,PORCINE 1000/ML
VIAL (ML) INJECTION
Status: DISCONTINUED | OUTPATIENT
Start: 2020-01-01 | End: 2020-01-01 | Stop reason: HOSPADM

## 2020-01-01 RX ORDER — POLYETHYLENE GLYCOL 3350 17 G/17G
1 POWDER, FOR SOLUTION ORAL
Status: DISCONTINUED | OUTPATIENT
Start: 2020-01-01 | End: 2020-01-01 | Stop reason: HOSPADM

## 2020-01-01 RX ORDER — DEXTROSE MONOHYDRATE 25 G/50ML
50 INJECTION, SOLUTION INTRAVENOUS
Status: DISCONTINUED | OUTPATIENT
Start: 2020-01-01 | End: 2020-01-01

## 2020-01-01 RX ORDER — CEFAZOLIN SODIUM 2 G/100ML
2 INJECTION, SOLUTION INTRAVENOUS EVERY 8 HOURS
Status: DISCONTINUED | OUTPATIENT
Start: 2020-01-01 | End: 2020-01-01

## 2020-01-01 RX ORDER — HEPARIN SODIUM,PORCINE 1000/ML
VIAL (ML) INJECTION PRN
Status: DISCONTINUED | OUTPATIENT
Start: 2020-01-01 | End: 2020-01-01 | Stop reason: SURG

## 2020-01-01 RX ORDER — BISACODYL 10 MG
10 SUPPOSITORY, RECTAL RECTAL
Status: DISCONTINUED | OUTPATIENT
Start: 2020-01-01 | End: 2020-01-01

## 2020-01-01 RX ORDER — CALCIUM CHLORIDE 100 MG/ML
INJECTION INTRAVENOUS; INTRAVENTRICULAR PRN
Status: DISCONTINUED | OUTPATIENT
Start: 2020-01-01 | End: 2020-01-01 | Stop reason: SURG

## 2020-01-01 RX ORDER — SODIUM CHLORIDE, SODIUM LACTATE, POTASSIUM CHLORIDE, CALCIUM CHLORIDE 600; 310; 30; 20 MG/100ML; MG/100ML; MG/100ML; MG/100ML
1000 INJECTION, SOLUTION INTRAVENOUS ONCE
Status: COMPLETED | OUTPATIENT
Start: 2020-01-01 | End: 2020-01-01

## 2020-01-01 RX ORDER — DEXTROSE MONOHYDRATE 25 G/50ML
INJECTION, SOLUTION INTRAVENOUS
Status: COMPLETED | OUTPATIENT
Start: 2020-01-01 | End: 2020-01-01

## 2020-01-01 RX ORDER — MORPHINE SULFATE 4 MG/ML
3 INJECTION, SOLUTION INTRAMUSCULAR; INTRAVENOUS EVERY 4 HOURS PRN
Status: DISCONTINUED | OUTPATIENT
Start: 2020-01-01 | End: 2020-01-01

## 2020-01-01 RX ORDER — HEPARIN SODIUM 5000 [USP'U]/ML
5000 INJECTION, SOLUTION INTRAVENOUS; SUBCUTANEOUS EVERY 8 HOURS
Status: DISCONTINUED | OUTPATIENT
Start: 2020-01-01 | End: 2020-01-01 | Stop reason: HOSPADM

## 2020-01-01 RX ORDER — SODIUM CHLORIDE, SODIUM GLUCONATE, SODIUM ACETATE, POTASSIUM CHLORIDE AND MAGNESIUM CHLORIDE 526; 502; 368; 37; 30 MG/100ML; MG/100ML; MG/100ML; MG/100ML; MG/100ML
INJECTION, SOLUTION INTRAVENOUS
Status: DISCONTINUED | OUTPATIENT
Start: 2020-01-01 | End: 2020-01-01 | Stop reason: SURG

## 2020-01-01 RX ORDER — POLYETHYLENE GLYCOL 3350 17 G/17G
1 POWDER, FOR SOLUTION ORAL
Status: DISCONTINUED | OUTPATIENT
Start: 2020-01-01 | End: 2020-01-01

## 2020-01-01 RX ORDER — AMOXICILLIN AND CLAVULANATE POTASSIUM 875; 125 MG/1; MG/1
1 TABLET, FILM COATED ORAL 2 TIMES DAILY
COMMUNITY

## 2020-01-01 RX ORDER — ATORVASTATIN CALCIUM 40 MG/1
40 TABLET, FILM COATED ORAL EVERY EVENING
Status: DISCONTINUED | OUTPATIENT
Start: 2020-01-01 | End: 2020-01-01

## 2020-01-01 RX ADMIN — PIPERACILLIN AND TAZOBACTAM 4.5 G: 4; .5 INJECTION, POWDER, LYOPHILIZED, FOR SOLUTION INTRAVENOUS; PARENTERAL at 13:43

## 2020-01-01 RX ADMIN — MORPHINE SULFATE 4 MG: 4 INJECTION INTRAVENOUS at 03:44

## 2020-01-01 RX ADMIN — PROPOFOL 30 MCG/KG/MIN: 10 INJECTION, EMULSION INTRAVENOUS at 05:18

## 2020-01-01 RX ADMIN — EPINEPHRINE 1 MG: 0.1 INJECTION, SOLUTION ENDOTRACHEAL; INTRACARDIAC; INTRAVENOUS at 15:23

## 2020-01-01 RX ADMIN — PIPERACILLIN AND TAZOBACTAM 4.5 G: 4; .5 INJECTION, POWDER, LYOPHILIZED, FOR SOLUTION INTRAVENOUS; PARENTERAL at 17:54

## 2020-01-01 RX ADMIN — HEPARIN SODIUM 7000 UNITS: 1000 INJECTION, SOLUTION INTRAVENOUS; SUBCUTANEOUS at 01:40

## 2020-01-01 RX ADMIN — SODIUM CHLORIDE, POTASSIUM CHLORIDE, SODIUM LACTATE AND CALCIUM CHLORIDE: 600; 310; 30; 20 INJECTION, SOLUTION INTRAVENOUS at 05:05

## 2020-01-01 RX ADMIN — ALBUMIN (HUMAN) 50 G: 2.5 SOLUTION INTRAVENOUS at 23:58

## 2020-01-01 RX ADMIN — HEPARIN SODIUM 2000 UNITS: 1000 INJECTION, SOLUTION INTRAVENOUS; SUBCUTANEOUS at 03:00

## 2020-01-01 RX ADMIN — HEPARIN SODIUM 3000 UNITS: 1000 INJECTION, SOLUTION INTRAVENOUS; SUBCUTANEOUS at 02:23

## 2020-01-01 RX ADMIN — EPINEPHRINE 1 MG: 0.1 INJECTION, SOLUTION ENDOTRACHEAL; INTRACARDIAC; INTRAVENOUS at 15:31

## 2020-01-01 RX ADMIN — OXYCODONE 10 MG: 5 TABLET ORAL at 17:28

## 2020-01-01 RX ADMIN — PIPERACILLIN AND TAZOBACTAM 4.5 G: 4; .5 INJECTION, POWDER, LYOPHILIZED, FOR SOLUTION INTRAVENOUS; PARENTERAL at 19:44

## 2020-01-01 RX ADMIN — DEXTROSE MONOHYDRATE 50 ML: 25 INJECTION, SOLUTION INTRAVENOUS at 15:33

## 2020-01-01 RX ADMIN — HEPARIN SODIUM 5000 UNITS: 5000 INJECTION, SOLUTION INTRAVENOUS; SUBCUTANEOUS at 06:03

## 2020-01-01 RX ADMIN — CEFAZOLIN 2 G: 330 INJECTION, POWDER, FOR SOLUTION INTRAMUSCULAR; INTRAVENOUS at 23:22

## 2020-01-01 RX ADMIN — ROCURONIUM BROMIDE 50 MG: 10 INJECTION, SOLUTION INTRAVENOUS at 02:43

## 2020-01-01 RX ADMIN — FENTANYL CITRATE 100 MCG: 50 INJECTION, SOLUTION INTRAMUSCULAR; INTRAVENOUS at 23:42

## 2020-01-01 RX ADMIN — PROPOFOL 35 MCG/KG/MIN: 10 INJECTION, EMULSION INTRAVENOUS at 13:41

## 2020-01-01 RX ADMIN — OXYCODONE 10 MG: 5 TABLET ORAL at 07:11

## 2020-01-01 RX ADMIN — PROPOFOL 15 MCG/KG/MIN: 10 INJECTION, EMULSION INTRAVENOUS at 10:00

## 2020-01-01 RX ADMIN — SODIUM BICARBONATE 50 MEQ: 84 INJECTION, SOLUTION INTRAVENOUS at 15:25

## 2020-01-01 RX ADMIN — PROPOFOL 100 MG: 10 INJECTION, EMULSION INTRAVENOUS at 23:17

## 2020-01-01 RX ADMIN — IOHEXOL 100 ML: 350 INJECTION, SOLUTION INTRAVENOUS at 20:44

## 2020-01-01 RX ADMIN — SODIUM CHLORIDE, POTASSIUM CHLORIDE, SODIUM LACTATE AND CALCIUM CHLORIDE: 600; 310; 30; 20 INJECTION, SOLUTION INTRAVENOUS at 18:00

## 2020-01-01 RX ADMIN — HEPARIN SODIUM 5000 UNITS: 5000 INJECTION, SOLUTION INTRAVENOUS; SUBCUTANEOUS at 13:38

## 2020-01-01 RX ADMIN — FAMOTIDINE 20 MG: 10 INJECTION INTRAVENOUS at 17:56

## 2020-01-01 RX ADMIN — LINEZOLID 600 MG: 600 INJECTION, SOLUTION INTRAVENOUS at 09:45

## 2020-01-01 RX ADMIN — SODIUM CHLORIDE, POTASSIUM CHLORIDE, SODIUM LACTATE AND CALCIUM CHLORIDE: 600; 310; 30; 20 INJECTION, SOLUTION INTRAVENOUS at 18:37

## 2020-01-01 RX ADMIN — CEFAZOLIN SODIUM 2 G: 2 INJECTION, SOLUTION INTRAVENOUS at 18:35

## 2020-01-01 RX ADMIN — SODIUM BICARBONATE 50 MEQ: 84 INJECTION, SOLUTION INTRAVENOUS at 15:34

## 2020-01-01 RX ADMIN — HEPARIN SODIUM 5000 UNITS: 5000 INJECTION, SOLUTION INTRAVENOUS; SUBCUTANEOUS at 04:29

## 2020-01-01 RX ADMIN — VANCOMYCIN HYDROCHLORIDE 1200 MG: 500 INJECTION, POWDER, LYOPHILIZED, FOR SOLUTION INTRAVENOUS at 06:03

## 2020-01-01 RX ADMIN — FAMOTIDINE 20 MG: 10 INJECTION INTRAVENOUS at 05:17

## 2020-01-01 RX ADMIN — FAMOTIDINE 20 MG: 10 INJECTION INTRAVENOUS at 18:35

## 2020-01-01 RX ADMIN — HEPARIN SODIUM 5000 UNITS: 5000 INJECTION, SOLUTION INTRAVENOUS; SUBCUTANEOUS at 21:55

## 2020-01-01 RX ADMIN — SODIUM CHLORIDE, POTASSIUM CHLORIDE, SODIUM LACTATE AND CALCIUM CHLORIDE: 600; 310; 30; 20 INJECTION, SOLUTION INTRAVENOUS at 22:08

## 2020-01-01 RX ADMIN — MORPHINE SULFATE 4 MG: 4 INJECTION INTRAVENOUS at 18:53

## 2020-01-01 RX ADMIN — MORPHINE SULFATE 4 MG: 4 INJECTION INTRAVENOUS at 08:25

## 2020-01-01 RX ADMIN — HEPARIN SODIUM 5000 UNITS: 5000 INJECTION, SOLUTION INTRAVENOUS; SUBCUTANEOUS at 22:09

## 2020-01-01 RX ADMIN — HYDROMORPHONE HYDROCHLORIDE 0.5 MG: 1 INJECTION, SOLUTION INTRAMUSCULAR; INTRAVENOUS; SUBCUTANEOUS at 21:13

## 2020-01-01 RX ADMIN — SODIUM CHLORIDE, POTASSIUM CHLORIDE, SODIUM LACTATE AND CALCIUM CHLORIDE 1000 ML: 600; 310; 30; 20 INJECTION, SOLUTION INTRAVENOUS at 20:12

## 2020-01-01 RX ADMIN — FAMOTIDINE 20 MG: 10 INJECTION INTRAVENOUS at 06:03

## 2020-01-01 RX ADMIN — ROCURONIUM BROMIDE 50 MG: 10 INJECTION, SOLUTION INTRAVENOUS at 23:18

## 2020-01-01 RX ADMIN — PROPOFOL 20 MCG/KG/MIN: 10 INJECTION, EMULSION INTRAVENOUS at 13:28

## 2020-01-01 RX ADMIN — EPHEDRINE SULFATE 10 MG: 50 INJECTION INTRAMUSCULAR; INTRAVENOUS; SUBCUTANEOUS at 02:46

## 2020-01-01 RX ADMIN — OXYCODONE 10 MG: 5 TABLET ORAL at 10:21

## 2020-01-01 RX ADMIN — SODIUM CHLORIDE, POTASSIUM CHLORIDE, SODIUM LACTATE AND CALCIUM CHLORIDE: 600; 310; 30; 20 INJECTION, SOLUTION INTRAVENOUS at 10:24

## 2020-01-01 RX ADMIN — DOCUSATE SODIUM 50 MG AND SENNOSIDES 8.6 MG 2 TABLET: 8.6; 5 TABLET, FILM COATED ORAL at 17:56

## 2020-01-01 RX ADMIN — MORPHINE SULFATE 4 MG: 4 INJECTION INTRAVENOUS at 19:44

## 2020-01-01 RX ADMIN — FAMOTIDINE 20 MG: 10 INJECTION INTRAVENOUS at 04:29

## 2020-01-01 RX ADMIN — LIDOCAINE HYDROCHLORIDE 20 MG: 20 INJECTION, SOLUTION EPIDURAL; INFILTRATION; INTRACAUDAL at 23:17

## 2020-01-01 RX ADMIN — ATORVASTATIN CALCIUM 40 MG: 40 TABLET, FILM COATED ORAL at 17:56

## 2020-01-01 RX ADMIN — MORPHINE SULFATE 4 MG: 4 INJECTION INTRAVENOUS at 06:03

## 2020-01-01 RX ADMIN — PIPERACILLIN AND TAZOBACTAM 4.5 G: 4; .5 INJECTION, POWDER, LYOPHILIZED, FOR SOLUTION INTRAVENOUS; PARENTERAL at 06:03

## 2020-01-01 RX ADMIN — HYDROMORPHONE HYDROCHLORIDE 0.5 MG: 1 INJECTION, SOLUTION INTRAMUSCULAR; INTRAVENOUS; SUBCUTANEOUS at 20:12

## 2020-01-01 RX ADMIN — INSULIN HUMAN 1 UNITS: 100 INJECTION, SOLUTION PARENTERAL at 13:03

## 2020-01-01 RX ADMIN — CALCIUM CHLORIDE 1 G: 100 INJECTION INTRAVENOUS; INTRAVENTRICULAR at 15:31

## 2020-01-01 RX ADMIN — PROTAMINE SULFATE 50 MG: 10 INJECTION, SOLUTION INTRAVENOUS at 03:24

## 2020-01-01 RX ADMIN — ALBUMIN (HUMAN) 50 G: 2.5 SOLUTION INTRAVENOUS at 20:59

## 2020-01-01 RX ADMIN — PROPOFOL 50 MG: 10 INJECTION, EMULSION INTRAVENOUS at 12:15

## 2020-01-01 RX ADMIN — SODIUM CHLORIDE, SODIUM GLUCONATE, SODIUM ACETATE, POTASSIUM CHLORIDE AND MAGNESIUM CHLORIDE: 526; 502; 368; 37; 30 INJECTION, SOLUTION INTRAVENOUS at 00:05

## 2020-01-01 RX ADMIN — SODIUM CHLORIDE, POTASSIUM CHLORIDE, SODIUM LACTATE AND CALCIUM CHLORIDE: 600; 310; 30; 20 INJECTION, SOLUTION INTRAVENOUS at 23:07

## 2020-01-01 RX ADMIN — PROPOFOL 35 MCG/KG/MIN: 10 INJECTION, EMULSION INTRAVENOUS at 06:44

## 2020-01-01 RX ADMIN — HUMAN ALBUMIN MICROSPHERES AND PERFLUTREN 3 ML: 10; .22 INJECTION, SOLUTION INTRAVENOUS at 15:16

## 2020-01-01 RX ADMIN — SODIUM CHLORIDE, POTASSIUM CHLORIDE, SODIUM LACTATE AND CALCIUM CHLORIDE 1000 ML: 600; 310; 30; 20 INJECTION, SOLUTION INTRAVENOUS at 09:10

## 2020-01-01 RX ADMIN — MORPHINE SULFATE 2 MG: 4 INJECTION INTRAVENOUS at 02:30

## 2020-01-01 RX ADMIN — INSULIN HUMAN 1 UNITS: 100 INJECTION, SOLUTION PARENTERAL at 05:12

## 2020-01-01 RX ADMIN — HEPARIN SODIUM 18 UNITS/KG/HR: 5000 INJECTION, SOLUTION INTRAVENOUS at 18:55

## 2020-01-01 RX ADMIN — HEPARIN SODIUM 5000 UNITS: 5000 INJECTION, SOLUTION INTRAVENOUS; SUBCUTANEOUS at 05:17

## 2020-01-01 RX ADMIN — SODIUM CHLORIDE, POTASSIUM CHLORIDE, SODIUM LACTATE AND CALCIUM CHLORIDE 1000 ML: 600; 310; 30; 20 INJECTION, SOLUTION INTRAVENOUS at 20:15

## 2020-01-01 RX ADMIN — CEFAZOLIN SODIUM 2 G: 2 INJECTION, SOLUTION INTRAVENOUS at 05:32

## 2020-01-01 RX ADMIN — INSULIN HUMAN 1 UNITS: 100 INJECTION, SOLUTION PARENTERAL at 18:02

## 2020-01-01 RX ADMIN — CEFAZOLIN 2 G: 330 INJECTION, POWDER, FOR SOLUTION INTRAMUSCULAR; INTRAVENOUS at 03:06

## 2020-01-01 RX ADMIN — FENTANYL CITRATE 100 MCG: 50 INJECTION, SOLUTION INTRAMUSCULAR; INTRAVENOUS at 00:21

## 2020-01-01 RX ADMIN — ROCURONIUM BROMIDE 50 MG: 10 INJECTION, SOLUTION INTRAVENOUS at 00:19

## 2020-01-01 RX ADMIN — ATORVASTATIN CALCIUM 40 MG: 40 TABLET, FILM COATED ORAL at 18:35

## 2020-01-01 RX ADMIN — EPINEPHRINE 1 MG: 0.1 INJECTION, SOLUTION ENDOTRACHEAL; INTRACARDIAC; INTRAVENOUS at 15:36

## 2020-01-01 RX ADMIN — VANCOMYCIN HYDROCHLORIDE 2000 MG: 500 INJECTION, POWDER, LYOPHILIZED, FOR SOLUTION INTRAVENOUS at 17:54

## 2020-01-01 RX ADMIN — OXYCODONE 10 MG: 5 TABLET ORAL at 21:55

## 2020-01-01 RX ADMIN — CALCIUM CHLORIDE 0.5 G: 100 INJECTION INTRAVENOUS; INTRAVENTRICULAR at 02:44

## 2020-01-01 RX ADMIN — PROPOFOL 30 MCG/KG/MIN: 10 INJECTION, EMULSION INTRAVENOUS at 03:55

## 2020-01-01 RX ADMIN — HEPARIN SODIUM 5000 UNITS: 5000 INJECTION, SOLUTION INTRAVENOUS; SUBCUTANEOUS at 12:10

## 2020-01-01 RX ADMIN — EPHEDRINE SULFATE 10 MG: 50 INJECTION INTRAMUSCULAR; INTRAVENOUS; SUBCUTANEOUS at 01:06

## 2020-01-01 RX ADMIN — FENTANYL CITRATE 50 MCG: 50 INJECTION, SOLUTION INTRAMUSCULAR; INTRAVENOUS at 23:17

## 2020-01-01 RX ADMIN — EPINEPHRINE 1 MG: 0.1 INJECTION, SOLUTION ENDOTRACHEAL; INTRACARDIAC; INTRAVENOUS at 15:27

## 2020-01-01 RX ADMIN — MORPHINE SULFATE 2 MG: 4 INJECTION INTRAVENOUS at 05:44

## 2020-01-01 RX ADMIN — ROCURONIUM BROMIDE 50 MG: 10 INJECTION, SOLUTION INTRAVENOUS at 12:15

## 2020-01-01 RX ADMIN — MIDAZOLAM 2 MG: 1 INJECTION INTRAMUSCULAR; INTRAVENOUS at 23:07

## 2020-01-01 RX ADMIN — PROPOFOL 25 MCG/KG/MIN: 10 INJECTION, EMULSION INTRAVENOUS at 22:25

## 2020-01-01 ASSESSMENT — ENCOUNTER SYMPTOMS
DIARRHEA: 1
ABDOMINAL PAIN: 1
ORTHOPNEA: 0
ARTHRALGIAS: 0
FALLS: 0
FOCAL WEAKNESS: 1
HEADACHES: 0
CONSTIPATION: 1
PALPITATIONS: 0
WEIGHT LOSS: 0
DEPRESSION: 0
COUGH: 0
NECK PAIN: 0
VOMITING: 0
ABDOMINAL DISTENTION: 1
BACK PAIN: 0
VOMITING: 0
MYALGIAS: 1
PND: 0
TREMORS: 0
HEMOPTYSIS: 0
DOUBLE VISION: 0
HEADACHES: 0
BLURRED VISION: 0
HEARTBURN: 0
ABDOMINAL PAIN: 0
BRUISES/BLEEDS EASILY: 0
DIZZINESS: 0
FEVER: 0
NAUSEA: 0
BACK PAIN: 0
NAUSEA: 1
CLAUDICATION: 1
RESPIRATORY NEGATIVE: 1
PALPITATIONS: 0
WEAKNESS: 0
WEAKNESS: 1
NUMBNESS: 0
DIZZINESS: 1
TINGLING: 0
SENSORY CHANGE: 1
CHILLS: 0

## 2020-01-01 ASSESSMENT — FIBROSIS 4 INDEX
FIB4 SCORE: 0.94
FIB4 SCORE: 2.42
FIB4 SCORE: 4.56
FIB4 SCORE: 16.72

## 2020-01-01 ASSESSMENT — PAIN DESCRIPTION - PAIN TYPE
TYPE: ACUTE PAIN
TYPE: SURGICAL PAIN;ACUTE PAIN
TYPE: ACUTE PAIN
TYPE: ACUTE PAIN
TYPE: OTHER (COMMENT)
TYPE: ACUTE PAIN
TYPE: ACUTE PAIN

## 2020-01-01 ASSESSMENT — LIFESTYLE VARIABLES: SUBSTANCE_ABUSE: 0

## 2020-01-01 ASSESSMENT — PULMONARY FUNCTION TESTS
FVC: 1.1
FVC: 1.5
FVC: .8

## 2020-01-01 ASSESSMENT — PAIN SCALES - GENERAL: PAIN_LEVEL: 2

## 2020-10-21 PROBLEM — I70.229 CRITICAL ISCHEMIA OF LOWER EXTREMITY (HCC): Status: ACTIVE | Noted: 2020-01-01

## 2020-10-21 PROBLEM — Z79.02 ANTIPLATELET OR ANTITHROMBOTIC LONG-TERM USE: Status: ACTIVE | Noted: 2020-01-01

## 2020-10-21 PROBLEM — J96.90 RESPIRATORY FAILURE (HCC): Status: ACTIVE | Noted: 2020-01-01

## 2020-10-21 PROBLEM — Z11.9 SCREENING EXAMINATION FOR INFECTIOUS DISEASE: Status: ACTIVE | Noted: 2020-01-01

## 2020-10-21 PROBLEM — E11.9 DIABETES MELLITUS (HCC): Status: ACTIVE | Noted: 2020-01-01

## 2020-10-21 PROBLEM — I70.0 OCCLUSION OF AORTA (HCC): Status: ACTIVE | Noted: 2020-01-01

## 2020-10-21 PROBLEM — D72.829 LEUKOCYTOSIS: Status: ACTIVE | Noted: 2020-01-01

## 2020-10-21 PROBLEM — I99.8 ISCHEMIA OF LOWER EXTREMITY: Status: ACTIVE | Noted: 2020-01-01

## 2020-10-21 NOTE — PROGRESS NOTES
Time of arrival to 0416. Report given from anesthesia MD to RN Lizy.     HR-   BP- 123/75  99% on ventilator 60% fiO2  Temp 97.2f, mckeon temp  Weight: 75.5kg    2 RN skin check performed with Lizy RN:   -L foot 2nd and 3rd digits amputated (old) approximated  -L foot and toes purple coloration  -multiple scars to R knee and medial calf   -RLE dressed fasciotomy site to R lower leg  -prevena sites to bilateral groin Y-ed into midline abdomen  -scattered bruising to bilateral uppers and lowers  -blanchable redness to coccyx, mepliex applied.     0422-Dr. Mckay paged about patient arrival, MD to place orders.     0428- RN able to doppler extremities.    LUE:+L popliteal/- posterior tibial/- pedal.  RUE: +R popliteal/-posterior tibial/ -pedal    0432-Dr. Ramirez at bedside. MD updated on pulses. MD orders for Levophed SBP>90, MAP> 65. MD orders for q1h vascular checks and to call to notify MD if popliteal pulses are lost.

## 2020-10-21 NOTE — ASSESSMENT & PLAN NOTE
- No signs of acute infection   - Likely reactive due to limb ischemia  - Blood cultures x2 ordered  - No abx ordered

## 2020-10-21 NOTE — PROGRESS NOTES
Call received from JENNIFER Hernandez RN to update on pt condition. Pt anticipated to S124, remaining intubated. RT aware.

## 2020-10-21 NOTE — OR SURGEON
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    Anesthesiologist/Type of Anesthesia:  Anesthesiologist: Kalyn Ibanez M.D.; Cleveland Mcdermott M.D.  Anesthesia Technician: Parag Wiggins/General    Surgical Staff:  Circulator: Erika Torres; Sunshine Kwan R.N.; Michelle Lara R.N.  Scrub Person: Junior Nicholson  First Assist: SARY Beltrán  Pre-Post Nurse: Katina Davis R.N.  Count Whitmer: Leopold Von C Garcia    Specimens removed if any:  ID Type Source Tests Collected by Time Destination   1 : Infrarenal Aorta Other Other AFB CULTURE, FUNGAL CULTURE, AEROBIC/ANAEROBIC CULTURE (SURGERY) Soham Ramirez M.D. 10/21/2020  1:46 AM    2 : Proximal Stent Margin Other Other MISCELLANEOUS TEST, AFB CULTURE, FUNGAL CULTURE, AEROBIC/ANAEROBIC CULTURE (SURGERY) Soham Ramirez M.D. 10/21/2020  1:53 AM    A : Infrarenal Aorta Stent Graft Other Other PATHOLOGY SPECIMEN Soham Ramirez M.D. 10/21/2020  1:48 AM        Estimated Blood Loss: 800cc    PRBCs 2 units    Complications: none        10/21/2020 6:03 AM Soham Ramirez M.D.

## 2020-10-21 NOTE — ANESTHESIA QCDR
2019 [facility] Clinical Data Registry (for Quality Improvement)     Postoperative nausea/vomiting risk protocol (Adult = 18 yrs and Pediatric 3-17 yrs)- (430 and 463)  General inhalation anesthetic (NOT TIVA) with PONV risk factors: Yes  Provision of anti-emetic therapy with at least 2 different classes of agents: Yes   Patient DID NOT receive anti-emetic therapy and reason is documented in Medical Record:  N/A    Multimodal Pain Management- (477)  Non-emergent surgery AND patient age >= 18:   Use of Multimodal Pain Management, two or more drugs and/or interventions, NOT including systemic opioids:   Exception: Documented allergy to multiple classes of analgesics:     Smoking Abstinence (404)  Patient is current smoker (cigarette, pipe, e-cig, marijuanna):   Elective Surgery:   Abstinence instructions provided prior to day of surgery:   Patient abstained from smoking on day of surgery:     Pre-Op Beta-Blocker in Isolated CABG (44)  Isolated CABG AND patient age >= 18:   Beta-blocker admin within 24 hours of surgical incision:   Exception:of medical reason(s) for not administering beta blocker within 24 hours prior to surgical incision (e.g., not  indicated,other medical reason):     PACU assessment of acute postoperative pain prior to Anesthesia Care End- Applies to Patients Age = 18- (ABG7)  Initial PACU pain score is which of the following:   Patient unable to report pain score:     Post-anesthetic transfer of care checklist/protocol to PACU/ICU- (426 and 427)  Upon conclusion of case, patient transferred to which of the following locations: ICU  Use of transfer checklist/protocol: Yes  Exclusion: Service Performed in Patient Hospital Room (and thus did not require transfer): N/A  Unplanned admission to ICU related to anesthesia service up through end of PACU care- (MD51)  Unplanned admission to ICU (not initially anticipated at anesthesia start time): No

## 2020-10-21 NOTE — PROGRESS NOTES
"Pharmacy Kinetics 75 y.o. 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    Chronically receiving Augmentin    Allergies: Patient has no known allergies.     List concerns for renal function:      Age  Hypoalbuminemia      Pertinent cultures to date:     Previous graft infection and bacteremia with MSSA    MRSA nares swab if pneumonia is a concern (ordered/positive/negative/n-a): n /a    Recent Labs     10/20/20  1847 10/21/20  0446 10/21/20  0900   WBC 13.1* 17.8* 19.1*   NEUTSPOLYS 77.30* 87.90* 86.50*     Recent Labs     10/20/20  1847 10/21/20  0446 10/21/20  0900   BUN 23* 21 22   CREATININE 1.13 1.24 1.30   ALBUMIN 3.7 2.9* 3.1*     No results for input(s): VANCOTROUGH, VANCOPEAK, VANCORANDOM in the last 72 hours.    Intake/Output Summary (Last 24 hours) at 10/21/2020 1542  Last data filed at 10/21/2020 1300  Gross per 24 hour   Intake 66816.05 ml   Output 1600 ml   Net 36964.05 ml      /87   Pulse 94   Temp 36.2 °C (97.2 °F) (Temporal)   Resp (!) 21   Ht 1.702 m (5' 7\")   Wt 75.5 kg (166 lb 7.2 oz)   SpO2 99%  Temp (24hrs), Av.5 °C (97.7 °F), Min:36.2 °C (97.2 °F), Max:37 °C (98.6 °F)      A/P   1. Vancomycin dose change: 1200 mg IV q12h  2. Next vancomycin level: prior to 4 th dose (not ordered)  3. Goal trough: 12-16 mcg/mL  Comments: UOP is marginal, patient resting comfortably on mechanical ventilator.  Patient with recent graft infection and now with graft explant and replacement.  Plan to reconsult [facility] physicians for long term antibiotic management.  Will continue to follow and adjust based on levels and cultures.    Fredi Osullivan PharmD, BCCCP, BCPS    "

## 2020-10-21 NOTE — ANESTHESIA POSTPROCEDURE EVALUATION
Patient: Dc Patel    Procedure Summary     Date: 10/20/20 Room / Location: [place] [address] / SURGERY [facility]    Anesthesia Start: 2307 Anesthesia Stop:     Procedures:       CREATION, BYPASS, ARTERIAL, AORTA TO FEMORAL (Bilateral Abdomen)      THROMBECTOMY (Bilateral Leg Upper) Diagnosis: (Bilateral Lower Extremity Ishcemia)    Surgeon: Soham Ramirez M.D. Responsible Provider: Cleveland Mcdermott M.D.    Anesthesia Type: general ASA Status: 4 - Emergent          Final Anesthesia Type: general  Last vitals  BP   Blood Pressure : 149/98    Temp   37 °C (98.6 °F)    Pulse   Pulse: 84   Resp   19    SpO2   98 %      Anesthesia Post Evaluation    Patient location during evaluation: PACU  Patient participation: complete - patient participated  Level of consciousness: awake and alert  Pain score: 2    Airway patency: patent  Anesthetic complications: no  Cardiovascular status: hemodynamically stable  Respiratory status: acceptable  Hydration status: euvolemic    PONV: none           Nurse Pain Score: 4 (NPRS)

## 2020-10-21 NOTE — ANESTHESIA PROCEDURE NOTES
Arterial Line  Performed by: Kalyn Ibanez M.D.  Authorized by: Cleveland Mcdermott M.D.     Start Time:  10/20/2020 11:15 PM  End Time:  10/20/2020 11:20 PM  Localization: surface landmarks    Patient Location:  OR  Indication: continuous blood pressure monitoring and blood sampling needed        Catheter Size:  20 G  Seldinger Technique?: Yes    Laterality:  Left  Site:  Radial artery  Line Secured:  Transparent dressing and antimicrobial disc

## 2020-10-21 NOTE — SENIOR ADMIT NOTE
"Senior Admit Note                              Chief complaint: Right lower extremity pain    Brief HPI:  Dc Patel is a 75 y.o. male who presents on 10/21/2020 due to right lower extremity pain, onset 1 week ago.  Patient reports that since 1 week he has been  experiencing excruciating right leg pain but this morning he noticed cold extremity with numbness which prompted him to come to the hospital for further evaluation.  He initially presented at [facility] for this, and was noted that he had no Doppler pulses on right leg.  He was started on heparin drip and shifted [facility].  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  Patient denies any fever, chills, shortness of breath, chest pain, palpitations and syncope.  In the ED, he was afebrile, pulse 82, blood pressure stable, on 2 L of oxygen.  Blood work notable for WBC 13.1 with left shift, chronic anemia, lactic acid 1.3, Covid negative, arterial duplex revealed very poor flow in both lower extremities.  Vascular surgery was consulted in the ED, stat CTA was ordered and patient was taken to the OR for surgery.    On exam:     Vitals:    10/20/20 1821 10/20/20 1828 10/20/20 2000 10/20/20 2214   BP: 139/84  158/90 149/98   Pulse: 82  70 84   Resp: (!) 55  19    Temp: 36.2 °C (97.2 °F)   37 °C (98.6 °F)   TempSrc: Temporal   Temporal   SpO2: 98%  99% 98%   Weight:  72.6 kg (160 lb)     Height:  1.702 m (5' 7\")       Body mass index is 25.06 kg/m².  /98   Pulse 84   Temp 37 °C (98.6 °F) (Temporal)   Resp 19   Ht 1.702 m (5' 7\")   Wt 72.6 kg (160 lb)   SpO2 98%   BMI 25.06 kg/m²   O2 therapy: Pulse Oximetry: 98 %, O2 (LPM): 2, O2 Delivery Device: Nasal " Cannula    Gen/Neuro: NAD, Moving all extremities,, no focal deficits,A&Ox3  Heart/Lungs: RRR, no MGR, CTAB  Abdo/Pelvis: Soft NDNT  Skin/Ext: left lower extremity- no edema , pulses intact  Right lower extremity- no edema, tenderness to palpation below the knee, Felt cold below knee, Pulses not palpable.       Problem list:   #Acute limb ischemia  #Leukocytosis with left shift    Previous history of peripheral vascular disease with surgical intervention and subsequent prosthetic graft infection.  Poor  Doppler, pulses not palpable  On Ultrasound,  acute right-sided ischemia.  Plan  Continue IV heparin,  Vascular surgery on board,  Hold antiplatelet therapy, resume post 24 hours surgery after vascular surgery input.  Pain control with IV morphine   Risk factor control such as hypertension, diabetes mellitus  Echocardiogram to rule out cardioembolic etiology  Telemetry monitoring for any concerns for neutropenia  Check lipid profile, Hb 1 AC  Currently patient does not have any acute infection symptoms.  Leukocytosis likely reactive from limb ischemia.  Obtain blood culture, monitor for any concerns of infection.  Hold off antibiotics for now    Chronic conditions  CKD stage III-avoid nephrotoxic agents  Type 2 diabetes mellitus-insulin sliding scale, hypoglycemia protocol      DVT prophylaxis: On Heparin drip  Code status: full code.    For complete details, please refer to H&P by Dr. Jessica.     Nigel Benavides M.D.

## 2020-10-21 NOTE — ED TRIAGE NOTES
Pt bib [facility] transfer c/o bilateral lower leg pain x 8 days. Pt went to ER in [place] where the were unable to palpate or doppler pedal pulses. Pt feet cold to touch. Pt was placed on heparin pta and transferred due to no u/s at their facility. On arrival pt vss feet cold and mottled. No pulses auscultated on arrival on either feet.

## 2020-10-21 NOTE — PROGRESS NOTES
[facility] TRIAGE OFFICER ER REPORT  Consult/Admission requested by: Dr Mahan  Chief complaint: Right lower extremity pain  Pertinent history/ER Course: 75-year-old male with history of PVD, with prior femorofemoral and femoropopliteal bypass, who presented initially at [facility] for right lower extremity pain, and was noted to have no Doppler pulses on the right leg.  He was started on heparin drip, and shifted to [facility].  Vascular surgery (Dr. Ramirez) has been contacted.  Patient meets admission criterion: Yes..  Recommendations given or work up & consultations requested per triage officer: none  Consultants involved and pertinent input from consultants: Vascular surgery  Admission status: To be reviewed by case management and admitting physician.   Admission order placed: To be placed by admitting physician.   Assigned hospitalist: GALI ARMAS

## 2020-10-21 NOTE — ANESTHESIA PROCEDURE NOTES
Arterial Line  Performed by: Cleveland Mcdermott M.D.  Authorized by: Cleveland Mcdermott M.D.     Start Time:  10/20/2020 11:36 PM

## 2020-10-21 NOTE — ASSESSMENT & PLAN NOTE
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 Vascular Surgery

## 2020-10-21 NOTE — CONSULTS
Vascular Surgery Consult Note  -------------------------------------------------------------------------------------------------  Date: 10/20/2020    Consulting Physician: Soham Ramirez M.D. [facility]  -------------------------------------------------------------------------------------------------    Reason for consultation:  Acute Critical BLE Ischemia    HPI:  This is a 75 y.o. male with multiple previous vascular procedures.  In July 2019 Dr. Grande performed a right to left femoral to femoral bypass and a left femoral to popliteal bypass.  In August the patient presented with infection of both of these bypass conduits and Dr. Grande removed them and replaced the conduits with cryopreserved vein.  Patient did not follow-up with Dr. Grande regularly after that.  Patient presented to the ER today with an 8-day history of severe bilateral lower extremity pain and numbness.  Work-up has shown that there is critically low perfusion of both lower extremities.    Past Medical History:   Diagnosis Date   • PVD (peripheral vascular disease) (Formerly [facility])        Past Surgical History:   Procedure Laterality Date   • FEMORAL FEMORAL BYPASS Left 8/20/2019    Procedure: CREATION, BYPASS, ARTERIAL, FEMORAL TO FEMORAL, USING GRAFT- REMOVAL AND REDO, CRYOVEIN;  Surgeon: Ana Grande M.D.;  Location: [facility];  Service: General   • FEMORAL POPLITEAL BYPASS Left 8/20/2019    Procedure: CREATION, BYPASS, ARTERIAL, FEMORAL TO POPLITEAL, USING GRAFT;  Surgeon: Ana Grande M.D.;  Location: [facility];  Service: General   • ANGIOGRAM Left 8/20/2019    Procedure: ANGIOGRAM;  Surgeon: Ana Grande M.D.;  Location: SURGERY [facility];  Service: General   • IRRIGATION & DEBRIDEMENT ORTHO Left 8/20/2019    Procedure: IRRIGATION AND DEBRIDEMENT, WOUND-KNEE;  Surgeon: Iván Cifuentes M.D.;  Location: SURGERY [facility];  Service: General   • FEMORAL FEMORAL BYPASS Bilateral 7/3/2019     Procedure: CREATION, BYPASS, ARTERIAL, FEMORAL TO FEMORAL, USING GRAFT;  Surgeon: Ana Grande M.D.;  Location: SURGERY [facility];  Service: General   • FEMORAL POPLITEAL BYPASS Left 7/3/2019    Procedure: CREATION, BYPASS, ARTERIAL, FEMORAL TO POPLITEAL, USING GRAFT;  Surgeon: Ana Grande M.D.;  Location: SURGERY [facility];  Service: General   • ANGIOGRAM Left 7/3/2019    Procedure: ANGIOGRAM;  Surgeon: Ana Grande M.D.;  Location: SURGERY [facility];  Service: General   • TOE AMPUTATION Left 10/23/2018    Procedure: TOE AMPUTATION 3RD  POSS 2ND  ;  Surgeon: Ana Grande M.D.;  Location: SURGERY [facility];  Service: General       Current Facility-Administered Medications   Medication Dose Route Frequency Provider Last Rate Last Dose   • lactated ringers infusion   Intravenous Continuous Jose M Mckay M.D. 125 mL/hr at 10/21/20 0505     • famotidine (PEPCID) injection 20 mg  20 mg Intravenous BID Jose M Mckay M.D.   20 mg at 10/21/20 0517   • Respiratory Therapy Consult   Nebulization Continuous RT Jose M Mckay M.D.       • Pharmacy Consult: Enteral tube insertion - review meds/change route/product selection  1 Each Other PHARMACY TO DOSE Jose M Mckay M.D.       • propofol (DIPRIVAN) injection  0-80 mcg/kg/min Intravenous Continuous Jose M Mckay M.D. 15.2 mL/hr at 10/21/20 0542 35 mcg/kg/min at 10/21/20 0542   • amoxicillin-clavulanate (AUGMENTIN) 875-125 MG per tablet 1 Tab  1 Tab Enteral Tube BID Jose M Mckay M.D.       • atorvastatin (LIPITOR) tablet 40 mg  40 mg Enteral Tube Q EVENING Jose M Mckay M.D.       • insulin regular (HumuLIN R,NovoLIN R) injection  1-6 Units Subcutaneous Q6HRS Jose M Mckay M.D.   1 Units at 10/21/20 0512    And   • glucose 4 g chewable tablet 16 g  16 g Enteral Tube Q15 MIN PRN Jose M Mckay M.D.        And   • dextrose 50% (D50W) injection 50 mL  50 mL Intravenous Q15 MIN PRN Jose M  ROXIE Mckay M.D.       • polyethylene glycol/lytes (MIRALAX) PACKET 1 Packet  1 Packet Enteral Tube QDAY PRN Jose M Mckay M.D.        And   • senna-docusate (PERICOLACE or SENOKOT S) 8.6-50 MG per tablet 2 Tab  2 Tab Enteral Tube BID Jose M Mckay M.D.   Stopped at 10/21/20 0500    And   • magnesium hydroxide (MILK OF MAGNESIA) suspension 30 mL  30 mL Enteral Tube QDAY PRN Jose M Mckay M.D.        And   • bisacodyl (DULCOLAX) suppository 10 mg  10 mg Rectal QDAY PRN Jose M Mckay M.D.       • acetaminophen (TYLENOL) tablet 650 mg  650 mg Enteral Tube Q6HRS PRN Jose M Mckay M.D.       • morphine (pf) 4 mg/mL injection 1-4 mg  1-4 mg Intravenous Q HOUR PRN Jose M Mckay M.D.   2 mg at 10/21/20 0544   • norepinephrine (Levophed) infusion 8 mg/250 mL (premix)  0-30 mcg/min Intravenous Continuous Soham Ramirez M.D.   Stopped at 10/21/20 0500   • heparin injection 5,000 Units  5,000 Units Subcutaneous Q8HRS Soham Ramirez M.D.   5,000 Units at 10/21/20 0517       Social History     Socioeconomic History   • Marital status:      Spouse name: Not on file   • Number of children: Not on file   • Years of education: Not on file   • Highest education level: Not on file   Occupational History   • Not on file   Social Needs   • Financial resource strain: Not on file   • Food insecurity     Worry: Not on file     Inability: Not on file   • Transportation needs     Medical: Not on file     Non-medical: Not on file   Tobacco Use   • Smoking status: Former Smoker   • Smokeless tobacco: Never Used   Substance and Sexual Activity   • Alcohol use: Not Currently   • Drug use: Not Currently     Types: Inhaled     Comment: THC   • Sexual activity: Not on file   Lifestyle   • Physical activity     Days per week: Not on file     Minutes per session: Not on file   • Stress: Not on file   Relationships   • Social connections     Talks on phone: Not on file     Gets together: Not on file  "    Attends Alevism service: Not on file     Active member of club or organization: Not on file     Attends meetings of clubs or organizations: Not on file     Relationship status: Not on file   • Intimate partner violence     Fear of current or ex partner: Not on file     Emotionally abused: Not on file     Physically abused: Not on file     Forced sexual activity: Not on file   Other Topics Concern   • Not on file   Social History Narrative   • Not on file       History reviewed. No pertinent family history.    Allergies:  Patient has no known allergies.    Review of Systems:  Noncontributory except as per HPI      Physical Exam:  /98   Pulse (!) 106   Temp 37 °C (98.6 °F) (Temporal)   Resp 20   Ht 1.702 m (5' 7\")   Wt 72.6 kg (160 lb)   SpO2 98%     Constitutional: Alert, oriented, mild distress from leg pain  HEENT:  Normocephalic and atraumatic, EOMI  Neck:   Supple, no JVD,   Cardiovascular: Regular rate and rhythm,   Pulmonary:  Good air entry bilaterally,    Abdominal:  Soft, non-tender, non-distended  Musculoskeletal: No edema.  There is tenderness of the right lower leg likely due to muscle ischemia  Neurological:  CN II-XII grossly intact, no focal deficits  Skin:   Skin is warm and dry. No rash noted.  Psychiatric:  Normal mood and affect.  Vascular:  Delayed capillary refill in both feet.  Pulses nondetectable.    Labs:  Recent Labs     10/20/20  1847   WBC 13.1*   RBC 4.20*   HEMOGLOBIN 12.8*   HEMATOCRIT 40.4*   MCV 96.2   MCH 30.5   MCHC 31.7*   RDW 50.5*   PLATELETCT 300   MPV 9.7     Recent Labs     10/20/20  1847 10/21/20  0446   SODIUM 137 132*   POTASSIUM 4.0 4.3   CHLORIDE 100 99   CO2 26 22   GLUCOSE 105* 195*   BUN 23* 21   CREATININE 1.13 1.24   CALCIUM 9.0 8.6     Recent Labs     10/20/20  1847   APTT 77.2*   INR 1.06     Recent Labs     10/20/20  1847 10/21/20  0446   ASTSGOT 28 50*   ALTSGPT 24 43   TBILIRUBIN 0.3 1.5   ALKPHOSPHAT 85 68   GLOBULIN 3.4 2.4   INR 1.06  --  "       Radiology:  Noninvasive arterial studies show critically low perfusion of both lower extremities    CT angiogram shows thrombosis of an infrarenal aortic stent graft which presumably was put in several years ago for treatment of an aneurysm.  There is also fluid around the graft suggestive of infection.  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  He also needs a right lower extremity fasciotomy    The patient and I, as well as some participating family members over the phone, discussed the steps of the procedure and the expected recovery which can be difficult.  We discussed the risks which are numerous and include but are not limited to bleeding, need for blood transfusion, infection, injury to intra-abdominal organs. We also discussed the global risks of surgery such as stroke, heart attack, blood clots, breathing problems, and potential risk of not surviving the operation or the recovery.  Patient understood and agreed to proceed and informed consent was obtained.                                                             Soham Ramirez MD  [facility] (General and Vascular Surgery)  Cell: [phone] (text or call is fine, if you don't reach me please try my office)  Office: [phone]    ___________________________________________________________________  Patient:Dc Patel   MRN:[id number]   [facility]:[id number]

## 2020-10-21 NOTE — ANESTHESIA PROCEDURE NOTES
Central Venous Line  Performed by: Cleveland Mcdermott M.D.  Authorized by: Cleveland Mcdermott M.D.     Start Time:  10/20/2020 11:54 PM  End Time:  10/21/2020 11:58 PM  Patient Location:  OR  Indication: central venous access and hemodynamic monitoring        provider hand hygiene performed prior to central venous catheter insertion, all 5 sterile barriers used (gloves, gown, cap, mask, large sterile drape) during central venous catheter insertion and skin prep agent completely dried prior to procedure    Patient Position:  Trendelenburg  Laterality:  Right  Site:  Internal jugular  Prep:  Chlorhexidine  Catheter Size:  8.5 Fr  Catheter Length (cm):  20  Catheter Type:  Introducer  Number of Lumens:  Double lumen  target vein identified, needle advanced into vein and blood aspirated and guidewire advanced into vein    Seldinger Technique?: Yes    Ultrasound-Guided: ultrasound-guided  Image captured, interpreted and electronically stored.  Sterile Gel and Probe Cover Used for Ultrasound?: Yes    Intravenous Verification: verified by ultrasound, venous blood return and chest x-ray pending    all ports aspirated, all ports flushed easily, guidewire was removed intact, biopatch was applied, line was sutured in place and dressing was applied    Events: patient tolerated procedure well with no complications     Ultrasound image saved in chart

## 2020-10-21 NOTE — PROGRESS NOTES
Vascular    Notified of bilateral lower extremity ischemia    Noninvasive arterial study reviewed, very poor flow in both lower extremities.    Heparin started    - We will obtain stat CT angiogram for surgical planning  -Stat Covid screen  -Continue n.p.o. except for medications for likely surgery tonight pending CTA results    Soham Ramirez MD  [facility] (General and Vascular Surgery)  Cell: [phone] (text or call is fine, if you don't reach me please try my office)  Office: [phone]  __________________________________________________________________  Patient:Dc Patel   MRN:[id number]   CSN:[id number]    10/20/2020    8:05 PM

## 2020-10-21 NOTE — H&P
History & Physical Note    Date of Admission: 10/20/2020  Admission Status: Emergency  [facility] Team: [facility] Team  Attending: NEO Matute   Senior Resident: Dr. Jung   Intern: Dr. So Mejia    Contact Number: [phone]    Chief Complaint: RLE pain    History of Present Illness (HPI):   Dc is a 75 y.o. male who PMH of PVD, left femorofemoral and left femoropopliteal bypass graft placement on 7/3/2019, followed by MSSA infection of prosthetic graft, removal, and revascularization surgery in 10/2019, iliac stent placement due to aneurysm, aortic stent placement, amputation of left second and third toes, presented 10/20/2020 with right leg pain.     He reports that he was raking about a week ago, when he first noticed significant right sided pain. He initially thought he was having cramping gluteal pain, but the pain has been worsening and excruciatingly painful, he has noticed that right leg is cold and numb as well, so he presented to [facility]. He reports that the pain in his right lower ext is throughout his entire leg, but particularly painful in the calf. At [place], due to no dopplerable pulses in right leg, pt started on heparin drip and transferred to [facility].     In the ED, temp 97.2 deg, HR 82, /84, sat 98% on RA. WBC 13.1, HGB 12.8, LA 1.3. Bilateral lower ext u/s and CTA aorta ordered. Vascular surgery Dr. Ramirez is following for concern for bilateral lower ext ischemia. Per notes, plan for surgery tonight.     Pt reports that he last saw his PCP 1 year ago. He denies hx of DM.     Review of Systems:  Review of Systems   Constitutional: Negative for chills, fever, malaise/fatigue and weight loss.        Excruciating right leg pain. Patient was crying during examination, as he reports that palpation of right calf significantly worsens pain   HENT: Positive for hearing loss (Chronic). Negative for ear discharge, ear pain and tinnitus.    Eyes: Negative for blurred vision and double  vision.   Respiratory: Negative for cough and hemoptysis.    Cardiovascular: Positive for claudication (Right buttock pain with exertion). Negative for chest pain, palpitations, orthopnea, leg swelling and PND.   Gastrointestinal: Positive for diarrhea (Chronic diarrhea). Negative for abdominal pain, heartburn, nausea and vomiting.   Genitourinary: Positive for frequency. Negative for dysuria and urgency.        Pt reports that he urinates small amounts    Musculoskeletal: Negative for back pain, falls, joint pain and neck pain.   Skin: Negative.  Negative for itching and rash.   Neurological: Positive for sensory change (Right lower ext numbness) and focal weakness (Weak left leg). Negative for dizziness, tingling, tremors, weakness and headaches.        Right leg numbness   Endo/Heme/Allergies: Does not bruise/bleed easily.   Psychiatric/Behavioral: Negative for depression, substance abuse and suicidal ideas.   All other systems reviewed and are negative.    Past Medical History:   Past Medical History was reviewed with patient.   has a past medical history of PVD (peripheral vascular disease) (HCC).    Past Surgical History: Past Surgical History was reviewed with patient.   has a past surgical history that includes toe amputation (Left, 10/23/2018); femoral femoral bypass (Bilateral, 7/3/2019); femoral popliteal bypass (Left, 7/3/2019); angiogram (Left, 7/3/2019); femoral femoral bypass (Left, 8/20/2019); femoral popliteal bypass (Left, 8/20/2019); angiogram (Left, 8/20/2019); and irrigation & debridement ortho (Left, 8/20/2019).    Medications: Medications have been reviewed with patient.  Prior to Admission Medications   Prescriptions Last Dose Informant Patient Reported? Taking?   Multiple Vitamins-Minerals (CENTRUM SILVER) Tab  Patient Yes No   Sig: Take 1 Tab by mouth every day.   acetaminophen (TYLENOL) 325 MG Tab  Patient No No   Sig: Take 2 Tabs by mouth every 6 hours as needed (Mild Pain; (Pain scale  1-3); Temp greater than 100.5 F).   ascorbic acid (VITAMIN C) 500 MG tablet   No No   Sig: Take 1 Tab by mouth 3 times a day, with meals.   aspirin (ASA) 81 MG Chew Tab chewable tablet  Patient No No   Sig: Take 1 Tab by mouth every day.   atorvastatin (LIPITOR) 40 MG Tab   No No   Sig: Take 1 Tab by mouth every evening.   brimonidine (ALPHAGAN) 0.2 % Solution  Patient No No   Sig: Place 1 Drop in right eye 2 Times a Day.   calcium carbonate (TUMS) 500 MG Chew Tab   Yes No   Sig: Take 500 mg by mouth every day.   ceFAZolin in dextrose (ANCEF) 2 g/100mL IVPB   No No   Si mL by Intravenous route every 8 hours.   Patient not taking: Reported on 10/16/2019   clopidogrel (PLAVIX) 75 MG Tab   No No   Sig: Take 1 Tab by mouth every day.   enoxaparin (LOVENOX) 40 MG/0.4ML Solution inj   No No   Sig: Inject 40 mg as instructed every day.   Patient not taking: Reported on 10/2/2019   escitalopram (LEXAPRO) 10 MG Tab   Yes No   Sig: Take 10 mg by mouth every day.   ferrous gluconate (FERGON) 324 (38 Fe) MG Tab   No No   Sig: Take 1 Tab by mouth 3 times a day, with meals.   folic acid (FOLVITE) 1 MG Tab   No No   Sig: Take 1 Tab by mouth every day.   hydrALAZINE (APRESOLINE) 20 MG/ML Solution   No No   Si.5 mL by Intravenous route every four hours as needed (Hypertension, if clonidine ineffective or not ordered.).   insulin regular (HUMULIN R) 100 Unit/mL Solution   No No   Sig: Inject 1-6 Units as instructed 4 Times a Day,Before Meals and at Bedtime.   labetalol (NORMODYNE,TRANDATE) 5 MG/ML Solution   No No   Si mL by Intravenous route every four hours as needed (Hypertension, if clonidine not ordered or ineffective).   polyethylene glycol/lytes (MIRALAX) Pack   No No   Sig: Take 1 Packet by mouth 2 times a day.   pregabalin (LYRICA) 150 MG Cap   Yes No   Sig: Take 150 mg by mouth every day.   riFAMPin (RIFADINE) 300 MG Cap   No No   Sig: Take 1 Cap by mouth 3 times a day.   senna-docusate (PERICOLACE OR SENOKOT  S) 8.6-50 MG Tab   No No   Sig: Take 2 Tabs by mouth 2 Times a Day.   timolol (TIMOPTIC) 0.5 % Solution  Patient No No   Sig: Place 1 Drop in right eye 2 Times a Day.      Facility-Administered Medications: None     Allergies: Allergies have been reviewed with patient.  No Known Allergies    Family History:  No pertinent [facility]    Social History:   Tobacco: 24 pack year hx. Quit smoking years ago  Alcohol: No  Recreational drugs (illegal and prescription):  Regular marijuana smoker  Employment:   Activity Level: Ambulation with   Living situation: Lives alone with cat.   Recent travel: No  Primary Care Provider: reviewed No primary care provider on file.  Other (stressors, spirituality, exposures):   Physical Exam:   Vitals:  Temp:  [36.2 °C (97.2 °F)] 36.2 °C (97.2 °F)  Pulse:  [82] 82  Resp:  [55] 55  BP: (139)/(84) 139/84  SpO2:  [98 %] 98 %    Physical Exam  Constitutional:       General: He is in acute distress.      Appearance: He is normal weight. He is not toxic-appearing or diaphoretic.   HENT:      Head: Normocephalic and atraumatic.      Nose: No congestion or rhinorrhea.      Mouth/Throat:      Mouth: Mucous membranes are dry.      Pharynx: Oropharynx is clear.   Cardiovascular:      Rate and Rhythm: Normal rate and regular rhythm.   Pulmonary:      Effort: Pulmonary effort is normal. No respiratory distress.      Breath sounds: Normal breath sounds. No stridor. No wheezing, rhonchi or rales.   Chest:      Chest wall: No tenderness.   Abdominal:      General: There is no distension.      Palpations: Abdomen is soft. There is no mass.      Tenderness: There is no abdominal tenderness. There is no guarding or rebound.      Hernia: No hernia is present.      Comments: Large abdomen. No fluid wave.   Musculoskeletal:         General: Tenderness (Significant right calf tenderness) present. No swelling, deformity or signs of injury.      Right lower leg: No edema.      Left lower leg: No edema.       Comments: Large scar on left leg   Skin:     General: Skin is warm and dry.      Capillary Refill: Capillary refill takes more than 3 seconds.      Coloration: Skin is not jaundiced.      Findings: No bruising, erythema or rash.      Comments: Bilateral lower ext cold to touch. Warmth of right leg significantly decreases at level of knee.   Neurological:      General: No focal deficit present.      Mental Status: He is alert.      Cranial Nerves: No cranial nerve deficit.      Sensory: Sensory deficit present.      Motor: Weakness present.   Psychiatric:         Mood and Affect: Mood normal.         Behavior: Behavior normal.      Comments: Crying due to intense pain.      Labs:   Recent Labs     10/20/20  1847   WBC 13.1*   RBC 4.20*   HEMOGLOBIN 12.8*   HEMATOCRIT 40.4*   MCV 96.2   MCH 30.5   RDW 50.5*   PLATELETCT 300   MPV 9.7   NEUTSPOLYS 77.30*   LYMPHOCYTES 12.60*   MONOCYTES 8.30   EOSINOPHILS 1.10   BASOPHILS 0.20     Recent Labs     10/20/20  1847   SODIUM 137   POTASSIUM 4.0   CHLORIDE 100   CO2 26   GLUCOSE 105*   BUN 23*   CPKTOTAL 1314*     Results for orders placed or performed during the hospital encounter of 19   EKG   Result Value Ref Range    Report       [facility]    Test Date:  2019  Pt Name:    KATHRYN DIAS              Department: [id number]  MRN:        [id number]                      Room:        OR [place]  Gender:     Male                         Technician: TORREY  :        1945                   Requested By:MARIANN GOOD  Order #:    [id number]                    Reading MD: Mack Briceño MD    Measurements  Intervals                                Axis  Rate:       88                           P:          59  UT:         160                          QRS:        -89  QRSD:       104                          T:          64  QT:         424  QTc:        513    Interpretive Statements  SINUS RHYTHM  LEFT AXIS DEVIATION  NONSPECIFIC ST-T WAVE CHANGES  ANTEROLATERAL  INFARCT, OLD  PROLONGED QT INTERVAL  No previous ECG available for comparison    Electronically Signed On 8- 16:58:01 PDT by Mack Briceño MD         Imaging:   U/S of bilateral lower ext artery:   Right.    The Bi-fem bypass graft appears occluded.   Monophasic low amplitude flow through the thigh with non pulsatile flow in    the popliteal and posterior tibial arteries.   No flow could be demonstrated in the peroneal and anterior tibial arteries.   Left.    The Bi-fem bypass graft appears occluded.   The superficial femoral, popliteal, proximal posterior tibial, and prox    peroneal arteries are occluded.   Minimal non pulsatile flow reconstitution visualized in the distal calf    vessels.     CTA aorta:  1.  Occlusion of the aorta below the level of the renal arteries which reconstitutes at the distal bilateral external iliac arteries  2.  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  3.  Low-density extends into the proximal portion left renal artery, could represent small focus of thrombus or short segment of dissection.  4.  Soft tissue density surrounding the aorta and proximal portions of the external iliac arteries bilaterally, concerning for inflammatory or infectious process.  5.  Bilateral fat-containing inguinal hernias are seen.  6.  Diverticulosis     CTA lower ext:  1.  Occlusion of left superficial femoral artery and popliteal artery which reconstitutes at the trifurcation with single vessel runoff at the ankle via the posterior tibial artery.  2.  Occlusion of the distal right superficial femoral artery extending through the popliteal artery with reconstitution of the tibioperoneal trunk. There is 0 vessel runoff at the ankle.    Previous Data Review: reviewed   Most recent Echo 8/21/2019.   Normal left ventricular chamber size.  Left ventricular ejection fraction is visually estimated to be 65%.  Moderate concentric left ventricular  hypertrophy.  Normal diastolic function.  No significant valve abnormalities.            Problem Representation: The patient is a 75 year old male with significant PVD history and left leg bypass grafting, who presents with new right leg pain. No dopplerable peripheral RLE pulses. Started on heparin drip. Vascular surgery is planning for surgical intervention.    # Acute limb ischemia  # PVD  - No dopplerable peripheral pulses in right leg  - U/s showed acute right-sided ischemia  - Admit to telemetry with fall precautions  - Continue heparin drop  - Vascular surgery following. Per notes, planning for surgery today  - Consider restarting aspirin/ clopidogrel following surgery   - Optimize control of HTN, DM, hyperlipidemia  - Continue atorvastatin 40  - To rule out cardioembolic etiology, echo pending   - Lipid panel and A1c ordered  - Diet NPO, as plan for surgery tonight    # MSSA infection of vascular graft  - Per ID, patient is on augmentin 500/125 BID and rifampin 300 q8h indefinitely     # Leukocytosis with left shift  - No signs of acute infection   - Likely reactive due to limb ischemia  - Blood cultures x2 ordered  - No abx ordered    # CKD stage 3  - Avoid nephrotoxic agents    # Type 2 DM  - Low dose sliding scale insulin   - Hypoglycemia protocol     GI ppx ordered  DVT ppx: Hepatin drip  Code status: Full Code    No new Assessment & Plan notes have been filed under this hospital service since the last note was generated.  Service: Hospital Medicine

## 2020-10-21 NOTE — ANESTHESIA TIME REPORT
Anesthesia Start and Stop Event Times     Date Time Event    10/20/2020 2305 Ready for Procedure     2307 Anesthesia Start    10/21/2020 0424 Anesthesia Stop        Responsible Staff  10/20/20 to 10/21/20    Name Role Begin End    Kalyn Ibanez M.D. Anesth 2307 2330    Cleveland Mcdermott M.D. Anesth 2330 0426        Preop Diagnosis (Free Text):  Pre-op Diagnosis     Bilateral Lower Extremity Ishcemia        Preop Diagnosis (Codes):    Post op Diagnosis  S/P aortobifemoral bypass surgery  Revision Aortobifemoral Bypass    Premium Reason  B. 1st Call    Comments: PREMIUM KARAN, CENTRAL LINE A LINE MAJOR ABDOMINAL VASCULAR SURGERY

## 2020-10-21 NOTE — ASSESSMENT & PLAN NOTE
- No dopplerable peripheral pulses in right leg  - U/s showed acute right-sided ischemia  - Admit to telemetry with fall precautions  - Continue heparin drop  - Vascular surgery following. Per notes, planning for surgery today  - Consider restarting aspirin/ clopidogrel following surgery   - Optimize control of HTN, DM, hyperlipidemia  - Continue atorvastatin 40  - To rule out cardioembolic etiology, echo pending   - Lipid panel and A1c ordered  - Diet NPO, as plan for surgery tonight

## 2020-10-21 NOTE — CARE PLAN
Problem: Pain Management  Goal: Pain level will decrease to patient's comfort goal  Intervention: Follow pain managment plan developed in collaboration with patient and Interdisciplinary Team  Note: Pain assessed and reassessed at appropriate intervals using scale best suited to patient's mentation. Medicated per MAR in addition to employment of nonpharmacologic measures.     Problem: Respiratory:  Goal: Respiratory status will improve  Intervention: Assess and monitor pulmonary status  Note: Pulmonary status assessed and monitored via secretion production, vent tolerance, work/rate of breathing, and O2 requirements. Q2h oral care and ETT repositioning in place. RN and RT in close collaboration regarding ventilator settings and optimizing pulmonary health and effort.

## 2020-10-21 NOTE — ASSESSMENT & PLAN NOTE
Bilateral lower extremity ischemia  CTA with occlusion of left superficial femoral artery and popliteal artery which reconstitutes at the trifurcation with single vessel runoff at the ankle via the posterior tibial artery.   Occlusion of the distal right superficial femoral artery extending through the popliteal artery with reconstitution of the tibioperoneal trunk. There is 0 vessel runoff at the ankle. Very poor flow in both lower extremities  Heparin initiated in ED.  10/20 OR removal of infected aortobifemoral bypass graft  Revascularization with new aortobifemoral graft tunneled through alternate site  Heparin stopped after surgery  Only popliteal signals postop: Trend  Cold, ischemic appearing feet: Warming measures  10/22 significantly improved distal perfusion on morning exam  Dopplerable signals down to feet  Soham Ramirez MD. Vascular Surgery.

## 2020-10-22 PROBLEM — R74.01 ELEVATED TRANSAMINASE LEVEL: Status: ACTIVE | Noted: 2020-01-01

## 2020-10-22 PROBLEM — R57.9 SHOCK CIRCULATORY (HCC): Status: ACTIVE | Noted: 2020-01-01

## 2020-10-22 NOTE — CONSULTS
[facility] INFECTIOUS DISEASES INPATIENT CONSULT NOTE     Date of Service: 10/22/2020    Consult Requested By: Soham Ramirez M.D.    Reason for Consultation: Infrarenal aortic endograft infection    Chief Complaint: Bilateral lower extremity pain    History of Present Illness:     Dc Patel is a 75 y.o. male with a past medical history significant for peripheral vascular disease status post multiple vascular procedures, abdominal aortic aneurysm status post stent who presented with 1 week history of bilateral lower extremity pain, found to have bilateral lower extremity critical limb ischemia in setting of thrombosis and infection of infrarenal aortic stent graft on admission.  Infectious disease consulted for further management of aforementioned graft infection.  Status post removal of infrarenal aortic endograft, placement of aortobifemoral bypass graft, right lower extremity fasciotomy on 10/20/2020.    History limited to chart review as patient currently intubated.  History is significant for prior femorofemoral bypass graft and left femoral to below-knee popliteal bypass graft placement in July 2019, subsequently found to have MSSA bacteremia, left knee septic arthritis presumed secondary to upper extremity skin wounds, requiring redo of aforementioned grafts in August 2019, treated with 6 weeks of cefazolin and 12 weeks of rifampin prior to transitioning to lifelong suppressive therapy on Augmentin.  Follow-up and compliance unclear, last note in December 2019 prior to this admission recorded phone call from patient stating that patient could not afford Augmentin and rifampin.     Review of Systems:  Unable to obtain as patient is intubated    Past Medical History:   Diagnosis Date   • PVD (peripheral vascular disease) (HCC)        Past Surgical History:   Procedure Laterality Date   • AORTOFEMORAL BYPASS Bilateral 10/20/2020    Procedure: CREATION, BYPASS, ARTERIAL, AORTA TO FEMORAL, REMOVAL INFECTED  OCCLUDED AORTIC ENDOGRAFT;  Surgeon: Soham Ramirez M.D.;  Location: [facility];  Service: Vascular   • THROMBECTOMY Bilateral 10/20/2020    Procedure: THROMBECTOMY;  Surgeon: Soham Ramirez M.D.;  Location: [facility];  Service: Vascular   • FASCIECTOMY Right 10/20/2020    Procedure: FASCIECTOMY;  Surgeon: Soham Ramirez M.D.;  Location: [facility];  Service: Vascular   • FEMORAL FEMORAL BYPASS Left 8/20/2019    Procedure: CREATION, BYPASS, ARTERIAL, FEMORAL TO FEMORAL, USING GRAFT- REMOVAL AND REDO, CRYOVEIN;  Surgeon: Ana Grande M.D.;  Location: [facility];  Service: General   • FEMORAL POPLITEAL BYPASS Left 8/20/2019    Procedure: CREATION, BYPASS, ARTERIAL, FEMORAL TO POPLITEAL, USING GRAFT;  Surgeon: Ana Grande M.D.;  Location: [facility];  Service: General   • ANGIOGRAM Left 8/20/2019    Procedure: ANGIOGRAM;  Surgeon: Ana Grande M.D.;  Location: [facility];  Service: General   • IRRIGATION & DEBRIDEMENT ORTHO Left 8/20/2019    Procedure: IRRIGATION AND DEBRIDEMENT, WOUND-KNEE;  Surgeon: Iván Cifuentes M.D.;  Location: [facility];  Service: General   • FEMORAL FEMORAL BYPASS Bilateral 7/3/2019    Procedure: CREATION, BYPASS, ARTERIAL, FEMORAL TO FEMORAL, USING GRAFT;  Surgeon: Ana Grande M.D.;  Location: [facility];  Service: General   • FEMORAL POPLITEAL BYPASS Left 7/3/2019    Procedure: CREATION, BYPASS, ARTERIAL, FEMORAL TO POPLITEAL, USING GRAFT;  Surgeon: Ana Grande M.D.;  Location: [facility];  Service: General   • ANGIOGRAM Left 7/3/2019    Procedure: ANGIOGRAM;  Surgeon: Ana Grande M.D.;  Location: [facility];  Service: General   • TOE AMPUTATION Left 10/23/2018    Procedure: TOE AMPUTATION 3RD  POSS 2ND  ;  Surgeon: Ana Grande M.D.;  Location: [facility];  Service: General       History reviewed. No pertinent family  history.    Social History     Socioeconomic History   • Marital status:      Spouse name: Not on file   • Number of children: Not on file   • Years of education: Not on file   • Highest education level: Not on file   Occupational History   • Not on file   Social Needs   • Financial resource strain: Not on file   • Food insecurity     Worry: Not on file     Inability: Not on file   • Transportation needs     Medical: Not on file     Non-medical: Not on file   Tobacco Use   • Smoking status: Former Smoker   • Smokeless tobacco: Never Used   Substance and Sexual Activity   • Alcohol use: Not Currently   • Drug use: Not Currently     Types: Inhaled     Comment: THC   • Sexual activity: Not on file   Lifestyle   • Physical activity     Days per week: Not on file     Minutes per session: Not on file   • Stress: Not on file   Relationships   • Social connections     Talks on phone: Not on file     Gets together: Not on file     Attends Baptism service: Not on file     Active member of club or organization: Not on file     Attends meetings of clubs or organizations: Not on file     Relationship status: Not on file   • Intimate partner violence     Fear of current or ex partner: Not on file     Emotionally abused: Not on file     Physically abused: Not on file     Forced sexual activity: Not on file   Other Topics Concern   • Not on file   Social History Narrative   • Not on file       No Known Allergies    Medications:    Current Facility-Administered Medications:   •  Linezolid (ZYVOX) premix 600 mg, 600 mg, Intravenous, Q12HRS, Ronald Neves D.O., Stopped at 10/22/20 1045  •  famotidine (PEPCID) injection 20 mg, 20 mg, Intravenous, BID, Jose M Mckay M.D., 20 mg at 10/22/20 0603  •  Respiratory Therapy Consult, , Nebulization, Continuous RT, Jose M Mckay M.D.  •  Pharmacy Consult: Enteral tube insertion - review meds/change route/product selection, 1 Each, Other, PHARMACY TO DOSE, Jose M FAUSTIN  NEO Mckay  •  propofol (DIPRIVAN) injection, 0-80 mcg/kg/min, Intravenous, Continuous, Last Rate: 6.5 mL/hr at 10/22/20 1000, 15 mcg/kg/min at 10/22/20 1000 **AND** Triglycerides Starting now and then Every 3 Days, , , Every 3 Days (0300), Jose M Mckay M.D.  •  atorvastatin (LIPITOR) tablet 40 mg, 40 mg, Enteral Tube, Q EVENING, Jose M Mckay M.D., 40 mg at 10/21/20 1756  •  insulin regular (HumuLIN R,NovoLIN R) injection, 1-6 Units, Subcutaneous, Q6HRS, Stopped at 10/22/20 0000 **AND** POC Blood Glucose, , , Q6H **AND** NOTIFY MD and PharmD, , , Once **AND** glucose 4 g chewable tablet 16 g, 16 g, Enteral Tube, Q15 MIN PRN **AND** dextrose 50% (D50W) injection 50 mL, 50 mL, Intravenous, Q15 MIN PRN, Jose M Mckay M.D.  •  senna-docusate (PERICOLACE or SENOKOT S) 8.6-50 MG per tablet 2 Tab, 2 Tab, Enteral Tube, BID, Stopped at 10/22/20 0600 **AND** polyethylene glycol/lytes (MIRALAX) PACKET 1 Packet, 1 Packet, Enteral Tube, QDAY PRN **AND** magnesium hydroxide (MILK OF MAGNESIA) suspension 30 mL, 30 mL, Enteral Tube, QDAY PRN **AND** bisacodyl (DULCOLAX) suppository 10 mg, 10 mg, Rectal, QDAY PRN, Jose M Mckay M.D.  •  acetaminophen (TYLENOL) tablet 650 mg, 650 mg, Enteral Tube, Q6HRS PRN, Jose M Mckay M.D.  •  norepinephrine (Levophed) infusion 8 mg/250 mL (premix), 0-30 mcg/min, Intravenous, Continuous, Soham Ramirez M.D., Stopped at 10/21/20 0500  •  heparin injection 5,000 Units, 5,000 Units, Subcutaneous, Q8HRS, Soham Ramirez M.D., 5,000 Units at 10/22/20 1210  •  morphine (pf) 4 mg/mL injection 1-4 mg, 1-4 mg, Intravenous, Q HOUR PRN, Ronald Neves D.O., 4 mg at 10/22/20 0603  •  [COMPLETED] piperacillin-tazobactam (ZOSYN) 4.5 g in  mL IVPB, 4.5 g, Intravenous, Once, Stopped at 10/21/20 1824 **AND** piperacillin-tazobactam (ZOSYN) 4.5 g in  mL IVPB, 4.5 g, Intravenous, Q8HRS, Ronald Neves D.O., Last Rate: 25 mL/hr at 10/22/20 1343, 4.5 g at  "10/22/20 1343  •  lactated ringers infusion, , Intravenous, Continuous, Ronald Neves D.O., Last Rate: 125 mL/hr at 10/21/20 2208    Physical Exam:   Vital Signs: /74   Pulse (!) 113   Temp 36.2 °C (97.2 °F) (Temporal)   Resp (!) 27   Ht 1.702 m (5' 7\")   Wt 79.5 kg (175 lb 4.3 oz)   SpO2 94%   BMI 27.45 kg/m²   Temp  Av.5 °C (97.7 °F)  Min: 36.2 °C (97.2 °F)  Max: 37 °C (98.6 °F)  Vital signs reviewed    Constitutional: In no acute distress.  Intubated.  Follows commands but appears fatigued, not very interactive.  Head: Atraumatic, normocephalic.  Eyes: Conjunctivae normal, EOM intact. Pupils are equal, round, and reactive to light.   Neck: Neck supple. No limitations in active range of motion.  Cardiovascular: Normal rate, regular rhythm, S1, S2.  No murmur, gallop, or friction rub.  Pulmonary/Chest: No respiratory distress. Unlabored respiratory effort.  Vented breath sounds.  Abdominal: Soft.  Multiple abdominal incision sites seen covered in clean dressing with drains.     Musculoskeletal: Freely moving all extremities.  Right lower extremity calf fasciotomy site covered in clean dressing.  Neurological: Alert.  Follows commands but appears fatigued, not very interactive.  Skin: Skin is warm and dry. No rashes or embolic phenomena noted on hands / nails or other exposed skin.  Psychiatric: Calm, no agitation.    LABS:  Recent Labs     10/21/20  2006 10/22/20  0436 10/22/20  0950   WBC 19.1* 18.5* 14.7*      Recent Labs     10/21/20  2006 10/22/20  0436 10/22/20  0950   HEMOGLOBIN 13.5* 11.6* 9.8*   HEMATOCRIT 41.6* 36.5* 29.7*   MCV 93.3 92.9 92.2   MCH 30.3 29.5 30.4   PLATELETCT 249 215 186       Recent Labs     10/21/20  2006 10/22/20  0436 10/22/20  0950   SODIUM 137 137 136   POTASSIUM 4.9 5.1 5.0   CHLORIDE 101 101 103   CO2 16* 18* 19*   CREATININE 2.04* 2.48* 2.69*        Recent Labs     10/21/20  0900 10/22/20  0436 10/22/20  0950   ALBUMIN 3.1* 3.0* 2.9*        MICRO:  No results " found for: BLOODCULTU, BLDCULT, BCHOLD     Latest pertinent labs were reviewed    IMAGING STUDIES:  EC-ECHOCARDIOGRAM COMPLETE W/ CONT   Final Result      DX-ABDOMEN FOR TUBE PLACEMENT   Final Result      Feeding tube tip projects over the gastric body.      DX-CHEST-LIMITED (1 VIEW)   Final Result         1.  Interstitial pulmonary parenchymal prominence suggest chronic underlying lung disease, component of interstitial edema and/or infiltrates not excluded.   2.  Trace left pleural effusion, new since prior study   3.  Cardiomegaly   4.  Atherosclerosis      CT-CTA AORTA-RO WITH & W/O-POST PROCESS   Final Result         CTA AORTA      1.  Occlusion of the aorta below the level of the renal arteries which reconstitutes at the distal bilateral external iliac arteries   2.  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   3.  Low-density extends into the proximal portion left renal artery, could represent small focus of thrombus or short segment of dissection.   4.  Soft tissue density surrounding the aorta and proximal portions of the external iliac arteries bilaterally, concerning for inflammatory or infectious process.   5.  Bilateral fat-containing inguinal hernias are seen.   6.  Diverticulosis      CTA LOWER EXTREMITIES      1.  Occlusion of left superficial femoral artery and popliteal artery which reconstitutes at the trifurcation with single vessel runoff at the ankle via the posterior tibial artery.   2.  Occlusion of the distal right superficial femoral artery extending through the popliteal artery with reconstitution of the tibioperoneal trunk. There is 0 vessel runoff at the ankle.      3D angiographic/MIP images of the vasculature confirm the vascular findings as described above.      These findings were discussed with the patient's clinician, Soham Ramirez, on 10/20/2020 9:08 PM.      US-EXTREMITY ARTERY LOWER BILAT   Final Result      DX-PORTABLE FLUORO > 1  HOUR    (Results Pending)       Assessment and Plan:   Dc Patel is a 75 y.o. male with a past medical history significant for peripheral vascular disease status post multiple vascular procedures, abdominal aortic aneurysm status post stent who presented with 1 week history of bilateral lower extremity pain, found to have bilateral lower extremity critical limb ischemia in setting of thrombosis and infection of infrarenal aortic stent graft on admission.  Status post removal of infrarenal aortic endograft, placement of aortobifemoral bypass graft, right lower extremity fasciotomy on 10/20/2020.  History is significant for prior femorofemoral bypass graft and left femoral to below-knee popliteal bypass graft placement in July 2019, subsequently found to have MSSA bacteremia, left knee septic arthritis presumed secondary to upper extremity skin wounds, requiring redo of aforementioned grafts in August 2019, treated with 6 weeks of cefazolin and 12 weeks of rifampin prior to transitioning to lifelong suppressive therapy on Augmentin.  Follow-up and compliance unclear, last note in December 2019 prior to this admission recorded phone call from patient stating that patient could not afford Augmentin and rifampin.  Suspect current infection secondary to noncompliance with antibiotic suppressive therapy, but will cover with empiric broad-spectrum antibiotics pending 10/20/2020 graft cultures given fragile clinical status with concurrent acute respiratory failure, lactic acidosis, rhabdomyolysis, shock liver, acute kidney injury.  No other obvious new source of infection.  10/21/2020 transthoracic echo showed ejection fraction 35%, grade 1 diastolic dysfunction, possible apical thrombus, no significant valvular dysfunction or apparent vegetation.    -Continue Zosyn and linezolid, further antibiotic adjustments pending 10/20/2020 graft cultures.  Plan to add rifampin at a later time.  Anticipate extended course of  antibiotics, with subsequent transition to suppressive therapy.  -Follow up on 10/21/2020 operative report.  -Obtain blood cultures.    Antibiotics course:  Cefazolin 10/20/2020-10/21/2020  Rifampin 10/21/2020  Vancomycin 10/21/2020-10/22/2020  Zosyn 10/21/2020-10/22/2020  Linezolid 10/22/2020-present

## 2020-10-22 NOTE — PROGRESS NOTES
"Vascular    Events  10/21: intubated, follows commands, no pressors, adequate UOP  10/22: intubated, sedated presently, no pressors, borderline UOP, creatinine worsening, shock liver    Vitals  /76   Pulse (!) 104   Temp 36.2 °C (97.2 °F) (Temporal)   Resp (!) 23   Ht 1.702 m (5' 7\")   Wt 79.5 kg (175 lb 4.3 oz)   SpO2 94%   BMI 27.45 kg/m²     Exam  Pre[facility]as CDI  Pedal doppler signals bilaterally, feet mostly pink with cap refill in the toes    Labs  WBC 19 -> 14.7  Hgb stable around 13 -> 11.6 -> 9.8 today  Creatinine 2 -> 2.5 -> 2.7 today  Lactic acid: 7.3 -> 7.1 -> 5.4  Cultures: pending, so far no organisms    A/P)  10/20: Aortobifemoral bypass with extraction of infected occluded aortic stent graft    Wean as tolerated  Empiric ABx, FU Cx  Monitor renal and liver progress  Trend Lactic acid    Appreciate ICU team's help managing Mr. Patel    Soham Ramirez MD  [facility] (General and Vascular Surgery)  Cell: [phone] (text or call is fine, if you don't reach me please try my office)  Office: [phone]  __________________________________________________________________  Patient:Dc Patel   MRN:[id number]   CSN:[id number]    "

## 2020-10-22 NOTE — CARE PLAN
Problem: Infection  Goal: Will remain free from infection  Intervention: Assess signs and symptoms of infection  Note: RN monitors Pt VS and lab values to observe for S/S of infection.  Hand hygiene implemented before and after Pt care.       Problem: Skin Integrity  Goal: Risk for impaired skin integrity will decrease  Intervention: Assess risk factors for impaired skin integrity and/or pressure ulcers  Note: Joseph scale being used to assess skin break down risks.  Providing assistance with repositioning.  Collaborating and communicating with health care team to prevent pressure ulcer.  Q2 hr turns & KATIE in place.

## 2020-10-22 NOTE — ASSESSMENT & PLAN NOTE
Patient has history of vascular graft infection with removal and replacement with cadaveric graft  New graft infection status post removal  Cultures pending  10/21 Augmentin ordered, changed to Zosyn and vancomycin  10/22 antibiotic day 2: Vancomycin changed to Zyvox because of renal function  [facility] infectious disease consulted

## 2020-10-22 NOTE — ASSESSMENT & PLAN NOTE
Received from OR on vasopressors.  Very low CVP  Ongoing fluid resuscitation  Acidosis correcting slowly  Serial labs

## 2020-10-22 NOTE — ASSESSMENT & PLAN NOTE
AST 3523, ALT 2603,   Presumably shock liver  May be contributing to poor clearance of lactate  Trend

## 2020-10-22 NOTE — PROGRESS NOTES
"Trauma / Surgical Daily Progress Note    Date of Service  10/21/2020    Chief Complaint  75 y.o. male admitted 10/20/2020 with ischemic RLE    Interval Events  Ongoing critical issues require patient remain in the care unit    Ventilator dependent respiratory failure: Secondary to hemodynamic instability and ongoing resuscitation.  Patient is sedated on the ventilator and weaning protocol has been ordered.    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  Critical bilateral lower extremity ischemia which may have started as much as a week before presentation to the emergency room.  Graft was removed and perfusion remained poor postop.  Surgeon is aware of lower extremity vascular/neuro exam.  We are watching closely.  Unfortunately there is not much to do in the way of limb salvage if injury progresses.    Ongoing acidosis despite aggressive fluid resuscitation: Fluids being increased and where trending resuscitation endpoints.  High likelihood of progression to rhabdomyolysis so we are trending CPKs and will order crush injury protocol if there is an increase.    Initial thought of trickle feeding was abandoned due to ongoing acidosis/shock: Continue n.p.o. with nasogastric tube to suction.    Heparin for DVT prophylaxis  Watch electrolytes, particularly potassium with worsening renal failure.    Uncertain of patient's decision makers/family: Social work working on that    Current lines/catheters necessary    Review of Systems  Review of Systems   Unable to perform ROS: Intubated        Vital Signs for last 24 hours  Temp:  [36.2 °C (97.2 °F)-37 °C (98.6 °F)] 36.2 °C (97.2 °F)  Pulse:  [] 92  Resp:  [19-33] 24  BP: (112-149)/(75-98) 114/86  SpO2:  [97 %-99 %] 97 %    Hemodynamic parameters for last 24 hours     /86   Pulse 92   Temp 36.2 °C (97.2 °F) (Temporal)   Resp (!) 24   Ht 1.702 m (5' 7\")   Wt 75.5 kg (166 lb 7.2 oz)   SpO2 97%   BMI 26.07 kg/m² "     Respiratory Data     Respiration: (!) 24, Pulse Oximetry: 97 %     Work Of Breathing / Effort: Vented  RUL Breath Sounds: Clear, RML Breath Sounds: Diminished, RLL Breath Sounds: Diminished, BRIAN Breath Sounds: Clear, LLL Breath Sounds: Diminished    Physical Exam  Physical Exam  Vitals signs and nursing note reviewed.   Constitutional:       General: He is sleeping.      Interventions: He is sedated, intubated and restrained.   HENT:      Head: Normocephalic and atraumatic.      Mouth/Throat:      Mouth: Mucous membranes are moist.      Pharynx: Oropharynx is clear.   Eyes:      Conjunctiva/sclera: Conjunctivae normal.      Pupils: Pupils are equal, round, and reactive to light.   Neck:      Musculoskeletal: Neck supple.   Cardiovascular:      Rate and Rhythm: Normal rate and regular rhythm.   Pulmonary:      Effort: He is intubated.      Breath sounds: Examination of the right-lower field reveals decreased breath sounds. Examination of the left-lower field reveals decreased breath sounds. Decreased breath sounds present.   Abdominal:      General: There is distension.      Tenderness: There is abdominal tenderness.      Comments: Incision intact with dressing  Firm distention with hypoactive bowel sounds   Genitourinary:     Comments: Diamond catheter with dark urine  Musculoskeletal:      Comments: Bilateral lower extremities cool from the mid foreleg down.  Significant mottling of the skin of the feet with cyanosis of the toes   Skin:     General: Skin is cool and moist.      Capillary Refill: Capillary refill takes 2 to 3 seconds.      Coloration: Skin is pale.   Psychiatric:         Behavior: Behavior is uncooperative.         Laboratory  Recent Results (from the past 24 hour(s))   Fungal Culture    Collection Time: 10/21/20  1:46 AM    Specimen: Tissue   Result Value Ref Range    Significant Indicator NEG     Source TISS     Site Infrarenal Aorta     Culture Result Culture in progress.    AFB Culture     Collection Time: 10/21/20  1:46 AM    Specimen: Tissue   Result Value Ref Range    Significant Indicator NEG     Source TISS     Site Infrarenal Aorta     Culture Result Culture in progress.     AFB Smear Results No acid fast bacilli seen.    Acid Fast Stain    Collection Time: 10/21/20  1:46 AM    Specimen: Tissue   Result Value Ref Range    Significant Indicator NEG     Source TISS     Site Infrarenal Aorta     AFB Smear Results No acid fast bacilli seen.    GRAM STAIN    Collection Time: 10/21/20  1:46 AM    Specimen: Tissue   Result Value Ref Range    Significant Indicator .     Source TISS     Site Infrarenal Aorta     Gram Stain Result No organisms seen.    Fungal Culture    Collection Time: 10/21/20  1:53 AM    Specimen: Tissue   Result Value Ref Range    Significant Indicator NEG     Source TISS     Site Infrarenal Aorta Stent     Culture Result Culture in progress.    AFB Culture    Collection Time: 10/21/20  1:53 AM    Specimen: Tissue   Result Value Ref Range    Significant Indicator NEG     Source TISS     Site Infrarenal Aorta Stent     Culture Result Culture in progress.     AFB Smear Results No acid fast bacilli seen.    Acid Fast Stain    Collection Time: 10/21/20  1:53 AM    Specimen: Tissue   Result Value Ref Range    Significant Indicator NEG     Source TISS     Site Infrarenal Aorta Stent     AFB Smear Results No acid fast bacilli seen.    GRAM STAIN    Collection Time: 10/21/20  1:53 AM    Specimen: Tissue   Result Value Ref Range    Significant Indicator .     Source TISS     Site Infrarenal Aorta Stent     Gram Stain Result No organisms seen.    CBC with Differential    Collection Time: 10/21/20  4:46 AM   Result Value Ref Range    WBC 17.8 (H) 4.8 - 10.8 K/uL    RBC 4.23 (L) 4.70 - 6.10 M/uL    Hemoglobin 12.8 (L) 14.0 - 18.0 g/dL    Hematocrit 38.7 (L) 42.0 - 52.0 %    MCV 91.5 81.4 - 97.8 fL    MCH 30.3 27.0 - 33.0 pg    MCHC 33.1 (L) 33.7 - 35.3 g/dL    RDW 59.8 (H) 35.9 - 50.0 fL     Platelet Count 243 164 - 446 K/uL    MPV 9.6 9.0 - 12.9 fL    Neutrophils-Polys 87.90 (H) 44.00 - 72.00 %    Lymphocytes 3.90 (L) 22.00 - 41.00 %    Monocytes 7.10 0.00 - 13.40 %    Eosinophils 0.10 0.00 - 6.90 %    Basophils 0.30 0.00 - 1.80 %    Immature Granulocytes 0.70 0.00 - 0.90 %    Nucleated RBC 0.00 /100 WBC    Neutrophils (Absolute) 15.63 (H) 1.82 - 7.42 K/uL    Lymphs (Absolute) 0.70 (L) 1.00 - 4.80 K/uL    Monos (Absolute) 1.27 (H) 0.00 - 0.85 K/uL    Eos (Absolute) 0.02 0.00 - 0.51 K/uL    Baso (Absolute) 0.06 0.00 - 0.12 K/uL    Immature Granulocytes (abs) 0.13 (H) 0.00 - 0.11 K/uL    NRBC (Absolute) 0.00 K/uL   Lipid Profile (Lipid Panel) Fasting    Collection Time: 10/21/20  4:46 AM   Result Value Ref Range    Cholesterol,Tot 134 100 - 199 mg/dL    Triglycerides 93 0 - 149 mg/dL    HDL 38 (A) >=40 mg/dL    LDL 77 <100 mg/dL   HEMOGLOBIN A1C    Collection Time: 10/21/20  4:46 AM   Result Value Ref Range    Glycohemoglobin 6.4 (H) 0.0 - 5.6 %    Est Avg Glucose 137 mg/dL   Comp Metabolic Panel    Collection Time: 10/21/20  4:46 AM   Result Value Ref Range    Sodium 132 (L) 135 - 145 mmol/L    Potassium 4.3 3.6 - 5.5 mmol/L    Chloride 99 96 - 112 mmol/L    Co2 22 20 - 33 mmol/L    Anion Gap 11.0 7.0 - 16.0    Glucose 195 (H) 65 - 99 mg/dL    Bun 21 8 - 22 mg/dL    Creatinine 1.24 0.50 - 1.40 mg/dL    Calcium 8.6 8.5 - 10.5 mg/dL    AST(SGOT) 50 (H) 12 - 45 U/L    ALT(SGPT) 43 2 - 50 U/L    Alkaline Phosphatase 68 30 - 99 U/L    Total Bilirubin 1.5 0.1 - 1.5 mg/dL    Albumin 2.9 (L) 3.2 - 4.9 g/dL    Total Protein 5.3 (L) 6.0 - 8.2 g/dL    Globulin 2.4 1.9 - 3.5 g/dL    A-G Ratio 1.2 g/dL   MAGNESIUM    Collection Time: 10/21/20  4:46 AM   Result Value Ref Range    Magnesium 1.9 1.5 - 2.5 mg/dL   ESTIMATED GFR    Collection Time: 10/21/20  4:46 AM   Result Value Ref Range    GFR If African American >60 >60 mL/min/1.73 m 2    GFR If Non  56 (A) >60 mL/min/1.73 m 2   ISTAT ARTERIAL BLOOD  GAS    Collection Time: 10/21/20  4:57 AM   Result Value Ref Range    Ph 7.360 (L) 7.400 - 7.500    Pco2 41.9 (H) 26.0 - 37.0 mmHg    Po2 116 (H) 64 - 87 mmHg    Tco2 25 20 - 33 mmol/L    S02 98 93 - 99 %    Hco3 23.7 17.0 - 25.0 mmol/L    BE -2 -4 - 3 mmol/L    Body Temp 96.8 F degrees    O2 Therapy 60 %    iPF Ratio 193     Ph Temp Cesar 7.375 (L) 7.400 - 7.500    Pco2 Temp Co 40.2 (H) 26.0 - 37.0 mmHg    Po2 Temp Cor 110 (H) 64 - 87 mmHg    Specimen Arterial     Action Range Triggered NO     Inst. Qualified Patient YES    ACCU-CHEK GLUCOSE    Collection Time: 10/21/20  5:11 AM   Result Value Ref Range    Glucose - Accu-Ck 184 (H) 65 - 99 mg/dL   Histology Request    Collection Time: 10/21/20  7:37 AM   Result Value Ref Range    Pathology Request Sent to Hist[facility]    CBC WITH DIFFERENTIAL    Collection Time: 10/21/20  9:00 AM   Result Value Ref Range    WBC 19.1 (H) 4.8 - 10.8 K/uL    RBC 4.51 (L) 4.70 - 6.10 M/uL    Hemoglobin 13.6 (L) 14.0 - 18.0 g/dL    Hematocrit 41.4 (L) 42.0 - 52.0 %    MCV 91.8 81.4 - 97.8 fL    MCH 30.2 27.0 - 33.0 pg    MCHC 32.9 (L) 33.7 - 35.3 g/dL    RDW 60.4 (H) 35.9 - 50.0 fL    Platelet Count 253 164 - 446 K/uL    MPV 9.4 9.0 - 12.9 fL    Neutrophils-Polys 86.50 (H) 44.00 - 72.00 %    Lymphocytes 2.00 (L) 22.00 - 41.00 %    Monocytes 9.80 0.00 - 13.40 %    Eosinophils 0.60 0.00 - 6.90 %    Basophils 0.40 0.00 - 1.80 %    Immature Granulocytes 0.70 0.00 - 0.90 %    Nucleated RBC 0.00 /100 WBC    Neutrophils (Absolute) 16.51 (H) 1.82 - 7.42 K/uL    Lymphs (Absolute) 0.39 (L) 1.00 - 4.80 K/uL    Monos (Absolute) 1.87 (H) 0.00 - 0.85 K/uL    Eos (Absolute) 0.11 0.00 - 0.51 K/uL    Baso (Absolute) 0.07 0.00 - 0.12 K/uL    Immature Granulocytes (abs) 0.14 (H) 0.00 - 0.11 K/uL    NRBC (Absolute) 0.00 K/uL   COMP METABOLIC PANEL    Collection Time: 10/21/20  9:00 AM   Result Value Ref Range    Sodium 133 (L) 135 - 145 mmol/L    Potassium 4.3 3.6 - 5.5 mmol/L    Chloride 97 96 - 112 mmol/L    Co2  18 (L) 20 - 33 mmol/L    Anion Gap 18.0 (H) 7.0 - 16.0    Glucose 181 (H) 65 - 99 mg/dL    Bun 22 8 - 22 mg/dL    Creatinine 1.30 0.50 - 1.40 mg/dL    Calcium 8.7 8.5 - 10.5 mg/dL    AST(SGOT) 180 (H) 12 - 45 U/L    ALT(SGPT) 149 (H) 2 - 50 U/L    Alkaline Phosphatase 76 30 - 99 U/L    Total Bilirubin 1.4 0.1 - 1.5 mg/dL    Albumin 3.1 (L) 3.2 - 4.9 g/dL    Total Protein 5.7 (L) 6.0 - 8.2 g/dL    Globulin 2.6 1.9 - 3.5 g/dL    A-G Ratio 1.2 g/dL   MAGNESIUM    Collection Time: 10/21/20  9:00 AM   Result Value Ref Range    Magnesium 2.0 1.5 - 2.5 mg/dL   PHOSPHORUS    Collection Time: 10/21/20  9:00 AM   Result Value Ref Range    Phosphorus 4.6 (H) 2.5 - 4.5 mg/dL   APTT    Collection Time: 10/21/20  9:00 AM   Result Value Ref Range    APTT 32.0 24.7 - 36.0 sec   FIBRINOGEN    Collection Time: 10/21/20  9:00 AM   Result Value Ref Range    Fibrinogen 559 (H) 215 - 460 mg/dL   LACTIC ACID    Collection Time: 10/21/20  9:00 AM   Result Value Ref Range    Lactic Acid 7.6 (HH) 0.5 - 2.0 mmol/L   CORTISOL    Collection Time: 10/21/20  9:00 AM   Result Value Ref Range    Cortisol 68.6 (H) 0.0 - 23.0 ug/dL   PROTHROMBIN TIME    Collection Time: 10/21/20  9:00 AM   Result Value Ref Range    PT 15.2 (H) 12.0 - 14.6 sec    INR 1.16 (H) 0.87 - 1.13   TROPONIN    Collection Time: 10/21/20  9:00 AM   Result Value Ref Range    Troponin T 18 6 - 19 ng/L   FREE THYROXINE    Collection Time: 10/21/20  9:00 AM   Result Value Ref Range    Free T-4 1.34 0.93 - 1.70 ng/dL   TSH    Collection Time: 10/21/20  9:00 AM   Result Value Ref Range    TSH 2.260 0.380 - 5.330 uIU/mL   CREATINE KINASE    Collection Time: 10/21/20  9:00 AM   Result Value Ref Range    CPK Total 1184 (H) 0 - 154 U/L   ESTIMATED GFR    Collection Time: 10/21/20  9:00 AM   Result Value Ref Range    GFR If African American >60 >60 mL/min/1.73 m 2    GFR If Non  53 (A) >60 mL/min/1.73 m 2   ISTAT ARTERIAL BLOOD GAS    Collection Time: 10/21/20  9:41 AM    Result Value Ref Range    Ph 7.352 (L) 7.400 - 7.500    Pco2 35.0 26.0 - 37.0 mmHg    Po2 110 (H) 64 - 87 mmHg    Tco2 20 20 - 33 mmol/L    S02 98 93 - 99 %    Hco3 19.4 17.0 - 25.0 mmol/L    BE -5 (L) -4 - 3 mmol/L    Body Temp 97.7 F degrees    O2 Therapy 50 %    iPF Ratio 220     Ph Temp Cesar 7.360 (L) 7.400 - 7.500    Pco2 Temp Co 34.2 26.0 - 37.0 mmHg    Po2 Temp Cor 107 (H) 64 - 87 mmHg    Specimen Arterial     Action Range Triggered NO     Inst. Qualified Patient YES    LACTIC ACID    Collection Time: 10/21/20  1:00 PM   Result Value Ref Range    Lactic Acid 5.4 (HH) 0.5 - 2.0 mmol/L   ACCU-CHEK GLUCOSE    Collection Time: 10/21/20  1:01 PM   Result Value Ref Range    Glucose - Accu-Ck 176 (H) 65 - 99 mg/dL   EC-ECHOCARDIOGRAM COMPLETE W/ CONT    Collection Time: 10/21/20  3:13 PM   Result Value Ref Range    Eject.Frac. MOD BP 31.03     Eject.Frac. MOD 4C 30.21     Eject.Frac. MOD 2C 30.91     Left Ventrical Ejection Fraction 35    ACCU-CHEK GLUCOSE    Collection Time: 10/21/20  5:58 PM   Result Value Ref Range    Glucose - Accu-Ck 179 (H) 65 - 99 mg/dL   LACTIC ACID    Collection Time: 10/21/20  5:59 PM   Result Value Ref Range    Lactic Acid 7.3 (HH) 0.5 - 2.0 mmol/L   LACTIC ACID    Collection Time: 10/21/20  8:06 PM   Result Value Ref Range    Lactic Acid 7.1 (HH) 0.5 - 2.0 mmol/L       Fluids    Intake/Output Summary (Last 24 hours) at 10/21/2020 2105  Last data filed at 10/21/2020 1815  Gross per 24 hour   Intake 92533.05 ml   Output 1780 ml   Net 84225.05 ml       Core Measures & Quality Metrics  Labs reviewed, Medications reviewed, Radiology images reviewed and EKG reviewed  Diamond catheter: Critically Ill - Requiring Accurate Measurement of Urinary Output  Central line in place: Concentrated IV drugs, Need for access, Sepsis and Shock    DVT Prophylaxis: Contraindicated - High bleeding risk  DVT prophylaxis - mechanical: SCDs  Ulcer prophylaxis: Yes  Antibiotics: Treating active  infection/contamination beyond 24 hours perioperative coverage  Assessed for rehab: Patient unable to tolerate rehabilitation therapeutic regimen    SAMANTHA Score  ETOH Screening    Assessment/Plan  Leukocytosis  Assessment & Plan  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  Soham Ramirez MD. Vascular Surgery    Critical lower limb ischemia  Assessment & Plan  Bilateral lower extremity ischemia  CTA with occlusion of left superficial femoral artery and popliteal artery which reconstitutes at the trifurcation with single vessel runoff at the ankle via the posterior tibial artery.   Occlusion of the distal right superficial femoral artery extending through the popliteal artery with reconstitution of the tibioperoneal trunk. There is 0 vessel runoff at the ankle. Very poor flow in both lower extremities  Heparin initiated in ED.  10/20 OR removal of infected aortobifemoral bypass graft  Revascularization with new aortobifemoral graft tunneled through alternate site  Heparin stopped after surgery  Only popliteal signals postop: Trend  Cold, ischemic appearing feet: Warming measures  Soham Ramirez MD. Vascular Surgery.    Diabetes mellitus (HCC)  Assessment & Plan  Premorbid by medical record  Regular insulin sliding scale for now    Antiplatelet or antithrombotic long-term use-  (present on admission)  Assessment & Plan  On Plavix at home.  Holding maintenance medication during acute illness.   Resume per Vascular Surgery recommendation     Screening examination for infectious disease- (present on admission)  Assessment & Plan  Admission SARS-CoV-2 testing negative. LOW RISK patient. Repeat SARS-CoV-2 testing not indicated. Isolation precautions de-escalated.    Chronic kidney disease (CKD), stage III (moderate)- (present on admission)  Assessment & Plan  Premorbid with acute oliguria  IV fluid resuscitation  Trend indices  Avoid nephrotoxins        Discussed patient condition with Family, RN, RT, Therapies, Pharmacy, Dietary and .  CRITICAL CARE TIME EXCLUDING PROCEDURES: 45    minutes

## 2020-10-22 NOTE — CARE PLAN
Problem: Safety  Goal: Will remain free from injury  Outcome: PROGRESSING AS EXPECTED  Goal: Will remain free from falls  Outcome: PROGRESSING AS EXPECTED  Educated on fall risk and interventions In place      Problem: Infection  Goal: Will remain free from infection  Outcome: PROGRESSING AS EXPECTED  Educated on infection prevention and interventions in place     Problem: Venous Thromboembolism (VTW)/Deep Vein Thrombosis (DVT) Prevention:  Goal: Patient will participate in Venous Thrombosis (VTE)/Deep Vein Thrombosis (DVT)Prevention Measures  Outcome: PROGRESSING AS EXPECTED  Educated about interventions in place.

## 2020-10-22 NOTE — PROGRESS NOTES
Dr. Neves notified of critical lab results of LA, CPK, AST, ALT, inadequate UOP & decrease in Hgb. MD to follow up,  will continue with POC.

## 2020-10-22 NOTE — RESPIRATORY CARE
Extubated patient per MD orders.     Cuff leak performed. Patient placed on 4L Nasal cannula no distress noted.

## 2020-10-22 NOTE — DISCHARGE PLANNING
Dc Patel was transferred to [facility] from [facility] on 10/20 for worsening right leg pain. Pt is currently intubated and unable to assess. No family/NOK on file. No ACP documents on file either. Pt's emergency contact is a friend, Julisa Loco [phone]. LSW called Julisa and left message requesting a call back.

## 2020-10-22 NOTE — CARE PLAN
Vent day: 1  ETT: 8.0 @ 23  Settings: 20, 400, +8, 50%  SBT x2 today     No changes made on shift.

## 2020-10-22 NOTE — PROGRESS NOTES
"Vascular    Events  10/21: intubated, follows commands, no pressors, adequate UOP    Vitals  /86   Pulse 92   Temp 36.2 °C (97.2 °F) (Temporal)   Resp (!) 24   Ht 1.702 m (5' 7\")   Wt 75.5 kg (166 lb 7.2 oz)   SpO2 97%   BMI 26.07 kg/m²     Exam  Prevenas CDI  Popliteal dopplers signals bilaterally    Labs  WBC 19  Hgb stable around 13  Creatinine normal  Lactic acid: 7.3 -> 7.1  Cultures: pending    A/P)  10/20: Aortobifemoral bypass with extraction of infected occluded aortic stent graft    Wean as tolerated  Empiric ABx, FU Cx  Trend Lactic acid  Appreciate ICU team's help managing Mr. Patel    Soham Ramirez MD  [facility] (General and Vascular Surgery)  Cell: [phone] (text or call is fine, if you don't reach me please try my office)  Office: [phone]  __________________________________________________________________  Patient:Dc Patel   MRN:[id number]   CSN:[id number]    10/21/2020    8:56 PM    "

## 2020-10-23 NOTE — PROCEDURES
Bronchoscopy Op Note     Date of operation: 10/23/2020    Pre-op Diagnosis: Impending respiratory failure with white out of the left chest on x-ray and acute aspiration event    Post-op Diagnosis: same    Surgeon: Pura     Anesthesia: Procedural deep sedation with rocuronium, propofol and fentanyl.     Procedure: Flexible Bronchoscopy w bronchoalveolar lavage    Indications: Foul smelling, tenacious sputum, bandemia and infiltrate on CXR     Procedure: The patient was placed on FiO2 of 1.0 and paralyzed and sedated. Bronchoscopy was performed to second generation airway and large amounts of tenacious secretions were suctioned from the left upper and lower lobes.  Multiple rounds of lavage required to clear secretions.  The right lower and middle lobes were also suctioned of bilious fluid.  The airway appeared to be clear at the end of the procedure. There were no issues with oxygenation during the procedure.     The patient tolerated the procedure well. There were no apparent immediate complications.

## 2020-10-23 NOTE — PROGRESS NOTES
Dr. Ramirez called with updated family contact information. This RN updated the information in the patient's chart and contacted family about the patient's passing. Family wishes the patient to go to the [facility] in [place]

## 2020-10-23 NOTE — DISCHARGE PLANNING
LSW responded to MIRTA PICKARD.     Per SW notes, no NOK identified. RN contacted pt's emergency contact, Jackie and notified of code blue. RN requested  call Jackie to discuss next steps.     LSW left VM for Jackie requesting a return call

## 2020-10-23 NOTE — PROCEDURES
Procedure Note    Date: 10/23/2020  Time: 1515    Procedure: Cardiopulmonary resuscitation    Indication: PEA arrest    Procedure: I responded to an overhead page of CODE BLUE.  I arrived to find the team performing ACLS with high-quality CPR for patient who was on a ventilator and was reintubated and underwent bronchoscopy earlier in the day.  Patient was in PEA and I assumed control of the code upon arrival.  We continued high-quality ACLS management including epinephrine and CPR.  His last pH was reported to me as being 7.0 before the arrest so I gave 2 amps of sodium bicarbonate.  He also had a mildly elevated potassium level on his last chemistry for which I administered calcium chloride.  We assessed a glucose which was elevated.  He had copious gastric secretions coming out of his endotracheal tube and was difficult to bag.  I was able to pass a suction catheter through the endotracheal tube and he had bilateral symmetric breath sounds on exam.  Using a bedside cardiac ultrasound I evaluated for cardiac function and he had cardiac standstill.  He had no evidence of pericardial effusion or tamponade.  On FAST exam he had no evidence of intra-abdominal fluid and on lung exam he had lung sliding bilaterally.  We monitored his end-tidal CO2 throughout CPR which reached 15-17 during compressions but quickly went to 0 during pulse check.  He remained in PEA during each pulse check. We limited the hands off chest time to a minimum.  I was able to review patient's most recent chest x-ray and labs during the resuscitation attempt.  Patient remained unresponsive throughout with fixed and dilated pupils.  I discussed our ongoing management with his primary service attending Dr. Neves as well as pharmacy and those in the CODE BLUE team.  Unfortunately the patient was not able to regain spontaneous circulation despite our best attempts and he was declared dead at 1540.      Jeremy Gonda, MD  Critical Care Medicine

## 2020-10-23 NOTE — PROGRESS NOTES
Angel from Lab called with critical result of WBC 2 at 1541. Critical lab result read back to 1541.   Dr. Gonda notified of critical lab result at 1542.  Critical lab result read back by Dr. Gonda.

## 2020-10-23 NOTE — DIETARY
Nutrition services: Day 3 of admit.  76 yo male admitted with ischemic RLE, PVD.  Follow up for adequacy of nutrition.    Evaluation:  1. Day 3 of admit without nutrition  2. Surgery 10/20 for removal of infected, occluded aortic endograft and creation of bypass, arterial aorta to femoral.  3. Bronchoscopy and intubation today  4. Pt was extubated yesterday and re-intubated today  5. Abdominal distention continues  6. Increased nutritional needs for healing as well as advanced age. Pt remains NPO and would benefit from TPN.    Malnutrition risk: na    Recommendations/Plan: Consider TPN if unable to use GI tract

## 2020-10-23 NOTE — PROGRESS NOTES
RT called to help put pt on hi-flow o2 d/t pt saturations being 82-87% x30 minutes with no response to NRB.    Pt on hi-flow now saturation at 97%

## 2020-10-23 NOTE — CARE PLAN
Problem: Nutritional:  Goal: Achieve adequate nutritional intake  Description: Start nutrition support  Outcome: NOT MET    Day 3 admit without nutrition

## 2020-10-23 NOTE — PROGRESS NOTES
Trauma / Surgical Daily Progress Note    Date of Service  10/23/2020    Chief Complaint  75 y.o. male admitted 10/20/2020 with ischemic RLE    Interval Events  Ongoing critical issues require patient to remain in the intensive care unit    Ventilator dependent respiratory failure: Primary issue at this time.  Patient was extubated yesterday and seemed to be doing relatively well overnight however he is having increasing oxygen requirements with cough and intermittent drops in oxygen saturation during the date today.  X-ray done at the end of rounds shows near white out of the left lung.  We talked to the patient and decision was made for semielective intubation to facilitate better oxygenation and bronchoscopy.  Patient had vomiting and aspiration just prior to intubation and nasogastric tube was placed at that time evacuating over 1L from the stomach.  The airway was controlled relatively easily with an 8 oh endotracheal tube and bronchoscopy for clearing fair amount of plugging and secretions from the left lung.  The area was lavaged clear gastric contents prior to the end of the procedure.  Afterwards x-ray shows significant improvement in the in the left lung with appropriate ET tube placement.  We had some difficulty with obtaining oxygen saturations because of waveform issues however when we are able to obtain a waveform saturations in the 90s were obtained.  Over the course of the next hour, oxygen saturation remained difficult to track but were intermittently appropriate.     Later in the afternoon, respiratory therapy patient and to obtain a blood gas with a noted that there was on pulse.  CODE BLUE was called and the code team arrived.  CPR was continued for 20+ minutes but no perfusing rhythm could be obtained.  Patient was declared dead.  Family was notified.  Primary physician was notified.        Review of Systems  Review of Systems   Unable to perform ROS: Intubated      Vital Signs for last 24  "hours  Pulse:  [] 141  Resp:  [12-32] 12  BP: (110-158)/(63-83) 132/77  SpO2:  [46 %-98 %] 46 %    Hemodynamic parameters for last 24 hours    /77   Pulse (!) 141   Temp 36.2 °C (97.2 °F) (Temporal)   Resp 12   Ht 1.702 m (5' 7\")   Wt 79 kg (174 lb 2.6 oz)   SpO2 (!) 46%   BMI 27.28 kg/m²     Respiratory Data     Respiration: 12, Pulse Oximetry: (!) 46 %     Work Of Breathing / Effort: Vented  RUL Breath Sounds: Rhonchi, RML Breath Sounds: Rhonchi, RLL Breath Sounds: Diminished, BRIAN Breath Sounds: Rhonchi, LLL Breath Sounds: Diminished    Physical Exam  Physical Exam  Vitals signs and nursing note reviewed.   Constitutional:       General: He is sleeping.      Interventions: He is sedated, intubated and restrained.   HENT:      Head: Normocephalic and atraumatic.      Mouth/Throat:      Mouth: Mucous membranes are moist.      Pharynx: Oropharynx is clear.   Eyes:      Extraocular Movements: Extraocular movements intact.      Conjunctiva/sclera: Conjunctivae normal.      Pupils: Pupils are equal, round, and reactive to light.   Neck:      Musculoskeletal: Normal range of motion and neck supple.   Cardiovascular:      Rate and Rhythm: Regular rhythm. Tachycardia present.      Comments: Bilateral lower extremities without mottling or cyanosis  PT pulses dopplerable  Pulmonary:      Effort: He is intubated.   Abdominal:      General: There is distension.      Palpations: Abdomen is soft.      Tenderness: There is no abdominal tenderness.   Genitourinary:     Comments: Diamond  Musculoskeletal: Normal range of motion.         General: No deformity.      Right lower leg: Edema present.      Left lower leg: Edema present.   Skin:     General: Skin is warm and dry.      Coloration: Skin is pale.   Neurological:      General: No focal deficit present.      Mental Status: He is easily aroused. He is disoriented.   Psychiatric:         Behavior: Behavior is uncooperative.         Laboratory  Recent Results " (from the past 24 hour(s))   ACCU-CHEK GLUCOSE    Collection Time: 10/22/20  5:59 PM   Result Value Ref Range    Glucose - Accu-Ck 94 65 - 99 mg/dL   Comp Metabolic Panel    Collection Time: 10/22/20  8:40 PM   Result Value Ref Range    Sodium 137 135 - 145 mmol/L    Potassium 5.1 3.6 - 5.5 mmol/L    Chloride 102 96 - 112 mmol/L    Co2 18 (L) 20 - 33 mmol/L    Anion Gap 17.0 (H) 7.0 - 16.0    Glucose 104 (H) 65 - 99 mg/dL    Bun 55 (H) 8 - 22 mg/dL    Creatinine 3.83 (H) 0.50 - 1.40 mg/dL    Calcium 7.6 (L) 8.5 - 10.5 mg/dL    AST(SGOT) 2313 (HH) 12 - 45 U/L    ALT(SGPT) 1991 (HH) 2 - 50 U/L    Alkaline Phosphatase 526 (H) 30 - 99 U/L    Total Bilirubin 1.4 0.1 - 1.5 mg/dL    Albumin 2.9 (L) 3.2 - 4.9 g/dL    Total Protein 5.1 (L) 6.0 - 8.2 g/dL    Globulin 2.2 1.9 - 3.5 g/dL    A-G Ratio 1.3 g/dL   LACTIC ACID    Collection Time: 10/22/20  8:40 PM   Result Value Ref Range    Lactic Acid 3.8 (H) 0.5 - 2.0 mmol/L   ESTIMATED GFR    Collection Time: 10/22/20  8:40 PM   Result Value Ref Range    GFR If  19 (A) >60 mL/min/1.73 m 2    GFR If Non African American 15 (A) >60 mL/min/1.73 m 2   Comp Metabolic Panel    Collection Time: 10/23/20 12:15 AM   Result Value Ref Range    Sodium 137 135 - 145 mmol/L    Potassium 4.9 3.6 - 5.5 mmol/L    Chloride 101 96 - 112 mmol/L    Co2 18 (L) 20 - 33 mmol/L    Anion Gap 18.0 (H) 7.0 - 16.0    Glucose 98 65 - 99 mg/dL    Bun 55 (H) 8 - 22 mg/dL    Creatinine 3.80 (H) 0.50 - 1.40 mg/dL    Calcium 7.5 (L) 8.5 - 10.5 mg/dL    AST(SGOT) 1774 (HH) 12 - 45 U/L    ALT(SGPT) 1619 (HH) 2 - 50 U/L    Alkaline Phosphatase 453 (H) 30 - 99 U/L    Total Bilirubin 1.6 (H) 0.1 - 1.5 mg/dL    Albumin 3.6 3.2 - 4.9 g/dL    Total Protein 5.5 (L) 6.0 - 8.2 g/dL    Globulin 1.9 1.9 - 3.5 g/dL    A-G Ratio 1.9 g/dL   LACTIC ACID    Collection Time: 10/23/20 12:15 AM   Result Value Ref Range    Lactic Acid 2.5 (H) 0.5 - 2.0 mmol/L   ESTIMATED GFR    Collection Time: 10/23/20 12:15 AM    Result Value Ref Range    GFR If  19 (A) >60 mL/min/1.73 m 2    GFR If Non  16 (A) >60 mL/min/1.73 m 2   ACCU-CHEK GLUCOSE    Collection Time: 10/23/20 12:19 AM   Result Value Ref Range    Glucose - Accu-Ck 92 65 - 99 mg/dL   LACTIC ACID    Collection Time: 10/23/20  5:00 AM   Result Value Ref Range    Lactic Acid 2.7 (H) 0.5 - 2.0 mmol/L   CREATINE KINASE    Collection Time: 10/23/20  5:00 AM   Result Value Ref Range    CPK Total 3611 (HH) 0 - 154 U/L   CBC WITHOUT DIFFERENTIAL    Collection Time: 10/23/20  5:00 AM   Result Value Ref Range    WBC 16.9 (H) 4.8 - 10.8 K/uL    RBC 3.36 (L) 4.70 - 6.10 M/uL    Hemoglobin 10.0 (L) 14.0 - 18.0 g/dL    Hematocrit 30.9 (L) 42.0 - 52.0 %    MCV 92.0 81.4 - 97.8 fL    MCH 29.8 27.0 - 33.0 pg    MCHC 32.4 (L) 33.7 - 35.3 g/dL    RDW 60.2 (H) 35.9 - 50.0 fL    Platelet Count 194 164 - 446 K/uL    MPV 10.3 9.0 - 12.9 fL   Comp Metabolic Panel    Collection Time: 10/23/20  5:00 AM   Result Value Ref Range    Sodium 138 135 - 145 mmol/L    Potassium 5.0 3.6 - 5.5 mmol/L    Chloride 101 96 - 112 mmol/L    Co2 19 (L) 20 - 33 mmol/L    Anion Gap 18.0 (H) 7.0 - 16.0    Glucose 99 65 - 99 mg/dL    Bun 64 (H) 8 - 22 mg/dL    Creatinine 4.18 (H) 0.50 - 1.40 mg/dL    Calcium 7.5 (L) 8.5 - 10.5 mg/dL    AST(SGOT) 1480 (H) 12 - 45 U/L    ALT(SGPT) 1417 (H) 2 - 50 U/L    Alkaline Phosphatase 459 (H) 30 - 99 U/L    Total Bilirubin 1.7 (H) 0.1 - 1.5 mg/dL    Albumin 3.5 3.2 - 4.9 g/dL    Total Protein 5.4 (L) 6.0 - 8.2 g/dL    Globulin 1.9 1.9 - 3.5 g/dL    A-G Ratio 1.8 g/dL   ESTIMATED GFR    Collection Time: 10/23/20  5:00 AM   Result Value Ref Range    GFR If  17 (A) >60 mL/min/1.73 m 2    GFR If Non  14 (A) >60 mL/min/1.73 m 2   LACTIC ACID    Collection Time: 10/23/20  8:40 AM   Result Value Ref Range    Lactic Acid 2.1 (H) 0.5 - 2.0 mmol/L   ISTAT ARTERIAL BLOOD GAS    Collection Time: 10/23/20  9:41 AM   Result Value  Ref Range    Ph 7.308 (L) 7.400 - 7.500    Pco2 35.3 26.0 - 37.0 mmHg    Po2 66 64 - 87 mmHg    Tco2 19 (L) 20 - 33 mmol/L    S02 91 (L) 93 - 99 %    Hco3 17.6 17.0 - 25.0 mmol/L    BE -8 (L) -4 - 3 mmol/L    Body Temp 97.0 F degrees    O2 Therapy 70 %    iPF Ratio 94     Ph Temp Cesar 7.320 (L) 7.400 - 7.500    Pco2 Temp Co 33.9 26.0 - 37.0 mmHg    Po2 Temp Cor 63 (L) 64 - 87 mmHg    Specimen Arterial     Action Range Triggered NO     Inst. Qualified Patient YES    ACCU-CHEK GLUCOSE    Collection Time: 10/23/20 12:20 PM   Result Value Ref Range    Glucose - Accu-Ck 107 (H) 65 - 99 mg/dL   CBC WITHOUT DIFFERENTIAL    Collection Time: 10/23/20  1:47 PM   Result Value Ref Range    WBC 1.5 (LL) 4.8 - 10.8 K/uL    RBC 4.01 (L) 4.70 - 6.10 M/uL    Hemoglobin 12.2 (L) 14.0 - 18.0 g/dL    Hematocrit 37.2 (L) 42.0 - 52.0 %    MCV 93.8 81.4 - 97.8 fL    MCH 29.9 27.0 - 33.0 pg    MCHC 31.9 (L) 33.7 - 35.3 g/dL    RDW 62.9 (H) 35.9 - 50.0 fL    Platelet Count 211 164 - 446 K/uL    MPV 9.7 9.0 - 12.9 fL   Comp Metabolic Panel    Collection Time: 10/23/20  1:47 PM   Result Value Ref Range    Sodium 138 135 - 145 mmol/L    Potassium 5.3 3.6 - 5.5 mmol/L    Chloride 101 96 - 112 mmol/L    Co2 17 (L) 20 - 33 mmol/L    Anion Gap 20.0 (H) 7.0 - 16.0    Glucose 100 (H) 65 - 99 mg/dL    Bun 71 (HH) 8 - 22 mg/dL    Creatinine 4.73 (H) 0.50 - 1.40 mg/dL    Calcium 7.8 (L) 8.5 - 10.5 mg/dL    AST(SGOT) 1172 (H) 12 - 45 U/L    ALT(SGPT) 1080 (H) 2 - 50 U/L    Alkaline Phosphatase 522 (H) 30 - 99 U/L    Total Bilirubin 1.5 0.1 - 1.5 mg/dL    Albumin 3.3 3.2 - 4.9 g/dL    Total Protein 5.8 (L) 6.0 - 8.2 g/dL    Globulin 2.5 1.9 - 3.5 g/dL    A-G Ratio 1.3 g/dL   LACTIC ACID    Collection Time: 10/23/20  1:47 PM   Result Value Ref Range    Lactic Acid 2.7 (H) 0.5 - 2.0 mmol/L   Triglyceride    Collection Time: 10/23/20  1:47 PM   Result Value Ref Range    Triglycerides 236 (H) 0 - 149 mg/dL   ESTIMATED GFR    Collection Time: 10/23/20  1:47  PM   Result Value Ref Range    GFR If  15 (A) >60 mL/min/1.73 m 2    GFR If Non  12 (A) >60 mL/min/1.73 m 2   ISTAT ARTERIAL BLOOD GAS    Collection Time: 10/23/20  2:14 PM   Result Value Ref Range    Ph 7.032 (LL) 7.400 - 7.500    Pco2 67.0 (HH) 26.0 - 37.0 mmHg    Po2 92 (H) 64 - 87 mmHg    Tco2 20 20 - 33 mmol/L    S02 91 (L) 93 - 99 %    Hco3 17.8 17.0 - 25.0 mmol/L    BE -14 (L) -4 - 3 mmol/L    Body Temp 97.9 F degrees    O2 Therapy 100 %    iPF Ratio 92     Ph Temp Cesar 7.037 (LL) 7.400 - 7.500    Pco2 Temp Co 65.9 (HH) 26.0 - 37.0 mmHg    Po2 Temp Cor 89 (H) 64 - 87 mmHg    Specimen Arterial     Action Range Triggered YES     Inst. Qualified Patient YES    CBC WITHOUT DIFFERENTIAL    Collection Time: 10/23/20  3:05 PM   Result Value Ref Range    WBC 2.0 (LL) 4.8 - 10.8 K/uL    RBC 4.22 (L) 4.70 - 6.10 M/uL    Hemoglobin 12.7 (L) 14.0 - 18.0 g/dL    Hematocrit 40.7 (L) 42.0 - 52.0 %    MCV 96.4 81.4 - 97.8 fL    MCH 30.1 27.0 - 33.0 pg    MCHC 31.2 (L) 33.7 - 35.3 g/dL    RDW 65.2 (H) 35.9 - 50.0 fL    Platelet Count 209 164 - 446 K/uL    MPV 10.6 9.0 - 12.9 fL       Fluids    Intake/Output Summary (Last 24 hours) at 10/23/2020 1608  Last data filed at 10/23/2020 1000  Gross per 24 hour   Intake 3670 ml   Output 137 ml   Net 3533 ml       Core Measures & Quality Metrics  Labs reviewed, Medications reviewed, Radiology images reviewed and EKG reviewed  Diamond catheter: Critically Ill - Requiring Accurate Measurement of Urinary Output  Central line in place: Concentrated IV drugs, Need for access, Sepsis and Shock    DVT Prophylaxis: Heparin  DVT prophylaxis - mechanical: SCDs  Ulcer prophylaxis: Yes  Antibiotics: Treating active infection/contamination beyond 24 hours perioperative coverage  Assessed for rehab: Patient unable to tolerate rehabilitation therapeutic regimen    SAMANTHA Score  ETOH Screening    Assessment/Plan  Shock circulatory (HCC)  Assessment & Plan  Received from OR  on vasopressors.  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  New graft infection status post removal  Cultures pending  10/21 Augmentin ordered, changed to Zosyn and vancomycin  10/22 antibiotic day 2: Vancomycin changed to Zyvox because of renal function  [facility] infectious disease consulted    Respiratory failure (HCC)  Assessment & Plan  Remained intubated post op  10/22 awake with good parameters  Extubated after rounds  10/23 reintubated because of worsening hypoxemia, white out of left chest on x-ray  Bronchoscopy and lavage to clear left lung  Aggressive pulmonary hygiene    Critical lower limb ischemia  Assessment & Plan  Bilateral lower extremity ischemia  CTA with occlusion of left superficial femoral artery and popliteal artery which reconstitutes at the trifurcation with single vessel runoff at the ankle via the posterior tibial artery.   Occlusion of the distal right superficial femoral artery extending through the popliteal artery with reconstitution of the tibioperoneal trunk. There is 0 vessel runoff at the ankle. Very poor flow in both lower extremities  Heparin initiated in ED.  10/20 OR removal of infected aortobifemoral bypass graft  Revascularization with new aortobifemoral graft tunneled through alternate site  Heparin stopped after surgery  Only popliteal signals postop: Trend  Cold, ischemic appearing feet: Warming measures  10/22 significantly improved distal perfusion on morning exam  Dopplerable signals down to feet  Soham Ramirez MD. Vascular Surgery.    Diabetes mellitus (HCC)  Assessment & Plan  Premorbid by medical record  Regular insulin sliding  scale    Antiplatelet or antithrombotic long-term use- (present on admission)  Assessment & Plan  On Plavix at home.  Holding maintenance medication during acute illness.   Resume per Vascular Surgery recommendation     Screening examination for infectious disease- (present on admission)  Assessment & Plan  Admission SARS-CoV-2 testing negative. LOW RISK patient. Repeat SARS-CoV-2 testing not indicated. Isolation precautions de-escalated.    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 Vascular Surgery    Chronic kidney disease (CKD), stage III (moderate)- (present on admission)  Assessment & Plan  Premorbid with acute oliguria  IV fluid resuscitation  Trend indices  Avoid nephrotoxins        Discussed patient condition with RN, RT, Pharmacy, Dietary, , Patient, vascular surgery and Critical care medicine.  CRITICAL CARE TIME EXCLUDING PROCEDURES: 42    minutes

## 2020-10-23 NOTE — CODE DOCUMENTATION
Dr. Gonda spoke to Dr. Neves.  Reviewed Code events.    Cardiac standstill on ECHO.    Code ended per Dr. Gonda.

## 2020-10-23 NOTE — PROGRESS NOTES
Late entry:    1515: pt hypotensive 70s/40s. Dr. Neves paged    1520: RT at bedside for blood gas draw. RT and this RN unable to palpate or doppler a pulse. Code called    1540: Time of death called by Dr. Gonda.

## 2020-10-23 NOTE — CARE PLAN
Pt on q2hr turn schedule, skin checked, linens changed PRN    Pt pain assessed using appropriate pain scale, 1-10 and treated per MAR. Non-pharm interventions in place such as re-positioning and temperature therapy and mobilization    Pt updated on POC and reasoning behind therapies and interventions. Pt understands and encouraged to ask questions

## 2020-10-23 NOTE — RESPIRATORY CARE
Entered pt room to get a follow up abg post previous vent changes. When palpating for a radial pulse, I couldn't feel one, checked brachial pulses w/ no success. Finally, checked carotid pulse, still didn't feel a pulse. RN was at bedside and grabbed the doppler, no pulse was found w/ doppler. Code Blue button was hit, I immediately disconnected the patient from the vent and began ventilating via ambu bag. RN began CPR immediately and the rest of the code team arrived. 2 RT's arrived bringing end tidal monitor and suction. End-tidal CO2 was attached reading 11-17 for duration of code.

## 2020-10-23 NOTE — CARE PLAN
Problem: Pain Management  Goal: Pain level will decrease to patient's comfort goal  Outcome: PROGRESSING SLOWER THAN EXPECTED  Note: Pain assessed regularly, medicated per MAR, patient repositioned for pain alleviation     Problem: Respiratory:  Goal: Respiratory status will improve  Outcome: PROGRESSING SLOWER THAN EXPECTED  Note: Patient positioned for optimal oxygenation and ventilation, suctioning PRN, collaboration with RT and MD

## 2020-10-23 NOTE — PROCEDURES
Intubation Procedure Note     Date of operation: 10/23/2020     Indication: impending respiratory failure     Operation performed: Rapid sequence endotracheal intubation.     Surgeon: Ronald Neves DO. Trauma Surgery.     Sedation with propofol followed by paralysis induction with rocuronium.     Procedure: Following informed consent, the patient was properly identified and optimally positioned in bed. Preoxygenation was provided via bag valve mask at 100% FiO2. The airway was then visualized with glide laryngoscope. The 8.0 endotracheal tube was then passed through the vocal cords and into the airway and was secured at 24 centimeters at the lip. Breath sounds were auscultated bilaterally with no sounds audible over the epigastrium. There was appropriate color change on the EZCAP. Stat portable chest x-ray was then ordered.     _______________________________  Ronald Neves DO     DD:10/23/2020 1:20 PM

## 2020-10-23 NOTE — DISCHARGE SUMMARY
Discharge Summary    CHIEF COMPLAINT ON ADMISSION  Chief Complaint   Patient presents with   • Leg Pain       Reason for Admission  ischemic RLE     Admission Date  10/20/2020    CODE STATUS  Full Code    HPI & HOSPITAL COURSE  This is a 75 y.o. male here with aortobifemoral graft infection with occlusion.  Patient was taken to the operating room by vascular surgery where he underwent debridement and removal of his infected graft with replacement using a bifurcated prosthesis.  After surgery, patient was taken to the surgical intensive care unit intubated and on the ventilator.  He was resuscitated over the next day and a half with slow clearance of his acidosis but also slow improvement of perfusion of the bilateral lower extremities.  On hospital day 3, patient was awake and following commands and his labs were significantly improved.  He was extubated and seem to be doing well at that time.  Over the course of the next 12 hours, patient had some deterioration and we had increasing oxygen requirements.  X-ray showed a significant white out of the left chest presumably due to mucous plugging.  Patient was intubated to protect his airway and facilitate bronchoscopy.  There is an aspiration event just prior to intubation.  When bronchoscopy successfully cleared much of the left lung with significantly improved aeration, however patient remains somewhat labile from that perspective had experienced a PEA arrest approximately 2 hours later.  Code team directed resuscitation efforts at that point and patient was declared dead after just over 20 minutes.  Family, primary surgeon and the 's office were notified.         The patient met 2-midnight criteria for an inpatient stay at the time of discharge.    Discharge Date  10/23/2020    FOLLOW UP ITEMS POST DISCHARGE  Not applicable    DISCHARGE DIAGNOSES  Active Problems:    Critical lower limb ischemia POA: Unknown    Respiratory failure (HCC) POA: Unknown     Leukocytosis POA: Unknown    Elevated transaminase level POA: Unknown      Overview: AST 3523, ALT 2603,       Presumably shock liver      Trend          Shock circulatory (HCC) POA: Unknown      Overview: Received from OR on vasopressors.  Very low CVP      Ongoing fluid resuscitation      Acidosis correcting slowly      Serial labs          Chronic kidney disease (CKD), stage III (moderate) POA: Yes    Occlusion of aorta (HCC) POA: Yes    Screening examination for infectious disease POA: Yes    Antiplatelet or antithrombotic long-term use POA: Yes    Diabetes mellitus (HCC) POA: Unknown  Resolved Problems:    * No resolved hospital problems. *      FOLLOW UP  No future appointments.  No follow-up provider specified.    MEDICATIONS ON DISCHARGE     Medication List      ASK your doctor about these medications      Instructions   acetaminophen 325 MG Tabs  Commonly known as: TYLENOL   Take 2 Tabs by mouth every 6 hours as needed (Mild Pain; (Pain scale 1-3); Temp greater than 100.5 F).  Dose: 650 mg     amoxicillin-clavulanate 875-125 MG Tabs  Commonly known as: AUGMENTIN   Take 1 Tab by mouth 2 times a day. Long term antibiotic  Dose: 1 Tab     ascorbic acid 500 MG tablet  Commonly known as: VITAMIN C   Take 1 Tab by mouth 3 times a day, with meals.  Dose: 500 mg     aspirin 81 MG Chew chewable tablet  Commonly known as: ASA   Take 1 Tab by mouth every day.  Dose: 81 mg     atorvastatin 40 MG Tabs  Commonly known as: LIPITOR   Take 1 Tab by mouth every evening.  Dose: 40 mg     brimonidine 0.2 % Soln  Commonly known as: ALPHAGAN   Place 1 Drop in right eye 2 Times a Day.  Dose: 1 Drop     ceFAZolin in dextrose 2-4 GM/100ML-% IVPB  Commonly known as: ANCEF   100 mL by Intravenous route every 8 hours.  Dose: 2 g     Centrum Silver Tabs   Take 1 Tab by mouth every day.  Dose: 1 Tab     clopidogrel 75 MG Tabs  Commonly known as: PLAVIX   Take 1 Tab by mouth every day.  Dose: 75 mg     enoxaparin 40 MG/0.4ML Soln  inj  Commonly known as: LOVENOX   Inject 40 mg as instructed every day.  Dose: 40 mg     ferrous gluconate 324 (38 Fe) MG Tabs  Commonly known as: FERGON   Take 1 Tab by mouth 3 times a day, with meals.  Dose: 324 mg     folic acid 1 MG Tabs  Commonly known as: FOLVITE   Take 1 Tab by mouth every day.  Dose: 1 mg     hydrALAZINE 20 MG/ML Soln  Commonly known as: APRESOLINE   0.5 mL by Intravenous route every four hours as needed (Hypertension, if clonidine ineffective or not ordered.).  Dose: 10 mg     insulin regular 100 Unit/mL Soln  Commonly known as: HumuLIN R   Inject 1-6 Units as instructed 4 Times a Day,Before Meals and at Bedtime.  Dose: 1-6 Units     labetalol 5 MG/ML Soln  Commonly known as: NORMODYNE/TRANDATE   2 mL by Intravenous route every four hours as needed (Hypertension, if clonidine not ordered or ineffective).  Dose: 10 mg     polyethylene glycol/lytes 17 g Pack  Commonly known as: MIRALAX   Take 1 Packet by mouth 2 times a day.  Dose: 17 g     riFAMPin 300 MG Caps  Commonly known as: RIFADINE   Take 1 Cap by mouth 3 times a day.  Dose: 300 mg     senna-docusate 8.6-50 MG Tabs  Commonly known as: PERICOLACE or SENOKOT S   Take 2 Tabs by mouth 2 Times a Day.  Dose: 2 Tab     timolol 0.5 % Soln  Commonly known as: TIMOPTIC   Place 1 Drop in right eye 2 Times a Day.  Dose: 1 Drop            Allergies  No Known Allergies    DIET  Orders Placed This Encounter   Procedures   • Diet NPO     Standing Status:   Standing     Number of Occurrences:   1     Order Specific Question:   Restrict to:     Answer:   Sips with Medications [3]       ACTIVITY  Not applicable    CONSULTATIONS  Vascular surgery    PROCEDURES  Removal of infected aortobifemoral bypass graft with revascularization using a tunneled prosthesis  Central line and arterial line placement  Endotracheal intubation  Flexible bronchoscopy    LABORATORY  Lab Results   Component Value Date    SODIUM 138 10/23/2020    POTASSIUM 5.3 10/23/2020     CHLORIDE 101 10/23/2020    CO2 17 (L) 10/23/2020    GLUCOSE 100 (H) 10/23/2020    BUN 71 (HH) 10/23/2020    CREATININE 4.73 (H) 10/23/2020        Lab Results   Component Value Date    WBC 2.0 (LL) 10/23/2020    HEMOGLOBIN 12.7 (L) 10/23/2020    HEMATOCRIT 40.7 (L) 10/23/2020    PLATELETCT 209 10/23/2020        Total time of the discharge process exceeds 30 minutes.

## 2020-10-23 NOTE — PROGRESS NOTES
Trauma / Surgical Daily Progress Note    Date of Service  10/22/2020    Chief Complaint  75 y.o. male admitted 10/20/2020 with ischemic RLE    Interval Events  Ongoing critical concerns require that the patient remains in the intensive care unit    Ventilator dependent respiratory failure: Patient has been tenuous in the last 24 hours but made significant improvement in the morning.  After SBT was borderline in the a.m., exercise were repeated in the afternoon with improved parameters the patient was extubated.  Aggressive pulmonary hygiene ordered.    Shock liver with bacteremia: Transaminases elevated but relatively stable.  Uncertain hepatic dysfunction but this may be contributed to elevated lactate: Continue to fluid resuscitate and trend labs.    Of vascular graft infection: Infectious disease consulted and following.  Work-up ongoing.  Presently on Zosyn and Zyvox.  Vancomycin was discontinued because of ongoing renal dysfunction.  Cultures pending.    Acute on chronic renal insufficiency with a oligoria: Multifactorial including crossclamped, hypovolemic state, some elements of sepsis.  CVP remains low and patient did seem to respond to albumin resuscitation yesterday.  Because abdomen remains relatively tight we will try to limit fluid administration.  Albumin again ordered to run as a steady drip overnight.    N.p.o. except sips with medications    Heparin for DVT prophylaxis      Review of Systems  Review of Systems   Respiratory: Negative.    Cardiovascular: Positive for leg swelling. Negative for chest pain and palpitations.   Gastrointestinal: Positive for abdominal distention, abdominal pain, constipation and nausea. Negative for vomiting.   Musculoskeletal: Positive for myalgias. Negative for arthralgias and back pain.   Neurological: Positive for dizziness and weakness. Negative for numbness and headaches.        Vital Signs for last 24 hours  Pulse:  [] 108  Resp:  [20-50] 28  BP:  ()/(65-84) 110/71  SpO2:  [91 %-100 %] 91 %    Hemodynamic parameters for last 24 hours  CVP:  [6 MM HG-17 MM HG] 8 MM HG    Respiratory Data     Respiration: (!) 28, Pulse Oximetry: 91 %     Work Of Breathing / Effort: Vented  RUL Breath Sounds: Clear, RML Breath Sounds: Clear, RLL Breath Sounds: Diminished, BRIAN Breath Sounds: Clear, LLL Breath Sounds: Diminished    Physical Exam  Physical Exam  Constitutional:       Interventions: He is restrained. Nasal cannula in place.   HENT:      Mouth/Throat:      Mouth: Mucous membranes are moist.      Pharynx: Oropharynx is clear.   Eyes:      Extraocular Movements: Extraocular movements intact.      Conjunctiva/sclera: Conjunctivae normal.      Pupils: Pupils are equal, round, and reactive to light.   Neck:      Musculoskeletal: Neck supple.   Cardiovascular:      Rate and Rhythm: Regular rhythm. Tachycardia present.      Comments: Palpable upper extremity pulses  Probable femoral pulses  Dopplerable pulses bilateral feet  Mottling and cyanosis resolved  Pulmonary:      Effort: No respiratory distress.      Breath sounds: Examination of the right-lower field reveals decreased breath sounds. Examination of the left-lower field reveals decreased breath sounds. Decreased breath sounds present. No wheezing.   Abdominal:      General: There is distension.      Palpations: Abdomen is soft.      Tenderness: There is abdominal tenderness.      Comments: Dressing CDI   Genitourinary:     Comments: Diamond  Skin:     General: Skin is warm and dry.      Coloration: Skin is not pale.   Neurological:      General: No focal deficit present.      Mental Status: He is alert. He is disoriented.         Laboratory  Recent Results (from the past 24 hour(s))   LACTIC ACID    Collection Time: 10/21/20  8:06 PM   Result Value Ref Range    Lactic Acid 7.1 (HH) 0.5 - 2.0 mmol/L   CBC WITH DIFFERENTIAL    Collection Time: 10/21/20  8:06 PM   Result Value Ref Range    WBC 19.1 (H) 4.8 - 10.8  K/uL    RBC 4.46 (L) 4.70 - 6.10 M/uL    Hemoglobin 13.5 (L) 14.0 - 18.0 g/dL    Hematocrit 41.6 (L) 42.0 - 52.0 %    MCV 93.3 81.4 - 97.8 fL    MCH 30.3 27.0 - 33.0 pg    MCHC 32.5 (L) 33.7 - 35.3 g/dL    RDW 63.0 (H) 35.9 - 50.0 fL    Platelet Count 249 164 - 446 K/uL    MPV 10.3 9.0 - 12.9 fL    Neutrophils-Polys 87.40 (H) 44.00 - 72.00 %    Lymphocytes 4.00 (L) 22.00 - 41.00 %    Monocytes 7.50 0.00 - 13.40 %    Eosinophils 0.40 0.00 - 6.90 %    Basophils 0.20 0.00 - 1.80 %    Immature Granulocytes 0.50 0.00 - 0.90 %    Nucleated RBC 0.00 /100 WBC    Neutrophils (Absolute) 16.73 (H) 1.82 - 7.42 K/uL    Lymphs (Absolute) 0.76 (L) 1.00 - 4.80 K/uL    Monos (Absolute) 1.43 (H) 0.00 - 0.85 K/uL    Eos (Absolute) 0.08 0.00 - 0.51 K/uL    Baso (Absolute) 0.03 0.00 - 0.12 K/uL    Immature Granulocytes (abs) 0.09 0.00 - 0.11 K/uL    NRBC (Absolute) 0.00 K/uL   Basic Metabolic Panel    Collection Time: 10/21/20  8:06 PM   Result Value Ref Range    Sodium 137 135 - 145 mmol/L    Potassium 4.9 3.6 - 5.5 mmol/L    Chloride 101 96 - 112 mmol/L    Co2 16 (L) 20 - 33 mmol/L    Glucose 168 (H) 65 - 99 mg/dL    Bun 33 (H) 8 - 22 mg/dL    Creatinine 2.04 (H) 0.50 - 1.40 mg/dL    Calcium 8.4 (L) 8.5 - 10.5 mg/dL    Anion Gap 20.0 (H) 7.0 - 16.0   CREATINE KINASE    Collection Time: 10/21/20  8:06 PM   Result Value Ref Range    CPK Total 1974 (HH) 0 - 154 U/L   ESTIMATED GFR    Collection Time: 10/21/20  8:06 PM   Result Value Ref Range    GFR If  38 (A) >60 mL/min/1.73 m 2    GFR If Non  32 (A) >60 mL/min/1.73 m 2   ISTAT ARTERIAL BLOOD GAS    Collection Time: 10/21/20  9:20 PM   Result Value Ref Range    Ph 7.351 (L) 7.400 - 7.500    Pco2 34.0 26.0 - 37.0 mmHg    Po2 74 64 - 87 mmHg    Tco2 20 20 - 33 mmol/L    S02 94 93 - 99 %    Hco3 18.8 17.0 - 25.0 mmol/L    BE -6 (L) -4 - 3 mmol/L    Body Temp 98.1 F degrees    O2 Therapy 40 %    iPF Ratio 185     Ph Temp Cesar 7.355 (L) 7.400 - 7.500    Pco2  Temp Co 33.6 26.0 - 37.0 mmHg    Po2 Temp Cor 72 64 - 87 mmHg    Specimen Arterial     Action Range Triggered NO     Inst. Qualified Patient YES    ACCU-CHEK GLUCOSE    Collection Time: 10/22/20 12:30 AM   Result Value Ref Range    Glucose - Accu-Ck 139 (H) 65 - 99 mg/dL   LACTIC ACID    Collection Time: 10/22/20 12:31 AM   Result Value Ref Range    Lactic Acid 4.8 (HH) 0.5 - 2.0 mmol/L   CBC WITHOUT DIFFERENTIAL    Collection Time: 10/22/20  4:36 AM   Result Value Ref Range    WBC 18.5 (H) 4.8 - 10.8 K/uL    RBC 3.93 (L) 4.70 - 6.10 M/uL    Hemoglobin 11.6 (L) 14.0 - 18.0 g/dL    Hematocrit 36.5 (L) 42.0 - 52.0 %    MCV 92.9 81.4 - 97.8 fL    MCH 29.5 27.0 - 33.0 pg    MCHC 31.8 (L) 33.7 - 35.3 g/dL    RDW 61.9 (H) 35.9 - 50.0 fL    Platelet Count 215 164 - 446 K/uL    MPV 10.2 9.0 - 12.9 fL   Comp Metabolic Panel    Collection Time: 10/22/20  4:36 AM   Result Value Ref Range    Sodium 137 135 - 145 mmol/L    Potassium 5.1 3.6 - 5.5 mmol/L    Chloride 101 96 - 112 mmol/L    Co2 18 (L) 20 - 33 mmol/L    Anion Gap 18.0 (H) 7.0 - 16.0    Glucose 121 (H) 65 - 99 mg/dL    Bun 42 (H) 8 - 22 mg/dL    Creatinine 2.48 (H) 0.50 - 1.40 mg/dL    Calcium 8.1 (L) 8.5 - 10.5 mg/dL    AST(SGOT) 3523 (HH) 12 - 45 U/L    ALT(SGPT) 2603 (HH) 2 - 50 U/L    Alkaline Phosphatase 554 (H) 30 - 99 U/L    Total Bilirubin 1.1 0.1 - 1.5 mg/dL    Albumin 3.0 (L) 3.2 - 4.9 g/dL    Total Protein 5.3 (L) 6.0 - 8.2 g/dL    Globulin 2.3 1.9 - 3.5 g/dL    A-G Ratio 1.3 g/dL   CRP Quantitive (Non-Cardiac)    Collection Time: 10/22/20  4:36 AM   Result Value Ref Range    Stat C-Reactive Protein 23.29 (H) 0.00 - 0.75 mg/dL   Prealbumin    Collection Time: 10/22/20  4:36 AM   Result Value Ref Range    Pre-Albumin 9.8 (L) 18.0 - 38.0 mg/dL   LACTIC ACID    Collection Time: 10/22/20  4:36 AM   Result Value Ref Range    Lactic Acid 5.4 (HH) 0.5 - 2.0 mmol/L   CREATINE KINASE    Collection Time: 10/22/20  4:36 AM   Result Value Ref Range    CPK Total 2808  (HH) 0 - 154 U/L   ESTIMATED GFR    Collection Time: 10/22/20  4:36 AM   Result Value Ref Range    GFR If  31 (A) >60 mL/min/1.73 m 2    GFR If Non African American 25 (A) >60 mL/min/1.73 m 2   ISTAT ARTERIAL BLOOD GAS    Collection Time: 10/22/20  8:06 AM   Result Value Ref Range    Ph 7.363 (L) 7.400 - 7.500    Pco2 33.0 26.0 - 37.0 mmHg    Po2 66 64 - 87 mmHg    Tco2 20 20 - 33 mmol/L    S02 92 (L) 93 - 99 %    Hco3 18.8 17.0 - 25.0 mmol/L    BE -6 (L) -4 - 3 mmol/L    Body Temp 98.8 F degrees    O2 Therapy 40 %    iPF Ratio 165     Ph Temp Cesar 7.362 (L) 7.400 - 7.500    Pco2 Temp Co 33.2 26.0 - 37.0 mmHg    Po2 Temp Cor 66 64 - 87 mmHg    Specimen Arterial     Action Range Triggered NO     Inst. Qualified Patient YES    LACTIC ACID    Collection Time: 10/22/20  9:50 AM   Result Value Ref Range    Lactic Acid 3.9 (H) 0.5 - 2.0 mmol/L   Comp Metabolic Panel    Collection Time: 10/22/20  9:50 AM   Result Value Ref Range    Sodium 136 135 - 145 mmol/L    Potassium 5.0 3.6 - 5.5 mmol/L    Chloride 103 96 - 112 mmol/L    Co2 19 (L) 20 - 33 mmol/L    Anion Gap 14.0 7.0 - 16.0    Glucose 100 (H) 65 - 99 mg/dL    Bun 45 (H) 8 - 22 mg/dL    Creatinine 2.69 (H) 0.50 - 1.40 mg/dL    Calcium 7.4 (L) 8.5 - 10.5 mg/dL    AST(SGOT) 3157 (HH) 12 - 45 U/L    ALT(SGPT) 2169 (HH) 2 - 50 U/L    Alkaline Phosphatase 518 (H) 30 - 99 U/L    Total Bilirubin 1.2 0.1 - 1.5 mg/dL    Albumin 2.9 (L) 3.2 - 4.9 g/dL    Total Protein 4.7 (L) 6.0 - 8.2 g/dL    Globulin 1.8 (L) 1.9 - 3.5 g/dL    A-G Ratio 1.6 g/dL   CBC WITHOUT DIFFERENTIAL    Collection Time: 10/22/20  9:50 AM   Result Value Ref Range    WBC 14.7 (H) 4.8 - 10.8 K/uL    RBC 3.22 (L) 4.70 - 6.10 M/uL    Hemoglobin 9.8 (L) 14.0 - 18.0 g/dL    Hematocrit 29.7 (L) 42.0 - 52.0 %    MCV 92.2 81.4 - 97.8 fL    MCH 30.4 27.0 - 33.0 pg    MCHC 33.0 (L) 33.7 - 35.3 g/dL    RDW 60.4 (H) 35.9 - 50.0 fL    Platelet Count 186 164 - 446 K/uL    MPV 10.6 9.0 - 12.9 fL    ESTIMATED GFR    Collection Time: 10/22/20  9:50 AM   Result Value Ref Range    GFR If  28 (A) >60 mL/min/1.73 m 2    GFR If Non African American 23 (A) >60 mL/min/1.73 m 2   ACCU-CHEK GLUCOSE    Collection Time: 10/22/20 12:09 PM   Result Value Ref Range    Glucose - Accu-Ck 114 (H) 65 - 99 mg/dL   LACTIC ACID    Collection Time: 10/22/20  1:21 PM   Result Value Ref Range    Lactic Acid 4.5 (HH) 0.5 - 2.0 mmol/L   LACTIC ACID    Collection Time: 10/22/20  3:55 PM   Result Value Ref Range    Lactic Acid 5.0 (HH) 0.5 - 2.0 mmol/L   CBC WITHOUT DIFFERENTIAL    Collection Time: 10/22/20  3:55 PM   Result Value Ref Range    WBC 18.3 (H) 4.8 - 10.8 K/uL    RBC 3.63 (L) 4.70 - 6.10 M/uL    Hemoglobin 11.0 (L) 14.0 - 18.0 g/dL    Hematocrit 33.6 (L) 42.0 - 52.0 %    MCV 92.6 81.4 - 97.8 fL    MCH 30.3 27.0 - 33.0 pg    MCHC 32.7 (L) 33.7 - 35.3 g/dL    RDW 61.6 (H) 35.9 - 50.0 fL    Platelet Count 203 164 - 446 K/uL    MPV 10.1 9.0 - 12.9 fL   Comp Metabolic Panel    Collection Time: 10/22/20  3:55 PM   Result Value Ref Range    Sodium 136 135 - 145 mmol/L    Potassium 4.9 3.6 - 5.5 mmol/L    Chloride 100 96 - 112 mmol/L    Co2 16 (L) 20 - 33 mmol/L    Anion Gap 20.0 (H) 7.0 - 16.0    Glucose 101 (H) 65 - 99 mg/dL    Bun 54 (H) 8 - 22 mg/dL    Creatinine 3.34 (H) 0.50 - 1.40 mg/dL    Calcium 8.2 (L) 8.5 - 10.5 mg/dL    AST(SGOT) 3162 (HH) 12 - 45 U/L    ALT(SGPT) 2182 (HH) 2 - 50 U/L    Alkaline Phosphatase 556 (H) 30 - 99 U/L    Total Bilirubin 1.5 0.1 - 1.5 mg/dL    Albumin 3.1 (L) 3.2 - 4.9 g/dL    Total Protein 5.2 (L) 6.0 - 8.2 g/dL    Globulin 2.1 1.9 - 3.5 g/dL    A-G Ratio 1.5 g/dL   ESTIMATED GFR    Collection Time: 10/22/20  3:55 PM   Result Value Ref Range    GFR If  22 (A) >60 mL/min/1.73 m 2    GFR If Non  18 (A) >60 mL/min/1.73 m 2       Fluids    Intake/Output Summary (Last 24 hours) at 10/22/2020 1801  Last data filed at 10/22/2020 1600  Gross per 24 hour    Intake 8534.88 ml   Output 1085 ml   Net 7449.88 ml       Core Measures & Quality Metrics  Labs reviewed, Medications reviewed, Radiology images reviewed and EKG reviewed  Diamond catheter: Critically Ill - Requiring Accurate Measurement of Urinary Output  Central line in place: Concentrated IV drugs, Need for access, Sepsis and Shock    DVT Prophylaxis: Heparin  DVT prophylaxis - mechanical: SCDs  Ulcer prophylaxis: Yes  Antibiotics: Treating active infection/contamination beyond 24 hours perioperative coverage  Assessed for rehab: Patient unable to tolerate rehabilitation therapeutic regimen    SAMANTHA Score  ETOH Screening    Assessment/Plan  Shock circulatory (HCC)  Assessment & Plan  Received from OR on vasopressors.  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  New graft infection status post removal  Cultures pending  10/21 Augmentin ordered, changed to Zosyn and vancomycin  10/22 antibiotic day 2: Vancomycin changed to Zyvox because of renal function  [facility] infectious disease consulted    Respiratory failure (HCC)  Assessment & Plan  Remained intubated post op  10/22 awake with good parameters  Extubated after rounds  Aggressive pulmonary hygiene    Critical lower limb ischemia  Assessment & Plan  Bilateral lower extremity ischemia  CTA with occlusion of left superficial femoral artery and popliteal artery which reconstitutes at the trifurcation with single vessel runoff at the ankle via the posterior tibial artery.   Occlusion of the distal right superficial femoral artery extending through the popliteal artery with reconstitution of the tibioperoneal trunk. There is 0 vessel runoff  at the ankle. Very poor flow in both lower extremities  Heparin initiated in ED.  10/20 OR removal of infected aortobifemoral bypass graft  Revascularization with new aortobifemoral graft tunneled through alternate site  Heparin stopped after surgery  Only popliteal signals postop: Trend  Cold, ischemic appearing feet: Warming measures  10/22 significantly improved distal perfusion on morning exam  Dopplerable signals down to feet  Soham Ramirez MD. Vascular Surgery.    Diabetes mellitus (HCC)  Assessment & Plan  Premorbid by medical record  Regular insulin sliding scale    Antiplatelet or antithrombotic long-term use- (present on admission)  Assessment & Plan  On Plavix at home.  Holding maintenance medication during acute illness.   Resume per Vascular Surgery recommendation     Screening examination for infectious disease- (present on admission)  Assessment & Plan  Admission SARS-CoV-2 testing negative. LOW RISK patient. Repeat SARS-CoV-2 testing not indicated. Isolation precautions de-escalated.    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 Vascular Surgery    Chronic kidney disease (CKD), stage III (moderate)- (present on admission)  Assessment & Plan  Premorbid with acute oliguria  IV fluid resuscitation  Trend indices  Avoid nephrotoxins        Discussed patient condition with RN, RT, Pharmacy, Dietary,  and Patient.  CRITICAL CARE TIME EXCLUDING PROCEDURES: 41   minutes

## 2020-10-23 NOTE — PROGRESS NOTES
Infectious Disease Progress Note    Author: Harvinder Head M.D. 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    Review of Systems:  Review of Systems   Unable to perform ROS: Medical condition       Hemodynamics:  No data recorded.  Monitored Temp: 35.9 °C (96.6 °F)  Pulse  Av.1  Min: 70  Max: 119   Blood Pressure : 132/77      Physical Exam:  Physical Exam  Vitals signs and nursing note reviewed.   Constitutional:       Appearance: He is ill-appearing.   HENT:      Mouth/Throat:      Comments: Facemask oxygen    Limited physical exam, primary team in the process of performing an intubation    Meds:    Current Facility-Administered Medications:   •  rocuronium  •  MD Alert...PROPOFOL CRITICAL CARE PROTOCOL  •  propofol **AND** Triglycerides Starting now and then Every 3 Days  •  propofol  •  oxyCODONE immediate-release  •  ceFAZolin  •  famotidine  •  Respiratory Therapy Consult  •  Pharmacy  •  atorvastatin  •  insulin regular **AND** POC Blood Glucose **AND** NOTIFY MD and PharmD **AND** glucose **AND** dextrose 50%  •  senna-docusate **AND** polyethylene glycol/lytes **AND** magnesium hydroxide **AND** bisacodyl  •  acetaminophen  •  norepinephrine (Levophed) infusion  •  heparin  •  morphine injection  •  LR    Labs:  Recent Labs     10/21/20  0446 10/21/20  0900 10/21/20  2006  10/22/20  1555 10/23/20  0500 10/23/20  1347   WBC 17.8* 19.1* 19.1*   < > 18.3* 16.9* 1.5*   RBC 4.23* 4.51* 4.46*   < > 3.63* 3.36* 4.01*   HEMOGLOBIN 12.8* 13.6* 13.5*   < > 11.0* 10.0* 12.2*   HEMATOCRIT 38.7* 41.4* 41.6*   < > 33.6* 30.9* 37.2*   MCV 91.5 91.8 93.3   < > 92.6 92.0 93.8   MCH 30.3  30.2 30.3   < > 30.3 29.8 29.9   RDW 59.8* 60.4* 63.0*   < > 61.6* 60.2* 62.9*   PLATELETCT 243 253 249   < > 203 194 211   MPV 9.6 9.4 10.3   < > 10.1 10.3 9.7   NEUTSPOLYS 87.90* 86.50* 87.40*  --   --   --   --    LYMPHOCYTES 3.90* 2.00* 4.00*  --   --   --   --    MONOCYTES 7.10 9.80 7.50  --   --   --   --    EOSINOPHILS 0.10 0.60 0.40  --   --   --   --    BASOPHILS 0.30 0.40 0.20  --   --   --   --     < > = values in this interval not displayed.     Recent Labs     10/21/20  2006 10/22/20  0436  10/22/20  2040 10/23/20  0015 10/23/20  0500   SODIUM 137 137   < > 137 137 138   POTASSIUM 4.9 5.1   < > 5.1 4.9 5.0   CHLORIDE 101 101   < > 102 101 101   CO2 16* 18*   < > 18* 18* 19*   GLUCOSE 168* 121*   < > 104* 98 99   BUN 33* 42*   < > 55* 55* 64*   CPKTOTAL 1974* 2808*  --   --   --  3611*    < > = values in this interval not displayed.     Recent Labs     10/22/20  2040 10/23/20  0015 10/23/20  0500   ALBUMIN 2.9* 3.6 3.5   TBILIRUBIN 1.4 1.6* 1.7*   ALKPHOSPHAT 526* 453* 459*   TOTPROTEIN 5.1* 5.5* 5.4*   ALTSGPT 1991* 1619* 1417*   ASTSGOT 2313* 1774* 1480*   CREATININE 3.83* 3.80* 4.18*       Imaging:  Ct-cta Aorta-ro With & W/o-post Process    Result Date: 10/20/2020  10/20/2020 8:19 PM HISTORY/REASON FOR EXAM: Bilateral lower extremity ischemia, surgical planning; Please call report to Dr. Ramirez at [phone] TECHNIQUE/EXAM DESCRIPTION: CT angiogram of the abdominal aorta with runoff without and with contrast, with reconstructions. Initial precontrast images were obtained from the diaphragm through the lesser trochanters. Subsequently, thin section postcontrast helical images were obtained from the diaphragm through the ankles following the IV bolus administration of 100 mL of Omnipaque 350. CT angiographic technique was utilized. 3D angiographic images were reviewed on PACS. Maximum intensity projection (MIP) images were generated and reviewed. Low dose optimization technique was utilized for this  CT exam including automated exposure control and adjustment of the mA and/or kV according to patient size. COMPARISON: None FINDINGS: CT ABDOMEN WITHOUT CONTRAST: On non-contrast images, no renal or urinary tract stones are identified. Bilateral dependent linear densities in the lung bases are seen, appearance most compatible with atelectasis. CTA AORTA: Scattered atherosclerotic calcifications are seen. There is abdominal aortic stent graft in place. 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 There is abdominal aortic stent graft seen, which extends to the level of the renal arteries and appears to partially cover the renal arteries. Low-density extends into the proximal portion of the left renal artery. The aorta is occluded just distal to the proximal portion of the aorta bifemoral stent graft with nonopacification extending through the bilateral iliac arterial tree which reconstitutes of the bilateral superficial iliac arteries. There is soft tissue density seen surrounding the mid and distal aorta and along the proximal portion of the bilateral common iliac arteries The liver is normal in contour, no intrahepatic biliary ductal dilatation is seen. 1.5 cm low-density lesion in the right hepatic lobe is seen measuring 14 Hounsfield units, appearance favors hepatic cyst. The gallbladder appears within normal limits. The spleen is unremarkable. The pancreas is grossly normal. Bilateral adrenal glands appear within normal limits. The kidneys are unremarkable. The ureters are normal in caliber along their visualized course. Scattered colonic diverticula are seen. The small bowel is unremarkable. The bladder is within normal limits. The bony structures are age appropriate. Bilateral fat-containing inguinal hernias CTA LOWER EXTREMITIES: The left superficial femoral and femoral arteries appear occluded along its length, there is reconstitution just distal to the trifurcation with  minimal flow visualized in the peroneal arterial branch and nonopacification of the distal anterior tibial artery. Single-vessel runoff at the ankle via the posterior tibial artery is seen. 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 There is 50% narrowing of the superficial femoral artery just distal to the bifurcation. Scattered atherosclerotic calcification throughout the right superficial femoral artery is seen. There is occlusion of the distal right superficial femoral artery extending through the popliteal artery which reconstitutes at the tibioperoneal trunk. There is minimal opacification  of the anterior tibial, posterior tibial, and peroneal branch arteries with 0 vessels flow at the ankle.     CTA AORTA 1.  Occlusion of the aorta below the level of the renal arteries which reconstitutes at the distal bilateral external iliac arteries 2.  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 3.  Low-density extends into the proximal portion left renal artery, could represent small focus of thrombus or short segment of dissection. 4.  Soft tissue density surrounding the aorta and proximal portions of the external iliac arteries bilaterally, concerning for inflammatory or infectious process. 5.  Bilateral fat-containing inguinal hernias are seen. 6.  Diverticulosis CTA LOWER EXTREMITIES 1.  Occlusion of left superficial femoral artery and popliteal artery which reconstitutes at the trifurcation with single vessel runoff at the ankle via the posterior tibial artery. 2.  Occlusion of the distal right superficial femoral artery extending through the popliteal artery with reconstitution of the tibioperoneal trunk. There is 0 vessel runoff at the ankle. 3D angiographic/MIP images of the vasculature confirm the vascular findings as described above. These findings were discussed with the  patient's clinician, Soham Ramirez, on 10/20/2020 9:08 PM.    [id number] View    Result Date: 10/23/2020  10/23/2020 9:54 AM HISTORY/REASON FOR EXAM:  Distention. Abdominal pain TECHNIQUE/EXAM DESCRIPTION AND NUMBER OF VIEWS:  1 view(s) of the abdomen. COMPARISON: Abdominal x-ray 10/21/2020 FINDINGS: Skin staples are present. Vascular stent projects over the mid abdomen. BOWEL: The abdominal bowel gas pattern is normal. No abnormal calcifications are identified. BONES: No significant bony abnormalities are identified. LUNGS: There is left atelectasis and a pleural effusion which is probably small to moderate in size.     1.  No dilated bowel 2.  Left atelectasis and small-moderate-sized pleural effusion 3.  Mid abdominal vascular stent present    Dx-chest-limited (1 View)    Result Date: 10/23/2020  10/23/2020 1:14 PM HISTORY/REASON FOR EXAM:  patient intubated TECHNIQUE/EXAM DESCRIPTION AND NUMBER OF VIEWS: Single portable view of the chest. COMPARISON: Earlier in the day. FINDINGS: Enteric tube courses to the abdomen with the distal tip not seen. ET tube tip about 4.1 cm above the cristina. Right IJ central venous catheter is stable. Cardiomediastinal silhouette is stable. There is a left pleural effusion with associated compressive atelectasis and/or consolidation. There is right basilar opacity which is more conspicuous than on prior and may also represent atelectasis and/or infection. No right pleural effusion. No pneumothorax. No acute osseous abnormality.     1.  Satisfactory support lines and tubes as detailed. 2.  Bibasilar opacities and left pleural effusion.    Dx-chest-limited (1 View)    Result Date: 10/21/2020    10/21/2020 5:14 AM HISTORY/REASON FOR EXAM: leukocytosis TECHNIQUE/EXAM DESCRIPTION:  Single AP view of the chest. COMPARISON: September 1, 2019 FINDINGS: Right internal jugular central line is seen terminating in the superior vena cava.  Endotracheal tube terminates between the clavicles  and cristina.  Nasogastric tube is identified with the tip terminating in the gastric fundus.   The cardiac silhouette appears within normal limits. Atherosclerotic calcification of the aorta is noted.  Bilateral perihilar interstitial prominence and bronchial wall cuffing is noted. Bilateral lung volumes are diminished.  Hazy interstitial bilateral pulmonary opacities are seen. Blunting of the left costophrenic angle is seen suggesting trace left effusion. The bony structures appear age-appropriate.     1.  Interstitial pulmonary parenchymal prominence suggest chronic underlying lung disease, component of interstitial edema and/or infiltrates not excluded. 2.  Trace left pleural effusion, new since prior study 3.  Cardiomegaly 4.  Atherosclerosis    Dx-chest-portable (1 View)    Result Date: 10/23/2020  10/23/2020 9:54 AM HISTORY/REASON FOR EXAM:  Shortness of Breath. TECHNIQUE/EXAM DESCRIPTION AND NUMBER OF VIEWS: Single portable view of the chest. COMPARISON: 1 view chest 9/1/2019 FINDINGS: Right internal jugular catheter tip projects over the SVC. LUNGS: Mild bilateral atelectasis. HEART and MEDIASTINUM: Obscured. Pleura: There has been development of a left pleural effusion which is at least moderate in size Osseous structures: No significant bony abnormality.     1.  Development of left pleural effusion which is at least moderate in size 2.  Mild left atelectasis    Us-extremity Artery Lower Bilat    Result Date: 10/21/2020  Lower Extremity  Arterial Duplex Report  Vascular Laboratory  CONCLUSIONS  1) Probably bifemoral bypass graft occlusion  2) No flow identifiable in the right peroneal and AT arteries  3) Left  SFA, Popliteal, proximal posterir tibial, and proximal peroneal  artery occlusion.  KATHRYN DIAS  Exam Date:     10/20/2020 19:04  Room #:     Inpatient  Priority:     Stat  Ht (in):             Wt (lb):  Ordering Physician:        JENNY NIELSON  Referring Physician:       [id number] Cordoba  Sonographer:                [facility],                             RVT  Study Type:                Complete Bilateral  Technical Quality:         Adequate  Age:    75    Gender:     M  MRN:    [id number]  :    1945      BSA:  Indications:     PVD, Pain in Limb  CPT Codes:       75426  ICD Codes:       443.9  729.5  History:         Bilateral lower extremity pain with history of PVD. Bi-                   femoral bypass graft (Rt to Lt).  Limitations:     Calcifications                RIGHT  Waveform        Peak Systolic Velocity (cm/s)                  Prox    Prox-Mid  Mid    Mid-Dist  Distal  Monophasic                        53                       CFA  Monophasic      13                                         PFA  Monophasic      36                19               15      SFA                                    11                       POP  Absent          0                                  0       AT                  12                                 12      PT  Absent          0                                  0       AL                LEFT  Waveform        Peak Systolic Velocity (cm/s)                  Prox    Prox-Mid  Mid    Mid-Dist  Distal  Monophasic                        46                       CFA  Monophasic      19                                         PFA  Absent          0                 0                0       SFA  Absent                            0                        POP  Absent          13                                 10      AT                  0                                  13      PT                  0                                  9       AL  FINDINGS  Right.  The Bi-fem bypass graft appears occluded.  Monophasic low amplitude flow through the thigh with non pulsatile flow in  the popliteal and posterior tibial arteries.  No flow could be demonstrated in the peroneal and anterior tibial arteries.  Left.  The Bi-fem bypass graft appears occluded.  The superficial  femoral, popliteal, proximal posterior tibial, and prox  peroneal arteries are occluded.  Minimal non pulsatile flow reconstitution visualized in the distal calf  vessels.  Namita Guaman MD  (Electronically Signed)  Final Date:      2020                   07:15    Ec-echocardiogram Complete W/ Cont    Result Date: 10/21/2020  Transthoracic Echo Report Echocardiography Laboratory CONCLUSIONS Prior study 19; compared to the images of the study done - there has been reduction of LVSF and possible apical thrombus. Moderately reduced left ventricular systolic function. Left ventricular ejection fraction is visually estimated to be 35%. Apical akinesis with possible thrombus. No significant valve abnormalities. KATHRYN DIAS Exam Date:         10/21/2020                    13:19 Exam Location:     Inpatient Priority:          Routine Ordering Physician:        EUGENIA TUTTLE Referring Physician: Sonographer:               Dyana CASTRO Age:    75     Gender:    M MRN:    [id number] :    1945 BSA:    1.84   Ht (in):    67     Wt (lb):    160 Exam Type:     Complete Indications:     Cardiac arrhythmia, unspecified ICD Codes:       I49.9 CPT Codes:       60106 BP:   149    /   98     HR:   94 Technical Quality:       Fair MEASUREMENTS  (Male / Female) Normal Values 2D ECHO LV Diastolic Diameter PLAX        4.8 cm                4.2 - 5.9 / 3.9 - 5.3 cm LV Systolic Diameter PLAX         4 cm                  2.1 - 4.0 cm IVS Diastolic Thickness           1.4 cm                LVPW Diastolic Thickness          1.1 cm                LVOT Diameter                     2 cm                  Estimated LV Ejection Fraction    35 %                  LV Ejection Fraction MOD BP       31 %                  >= 55  % LV Ejection Fraction MOD 4C       30.2 %                LV Ejection Fraction MOD 2C       30.9 %                DOPPLER AV Peak Velocity                  1.3 m/s               AV Peak Gradient                   7.2 mmHg              AV Mean Gradient                  3.5 mmHg              LVOT Peak Velocity                0.67 m/s              AV Area Cont Eq vti               1.7 cm2               Mitral E Point Velocity           0.61 m/s              Mitral E to A Ratio               1.1                   MV Pressure Half Time             20.4 ms               MV Area PHT                       10.8 cm2              MV Deceleration Time              70.5 ms               * Indicates values subject to auto-interpretation LV EF:  35    % FINDINGS Left Ventricle Contrast was used to enhance visualization of the endocardial border. 3 ML of contrast was administered. Existing IV was used. Left ventricle is moderately dilated. Moderate concentric left ventricular hypertrophy. Moderately reduced left ventricular systolic function. Left ventricular ejection fraction is visually estimated to be 35%. Apical akinesis with possible thrombus. Grade I diastolic dysfunction. Right Ventricle Normal right ventricular size. Reduced right ventricular systolic function. Right Atrium Right atrium not well visualized. Left Atrium Mildly dilated left atrium. Mitral Valve Structurally normal mitral valve without significant flow abnormalities. Aortic Valve Calcified aortic valve leaflets. No aortic stenosis. No aortic insufficiency. Tricuspid Valve Structurally normal tricuspid valve without significant stenosis or regurgitation. Right atrial pressure is estimated to be 3 mmHg. Pulmonic Valve Structurally normal pulmonic valve without significant stenosis or regurgitation. Pericardium No pericardial effusion seen. Aorta Normal aortic root for body surface area. Normal ascending aorta. Francis Soto M.D. (Electronically Signed) Final Date:     21 October 2020                 15:27    Dx-abdomen For Tube Placement    Result Date: 10/21/2020  10/21/2020 12:36 PM HISTORY/REASON FOR EXAM:  Line evaluation. TECHNIQUE/EXAM DESCRIPTION  AND NUMBER OF VIEWS:  1 view(s) of the abdomen. COMPARISON:  None. FINDINGS: Enteric tube has been placed. The tip projects over the midabdomen. There are dilated loops of bowel.     Feeding tube tip projects over the gastric body.      Micro:  Results     Procedure Component Value Units Date/Time    AFB Culture [[id number]] Collected: 10/21/20 0153    Order Status: Completed Specimen: Tissue Updated: 10/23/20 0958     Significant Indicator NEG     Source TISS     Site Infrarenal Aorta Stent     Culture Result Culture in progress.     AFB Smear Results No acid fast bacilli seen.    Narrative:      Surgery Specimen    CULTURE TISSUE W/ GRM STAIN [[id number]] Collected: 10/21/20 0153    Order Status: Completed Specimen: Tissue Updated: 10/23/20 0958     Significant Indicator NEG     Source TISS     Site Infrarenal Aorta Stent     Culture Result No growth at 48 hours.     Gram Stain Result No organisms seen.    Narrative:      Surgery Specimen    Anaerobic Culture [[id number]] Collected: 10/21/20 0153    Order Status: Completed Specimen: Tissue Updated: 10/23/20 0958     Significant Indicator NEG     Source TISS     Site Infrarenal Aorta Stent     Culture Result Culture in progress.    Narrative:      Surgery Specimen    Fungal Culture [[id number]] Collected: 10/21/20 0153    Order Status: Completed Specimen: Tissue Updated: 10/23/20 0958     Significant Indicator NEG     Source TISS     Site Infrarenal Aorta Stent     Culture Result Culture in progress.    Narrative:      Surgery Specimen    AFB Culture [[id number]] Collected: 10/21/20 0146    Order Status: Completed Specimen: Tissue Updated: 10/23/20 0957     Significant Indicator NEG     Source TISS     Site Infrarenal Aorta     Culture Result Culture in progress.     AFB Smear Results No acid fast bacilli seen.    Narrative:      Surgery Specimen    CULTURE TISSUE W/ GRM STAIN [[id number]] Collected: 10/21/20 0146    Order Status: Completed Specimen: Tissue Updated:  "10/23/20 0957     Significant Indicator NEG     Source TISS     Site Infrarenal Aorta     Culture Result No growth at 48 hours.     Gram Stain Result No organisms seen.    Narrative:      Surgery Specimen    Anaerobic Culture [[id number]] Collected: 10/21/20 0146    Order Status: Completed Specimen: Tissue Updated: 10/23/20 0957     Significant Indicator NEG     Source TISS     Site Infrarenal Aorta     Culture Result Culture in progress.    Narrative:      Surgery Specimen    Fungal Culture [[id number]] Collected: 10/21/20 0146    Order Status: Completed Specimen: Tissue Updated: 10/23/20 0957     Significant Indicator NEG     Source TISS     Site Infrarenal Aorta     Culture Result Culture in progress.    Narrative:      Surgery Specimen    BLOOD CULTURE [[id number]] Collected: 10/22/20 0905    Order Status: Completed Specimen: Blood from Peripheral Updated: 10/23/20 0723     Significant Indicator NEG     Source BLD     Site PERIPHERAL     Culture Result No Growth  Note: Blood cultures are incubated for 5 days and  are monitored continuously.Positive blood cultures  are called to the RN and reported as soon as  they are identified.      Narrative:      Collected By:[id number] LILY BENITEZ  Per Hospital Policy: Only change Specimen Src: to \"Line\" if  specified by physician order.  No site indicated    BLOOD CULTURE [[id number]] Collected: 10/22/20 0925    Order Status: Completed Specimen: Blood from Peripheral Updated: 10/23/20 0723     Significant Indicator NEG     Source BLD     Site PERIPHERAL     Culture Result No Growth  Note: Blood cultures are incubated for 5 days and  are monitored continuously.Positive blood cultures  are called to the RN and reported as soon as  they are identified.      Narrative:      Collected By:[id number] LILY BENITEZ  Per Hospital Policy: Only change Specimen Src: to \"Line\" if  specified by physician order.  Left Hand    Acid Fast Stain [[id number]] Collected: 10/21/20 0153    Order " Status: Completed Specimen: Tissue Updated: 10/21/20 1331     Significant Indicator NEG     Source TISS     Site Infrarenal Aorta Stent     AFB Smear Results No acid fast bacilli seen.    Narrative:      Surgery Specimen    GRAM STAIN [[id number]] Collected: 10/21/20 0153    Order Status: Completed Specimen: Tissue Updated: 10/21/20 1331     Significant Indicator .     Source TISS     Site Infrarenal Aorta Stent     Gram Stain Result No organisms seen.    Narrative:      Surgery Specimen    Acid Fast Stain [[id number]] Collected: 10/21/20 0146    Order Status: Completed Specimen: Tissue Updated: 10/21/20 1319     Significant Indicator NEG     Source TISS     Site Infrarenal Aorta     AFB Smear Results No acid fast bacilli seen.    Narrative:      Surgery Specimen    GRAM STAIN [[id number]] Collected: 10/21/20 0146    Order Status: Completed Specimen: Tissue Updated: 10/21/20 1319     Significant Indicator .     Source TISS     Site Infrarenal Aorta     Gram Stain Result No organisms seen.    Narrative:      Surgery Specimen    BLOOD CULTURE [[id number]]     Order Status: Canceled Specimen: Blood from Peripheral     BLOOD CULTURE [[id number]]     Order Status: Canceled Specimen: Blood from Peripheral     COVID/SARS CoV-2 PCR [[id number]] Collected: 10/20/20 2018    Order Status: Completed Specimen: Respirate from Nasopharyngeal Updated: 10/20/20 2028     COVID Order Status Received     Comment: The order for SARS CoV-2 testing has been received by the  Laboratory. This result is neither positive nor negative.  Final results of testing will report in 24-48 hours, separately.         Narrative:      Is patient being admitted?->Yes  Does this patient meet criteria for Rush/Cepheid per [facility]  Inpatient Workflow? (See workflow link below)->Yes  Expected turn around time?->Rush (Cepheid 2-4 hours)  Is this the patients First SARS CoV-2 test?->Yes  Is this patient employed in healthcare?->No  Is the patient symptomatic as  defined by the CDC?->No  Is the patient hospitalized?->Yes  Is the patient in the ICU?->No  Is the patient a resident in a congregate care setting?->No  Is the patient pregnant?->No  Is patient being admitted?->Yes  Does this patient meet criteria for Rush/Cepheid per [facility]  Inpatient Workflow? (See workflow link below)->Yes  Expected turn around time?->Rush (Cepheid 2-4 hours)  Is this the patients First SARS CoV-2 test?->Unknown  Is this patient employed in healthcare?->No  Is the patient symptomatic as defined by the CDC?->No  Is the patient hospitalized?->Yes  Is the patient in the ICU?->No  Is the patient a resident in a congregate care setting?->No  Is the patient pregnant?->No    COVID/SARS CoV-2 PCR [[id number]]     Order Status: Canceled Specimen: Respirate from Nasopharyngeal           Assessment:  75-year-old male with past medical history of peripheral artery disease on Coumadin, osteoarthritis, CKD stage III, had previously undergone a femorofemoral bypass graft placement and left femoral to below-knee popliteal bypass graft placement on 7/3/2019. Vascular op report noted at least one iliac stent on the left side due to aneurysm. Additionally, patient reportedly has aortic stents in place which could not be removed. 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  Hospitalized from 8/18-9/17/2019, admitted due to severe lower dull abdominal pain and left leg numbness.  Blood cultures on 8/18 and 8/21 +MSSA. CTA on 8/18 showed complete occlusion of the left femoral-popliteal bypass graft with gas within the lumen fluid surrounding the graft.  Due to severe left knee pain, aspiration was done on 8/20, +MSSA.  Additionally, on 8/20, patient underwent removal of the infected femorofemoral bypass graft, removal of the infected left femoral below the knee popliteal bypass graft, redo right to left femorofemoral bypass graft, redo left femoral to  below-knee popliteal bypass graft and bilateral sartorius flaps. OR culture of left femoral positive MSSA.  Discharged to SNF on IV Ancef 2 g every 8 hours and p.o. rifampin 300 mg 3 times daily for 6 weeks through 10/4/2019, followed by chronic lifelong suppression with p.o. Augmentin, and rifampin for total 12-week course through November 2019.     Patient now admitted with bilateral lower extremity pain, found to have critical limb ischemia in setting of likely infected and occluded infrarenal aortic stent graft, underwent removal and placement of cadaveric aortobifemoral bypass graft, right lower extremity fasciotomy on 10/20/2020.     Noted documentation from December 2019 the patient was unable to afford his chronic suppressive Augmentin. Patient is now extubated and confirmed with patient that he has not been on antibiotics this year.     Pertinent Diagnoses:  Septic shock  Infected and occluded aortic graft, status post removal  Critical limb ischemia  Acute hypoxic respiratory failure  Left pleural effusion  Chronic infected vascular grafts, MSSA  Acute renal failure  Transaminitis    Plan:  -Patient has been off his planned suppressive Augmentin for at least 10 months. Repeat infection likely secondary to the same organism-MSSA  -Continue renally dosed IV Ancef 2 g every 12 hours  -Follow pending OR cultures and blood cultures, will adjust antibiotics accordingly  -Will likely add rifampin at some point once liver enzymes improve  -Chest x-ray from today with large left-sided pleural effusion, plan is for intubation and thoracentesis    Discussed with pharmacy, Fredi Osullivan.  ID will follow.  Please call with questions.

## 2020-10-23 NOTE — PROGRESS NOTES
"Vascular    Events  10/21: intubated, follows commands, no pressors, adequate UOP  10/22: intubated, sedated presently, no pressors, borderline UOP, creatinine worsening, shock liver  10/23: extubated but tenuous, reporting moderate abdominal pain, minimal UOP, Creatinine worse, liver improving    Vitals  /80   Pulse (!) 104   Temp 36.2 °C (97.2 °F) (Temporal)   Resp 20   Ht 1.702 m (5' 7\")   Wt 79 kg (174 lb 2.6 oz)   SpO2 93%   BMI 27.28 kg/m²     Exam  Prevenas CDI  Pedal doppler signals bilaterally, feet mostly pink with cap refill in the toes    Labs  WBC 19 -> 14.7 -> 17 today  Hgb stable around 13 -> 11.6 -> 9.8 -> 10 today  Creatinine 2 -> 2.5 -> 2.7 -> 4.2 today  Lactic acid: 7.3 -> 7.1 -> 5.4 -> 2.1 today  CPK 2800 -> 3600 today    Cultures: pending, so far no organisms      A/P)  10/20: Aortobifemoral bypass with extraction of infected occluded aortic stent graft    Pulm toilet, likely need for reintubation  Empiric ABx, FU Cx  Monitor renal progress, anticipating need for HD  Monitor CPK    Appreciate ICU team's help managing Mr. Patel    Soham Ramirez MD  [facility] (General and Vascular Surgery)  Cell: [phone] (text or call is fine, if you don't reach me please try my office)  Office: [phone]  __________________________________________________________________  Patient:Dc Patel   MRN:[id number]   CSN:[id number]    "

## 2020-10-23 NOTE — PROGRESS NOTES
Jackie (friend) called and updated regarding patient coding.  She states she is trying to find his sons but she is having a hard time.

## 2020-10-24 LAB
BACTERIA TISS AEROBE CULT: NORMAL
BACTERIA TISS AEROBE CULT: NORMAL
GRAM STN SPEC: NORMAL
GRAM STN SPEC: NORMAL
SIGNIFICANT IND 70042: NORMAL
SIGNIFICANT IND 70042: NORMAL
SITE SITE: NORMAL
SITE SITE: NORMAL
SOURCE SOURCE: NORMAL
SOURCE SOURCE: NORMAL

## 2020-10-26 LAB
BACTERIA SPEC ANAEROBE CULT: NORMAL
BACTERIA SPEC ANAEROBE CULT: NORMAL
SIGNIFICANT IND 70042: NORMAL
SIGNIFICANT IND 70042: NORMAL
SITE SITE: NORMAL
SITE SITE: NORMAL
SOURCE SOURCE: NORMAL
SOURCE SOURCE: NORMAL

## 2020-10-27 LAB
BACTERIA BLD CULT: NORMAL
BACTERIA BLD CULT: NORMAL
SIGNIFICANT IND 70042: NORMAL
SIGNIFICANT IND 70042: NORMAL
SITE SITE: NORMAL
SITE SITE: NORMAL
SOURCE SOURCE: NORMAL
SOURCE SOURCE: NORMAL

## 2020-10-28 NOTE — DOCUMENTATION QUERY
[facility]                                                                       Query Response Note      PATIENT:               KATHRYN DIAS  ACCT #:                  [id number]  MRN:                     [id number]  :                      1945  ADMIT DATE:       10/20/2020 6:18 PM  DISCH DATE:        10/23/2020 3:40 PM  RESPONDING  PROVIDER #:        [id number]           QUERY TEXT:    Septic shock is documented in the 10/23 ID Progress Note.  Sepsis is documented in 10/22 Trauma/ Surgery Progress Note.  Sepsis and septic shock are not mentioned in DC Summary.  Can the diagnoses of sepsis and septic shock be clarified?      NOTE:  If an appropriate response is not listed below, please respond with a new note.      The patient's Clinical Indicators include:  Per 10/23 ID Progress Note: septic shock  Per 10/22 Trauma/ Surgery Progress Note: some element of sepsis   10/20 wbc 13.1, 10/21 wbc 19.1  10/21 lactic acid 7.6  Admit Vitals: 139/84, 82, 55, 97.2F, 98%  10/22 vitals: 90/68, 91, 24, 99%   Risk Factors: infected graft, Diabetes   Treatment: Zosyn, vanco, ancef, zyvox, IVF  Options provided:   -- Sepsis with septic shock ruled in, -if able please clarify POA status   -- Sepsis without septic shock ruled in   -- Sepsis, septic shock ruled out   -- Unable to determine      Query created by: Burak Colvin on 10/26/2020 11:39 AM    RESPONSE TEXT:    Unable to determine          Electronically signed by:  DONAVAN RUEDA DO 10/27/2020 5:27 PM

## 2020-11-18 LAB
FUNGUS SPEC CULT: NORMAL
FUNGUS SPEC CULT: NORMAL
SIGNIFICANT IND 70042: NORMAL
SIGNIFICANT IND 70042: NORMAL
SITE SITE: NORMAL
SITE SITE: NORMAL
SOURCE SOURCE: NORMAL
SOURCE SOURCE: NORMAL

## 2020-12-08 NOTE — OP REPORT
DATE OF SERVICE:  10/20/2020     PREOPERATIVE DIAGNOSES:  1.  Acute bilateral lower extremity ischemia due to acute aortobiiliac   thrombosis.  2.  Occlusion of fbxjp-cg-bwaqs graft.  3.  Possible qtzky-oe-tetdz stent graft infection.     POSTOPERATIVE DIAGNOSES:  1.  Acute bilateral lower extremity ischemia due to acute aortobiiliac   thrombosis.  2.  Occlusion of vzkyy-mx-hreik graft.  3.  Possible rdntz-rz-nphsf stent graft infection.     PROCEDURES:  Aortobifemoral bypass with extraction of existing vqlqv-gh-karci   stent graft.     SURGEON:  Soham Ramirez MD.     ASSISTANT:  ALESSIA Mccormack.     ANESTHESIA:  General.     ESTIMATED BLOOD LOSS:  350 mL.     SPECIMENS:  Endograft and aneurysm sac and contents sent for culture and   sensitivity.     FINDINGS:  Severe retroperitoneal fibrosis around the aneurysm suggestive of   chronic inflammation.  No gross evidence of infection including no purulent   fluid within the aneurysm sac or around the endograft.     COMPLICATIONS:  None.     DISPOSITION:  The patient was sent to the ICU in guarded condition, still   intubated.     INDICATIONS:  The patient is an approximately 75-year-old male with a   longstanding history of lower extremity peripheral arterial occlusive disease.    The patient has undergone many procedures for his lower extremity perfusion.    In 08/2019, the patient actually developed a severe infection of a right to   left dfmsdfy-za-vwyntkw bypass and also of his left uxnklqs-xn-ahveoinxj   artery bypass, all of which was prosthetic.  These grafts were extracted by   Dr. Grande in 08/2019, and the grafts were replaced with biologic tissue   including arterial graft for the rahozde-xr-cuxgmsi bypass and cadaveric vein   for the xqydbyx-aw-kcriawqxh bypass.  The patient ultimately had a relatively   uneventful recovery after that operation and has not had any additional   vascular surgery needs in the interim.  He then presented to the  ER on the   evening of 10/20/2020 with a several day history of worsening bilateral lower   extremity pain.  His symptoms and exam were consistent with severe worsening   of his chronic lower extremity ischemia.  Workup showed that his   socoo-py-punnz endograft was occluded and there was also fluid around the   endograft suggestive of a possible infection.  Infection of the endograft   would explain why it acutely thrombosed and led to acute worsening of his   lower extremity ischemia and the severe acute onset of pain that he was   having.  With these findings, I discussed with the patient 2 options for   revascularization.  One option would be to perform an aortobifemoral bypass   and extract the endograft as it is potentially an ongoing source of infection   and this would give him robust improvement in his lower extremity perfusion.    The alternative would be an axillary to bifemoral bypass, which has less   durability than an aortobifemoral bypass and also if the existing   vajnd-kr-wguow endograft is infected, then it would likely cease the axillary   bifemoral bypass and cause worsening of his infection.  After discussing both   options, I recommended to the patient that we proceed with the aortobifemoral   bypass.  We discussed the steps of the operation and the expected recovery,   and we also discussed the risks which include but are not limited to bleeding,   infection, injury to vessels or nerves, injury to intra-abdominal organs,   risks of anesthesia and global risks associated with the procedure, such as   stroke, heart attack, pulmonary complications and even potentially not   surviving the operation or the recovery.  I did explain to the patient that he   could potentially be on a ventilator for an extended period of time after the   operation and he could potentially face some degree of kidney failure and   there is the ever present possibility of requiring additional surgical   procedures either  during this acute recovery or in the future.  The patient   understood this information.  His questions were answered and he agreed to   proceed.  We also contacted his family and informed them of the emergent   nature of the situation.     PROCEDURE:  Following informed consent, the patient was placed supine on the   operating table and general anesthesia was administered.  Hemodynamic lines   and a Diamond catheter were inserted.  The patient was prepped and draped   sterile.  Surgical timeout was called.  The patient was on preoperative   antibiotics.     The procedure began with vertical incisions in both groins and the common   femoral arteries were exposed in the usual fashion.  These were redo femoral   exposures due to a history of multiple previous gkiwidf-gv-duvmrrv bypass   operations.  Once we had proximal and distal control of the common femoral   arteries bilaterally, we then proceeded with the abdominal exposure.  We   carefully entered the abdomen through a midline laparotomy incision and a   self-retaining retractor was placed.  The small bowel was reflected to the   right lateral aspect of the abdomen, and the transverse colon was reflected   superiorly.  We opened the retroperitoneum to gain access to the infrarenal   aorta.  We found significant fibrosis in the retroperitoneum consistent with   chronic inflammation and could potentially support the concern for endograft   infection.  We exposed the anterior surface of the infrarenal aorta, and we   extended the dissection proximally and distally.  We worked our way up to the   level of the renal arteries, and we encircled the aorta at this level with an   umbilical tape.     At this point, we created the retroperitoneal tunnels from the groin incisions   up to the abdominal exposure, and we held these tunnels in place with red   rubber catheters.     The patient was systemically heparinized.  We then clamped the infrarenal   aorta and we removed a  segment of the infrarenal aorta and we created a sewing   ring just below the renal arteries.  We oversewed the distal stump of the   aorta with a 2-0 Prolene suture.  We then used a 16x8 Dacron bifurcated graft   and created an end-to-end anastomosis with the infrarenal aorta.  Of note,   when we were fashioning the sewing ring and removing the segment of the   infrarenal aorta, we removed the involved endograft and we also pulled the   endograft limbs out of the iliac arteries and we debrided the aortic wall all   the way down to the level of the iliac bifurcation.  The endograft and   aneurysm contents and aortic valve that were debrided were sent for culture   and sensitivity.  The prosthetic graft material that we used was soaked in   rifampin.  The end-to-end anastomosis was created with a running 3-0 Prolene   suture line.  Once complete, the anastomosis was pressurized and found to be   hemostatic.  The graft was flushed.  The limbs were tunneled through the   retroperitoneal tunnels down to the respective groin incisions and then   topical hemostatic was placed in the retroperitoneum after it was thoroughly   irrigated, and we then turned our attention to fashioning the femoral   anastomosis.     We clamped the right common femoral artery, and we detached the existing   vffujio-eo-gwiqjau bypass and we created a sewing ring on the anterior surface   of the common femoral artery.  We then created a packer in the distal end of   the aortoiliac graft limb, and we anastomosed it to the common femoral artery   in an end-to-side fashion using a running 5-0 Prolene suture line.  The   anastomosis was flushed and irrigated prior to completion.  Once complete, the   anastomosis was pressurized and found to be hemostatic and then the right leg   was unclamped and reperfused.  Topical hemostatic was applied.     We then turned our attention to the left groin.  The femoral artery was   clamped in a similar fashion, and  the existing grafts were detached to create   a sewing ring on the anterior aspect of the common femoral artery.  We then   fashioned a packer in the distal end of the graft, and we anastomosed it to the   common femoral artery using a running 5-0 Prolene suture line.  The   anastomosis was flushed and irrigated prior to completion.  Once complete, the   anastomosis was pressurized and found to be hemostatic.  The clamps were   removed, and the left leg was reperfused and then topical hemostatic was   applied around the anastomosis.     Doppler evaluation distal to the anastomosis indicated good flow through the   graft.  At this point, the patient's heparin was reversed with protamine.  The   abdominal exposure was examined and found to be hemostatic and then the   retroperitoneum was closed over the graft and then the abdominal contents were   placed in their anatomic position and Seprafilm was placed in the abdomen and   the midline fascia was closed with a size #1 double stranded running PDS   suture.  The skin was reapproximated with staples.     At this point, we closed the femoral incisions with multiple layers of   absorbable suture and staples for the skin, and Prevena bandages were used to   protect all the incisions.  The patient was in guarded condition throughout   the operation.  He was left intubated and sent to the surgical ICU in guarded   but stable condition.  All counts were correct at the conclusion of the case.        ______________________________________________  MD GIANFRANCO Sepulveda/TA    DD:  12/08/2020 10:51  DT:  12/08/2020 11:54    Job#:  [id number]

## 2020-12-16 LAB
MYCOBACTERIUM SPEC CULT: NORMAL
MYCOBACTERIUM SPEC CULT: NORMAL
RHODAMINE-AURAMINE STN SPEC: NORMAL
RHODAMINE-AURAMINE STN SPEC: NORMAL
SIGNIFICANT IND 70042: NORMAL
SIGNIFICANT IND 70042: NORMAL
SITE SITE: NORMAL
SITE SITE: NORMAL
SOURCE SOURCE: NORMAL
SOURCE SOURCE: NORMAL

## 2020-12-21 NOTE — DOCUMENTATION QUERY
"                                                                         [facility]                                                                       Query Response Note      PATIENT:               KATHRYN DIAS  ACCT #:                  [id number]  MRN:                     [id number]  :                      1945  ADMIT DATE:       10/20/2020 6:18 PM  DISCH DATE:        10/23/2020 3:40 PM  RESPONDING  PROVIDER #:        [id number]           QUERY TEXT:    Per the bronchoscopy report,Flexible bronchoscopy with bronchoalveolar lavage is documented.   Can the site of the bronchoscopy procedure be further specified.     NOTE:  If an appropriate response is not listed below, please respond with a new note.                The patient's Clinical Indicators include:  Admitted for infected occluded infrarenal aortic endograft.  Had OR 10/2.  On 10/23 intubatted for impending respiratory failure. Bronchoscopy 10/23/2020.    Operative note states: \"Flexible bronchoscopy w bronchoalveloar lavage.\"  Indications: Foul smelling,tenacious sputum.  Large amounts of tenacious secretions were suctioned from the left upper and lower lobes.Multiple rounds of lavage required to clear secretions.The right lower and middle  lobes were also suctioned of bilous fluid.    Risk factors: Shock, Intubated, PVD, Diabetic .  Options provided:   -- Lower Lobe Bronchus, Left   -- Lower Lobe Bronchus, Right   -- Lower Lung Lobe, Left   -- Lower Lung Lobe, Right   -- Middle Lobe Bronchus, Left   -- Middle Lobe Bronchus, Right   -- Middle Lung Lobe,Right   -- Upper Lobe Bronchus, Left   -- Upper lobe Bronchus, Right   -- Bronchial Airways, Bilateral   -- Upper Lung Lobe, Left   -- Upper Lung Lobe, Right   -- Lung, Left   -- Lung, Right   -- Lung Bilateral   -- Unable to determine      Query created by: Capri Long on 2020 5:22 AM    RESPONSE TEXT:    Upper Lobe Bronchus, Left          Electronically signed by:  DONAVAN RUEDA DO " 12/21/2020 12:12 PM

## 2021-03-29 NOTE — ASSESSMENT & PLAN NOTE
Remained intubated post op  10/22 awake with good parameters  Extubated after rounds  10/23 reintubated because of worsening hypoxemia, white out of left chest on x-ray  Bronchoscopy and lavage to clear left lung  Aggressive pulmonary hygiene   No cyanosis, clubbing or edema

## 2021-05-16 NOTE — PROGRESS NOTES
Report received, poc discussed, assumed care of pt.   Call light in reach, hourly rounding in place.   Pt gets up self.   Npo with sips since midnight for angio later today.  + void. LBM 10/21.   Oxy pain. Morphine for breakthrough.  No further needs.  Sleeping.    Abdominal Pain, N/V/D

## 2021-09-23 NOTE — CONSULTS
DATE OF SERVICE:  08/20/2019    REQUESTING PHYSICIAN:  Dr. Jaquez.    CHIEF COMPLAINT:  Left knee pain.    HISTORY OF PRESENT ILLNESS:  Patient is a 74-year-old man who has infected   vascular graft in his left lower extremity.  He also has pain in his left   knee.  This persisted for a few weeks.  I was asked to see him to evaluate for   possible septic arthritis.  Pain is worse with any movement.  He does have   bacteremia.    ALLERGIES:  None.    MEDICATIONS:  Multivitamin, Tylenol, aspirin, Lipitor, Alphagan eye drops,   Timoptic eye drops, and Coumadin.    PAST MEDICAL HISTORY:  Peripheral vascular disease.    PAST SURGICAL HISTORY:    1.  Left toe amputation.  2.  Fem-fem bypass.    SOCIAL HISTORY:  The patient has a history of smoking use, but has quit.  He   does not currently drink or use drugs, although he has a history of both.    FAMILY HISTORY:  Positive for cancer.    REVIEW OF SYSTEMS:  No loss of consciousness.  He does have nausea and   abdominal pain.  No fevers, chills, weight loss, weight gain, chest pain, or   shortness of breath.    PHYSICAL EXAMINATION:  GENERAL:  The patient is in no acute distress.  VITAL SIGNS:  Blood pressure is 108/76, heart rate 85, respirations 17,   temperature 98.1.  HEENT:  Normocephalic, atraumatic.  NECK:  Supple, nontender.  CHEST:  Nontender.  ABDOMEN:  Tender.  EXTREMITIES:  The right lower and bilateral upper extremities are without   tenderness or deformity.  Left lower extremity shows large effusion at the   left knee where he is tender.  I did not try range of motion of the knee as it   is too painful for him.    LABORATORY DATA:  Labs from 08/18 show a white blood cell count of 15,300,   hematocrit 28.3% and platelet count 311,000.  Sodium 127, potassium is 4.0 and   creatinine 1.25.  INR yesterday was 2.76.  Blood cultures from 08/18 show   Staph aureus.    ASSESSMENT:  Left knee effusion.    PLAN:  I recommended aspiration of the knee.  This was done.  We  obtained 55   mL of purulent fluid, which was sent to the lab for stat cell count, Gram   stain and culture.  Likely require arthrotomy and drainage at the time of his   vascular surgery later today.       ____________________________________     MD FRANCISCO CHARLES / RICHA    DD:  08/20/2019 10:41:56  DT:  08/20/2019 12:26:49    D#:  [id number]  Job#:  [id number]   Medical Necessity Statement: Based on my medical judgement, Mohs surgery is the most appropriate treatment for this cancer compared to other treatments.

## 2022-01-05 NOTE — ED PROVIDER NOTES
ED Provider Note    Scribed for Murphy Mahan M.D. by Mason Sánchez. 10/20/2020  6:20 PM    Primary care provider: None noted  Means of arrival: Med Flight  History obtained from: Patient  History limited by: None    CHIEF COMPLAINT  Chief Complaint   Patient presents with   • Leg Pain       HPI  Dc Patel is a 75 y.o. male who presents to the Emergency Department for worsening right leg pain onset a week ago. He states that he was raking pine needles and woke up the next day feeling pain throughout his entire legs, but the pain is now centralized in his calf. He adds that his pain is exacerbated by movement. Patient states he was seen 5 days ago for these symptoms and was tested for a blood clot. He adds that he was given no pain medication at this time and has been experiencing difficulty eating and drinking secondary to pain. Patient notes that he has an extensive surgical history. Patient states that he sees Dr. Grande. Patient states that he has been treating himself with lidocaine and acetaminophen with minimal alleviation. Patient denies any nausea, vomiting, chest pain, shortness of breath, or abnormal bowel movements. He adds that he has been experiencing some diarrhea, but states that it is not unusual.   PPE Note: I personally donned full PPE for all patient encounters during this visit, including being clean-shaven with an N95 respirator mask and gloves.    Scribe remained outside the patient's room and did not have any contact with the patient for the duration of patient encounter.     REVIEW OF SYSTEMS  Pertinent negatives include no nausea, vomiting, chest pain, shortness of breath, or abnormal bowel movements.. As above, all other systems reviewed and are negative.   See HPI for further details.     PAST MEDICAL HISTORY   has a past medical history of PVD (peripheral vascular disease) (HCC).    SURGICAL HISTORY   has a past surgical history that includes toe amputation (Left, 10/23/2018);  Heme/Onc Heme/Onc Heme/Onc Heme/Onc Heme/Onc Internal Medicine Internal Medicine Heme/Onc Hospitalist Internal Medicine Internal Medicine Rehab Medicine Vascular Surgery femoral femoral bypass (Bilateral, 7/3/2019); femoral popliteal bypass (Left, 7/3/2019); angiogram (Left, 7/3/2019); femoral femoral bypass (Left, 8/20/2019); femoral popliteal bypass (Left, 8/20/2019); angiogram (Left, 8/20/2019); and irrigation & debridement ortho (Left, 8/20/2019).    SOCIAL HISTORY  Social History     Tobacco Use   • Smoking status: Former Smoker   • Smokeless tobacco: Never Used   Substance Use Topics   • Alcohol use: Not Currently   • Drug use: Not Currently     Types: Inhaled     Comment: THC      Social History     Substance and Sexual Activity   Drug Use Not Currently   • Types: Inhaled    Comment: THC       FAMILY HISTORY  None noted    CURRENT MEDICATIONS  Current Outpatient Medications:   •  escitalopram (LEXAPRO) 10 MG Tab, Take 10 mg by mouth every day., Disp: , Rfl:   •  calcium carbonate (TUMS) 500 MG Chew Tab, Take 500 mg by mouth every day., Disp: , Rfl:   •  pregabalin (LYRICA) 150 MG Cap, Take 150 mg by mouth every day., Disp: , Rfl:   •  atorvastatin (LIPITOR) 40 MG Tab, Take 1 Tab by mouth every evening., Disp: 30 Tab, Rfl: 0  •  ascorbic acid (VITAMIN C) 500 MG tablet, Take 1 Tab by mouth 3 times a day, with meals., Disp: 30 Tab, Rfl: 3  •  ceFAZolin in dextrose (ANCEF) 2 g/100mL IVPB, 100 mL by Intravenous route every 8 hours. (Patient not taking: Reported on 10/16/2019), Disp: 3000 mL, Rfl:   •  clopidogrel (PLAVIX) 75 MG Tab, Take 1 Tab by mouth every day., Disp: 30 Tab, Rfl:   •  enoxaparin (LOVENOX) 40 MG/0.4ML Solution inj, Inject 40 mg as instructed every day. (Patient not taking: Reported on 10/2/2019), Disp: , Rfl:   •  ferrous gluconate (FERGON) 324 (38 Fe) MG Tab, Take 1 Tab by mouth 3 times a day, with meals., Disp: 30 Tab, Rfl:   •  folic acid (FOLVITE) 1 MG Tab, Take 1 Tab by mouth every day., Disp: 30 Tab, Rfl:   •  hydrALAZINE (APRESOLINE) 20 MG/ML Solution, 0.5 mL by Intravenous route every four hours as needed (Hypertension, if clonidine ineffective or not  "ordered.)., Disp: , Rfl: 0  •  insulin regular (HUMULIN R) 100 Unit/mL Solution, Inject 1-6 Units as instructed 4 Times a Day,Before Meals and at Bedtime., Disp: 10 mL, Rfl:   •  labetalol (NORMODYNE,TRANDATE) 5 MG/ML Solution, 2 mL by Intravenous route every four hours as needed (Hypertension, if clonidine not ordered or ineffective)., Disp: , Rfl: 0  •  senna-docusate (PERICOLACE OR SENOKOT S) 8.6-50 MG Tab, Take 2 Tabs by mouth 2 Times a Day., Disp: 30 Tab, Rfl: 0  •  riFAMPin (RIFADINE) 300 MG Cap, Take 1 Cap by mouth 3 times a day., Disp: 60 Cap, Rfl: 11  •  polyethylene glycol/lytes (MIRALAX) Pack, Take 1 Packet by mouth 2 times a day., Disp: , Rfl:   •  brimonidine (ALPHAGAN) 0.2 % Solution, Place 1 Drop in right eye 2 Times a Day., Disp: 1 Bottle, Rfl:   •  timolol (TIMOPTIC) 0.5 % Solution, Place 1 Drop in right eye 2 Times a Day., Disp: 1 Bottle, Rfl: 3  •  aspirin (ASA) 81 MG Chew Tab chewable tablet, Take 1 Tab by mouth every day., Disp: 100 Tab, Rfl:   •  acetaminophen (TYLENOL) 325 MG Tab, Take 2 Tabs by mouth every 6 hours as needed (Mild Pain; (Pain scale 1-3); Temp greater than 100.5 F)., Disp: 30 Tab, Rfl: 0  •  Multiple Vitamins-Minerals (CENTRUM SILVER) Tab, Take 1 Tab by mouth every day., Disp: , Rfl:     ALLERGIES  No Known Allergies    PHYSICAL EXAM  VITAL SIGNS: /84   Pulse 82   Temp 36.2 °C (97.2 °F) (Temporal)   Resp (!) 55   Ht 1.702 m (5' 7\")   Wt 72.6 kg (160 lb)   SpO2 98%   BMI 25.06 kg/m²   Constitutional: Patient in severe pain and is tearful. Well developed, Well nourished, Non-toxic appearance.   HENT: Normocephalic, Atraumatic, Bilateral external ears normal, Oropharynx is clear mucous membranes are moist. No oral exudates or nasal discharge.   Eyes: Pupils are equal round and reactive, EOMI, Conjunctiva normal, No discharge.   Neck: Normal range of motion, No tenderness, Supple, No stridor. No meningismus.  Lymphatic: No lymphadenopathy noted.   Cardiovascular: " Cardiology Heme/Onc Heme/Onc Heme/Onc Heme/Onc Heme/Onc Internal Medicine Internal Medicine Internal Medicine Vascular Surgery Vascular Surgery Internal Medicine Regular rate and rhythm without murmur rub or gallop.  Thorax & Lungs: Clear breath sounds bilaterally without wheezes, rhonchi or rales. There is no chest wall tenderness.   Abdomen: Soft non-tender non-distended. There is no rebound or guarding. No organomegaly is appreciated. Bowel sounds are normal.  Skin: Normal without rash.   Back: No CVA or spinal tenderness.   Extremities: Pulsation in right lower extremity could not be appreciated in dorsalis pedis, posterior tibial, and popliteal. Left second and third toe amputation. Right calf tenderness. No edema, No cyanosis, No clubbing. Capillary refill is less than 2 seconds.  Musculoskeletal: Good range of motion in all major joints. No tenderness to palpation or major deformities noted.   Neurologic: Alert & oriented x 3, Normal motor function, Normal sensory function, No focal deficits noted. Reflexes are normal.  Psychiatric: Affect normal, Judgment normal, Mood normal. There is no suicidal ideation or patient reported hallucinations.     DIAGNOSTIC STUDIES / PROCEDURES    LABS  Labs Reviewed   CBC WITH DIFFERENTIAL - Abnormal; Notable for the following components:       Result Value    WBC 13.1 (*)     RBC 4.20 (*)     Hemoglobin 12.8 (*)     Hematocrit 40.4 (*)     MCHC 31.7 (*)     RDW 50.5 (*)     Neutrophils-Polys 77.30 (*)     Lymphocytes 12.60 (*)     Neutrophils (Absolute) 10.11 (*)     Monos (Absolute) 1.09 (*)     All other components within normal limits    Narrative:     Indicate which anticoagulants the patient is on:->HEPARIN   COMP METABOLIC PANEL - Abnormal; Notable for the following components:    Glucose 105 (*)     Bun 23 (*)     All other components within normal limits    Narrative:     Indicate which anticoagulants the patient is on:->HEPARIN   APTT - Abnormal; Notable for the following components:    APTT 77.2 (*)     All other components within normal limits    Narrative:     Indicate which anticoagulants the patient is on:->HEPARIN  Rehab Medicine Vascular Surgery   PROTHROMBIN TIME    Narrative:     Indicate which anticoagulants the patient is on:->HEPARIN   LACTIC ACID   COVID/SARS COV-2   HEPARIN XA (UNFRACTIONATED)    Narrative:     Indicate which anticoagulants the patient is on:->HEPARIN   ESTIMATED GFR    Narrative:     Indicate which anticoagulants the patient is on:->HEPARIN      All labs reviewed by me.    RADIOLOGY  US-EXTREMITY ARTERY LOWER BILAT         CT-CTA AORTA-RO WITH & W/O-POST PROCESS    (Results Pending)     The radiologist's interpretation of all radiological studies have been reviewed by me.    COURSE & MEDICAL DECISION MAKING  Nursing notes, VS, PMSFHx reviewed in chart.    6:20 PM Patient seen and examined at bedside. Ordered for labs and imaging to evaluate. Patient was treated with LR infusion bolus for his symptoms. Paged Dr. Grande.     6:38 PM Patient treated with heparin infusion 25,000 units.    Laboratory evaluation reveals leukocytosis of 13,000 with a 77% PMN shift and unremarkable serum electrolytes.  CPK total is 1314 and lactate is 1.3.  INR is unremarkable at 1.06 aPTT is elevated at 77 indicating therapeutic levels of heparin.    6:41 PM Patient treated with morphine injection 4 mg.  This seemed to fail to completely address his pain and therefore we changed to Dilaudid 0.5 mg IV and this helped    7:09 PM Patient was reevaluated at bedside. Discussed lab and radiology results with the patient.     7:13 PM I discussed the patient's case and the above findings with Dr. Grande (Vascular) who agreed to evaluate the patient and decide whether or not the patient requires surgery.    7:34 PM Paged Hospitalist.     7:35 PM I discussed the patient's case and the above findings with Dr. Grande (Vascular) who informed me that the patient is to be seen by Dr. Del Cid.     7:45 I discussed the patient's case and the above findings with Dr. Gipson (Hospitalist) who agreed to hospitalize the patient.     HYDRATION: Based on the patient's presentation  Internal Medicine Internal Medicine of Other NPO the patient was given IV fluids. IV Hydration was used because oral hydration was not adequate alone. Upon recheck following hydration, the patient was improved.     Please note a critical care time of 30 minutes spent in the care of this patient outside of procedure time    DISPOSITION:  Patient will be hospitalized by Dr. Gipson in guarded condition.  Dr. Nesbitt she is providing consultation and the patient is pending a angiogram and possibly operative intervention based on that study      FINAL IMPRESSION  1. Ischemic leg    2. PVD (peripheral vascular disease) with claudication (HCC)    Critical care time, 30 minutes     Mason REGAN (Alvaro), am scribing for, and in the presence of, Murphy Mahan M.D..    Electronically signed by: Mason Sánchez (Alvaro), 10/20/2020    Murphy REGAN M.D. personally performed the services described in this documentation, as scribed by Mason Sánchez in my presence, and it is both accurate and complete. C    The note accurately reflects work and decisions made by me.  Murphy Mahan M.D.  10/20/2020  8:39 PM

## 2023-04-11 NOTE — ANESTHESIA PREPROCEDURE EVALUATION
Relevant Problems   CARDIAC   (+) Femoral artery occlusion, left (HCC)   (+) Infected prosthetic vascular graft (HCC)   (+) Ischemia of toe   (+) PAD (peripheral artery disease) (HCC)         (+) Chronic kidney disease (CKD), stage III (moderate)      Other   (+) Staphylococcal arthritis of left knee (HCC)       Physical Exam    Airway   Mallampati: II  TM distance: >3 FB  Neck ROM: full       Cardiovascular - normal exam  Rhythm: regular  Rate: normal  (-) murmur  Comments: EKG NSR   ECHO 2019 EF 65   Dental - normal exam  (+) upper dentures, lower dentures           Pulmonary - normal exam  Breath sounds clear to auscultation  Comments: Decreased breath sounds smokes marijuana   Abdominal    Neurological - normal exam                 Anesthesia Plan    ASA 4- EMERGENT   ASA physical status emergent criteria: acute ischemia (limb, body part, tissue)    Plan - general       Airway plan will be ETT      Plan Factors:   Patient was previously instructed to abstain from smoking on day of procedure.  Patient did not smoke on day of procedure.      Induction: intravenous    Postoperative Plan: Postoperative administration of opioids is intended.    Pertinent diagnostic labs and testing reviewed    Informed Consent:  Emergent - Consent given by clinician  Anesthetic plan and risks discussed with patient.    Use of blood products discussed with: patient whom consented to blood products.          Include Z78.9 (Other Specified Conditions Influencing Health Status) As An Associated Diagnosis?: No

## 2024-06-07 NOTE — CARE PLAN
Past Medical History:   Diagnosis Date    Bilateral knee pain 2012    left worse, right knee painful even after partial replacement.    Bruises easily     Clot ?    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 Took Coumadin x 6 weeks    KNEE ARTHROSCOPY  2010    right    TONSILLECTOMY         Review of patient's allergies indicates:  No Known Allergies    Current Facility-Administered Medications on File Prior to Encounter   Medication    [COMPLETED] iohexoL (OMNIPAQUE 350) injection 100 mL     Current Outpatient Medications on File Prior to Encounter   Medication Sig    amitriptyline (ELAVIL) 10 MG tablet TAKE 1 TABLET EVERY NIGHT AS NEEDED FOR PAIN (Patient taking differently: Take 10 mg by mouth nightly as needed.)    artificial tears,hypromellose,,GENTEAL/SUSTANE, (SYSTANE GEL) 0.3 % Gel 1 drop as needed.    cholecalciferol, vitamin D3, 5,000 unit capsule Take 5,000 Units by mouth once daily.    cyanocobalamin (VITAMIN B-12) 1000 MCG tablet Take 5,000 mcg by mouth once daily.    DULoxetine (CYMBALTA) 60 MG capsule Take 1 capsule (60 mg total) by mouth once daily.    famotidine (PEPCID) 20 MG tablet TAKE 1 TABLET EVERY EVENING     Problem: Fluid Volume:  Goal: Will maintain balanced intake and output  Outcome: PROGRESSING AS EXPECTED  Pt is NPO at this time.   IV fluids ordered and are running.   I's and O's recorded.     Problem: Pain Management  Goal: Pain level will decrease to patient's comfort goal  Outcome: PROGRESSING AS EXPECTED  PRN medication in use to help pt manage pain.   Pt uses 0-10 scale appropriately.   Pt states walking helps decrease pain- pt ambulates frequently in the halls.        fluconazole (DIFLUCAN) 100 MG tablet Take 1 tablet (100 mg total) by mouth once daily.    furosemide (LASIX) 40 MG tablet Take 1 tablet (40 mg total) by mouth every Tues, Fri. Take daily    gabapentin (NEURONTIN) 800 MG tablet TAKE 1 TABLET THREE TIMES DAILY (Patient taking differently: Take 800 mg by mouth 2 (two) times daily.)    levothyroxine (SYNTHROID) 125 MCG tablet TAKE 1 TABLET EVERY DAY BEFORE BREAKFAST (Patient taking differently: Take 125 mcg by mouth before breakfast. TAKE 1 TABLET EVERY DAY BEFORE BREAKFAST)    LORazepam (ATIVAN) 1 MG tablet Take 1 tablet (1 mg total) by mouth every evening.    losartan (COZAAR) 100 MG tablet TAKE 1/2 TABLET TWICE DAILY (Patient taking differently: Take 50 mg by mouth 2 (two) times daily. 50 Mg BID)    magnesium 30 mg Tab Take 1 tablet by mouth once daily.    metoprolol succinate (TOPROL-XL) 50 MG 24 hr tablet Take 0.5 tablets (25 mg total) by mouth every evening.    oxyCODONE-acetaminophen (PERCOCET)  mg per tablet Take 1 tablet by mouth daily as needed for Pain.    pantoprazole (PROTONIX) 40 MG tablet Take 1 tablet (40 mg total) by mouth every morning.    potassium chloride SA (K-DUR,KLOR-CON) 20 MEQ tablet Take 20 mEq by mouth once daily.    spironolactone (ALDACTONE) 25 MG tablet TAKE 1 TABLET ONE TIME DAILY (Patient taking differently: Take 25 mg by mouth once daily.)    turmeric 400 mg Cap Take 400 mg by mouth once daily.    vit C/E/Zn/coppr/lutein/zeaxan (PRESERVISION AREDS-2 ORAL) Take 1 capsule by mouth once daily.    alendronate (FOSAMAX) 70 MG tablet TAKE 1 TABLET BY MOUTH WEEKLY (Patient taking differently: Take 70 mg by mouth every 7 days. TAKE 1 TABLET BY MOUTH WEEKLY)    [DISCONTINUED] albuterol (PROVENTIL/VENTOLIN HFA) 90 mcg/actuation inhaler Inhale 2 puffs into the lungs every 6 (six) hours as needed for Wheezing. Rescue    [DISCONTINUED] budesonide-formoterol 160-4.5 mcg (SYMBICORT) 160-4.5 mcg/actuation HFAA Inhale 2 puffs into the lungs every  12 (twelve) hours. Controller (Patient not taking: Reported on 2024)    [DISCONTINUED] ciclopirox 0.77 % Gel APPLY TO THE AFFECTED AND SURROUNDING AREAS OF SKIN BY TOPICAL ROUTE 2 TIMES PER DAY IN THE MORNING AND EVENING (Patient not taking: Reported on 2024)    [DISCONTINUED] neomycin-polymyxin-dexamethasone (MAXITROL) 3.5mg/mL-10,000 unit/mL-0.1 % DrpS Place 1 drop into both eyes every 2 (two) hours.    [DISCONTINUED] oxyCODONE-acetaminophen (PERCOCET)  mg per tablet Take 1 tablet by mouth every 6 (six) hours as needed.     [DISCONTINUED] predniSONE (DELTASONE) 20 MG tablet Take two tablets po qd x 5 days (Patient not taking: Reported on 2024)    [DISCONTINUED] triamcinolone acetonide 0.1% (KENALOG) 0.1 % ointment Apply topically 2 (two) times daily. (Patient not taking: Reported on 2024)     Family History       Problem Relation (Age of Onset)    Cancer Daughter    Diabetes Maternal Grandmother    Hypertension Mother    Neuropathy Mother, Father    Stroke Mother          Tobacco Use    Smoking status: Former     Current packs/day: 0.00     Types: Cigarettes     Quit date: 3/23/2012     Years since quittin.2    Smokeless tobacco: Never   Substance and Sexual Activity    Alcohol use: Yes     Comment: occasional    Drug use: No    Sexual activity: Not Currently     Review of Systems   Constitutional:  Negative for chills and fever.   Respiratory:  Positive for shortness of breath.    Cardiovascular:  Positive for chest pain, palpitations and leg swelling.   Gastrointestinal:  Negative for abdominal pain, nausea and vomiting.   Musculoskeletal:  Positive for arthralgias.   Neurological:  Positive for numbness. Negative for syncope.     Objective:     Vital Signs (Most Recent):  Temp: 98.1 °F (36.7 °C) (24)  Pulse: 69 (24)  Resp: (!) 21 (24)  BP: (!) 181/75 (24)  SpO2: 99 % (24) Vital Signs (24h Range):  Temp:  [98.1 °F (36.7 °C)] 98.1  °F (36.7 °C)  Pulse:  [68-85] 69  Resp:  [17-21] 21  SpO2:  [97 %-99 %] 99 %  BP: (143-181)/(67-76) 181/75     Weight: 111.1 kg (245 lb)  Body mass index is 36.18 kg/m².     Physical Exam  Vitals reviewed.   Constitutional:       General: She is not in acute distress.  HENT:      Head: Normocephalic and atraumatic.      Mouth/Throat:      Mouth: Mucous membranes are moist.   Eyes:      Conjunctiva/sclera: Conjunctivae normal.   Cardiovascular:      Rate and Rhythm: Normal rate and regular rhythm.      Pulses:           Dorsalis pedis pulses are 1+ on the right side and 1+ on the left side.   Pulmonary:      Effort: Pulmonary effort is normal. No respiratory distress.      Breath sounds: Decreased breath sounds present.   Abdominal:      General: Bowel sounds are normal.      Palpations: Abdomen is soft.      Tenderness: There is no abdominal tenderness.   Musculoskeletal:         General: Tenderness (RLE) present. Normal range of motion.      Cervical back: Normal range of motion.      Right lower leg: Edema present.      Left lower leg: Edema present.      Comments: Negative Homans angi   Feet:      Right foot:      Skin integrity: Erythema and warmth present.   Skin:     General: Skin is warm and dry.      Capillary Refill: Capillary refill takes less than 2 seconds.      Findings: Erythema: R foot, R calf.      Comments: 2.5cm mass to L upper back   Neurological:      Mental Status: She is alert and oriented to person, place, and time.   Psychiatric:         Mood and Affect: Mood normal.                Significant Labs: All pertinent labs within the past 24 hours have been reviewed.  CBC:   Recent Labs   Lab 06/05/24  1704 06/06/24  1920   WBC 6.70 6.84   HGB 12.0 12.4   HCT 37.5 38.1    252     CMP:   Recent Labs   Lab 06/06/24  1316 06/06/24  1920   NA  --  138   K  --  3.8   CL  --  102   CO2  --  28   GLU  --  108   BUN 9 8   CREATININE 0.6 0.7   CALCIUM  --  9.2   PROT  --  6.6   ALBUMIN  --  4.2  "  BILITOT  --  0.5   ALKPHOS  --  39*   AST  --  17   ALT  --  16   ANIONGAP  --  8     Cardiac Markers:   Recent Labs   Lab 06/06/24 1920   *     Coagulation:   Recent Labs   Lab 06/06/24 1920   INR 0.9   APTT 25.6     Magnesium: No results for input(s): "MG" in the last 48 hours.  Troponin:   Recent Labs   Lab 06/06/24 1920   TROPONINIHS 7.4     TSH:   Recent Labs   Lab 12/28/23  1127   TSH 1.253  1.253     Urine Studies: No results for input(s): "COLORU", "APPEARANCEUA", "PHUR", "SPECGRAV", "PROTEINUA", "GLUCUA", "KETONESU", "BILIRUBINUA", "OCCULTUA", "NITRITE", "UROBILINOGEN", "LEUKOCYTESUR", "RBCUA", "WBCUA", "BACTERIA", "SQUAMEPITHEL", "HYALINECASTS" in the last 48 hours.    Invalid input(s): "WRIGHTSUR"    Significant Imaging: I have reviewed all pertinent imaging results/findings within the past 24 hours.  "

## (undated) DEVICE — SODIUM CHL. INJ. 0.9% 500ML (24EA/CA 50CA/PF)

## (undated) DEVICE — SHEAR HS FOCUS 9CM CVD - (6/BX)

## (undated) DEVICE — BLADE SURGICAL #11 - (50/BX)

## (undated) DEVICE — SUTURE ETHILON 2-0 FSLX 30 (36PK/BX)"

## (undated) DEVICE — CLIP LG INTNL HRZN TI ESCP LGT - (20/BX)

## (undated) DEVICE — TUBING CLEARLINK DUO-VENT - C-FLO (48EA/CA)

## (undated) DEVICE — DISH PETRI STERILE (50EA/CA)

## (undated) DEVICE — GLOVE BIOGEL PI INDICATOR SZ 8.0 SURGICAL PF LF -(50/BX 4BX/CA)

## (undated) DEVICE — SUTURE 2-0 SILK 12 X 18" (36PK/BX)"

## (undated) DEVICE — INSTRUMENT JAWCOVER SUTURE - BOOTS (5PK/BX)

## (undated) DEVICE — TRAY SURESTEP FOLEY TEMP SENSING 16FR (10EA/CA) ORDER  #18764 FOR TEMP FOLEY ONLY

## (undated) DEVICE — SUTURE 7-0 PROLENE BV-1 D/A 30 (36PK/BX)"

## (undated) DEVICE — SYRINGE SAFETY 5 ML 18 GA X 1-1/2 BLUNT LL (100/BX 4BX/CA)

## (undated) DEVICE — BANDAGE ROLL STERILE BULKEE 4.5 IN X 4 YD (100EA/CA)

## (undated) DEVICE — TUBE E-T HI-LO CUFF 8.0MM (10EA/PK)

## (undated) DEVICE — SUTURE 5-0 PROLENE BV-1 HEMOSEAL (36PK/BX)

## (undated) DEVICE — SYRINGE 30 ML LL (56/BX)

## (undated) DEVICE — SUTURE GENERAL

## (undated) DEVICE — SET EXTENSION WITH 2 PORTS (48EA/CA) ***PART #2C8610 IS A SUBSTITUTE*****

## (undated) DEVICE — BOVIE BLADE COATED - (50/PK)

## (undated) DEVICE — TUBING PRSS MNTR 72IN M/ M LL - (25/BX) MONIT. LINE W/MALE L/L

## (undated) DEVICE — DRAPE SURGICAL U 77X120 - (10/CA)

## (undated) DEVICE — KIT INTROPERCUTANEOUS SHEATH - 8.5 FR W/MAX BARRIER AND BIOPATCH  (5/CA)

## (undated) DEVICE — KIT ANESTHESIA W/CIRCUIT & 3/LT BAG W/FILTER (20EA/CA)

## (undated) DEVICE — STAPLER SKIN DISP - (6/BX 10BX/CA) VISISTAT

## (undated) DEVICE — SUTURE 1 PDS II PLUS TP-1 - (12PK/BX)

## (undated) DEVICE — SUTURE CV

## (undated) DEVICE — STAPLER 35MM SKIN WIDE (6EA/BX)

## (undated) DEVICE — PROTECTOR ULNA NERVE - (36PR/CA)

## (undated) DEVICE — SET BIFURCATED BLOOD - (48EA/CS)

## (undated) DEVICE — BAG DECANTER (50EA/CS)

## (undated) DEVICE — TRAY, CV CATH MULTI-LUM.AK-25703

## (undated) DEVICE — CANNULA W/ SUPPLY TUBING O2 - (50/CA)

## (undated) DEVICE — MASK ANESTHESIA ADULT  - (100/CA)

## (undated) DEVICE — SPONGE KITTNER DISSECTORS - (5EA/PK 50PK/CA)

## (undated) DEVICE — GLOVE BIOGEL SZ 7 SURGICAL PF LTX - (50PR/BX 4BX/CA)

## (undated) DEVICE — DRAPE IOBAN II 23 IN X 33 IN - (10/BX)

## (undated) DEVICE — PAD LAP STERILE 18 X 18 - (5/PK 40PK/CA)

## (undated) DEVICE — SURGIFOAM (SIZE 100) - (6EA/CA)

## (undated) DEVICE — TOWELS CLOTH SURGICAL - (4/PK 20PK/CA)

## (undated) DEVICE — TUBE E-T HI-LO CUFF 7.5MM (10EA/PK)

## (undated) DEVICE — CLIP MED INTNL HRZN TI ESCP - (25/BX)

## (undated) DEVICE — GLOVE SZ 8 BIOGEL PI MICRO - PF LF (50PR/BX)

## (undated) DEVICE — GRAFT MESH SEPRAFILM PRO PACK - 5/BX CONTAINS 6 3X5 PIECES

## (undated) DEVICE — LACTATED RINGERS INJ 1000 ML - (14EA/CA 60CA/PF)

## (undated) DEVICE — SPONGE XRAY 8X4 STERL. 12PL - (10EA/TY 80TY/CA)

## (undated) DEVICE — BAG RESUSCITATION DISPOSABLE - WITH MASK (10 EA/CA)

## (undated) DEVICE — PACK SINGLE BASIN - (6/CA)

## (undated) DEVICE — SUCTION INSTRUMENT YANKAUER BULBOUS TIP W/O VENT (50EA/CA)

## (undated) DEVICE — HEAD HOLDER JUNIOR/ADULT

## (undated) DEVICE — GLOVE BIOGEL SZ 7.5 SURGICAL PF LTX - (50PR/BX 4BX/CA)

## (undated) DEVICE — POLY UMBILICAL TAPE 1/8X30 - (36/BX)

## (undated) DEVICE — SUTURE 3-0 VICRYL PLUS SH - 8X 18 INCH (12/BX)

## (undated) DEVICE — SYRINGE SAFETY 10 ML 18 GA X 1 1/2 BLUNT LL (100/BX 4BX/CA)

## (undated) DEVICE — SUTURE 6-0 PROLENE RB-2 D/A 30 (36PK/BX)"

## (undated) DEVICE — BLADE SAGITTAL SAW 9.4MM X 25.5MM X .4MM FINE TOOTH (1/EA)

## (undated) DEVICE — SUTURE 2-0 PROLENE SH D/A (36PK/BX)

## (undated) DEVICE — SET LEADWIRE 5 LEAD BEDSIDE DISPOSABLE ECG (1SET OF 5/EA)

## (undated) DEVICE — SUTURE 1 VICRYL PLUS CTX - 36 INCH (36/BX)

## (undated) DEVICE — TIP INTPLS HFLO ML ORFC BTRY - (12/CS)  FOR SURGILAV

## (undated) DEVICE — SUTURE 4-0 MONOCRYL PLUS PS-1 - 27 INCH (36/BX)

## (undated) DEVICE — CANISTER SUCTION 3000ML MECHANICAL FILTER AUTO SHUTOFF MEDI-VAC NONSTERILE LF DISP  (40EA/CA)

## (undated) DEVICE — TRAY CATHETER FOLEY URINE METER W/STATLOCK 350ML (10EA/CA)

## (undated) DEVICE — SUTURE GOR-TEX CV-6 TT-9 (36PK/BX)

## (undated) DEVICE — DRAPE LARGE 3 QUARTER - (20/CA)

## (undated) DEVICE — SYRINGE 20 ML LL (50EA/BX 4BX/CA)

## (undated) DEVICE — NEPTUNE 4 PORT MANIFOLD - (20/PK)

## (undated) DEVICE — PAD PREP 24 X 48 CUFFED - (100/CA)

## (undated) DEVICE — SUTURE 4-0 PROLENE RB-1 D/A 36 (36PK/BX)"

## (undated) DEVICE — BAG, SPONGE COUNT 50600

## (undated) DEVICE — KIT RADIAL ARTERY 20GA W/MAX BARRIER AND BIOPATCH  (5EA/CA) #10740 IS FOR THE SET RADIAL ARTERIAL

## (undated) DEVICE — ELECTRODE DUAL RETURN W/ CORD - (50/PK)

## (undated) DEVICE — FILTER BLOOD TRANSFUSION - (40/CA) (PALL)

## (undated) DEVICE — SYRINGE SAFETY TB 1 ML 27 GA X 1/2 IN (100/BX 4BX/CA)

## (undated) DEVICE — DRAPE IOBAN II INCISE 23X17 - (10EA/BX 4BX/CA)

## (undated) DEVICE — TRANSDUCER ADULT DISP. SINGLE BONDED STERILE - (20EA/CA)

## (undated) DEVICE — CLIP MED LG INTNL HRZN TI ESCP - (20/BX)

## (undated) DEVICE — SET EXTENSION 31IN APPX 3.2ML WITH CLAMP (50/CA)WAS 4610-03

## (undated) DEVICE — DRAPE MEDI-SLUSH STERILE  - (40/CA)

## (undated) DEVICE — ELECTRODE 850 FOAM ADHESIVE - HYDROGEL RADIOTRNSPRNT (50/PK)

## (undated) DEVICE — SYRINGE SAFETY 3 ML 18 GA X 1 1/2 BLUNT LL (100/BX 8BX/CA)

## (undated) DEVICE — SPONGE RADIOPAQUE CTN X-LG - STERILE (50PK/CA) MADE TO ORDER ITEM AND HAS A 4-6 WEEK LEAD TIME

## (undated) DEVICE — DRESSING ABDOMINAL PAD STERILE 8 X 10" (360EA/CA)"

## (undated) DEVICE — SUTURE 4-0 SILK 12 X 18 INCH - (36/BX)

## (undated) DEVICE — CHLORAPREP 26 ML APPLICATOR - ORANGE TINT(25/CA)

## (undated) DEVICE — GOWN SURGEONS X-LARGE - DISP. (30/CA)

## (undated) DEVICE — SENSOR SPO2 NEO LNCS ADHESIVE (20/BX) SEE USER NOTES

## (undated) DEVICE — BAG ISOLATION 20X20 INVISI - SHEILD (10EA/BX)

## (undated) DEVICE — GELAQUASONIC 100 ULTRASOUND - 48/BX 20GM STERILE FOIL POUCH

## (undated) DEVICE — GLOVE SZ 6.5 BIOGEL PI MICRO - PF LF (50PR/BX)

## (undated) DEVICE — SYRINGE 10 ML CONTROL LL (25EA/BX 4BX/CA)

## (undated) DEVICE — KIT ROOM DECONTAMINATION

## (undated) DEVICE — PACK MAJOR BASIN - (2EA/CA)

## (undated) DEVICE — VESSELOOP MAXI BLUE STERILE- SURG-I-LOOP (10EA/BX)

## (undated) DEVICE — GLOVE BIOGEL ECLIPSE PF LATEX SIZE 7.5

## (undated) DEVICE — SPONGE GAUZESTER 4 X 4 4PLY - (128PK/CA)

## (undated) DEVICE — DRESSING TRANSPARENT FILM TEGADERM 4 X 4.75" (50EA/BX)"

## (undated) DEVICE — DRAPE MAYO STAND - (30/CA)

## (undated) DEVICE — GOWN SURGICAL X-LARGE ULTRA - FILM-REINFORCED (20/CA)

## (undated) DEVICE — GLOVE BIOGEL SZ 6.5 SURGICAL PF LTX (50PR/BX 4BX/CA)

## (undated) DEVICE — WIPE INSTRUMENT MICROWIPE (20EA/SP)

## (undated) DEVICE — CATHETER IV 20 GA X 1-1/4 ---SURG.& SDS ONLY--- (50EA/BX)

## (undated) DEVICE — BLADE SURGICAL CLIPPER - (50EA/CA)

## (undated) DEVICE — DRAPE 36X28IN RAD CARM BND BG - (25/CA) O

## (undated) DEVICE — SUTURE 3-0 SILK 12 X 18 IN - (36/BX)

## (undated) DEVICE — SUTURE 3-0 PROLENE SH 36 (36PK/BX)"

## (undated) DEVICE — SLEEVE, VASO, THIGH, MED

## (undated) DEVICE — GLOVE BIOGEL INDICATOR SZ 8 SURGICAL PF LTX - (50/BX 4BX/CA)

## (undated) DEVICE — SYRINGE DISP. 60 CC LL - (30/BX, 12BX/CA)**WHEN THESE ARE GONE ORDER #500206**

## (undated) DEVICE — CATHETER IV NON-SAFETY 18 GA X 1 1/4 (50/BX 4BX/CA)

## (undated) DEVICE — CATHETER EMBOLECTOMY 4FR (5EA/CA)

## (undated) DEVICE — CLIP SM INTNL HRZN TI ESCP LGT - (24EA/PK 25PK/BX)

## (undated) DEVICE — GLOVE BIOGEL PI INDICATOR SZ 7.0 SURGICAL PF LF - (50/BX 4BX/CA)

## (undated) DEVICE — CATHETER EMBOLECTOMY 3FR (1EA)

## (undated) DEVICE — ADAPTER CATHETER LUER LOCK SYRINGE STERILE

## (undated) DEVICE — SUTURE 0 PROLENE CT-1 30 (36PK/BX)"

## (undated) DEVICE — GLOVE BIOGEL PI INDICATOR SZ 6.5 SURGICAL PF LF - (50/BX 4BX/CA)

## (undated) DEVICE — STOPCOCK 3-WAY W/SWIVEL LEVER LOCK (50EA/CA)

## (undated) DEVICE — CONTAINER SPECIMEN BAG OR - STERILE 4 OZ W/LID (100EA/CA)

## (undated) DEVICE — GLOVE BIOGEL SZ 8 SURGICAL PF LTX - (50PR/BX 4BX/CA)

## (undated) DEVICE — BLADE SURGICAL #15 - (50/BX 3BX/CA)

## (undated) DEVICE — VESSELOOP MINI BLUE STERILE - SURG-I-LOOP (10EA/BX)

## (undated) DEVICE — SUTURE 0 VICRYL PLUS CT-1 - 8 X 18 INCH (12/BX)

## (undated) DEVICE — SUTURE 0 SILK TIES (36PK/BX)

## (undated) DEVICE — PACK LOWER EXTREMITY - (2/CA)

## (undated) DEVICE — GOWN WARMING STANDARD FLEX - (30/CA)

## (undated) DEVICE — GLOVE BIOGEL SZ 6 PF LATEX - (50EA/BX 4BX/CA)

## (undated) DEVICE — KIT SURGIFLO W/OUT THROMBIN - (6EA/CA)

## (undated) DEVICE — SYSTEM PEEL & PLACE 13CM INCISIONS

## (undated) DEVICE — SUTURE 2-0 SILK SH (36PK/BX)

## (undated) DEVICE — SUTURE 4-0 PROLENE SH 36 (36PK/BX)"

## (undated) DEVICE — GLOVE BIOGEL INDICATOR SZ 7.5 SURGICAL PF LTX - (50PR/BX 4BX/CA)

## (undated) DEVICE — TUBE CONNECT SUCTION CLEAR 120 X 1/4" (50EA/CA)"

## (undated) DEVICE — KIT EVACUATER 3 SPRING PVC LF 1/8 DRAIN SIZE (10EA/CA)"

## (undated) DEVICE — SUTURE 6-0 PROLENE BV-1 D/A 30 (36PK/BX)"

## (undated) DEVICE — PAD TRAC PAD ONLY (10EA/CA)

## (undated) DEVICE — STAPLER 35MM SKIN WIDE ROTATING HEAD (6EA/BX)

## (undated) DEVICE — SYRINGE DISP. 12 CC LL - (100/BX)

## (undated) DEVICE — CATHETER IV NON-SAFETY 22 GA X 1 JELCO (50/BX 4BX/CA)

## (undated) DEVICE — DRAPE C-ARM LARGE 41IN X 74 IN - (10/BX 2BX/CA)

## (undated) DEVICE — SET FLUID WARMING STANDARD FLOW - (10/CA)

## (undated) DEVICE — SUTURE 2-0 SILK SH 24 (36PK/BX)"

## (undated) DEVICE — SPONGE DRAIN 4 X 4IN 6-PLY - (2/PK25PK/BX12BX/CS)

## (undated) DEVICE — BANDAGE ELASTIC 4 HONEYCOMB - 4"X5YD LF (20/CA)"

## (undated) DEVICE — DRAPE MAGNETIC (INSTRA-MAG) - (30/CA)

## (undated) DEVICE — VESSELOOP SUPER MAXI BLUE - SURG-I-LOOP (2EA/PK 10PK/BX)

## (undated) DEVICE — COVER MAYO STAND X-LG - (22EA/CA)

## (undated) DEVICE — SODIUM CHL. IRRIGATION 0.9% 3000ML (4EA/CA 65CA/PF)

## (undated) DEVICE — DRESSING TRANSPARENT FILM TEGADERM 2.375 X 2.75"  (100EA/BX)"

## (undated) DEVICE — BOVIE BLADE SHORT - (150EA/CT)

## (undated) DEVICE — SUTURE 0 VICRYL PLUS CT-1 - 36 INCH (36/BX)

## (undated) DEVICE — DECANTER FLD BLS - (50/CA)

## (undated) DEVICE — SODIUM CHL IRRIGATION 0.9% 1000ML (12EA/CA)

## (undated) DEVICE — DERMABOND ADVANCED - (12EA/BX)

## (undated) DEVICE — SUTURE 3-0 ETHILON FS-1 - (36/BX) 30 INCH

## (undated) DEVICE — SUTURE 4-0 PROLENE V-7 D/A (36PK/BX)

## (undated) DEVICE — TUBE NG SALEM SUMP 16FR (50EA/CA)

## (undated) DEVICE — SUTURE 3-0 VICRYL PLUS SH - 27 INCH (36/BX)

## (undated) DEVICE — LIGASURE TISSUE FUSION  - SINGLE USE (6/CA)

## (undated) DEVICE — SUTURE 7-0 PROLENE BV-1 D/A 24 (36PK/BX)"

## (undated) DEVICE — SUTURE 3-0 VICRYL PLUS - 12 X 18 INCH (12/BX)

## (undated) DEVICE — MASK, LARYNGEAL AIRWAY #4

## (undated) DEVICE — PTFE PLED STER - (250/CA)

## (undated) DEVICE — SUTURE 2-0 VICRYL PLUS CT-1 36 (36PK/BX)"

## (undated) DEVICE — SUTURE 2-0 VICRYL PLUS CT-1 - 8 X 18 INCH(12/BX)

## (undated) DEVICE — HANDPIECE 10FT INTPLS SCT PLS IRRIGATION HAND CONTROL SET (6/PK)

## (undated) DEVICE — MASK, LARYNGEAL AIRWAY #5

## (undated) DEVICE — SUTURE 3-0 VICRYL PLUS CT-1 - 36 INCH (36/BX)